# Patient Record
Sex: FEMALE | Race: ASIAN | NOT HISPANIC OR LATINO | URBAN - METROPOLITAN AREA
[De-identification: names, ages, dates, MRNs, and addresses within clinical notes are randomized per-mention and may not be internally consistent; named-entity substitution may affect disease eponyms.]

---

## 2017-08-09 ENCOUNTER — OUTPATIENT (OUTPATIENT)
Dept: OUTPATIENT SERVICES | Facility: HOSPITAL | Age: 72
LOS: 1 days | Discharge: HOME | End: 2017-08-09
Payer: MEDICAID

## 2017-08-09 DIAGNOSIS — D64.9 ANEMIA, UNSPECIFIED: ICD-10-CM

## 2017-08-10 ENCOUNTER — RESULT REVIEW (OUTPATIENT)
Age: 72
End: 2017-08-10

## 2017-08-10 ENCOUNTER — APPOINTMENT (OUTPATIENT)
Dept: HEMATOLOGY ONCOLOGY | Facility: CLINIC | Age: 72
End: 2017-08-10

## 2017-08-10 VITALS
HEART RATE: 62 BPM | HEIGHT: 60.71 IN | TEMPERATURE: 98.1 F | DIASTOLIC BLOOD PRESSURE: 66 MMHG | OXYGEN SATURATION: 100 % | WEIGHT: 98.55 LBS | BODY MASS INDEX: 18.85 KG/M2 | RESPIRATION RATE: 16 BRPM | SYSTOLIC BLOOD PRESSURE: 151 MMHG

## 2017-08-10 DIAGNOSIS — C90.00 MULTIPLE MYELOMA NOT HAVING ACHIEVED REMISSION: ICD-10-CM

## 2017-08-10 LAB
ANISOCYTOSIS BLD QL: SIGNIFICANT CHANGE UP
BASOPHILS # BLD AUTO: 0 K/UL — SIGNIFICANT CHANGE UP (ref 0–0.2)
DACRYOCYTES BLD QL SMEAR: SLIGHT — SIGNIFICANT CHANGE UP
ELLIPTOCYTES BLD QL SMEAR: SLIGHT — SIGNIFICANT CHANGE UP
EOSINOPHIL # BLD AUTO: 0.2 K/UL — SIGNIFICANT CHANGE UP (ref 0–0.5)
EOSINOPHIL NFR BLD AUTO: 5 % — SIGNIFICANT CHANGE UP (ref 0–6)
HCT VFR BLD CALC: 27.1 % — LOW (ref 34.5–45)
HGB BLD-MCNC: 9 G/DL — LOW (ref 11.5–15.5)
HYPOCHROMIA BLD QL: SLIGHT — SIGNIFICANT CHANGE UP
LYMPHOCYTES # BLD AUTO: 2 K/UL — SIGNIFICANT CHANGE UP (ref 1–3.3)
LYMPHOCYTES # BLD AUTO: 33 % — SIGNIFICANT CHANGE UP (ref 13–44)
MACROCYTES BLD QL: SLIGHT — SIGNIFICANT CHANGE UP
MCHC RBC-ENTMCNC: 25.6 PG — LOW (ref 27–34)
MCHC RBC-ENTMCNC: 33.4 G/DL — SIGNIFICANT CHANGE UP (ref 32–36)
MCV RBC AUTO: 76.7 FL — LOW (ref 80–100)
MICROCYTES BLD QL: SLIGHT — SIGNIFICANT CHANGE UP
MONOCYTES # BLD AUTO: 0.6 K/UL — SIGNIFICANT CHANGE UP (ref 0–0.9)
MONOCYTES NFR BLD AUTO: 10 % — SIGNIFICANT CHANGE UP (ref 2–14)
NEUTROPHILS # BLD AUTO: 3.4 K/UL — SIGNIFICANT CHANGE UP (ref 1.8–7.4)
NEUTROPHILS NFR BLD AUTO: 49 % — SIGNIFICANT CHANGE UP (ref 43–77)
NEUTS BAND # BLD: 3 % — SIGNIFICANT CHANGE UP (ref 0–8)
NRBC # BLD: 5 /100 — HIGH (ref 0–0)
PLAT MORPH BLD: NORMAL — SIGNIFICANT CHANGE UP
PLATELET # BLD AUTO: 247 K/UL — SIGNIFICANT CHANGE UP (ref 150–400)
POIKILOCYTOSIS BLD QL AUTO: SIGNIFICANT CHANGE UP
POLYCHROMASIA BLD QL SMEAR: SLIGHT — SIGNIFICANT CHANGE UP
RBC # BLD: 3.53 M/UL — LOW (ref 3.8–5.2)
RBC # FLD: 17.8 % — HIGH (ref 10.3–14.5)
RBC BLD AUTO: ABNORMAL
SCHISTOCYTES BLD QL AUTO: SLIGHT — SIGNIFICANT CHANGE UP
WBC # BLD: 6.3 K/UL — SIGNIFICANT CHANGE UP (ref 3.8–10.5)
WBC # FLD AUTO: 6.3 K/UL — SIGNIFICANT CHANGE UP (ref 3.8–10.5)

## 2017-08-10 PROCEDURE — 84166 PROTEIN E-PHORESIS/URINE/CSF: CPT | Mod: 26

## 2017-08-21 ENCOUNTER — APPOINTMENT (OUTPATIENT)
Dept: RADIOLOGY | Facility: CLINIC | Age: 72
End: 2017-08-21
Payer: MEDICAID

## 2017-08-21 ENCOUNTER — RESULT REVIEW (OUTPATIENT)
Age: 72
End: 2017-08-21

## 2017-08-21 ENCOUNTER — OUTPATIENT (OUTPATIENT)
Dept: OUTPATIENT SERVICES | Facility: HOSPITAL | Age: 72
LOS: 1 days | End: 2017-08-21
Payer: MEDICAID

## 2017-08-21 DIAGNOSIS — Z00.8 ENCOUNTER FOR OTHER GENERAL EXAMINATION: ICD-10-CM

## 2017-08-21 PROCEDURE — 77075 RADEX OSSEOUS SURVEY COMPL: CPT | Mod: 26

## 2017-08-21 PROCEDURE — 77075 RADEX OSSEOUS SURVEY COMPL: CPT

## 2017-09-11 ENCOUNTER — APPOINTMENT (OUTPATIENT)
Dept: HEMATOLOGY ONCOLOGY | Facility: CLINIC | Age: 72
End: 2017-09-11

## 2017-09-13 ENCOUNTER — OUTPATIENT (OUTPATIENT)
Dept: OUTPATIENT SERVICES | Facility: HOSPITAL | Age: 72
LOS: 1 days | Discharge: HOME | End: 2017-09-13

## 2017-09-13 DIAGNOSIS — D64.9 ANEMIA, UNSPECIFIED: ICD-10-CM

## 2017-09-15 ENCOUNTER — OUTPATIENT (OUTPATIENT)
Dept: OUTPATIENT SERVICES | Facility: HOSPITAL | Age: 72
LOS: 1 days | End: 2017-09-15
Payer: MEDICAID

## 2017-09-15 DIAGNOSIS — D64.9 ANEMIA, UNSPECIFIED: ICD-10-CM

## 2017-09-17 ENCOUNTER — RESULT REVIEW (OUTPATIENT)
Age: 72
End: 2017-09-17

## 2017-09-18 ENCOUNTER — RESULT REVIEW (OUTPATIENT)
Age: 72
End: 2017-09-18

## 2017-09-18 ENCOUNTER — APPOINTMENT (OUTPATIENT)
Dept: HEMATOLOGY ONCOLOGY | Facility: CLINIC | Age: 72
End: 2017-09-18

## 2017-09-18 ENCOUNTER — APPOINTMENT (OUTPATIENT)
Dept: ORTHOPEDIC SURGERY | Facility: CLINIC | Age: 72
End: 2017-09-18
Payer: MEDICAID

## 2017-09-18 VITALS
SYSTOLIC BLOOD PRESSURE: 161 MMHG | HEIGHT: 62 IN | HEART RATE: 67 BPM | BODY MASS INDEX: 18.4 KG/M2 | WEIGHT: 100 LBS | DIASTOLIC BLOOD PRESSURE: 71 MMHG

## 2017-09-18 VITALS
TEMPERATURE: 98.1 F | HEART RATE: 68 BPM | BODY MASS INDEX: 19.3 KG/M2 | SYSTOLIC BLOOD PRESSURE: 136 MMHG | RESPIRATION RATE: 16 BRPM | DIASTOLIC BLOOD PRESSURE: 80 MMHG | OXYGEN SATURATION: 100 % | WEIGHT: 101.19 LBS

## 2017-09-18 DIAGNOSIS — Z87.39 PERSONAL HISTORY OF OTHER DISEASES OF THE MUSCULOSKELETAL SYSTEM AND CONNECTIVE TISSUE: ICD-10-CM

## 2017-09-18 DIAGNOSIS — Z78.9 OTHER SPECIFIED HEALTH STATUS: ICD-10-CM

## 2017-09-18 DIAGNOSIS — Z82.61 FAMILY HISTORY OF ARTHRITIS: ICD-10-CM

## 2017-09-18 DIAGNOSIS — M19.041 PRIMARY OSTEOARTHRITIS, RIGHT HAND: ICD-10-CM

## 2017-09-18 PROCEDURE — 88189 FLOWCYTOMETRY/READ 16 & >: CPT

## 2017-09-18 PROCEDURE — 81261 IGH GENE REARRANGE AMP METH: CPT

## 2017-09-18 PROCEDURE — 88291 CYTO/MOLECULAR REPORT: CPT

## 2017-09-18 PROCEDURE — 81264 IGK REARRANGEABN CLONAL POP: CPT

## 2017-09-18 PROCEDURE — 88271 CYTOGENETICS DNA PROBE: CPT

## 2017-09-18 PROCEDURE — 88280 CHROMOSOME KARYOTYPE STUDY: CPT

## 2017-09-18 PROCEDURE — 99204 OFFICE O/P NEW MOD 45 MIN: CPT

## 2017-09-18 PROCEDURE — 88237 TISSUE CULTURE BONE MARROW: CPT

## 2017-09-18 PROCEDURE — 88184 FLOWCYTOMETRY/ TC 1 MARKER: CPT

## 2017-09-18 PROCEDURE — 88264 CHROMOSOME ANALYSIS 20-25: CPT

## 2017-09-18 PROCEDURE — 88185 FLOWCYTOMETRY/TC ADD-ON: CPT

## 2017-09-18 PROCEDURE — 88275 CYTOGENETICS 100-300: CPT

## 2017-09-19 DIAGNOSIS — C90.00 MULTIPLE MYELOMA NOT HAVING ACHIEVED REMISSION: ICD-10-CM

## 2017-09-19 PROBLEM — Z78.9 DENIES ALCOHOL CONSUMPTION: Status: ACTIVE | Noted: 2017-09-18

## 2017-09-19 PROBLEM — Z78.9 EXERCISES OCCASIONALLY: Status: ACTIVE | Noted: 2017-09-18

## 2017-09-19 PROBLEM — Z87.39 HISTORY OF ARTHRITIS: Status: RESOLVED | Noted: 2017-09-18 | Resolved: 2017-09-19

## 2017-09-19 PROBLEM — Z78.9 DOES NOT USE ILLICIT DRUGS: Status: ACTIVE | Noted: 2017-09-18

## 2017-09-19 PROBLEM — Z87.39 HISTORY OF RHEUMATOID ARTHRITIS: Status: RESOLVED | Noted: 2017-09-18 | Resolved: 2017-09-19

## 2017-09-19 PROBLEM — Z87.39 HISTORY OF OSTEOPOROSIS: Status: RESOLVED | Noted: 2017-09-18 | Resolved: 2017-09-19

## 2017-09-19 PROBLEM — Z82.61 FAMILY HISTORY OF ARTHRITIS: Status: ACTIVE | Noted: 2017-09-18

## 2017-09-19 PROBLEM — Z78.9 CURRENT NON-SMOKER: Status: ACTIVE | Noted: 2017-09-18

## 2017-09-21 LAB — DNA PLOIDY SPEC FC-IMP: SIGNIFICANT CHANGE UP

## 2017-09-22 LAB — HEMATOPATHOLOGY REPORT: SIGNIFICANT CHANGE UP

## 2017-09-25 LAB — TM INTERPRETATION: SIGNIFICANT CHANGE UP

## 2017-09-27 LAB
CHROM ANALY INTERPHASE BLD FISH-IMP: SIGNIFICANT CHANGE UP
CHROM ANALY OVERALL INTERP SPEC-IMP: SIGNIFICANT CHANGE UP

## 2017-09-29 ENCOUNTER — FORM ENCOUNTER (OUTPATIENT)
Age: 72
End: 2017-09-29

## 2017-09-30 ENCOUNTER — OUTPATIENT (OUTPATIENT)
Dept: OUTPATIENT SERVICES | Facility: HOSPITAL | Age: 72
LOS: 1 days | End: 2017-09-30
Payer: MEDICAID

## 2017-09-30 ENCOUNTER — APPOINTMENT (OUTPATIENT)
Dept: RADIOLOGY | Facility: IMAGING CENTER | Age: 72
End: 2017-09-30
Payer: MEDICAID

## 2017-09-30 DIAGNOSIS — C90.00 MULTIPLE MYELOMA NOT HAVING ACHIEVED REMISSION: ICD-10-CM

## 2017-09-30 PROCEDURE — 77074 RADEX OSSEOUS SURVEY LMTD: CPT

## 2017-09-30 PROCEDURE — 77074 RADEX OSSEOUS SURVEY LMTD: CPT | Mod: 26

## 2017-10-09 ENCOUNTER — APPOINTMENT (OUTPATIENT)
Dept: HEMATOLOGY ONCOLOGY | Facility: CLINIC | Age: 72
End: 2017-10-09

## 2017-10-22 ENCOUNTER — FORM ENCOUNTER (OUTPATIENT)
Age: 72
End: 2017-10-22

## 2017-10-23 ENCOUNTER — OUTPATIENT (OUTPATIENT)
Dept: OUTPATIENT SERVICES | Facility: HOSPITAL | Age: 72
LOS: 1 days | End: 2017-10-23
Payer: MEDICAID

## 2017-10-23 ENCOUNTER — APPOINTMENT (OUTPATIENT)
Dept: NUCLEAR MEDICINE | Facility: IMAGING CENTER | Age: 72
End: 2017-10-23
Payer: MEDICAID

## 2017-10-23 DIAGNOSIS — C90.00 MULTIPLE MYELOMA NOT HAVING ACHIEVED REMISSION: ICD-10-CM

## 2017-10-23 PROCEDURE — A9552: CPT

## 2017-10-23 PROCEDURE — 78816 PET IMAGE W/CT FULL BODY: CPT | Mod: 26,PI

## 2017-10-23 PROCEDURE — 78816 PET IMAGE W/CT FULL BODY: CPT

## 2019-11-18 ENCOUNTER — APPOINTMENT (OUTPATIENT)
Dept: VASCULAR SURGERY | Facility: CLINIC | Age: 74
End: 2019-11-18
Payer: MEDICAID

## 2019-11-18 PROCEDURE — 93971 EXTREMITY STUDY: CPT

## 2019-11-18 PROCEDURE — 99203 OFFICE O/P NEW LOW 30 MIN: CPT

## 2019-11-25 NOTE — PHYSICAL EXAM
[2+] : left 2+ [No Rash or Lesion] : No rash or lesion [Alert] : alert [Calm] : calm [JVD] : no jugular venous distention  [Normal Breath Sounds] : Normal breath sounds [Normal Heart Sounds] : normal heart sounds [Ankle Swelling (On Exam)] : not present [Abdomen Masses] : No abdominal masses [Skin Ulcer] : no ulcer [Oriented to Place] : oriented to place [Oriented to Person] : oriented to person [de-identified] : appears well  [de-identified] : right ac fossa notable for dialated vein that is compressible, no pulsatility, no erythema no palpable cord \par  strength 5/5 b/l upper extremities\par  [de-identified] : intact

## 2019-11-25 NOTE — ASSESSMENT
[FreeTextEntry1] : 75 yo female with history of htn, ckd, pre-dm, gout, thalasemia, anemia presents for evaluation of right upper extremity mass\par venous duplex shows chronic webbing with cephalic vein dilation \par no intervention at this time\par pt advised to avoid the area for venupuncture \par pt to follow up as needed

## 2020-07-01 ENCOUNTER — RECORD ABSTRACTING (OUTPATIENT)
Age: 75
End: 2020-07-01

## 2020-07-02 ENCOUNTER — OUTPATIENT (OUTPATIENT)
Dept: OUTPATIENT SERVICES | Facility: HOSPITAL | Age: 75
LOS: 1 days | Discharge: HOME | End: 2020-07-02

## 2020-07-02 DIAGNOSIS — D64.9 ANEMIA, UNSPECIFIED: ICD-10-CM

## 2020-07-09 ENCOUNTER — APPOINTMENT (OUTPATIENT)
Dept: HEMATOLOGY ONCOLOGY | Facility: CLINIC | Age: 75
End: 2020-07-09
Payer: MEDICARE

## 2020-07-09 PROCEDURE — 99203 OFFICE O/P NEW LOW 30 MIN: CPT | Mod: 95

## 2020-07-09 NOTE — ASSESSMENT
[FreeTextEntry1] : This is a 74 year old woman with a history of kappa light cahin MGUS, bone marrow biopsy demonstrated 9% plasma cells, with CCND1 mutation in 4% of cells, IgH and IgK positive, 46XX karyotype.  Chapis lost to follow up for nearly 3 years while in the [facility], monika had not been following up with a physician there.  Will re-evaluate the MGUS with CBC, CMP, LDH, SPEP, ELIECER, Free light chains.  If there is an indication of progressive anemia or renal insufficiency, will need to repeat bone marrow biopsy and consider treatment. Otherwise will monitor as an MGUS.

## 2020-07-09 NOTE — HISTORY OF PRESENT ILLNESS
[de-identified] : THis is a 74 year old woman with a history of MGUS 9% plasma cells on bone marrow biopsy in September 2017.  Hx of renal insufficeincy and chronic anemia, hypertension, hyperlipidemia.  Bone marrow biopsy performed due to kappa light chain MGUS.  Patient  was lost to follow up for 3 years, had been living in the [facility] until recently ocming back to [place] in June. Has not had any labwork done in the [facility] and has not yet re-established care since then.  Chapis feels well on the most part, denies any weakness or fatigue, denies any bony aches or body pains.  Patient \par \par Patient does not have any body aches or bone pains.  \par \par Does have a history of an anemia hg 8-9 range with a Creatinine of 1.6-1.9 in 2017 no more recentlab.s  \par \par PET scan negative no lytic lesions October 24 2017,  Do not have any more recent records.\par \par She sees a nephrologist Dr. Breen.  however has not seen them either since coming back to the [place]. [de-identified] : \par Comprehensive Hematopathology Report\par Final Diagnosis:\par 1, 2. Bone marrow biopsy and bone marrow aspirate\par - Trilineage hematopoiesis with maturation and present iron\par stores\par - Mild monotypic plasmacytosis (approximately 8-10% plasma\par cells)\par Diagnostic Note:\par Plasma cells are increased according to bone marrow aspirate (9%\par of cells). Flow cytometry showed 2% monotypic plasma cells with\par aberrant phenotype. Biopsy was minimal (quantification of plasma\par cells may not be representative); however  showed increased\par plasma cells (8-10% marrow cellularity; minimal sample). Suggest\par follow-up, re-evaluation of bone marrow if clinically indicated,\par and correlation with relevant clinical, laboratory (monoclonal\par protein type and amount) and radiologic imaging. Please note\par findings of a normal female karyotype and a positive multiple\par myeloma panel FISH study for gains of CCND1 in 4% of cells. Based\par on the additional findings, the diagnosis has not changed.\par \par Karyotype 46 XX\par IgH, IgK positive\par FISH positive for gain in CCND in 4%

## 2020-07-17 ENCOUNTER — LABORATORY RESULT (OUTPATIENT)
Age: 75
End: 2020-07-17

## 2020-07-22 LAB
ALBUMIN MFR SERPL ELPH: 61.4 %
ALBUMIN SERPL ELPH-MCNC: 5.3 G/DL
ALBUMIN SERPL-MCNC: 5 G/DL
ALBUMIN/GLOB SERPL: 1.6 RATIO
ALP BLD-CCNC: 51 U/L
ALPHA1 GLOB MFR SERPL ELPH: 2.9 %
ALPHA1 GLOB SERPL ELPH-MCNC: 0.2 G/DL
ALPHA2 GLOB MFR SERPL ELPH: 8.6 %
ALPHA2 GLOB SERPL ELPH-MCNC: 0.7 G/DL
ALT SERPL-CCNC: 24 U/L
ANION GAP SERPL CALC-SCNC: 16 MMOL/L
AST SERPL-CCNC: 31 U/L
B-GLOBULIN MFR SERPL ELPH: 8.2 %
B-GLOBULIN SERPL ELPH-MCNC: 0.7 G/DL
B2 MICROGLOB SERPL-MCNC: 7.3 MG/L
BASOPHILS # BLD AUTO: 0.06 K/UL
BASOPHILS NFR BLD AUTO: 0.9 %
BILIRUB SERPL-MCNC: 0.2 MG/DL
BUN SERPL-MCNC: 29 MG/DL
CALCIUM SERPL-MCNC: 9.6 MG/DL
CHLORIDE SERPL-SCNC: 106 MMOL/L
CO2 SERPL-SCNC: 19 MMOL/L
CREAT SERPL-MCNC: 1.93 MG/DL
DEPRECATED KAPPA LC FREE/LAMBDA SER: 7.75 RATIO
DEPRECATED KAPPA LC FREE/LAMBDA SER: 7.75 RATIO
EOSINOPHIL # BLD AUTO: 0.32 K/UL
EOSINOPHIL NFR BLD AUTO: 5.2 %
FERRITIN SERPL-MCNC: 2211 NG/ML
FOLATE SERPL-MCNC: >20 NG/ML
GAMMA GLOB FLD ELPH-MCNC: 1.5 G/DL
GAMMA GLOB MFR SERPL ELPH: 18.9 %
GLUCOSE SERPL-MCNC: 101 MG/DL
HCT VFR BLD CALC: 31.7 %
HGB BLD-MCNC: 9.2 G/DL
IGA SER QL IEP: 130 MG/DL
IGG SER QL IEP: 1658 MG/DL
IGM SER QL IEP: 57 MG/DL
INTERPRETATION SERPL IEP-IMP: NORMAL
IRON SATN MFR SERPL: 81 %
IRON SERPL-MCNC: 165 UG/DL
KAPPA LC CSF-MCNC: 3.34 MG/DL
KAPPA LC CSF-MCNC: 3.34 MG/DL
KAPPA LC SERPL-MCNC: 25.87 MG/DL
KAPPA LC SERPL-MCNC: 25.87 MG/DL
LDH SERPL-CCNC: 245 U/L
LYMPHOCYTES # BLD AUTO: 1.54 K/UL
LYMPHOCYTES NFR BLD AUTO: 25.2 %
M PROTEIN MFR SERPL ELPH: 13.6 %
M PROTEIN SPEC IFE-MCNC: NORMAL
MAN DIFF?: NORMAL
MCHC RBC-ENTMCNC: 25.7 PG
MCHC RBC-ENTMCNC: 29 GM/DL
MCV RBC AUTO: 88.5 FL
MONOCLON BAND OBS SERPL: 1.1 G/DL
MONOCYTES # BLD AUTO: 0.32 K/UL
MONOCYTES NFR BLD AUTO: 5.2 %
NEUTROPHILS # BLD AUTO: 3.89 K/UL
NEUTROPHILS NFR BLD AUTO: 63.5 %
PLATELET # BLD AUTO: 189 K/UL
POTASSIUM SERPL-SCNC: 5.3 MMOL/L
PROT SERPL-MCNC: 8.1 G/DL
RBC # BLD: 3.58 M/UL
RBC # BLD: 3.58 M/UL
RBC # FLD: 16.6 %
RETICS # AUTO: 1.1 %
RETICS AGGREG/RBC NFR: 39.8 K/UL
SODIUM SERPL-SCNC: 142 MMOL/L
TIBC SERPL-MCNC: 204 UG/DL
UIBC SERPL-MCNC: 39 UG/DL
VIT B12 SERPL-MCNC: 1188 PG/ML
WBC # FLD AUTO: 6.13 K/UL

## 2021-01-06 DIAGNOSIS — D47.2 MONOCLONAL GAMMOPATHY: ICD-10-CM

## 2021-01-12 ENCOUNTER — OUTPATIENT (OUTPATIENT)
Dept: OUTPATIENT SERVICES | Facility: HOSPITAL | Age: 76
LOS: 1 days | Discharge: HOME | End: 2021-01-12

## 2021-01-12 DIAGNOSIS — D64.9 ANEMIA, UNSPECIFIED: ICD-10-CM

## 2021-01-13 ENCOUNTER — APPOINTMENT (OUTPATIENT)
Dept: HEMATOLOGY ONCOLOGY | Facility: CLINIC | Age: 76
End: 2021-01-13

## 2022-02-06 ENCOUNTER — INPATIENT (INPATIENT)
Facility: HOSPITAL | Age: 77
LOS: 23 days | Discharge: NURSING, REHAB OR LONG-TERM CARE | DRG: 312 | End: 2022-03-02
Attending: INTERNAL MEDICINE | Admitting: INTERNAL MEDICINE
Payer: MEDICARE

## 2022-02-06 ENCOUNTER — EMERGENCY (EMERGENCY)
Facility: HOSPITAL | Age: 77
LOS: 0 days | Discharge: ANOTHER HOSPITAL | End: 2022-02-06
Attending: STUDENT IN AN ORGANIZED HEALTH CARE EDUCATION/TRAINING PROGRAM
Payer: MEDICARE

## 2022-02-06 VITALS
OXYGEN SATURATION: 98 % | TEMPERATURE: 98 F | RESPIRATION RATE: 18 BRPM | HEART RATE: 93 BPM | SYSTOLIC BLOOD PRESSURE: 160 MMHG | DIASTOLIC BLOOD PRESSURE: 74 MMHG

## 2022-02-06 VITALS
DIASTOLIC BLOOD PRESSURE: 81 MMHG | SYSTOLIC BLOOD PRESSURE: 168 MMHG | HEIGHT: 60 IN | RESPIRATION RATE: 16 BRPM | HEART RATE: 88 BPM | OXYGEN SATURATION: 100 % | TEMPERATURE: 98 F | WEIGHT: 85.1 LBS

## 2022-02-06 VITALS
OXYGEN SATURATION: 98 % | RESPIRATION RATE: 16 BRPM | HEIGHT: 60 IN | WEIGHT: 87.96 LBS | TEMPERATURE: 98 F | HEART RATE: 93 BPM | SYSTOLIC BLOOD PRESSURE: 167 MMHG | DIASTOLIC BLOOD PRESSURE: 76 MMHG

## 2022-02-06 DIAGNOSIS — I10 ESSENTIAL (PRIMARY) HYPERTENSION: ICD-10-CM

## 2022-02-06 DIAGNOSIS — S12.9XXA FRACTURE OF NECK, UNSPECIFIED, INITIAL ENCOUNTER: ICD-10-CM

## 2022-02-06 DIAGNOSIS — M54.2 CERVICALGIA: ICD-10-CM
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
## 2022-02-06 DIAGNOSIS — S12.290A OTHER DISPLACED FRACTURE OF THIRD CERVICAL VERTEBRA, INITIAL ENCOUNTER FOR CLOSED FRACTURE: ICD-10-CM

## 2022-02-06 DIAGNOSIS — E78.5 HYPERLIPIDEMIA, UNSPECIFIED: ICD-10-CM

## 2022-02-06 DIAGNOSIS — Z20.822 CONTACT WITH AND (SUSPECTED) EXPOSURE TO COVID-19: ICD-10-CM

## 2022-02-06 DIAGNOSIS — M79.645 PAIN IN LEFT FINGER(S): ICD-10-CM
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
## 2022-02-06 LAB
ALBUMIN SERPL ELPH-MCNC: 3.8 G/DL — SIGNIFICANT CHANGE UP (ref 3.3–5)
ALBUMIN SERPL ELPH-MCNC: 4.5 G/DL — SIGNIFICANT CHANGE UP (ref 3.3–5)
ALP SERPL-CCNC: 67 U/L — SIGNIFICANT CHANGE UP (ref 40–120)
ALP SERPL-CCNC: 70 U/L — SIGNIFICANT CHANGE UP (ref 40–120)
ALT FLD-CCNC: 26 U/L — SIGNIFICANT CHANGE UP (ref 10–45)
ALT FLD-CCNC: 39 U/L — SIGNIFICANT CHANGE UP (ref 12–78)
ANION GAP SERPL CALC-SCNC: 11 MMOL/L — SIGNIFICANT CHANGE UP (ref 5–17)
ANION GAP SERPL CALC-SCNC: 4 MMOL/L — LOW (ref 5–17)
APPEARANCE UR: CLEAR — SIGNIFICANT CHANGE UP
APTT BLD: 30 SEC — SIGNIFICANT CHANGE UP (ref 27.5–35.5)
AST SERPL-CCNC: 29 U/L — SIGNIFICANT CHANGE UP (ref 10–40)
AST SERPL-CCNC: 31 U/L — SIGNIFICANT CHANGE UP (ref 15–37)
BACTERIA # UR AUTO: NEGATIVE — SIGNIFICANT CHANGE UP
BASOPHILS # BLD AUTO: 0.01 K/UL — SIGNIFICANT CHANGE UP (ref 0–0.2)
BASOPHILS # BLD AUTO: 0.02 K/UL — SIGNIFICANT CHANGE UP (ref 0–0.2)
BASOPHILS NFR BLD AUTO: 0.1 % — SIGNIFICANT CHANGE UP (ref 0–2)
BASOPHILS NFR BLD AUTO: 0.3 % — SIGNIFICANT CHANGE UP (ref 0–2)
BILIRUB SERPL-MCNC: 0.2 MG/DL — SIGNIFICANT CHANGE UP (ref 0.2–1.2)
BILIRUB SERPL-MCNC: 0.2 MG/DL — SIGNIFICANT CHANGE UP (ref 0.2–1.2)
BILIRUB UR-MCNC: NEGATIVE — SIGNIFICANT CHANGE UP
BUN SERPL-MCNC: 26 MG/DL — HIGH (ref 7–23)
BUN SERPL-MCNC: 31 MG/DL — HIGH (ref 7–23)
CALCIUM SERPL-MCNC: 8.4 MG/DL — LOW (ref 8.5–10.1)
CALCIUM SERPL-MCNC: 9.1 MG/DL — SIGNIFICANT CHANGE UP (ref 8.4–10.5)
CHLORIDE SERPL-SCNC: 109 MMOL/L — HIGH (ref 96–108)
CHLORIDE SERPL-SCNC: 113 MMOL/L — HIGH (ref 96–108)
CO2 SERPL-SCNC: 17 MMOL/L — LOW (ref 22–31)
CO2 SERPL-SCNC: 22 MMOL/L — SIGNIFICANT CHANGE UP (ref 22–31)
COLOR SPEC: COLORLESS — SIGNIFICANT CHANGE UP
CREAT SERPL-MCNC: 1.99 MG/DL — HIGH (ref 0.5–1.3)
CREAT SERPL-MCNC: 2.29 MG/DL — HIGH (ref 0.5–1.3)
DIFF PNL FLD: NEGATIVE — SIGNIFICANT CHANGE UP
EOSINOPHIL # BLD AUTO: 0.03 K/UL — SIGNIFICANT CHANGE UP (ref 0–0.5)
EOSINOPHIL # BLD AUTO: 0.05 K/UL — SIGNIFICANT CHANGE UP (ref 0–0.5)
EOSINOPHIL NFR BLD AUTO: 0.4 % — SIGNIFICANT CHANGE UP (ref 0–6)
EOSINOPHIL NFR BLD AUTO: 0.7 % — SIGNIFICANT CHANGE UP (ref 0–6)
EPI CELLS # UR: 1 /HPF — SIGNIFICANT CHANGE UP
FLUAV AG NPH QL: SIGNIFICANT CHANGE UP
FLUBV AG NPH QL: SIGNIFICANT CHANGE UP
GLUCOSE SERPL-MCNC: 128 MG/DL — HIGH (ref 70–99)
GLUCOSE SERPL-MCNC: 145 MG/DL — HIGH (ref 70–99)
GLUCOSE UR QL: NEGATIVE — SIGNIFICANT CHANGE UP
HCT VFR BLD CALC: 29 % — LOW (ref 34.5–45)
HCT VFR BLD CALC: 30.9 % — LOW (ref 34.5–45)
HGB BLD-MCNC: 8.6 G/DL — LOW (ref 11.5–15.5)
HGB BLD-MCNC: 9.4 G/DL — LOW (ref 11.5–15.5)
HYALINE CASTS # UR AUTO: 1 /LPF — SIGNIFICANT CHANGE UP (ref 0–2)
IMM GRANULOCYTES NFR BLD AUTO: 1.1 % — SIGNIFICANT CHANGE UP (ref 0–1.5)
IMM GRANULOCYTES NFR BLD AUTO: 1.2 % — SIGNIFICANT CHANGE UP (ref 0–1.5)
INR BLD: 0.86 RATIO — LOW (ref 0.88–1.16)
KETONES UR-MCNC: NEGATIVE — SIGNIFICANT CHANGE UP
LEUKOCYTE ESTERASE UR-ACNC: NEGATIVE — SIGNIFICANT CHANGE UP
LYMPHOCYTES # BLD AUTO: 0.74 K/UL — LOW (ref 1–3.3)
LYMPHOCYTES # BLD AUTO: 0.75 K/UL — LOW (ref 1–3.3)
LYMPHOCYTES # BLD AUTO: 8.9 % — LOW (ref 13–44)
LYMPHOCYTES # BLD AUTO: 9.9 % — LOW (ref 13–44)
MCHC RBC-ENTMCNC: 25.6 PG — LOW (ref 27–34)
MCHC RBC-ENTMCNC: 25.8 PG — LOW (ref 27–34)
MCHC RBC-ENTMCNC: 29.7 GM/DL — LOW (ref 32–36)
MCHC RBC-ENTMCNC: 30.4 G/DL — LOW (ref 32–36)
MCV RBC AUTO: 84.9 FL — SIGNIFICANT CHANGE UP (ref 80–100)
MCV RBC AUTO: 86.3 FL — SIGNIFICANT CHANGE UP (ref 80–100)
MONOCYTES # BLD AUTO: 0.39 K/UL — SIGNIFICANT CHANGE UP (ref 0–0.9)
MONOCYTES # BLD AUTO: 0.39 K/UL — SIGNIFICANT CHANGE UP (ref 0–0.9)
MONOCYTES NFR BLD AUTO: 4.6 % — SIGNIFICANT CHANGE UP (ref 2–14)
MONOCYTES NFR BLD AUTO: 5.2 % — SIGNIFICANT CHANGE UP (ref 2–14)
NEUTROPHILS # BLD AUTO: 6.21 K/UL — SIGNIFICANT CHANGE UP (ref 1.8–7.4)
NEUTROPHILS # BLD AUTO: 7.11 K/UL — SIGNIFICANT CHANGE UP (ref 1.8–7.4)
NEUTROPHILS NFR BLD AUTO: 82.8 % — HIGH (ref 43–77)
NEUTROPHILS NFR BLD AUTO: 84.8 % — HIGH (ref 43–77)
NITRITE UR-MCNC: NEGATIVE — SIGNIFICANT CHANGE UP
NRBC # BLD: 0 /100 WBCS — SIGNIFICANT CHANGE UP (ref 0–0)
NRBC # BLD: 0 /100 WBCS — SIGNIFICANT CHANGE UP (ref 0–0)
NT-PROBNP SERPL-SCNC: 326 PG/ML — HIGH (ref 0–300)
PH UR: 8 — SIGNIFICANT CHANGE UP (ref 5–8)
PLATELET # BLD AUTO: 204 K/UL — SIGNIFICANT CHANGE UP (ref 150–400)
PLATELET # BLD AUTO: 244 K/UL — SIGNIFICANT CHANGE UP (ref 150–400)
POTASSIUM SERPL-MCNC: 5.6 MMOL/L — HIGH (ref 3.5–5.3)
POTASSIUM SERPL-MCNC: 5.7 MMOL/L — HIGH (ref 3.5–5.3)
POTASSIUM SERPL-SCNC: 5.6 MMOL/L — HIGH (ref 3.5–5.3)
POTASSIUM SERPL-SCNC: 5.7 MMOL/L — HIGH (ref 3.5–5.3)
PROT SERPL-MCNC: 8.2 G/DL — SIGNIFICANT CHANGE UP (ref 6–8.3)
PROT SERPL-MCNC: 8.7 GM/DL — HIGH (ref 6–8.3)
PROT UR-MCNC: ABNORMAL
PROTHROM AB SERPL-ACNC: 10.4 SEC — LOW (ref 10.6–13.6)
RBC # BLD: 3.36 M/UL — LOW (ref 3.8–5.2)
RBC # BLD: 3.64 M/UL — LOW (ref 3.8–5.2)
RBC # FLD: 18.6 % — HIGH (ref 10.3–14.5)
RBC # FLD: 18.6 % — HIGH (ref 10.3–14.5)
RBC CASTS # UR COMP ASSIST: 1 /HPF — SIGNIFICANT CHANGE UP (ref 0–4)
SARS-COV-2 RNA SPEC QL NAA+PROBE: SIGNIFICANT CHANGE UP
SODIUM SERPL-SCNC: 137 MMOL/L — SIGNIFICANT CHANGE UP (ref 135–145)
SODIUM SERPL-SCNC: 139 MMOL/L — SIGNIFICANT CHANGE UP (ref 135–145)
SP GR SPEC: 1.01 — SIGNIFICANT CHANGE UP (ref 1.01–1.02)
TROPONIN I, HIGH SENSITIVITY RESULT: 6.9 NG/L — SIGNIFICANT CHANGE UP
TROPONIN T, HIGH SENSITIVITY RESULT: 16 NG/L — SIGNIFICANT CHANGE UP (ref 0–51)
TROPONIN T, HIGH SENSITIVITY RESULT: 16 NG/L — SIGNIFICANT CHANGE UP (ref 0–51)
UROBILINOGEN FLD QL: NEGATIVE — SIGNIFICANT CHANGE UP
WBC # BLD: 7.49 K/UL — SIGNIFICANT CHANGE UP (ref 3.8–10.5)
WBC # BLD: 8.39 K/UL — SIGNIFICANT CHANGE UP (ref 3.8–10.5)
WBC # FLD AUTO: 7.49 K/UL — SIGNIFICANT CHANGE UP (ref 3.8–10.5)
WBC # FLD AUTO: 8.39 K/UL — SIGNIFICANT CHANGE UP (ref 3.8–10.5)
WBC UR QL: 0 /HPF — SIGNIFICANT CHANGE UP (ref 0–5)

## 2022-02-06 PROCEDURE — 70450 CT HEAD/BRAIN W/O DYE: CPT | Mod: 26,MA

## 2022-02-06 PROCEDURE — 99285 EMERGENCY DEPT VISIT HI MDM: CPT

## 2022-02-06 PROCEDURE — 93010 ELECTROCARDIOGRAM REPORT: CPT

## 2022-02-06 PROCEDURE — 73130 X-RAY EXAM OF HAND: CPT | Mod: 26,LT

## 2022-02-06 PROCEDURE — 72125 CT NECK SPINE W/O DYE: CPT | Mod: 26,MA

## 2022-02-06 PROCEDURE — 71045 X-RAY EXAM CHEST 1 VIEW: CPT | Mod: 26

## 2022-02-06 RX ORDER — SIMVASTATIN 20 MG/1
40 TABLET, FILM COATED ORAL AT BEDTIME
Refills: 0 | Status: DISCONTINUED | OUTPATIENT
Start: 2022-02-06 | End: 2022-03-02

## 2022-02-06 RX ORDER — ALBUTEROL 90 UG/1
2.5 AEROSOL, METERED ORAL ONCE
Refills: 0 | Status: COMPLETED | OUTPATIENT
Start: 2022-02-06 | End: 2022-02-06

## 2022-02-06 RX ORDER — SODIUM CHLORIDE 9 MG/ML
1000 INJECTION INTRAMUSCULAR; INTRAVENOUS; SUBCUTANEOUS ONCE
Refills: 0 | Status: COMPLETED | OUTPATIENT
Start: 2022-02-06 | End: 2022-02-06

## 2022-02-06 RX ORDER — AMLODIPINE BESYLATE 2.5 MG/1
0 TABLET ORAL
Qty: 0 | Refills: 0 | DISCHARGE

## 2022-02-06 RX ORDER — OXYCODONE AND ACETAMINOPHEN 5; 325 MG/1; MG/1
1 TABLET ORAL ONCE
Refills: 0 | Status: DISCONTINUED | OUTPATIENT
Start: 2022-02-06 | End: 2022-02-06

## 2022-02-06 RX ORDER — MORPHINE SULFATE 50 MG/1
2 CAPSULE, EXTENDED RELEASE ORAL ONCE
Refills: 0 | Status: DISCONTINUED | OUTPATIENT
Start: 2022-02-06 | End: 2022-02-06

## 2022-02-06 RX ORDER — METOPROLOL TARTRATE 50 MG
12.5 TABLET ORAL EVERY 12 HOURS
Refills: 0 | Status: DISCONTINUED | OUTPATIENT
Start: 2022-02-06 | End: 2022-02-08

## 2022-02-06 RX ORDER — CALCIUM ACETATE 667 MG
667 TABLET ORAL
Refills: 0 | Status: DISCONTINUED | OUTPATIENT
Start: 2022-02-06 | End: 2022-02-08

## 2022-02-06 RX ORDER — ACETAMINOPHEN 500 MG
750 TABLET ORAL ONCE
Refills: 0 | Status: COMPLETED | OUTPATIENT
Start: 2022-02-06 | End: 2022-02-06

## 2022-02-06 RX ORDER — SODIUM BICARBONATE 1 MEQ/ML
0 SYRINGE (ML) INTRAVENOUS
Qty: 0 | Refills: 0 | DISCHARGE

## 2022-02-06 RX ORDER — MORPHINE SULFATE 50 MG/1
4 CAPSULE, EXTENDED RELEASE ORAL ONCE
Refills: 0 | Status: DISCONTINUED | OUTPATIENT
Start: 2022-02-06 | End: 2022-02-06

## 2022-02-06 RX ORDER — HYDRALAZINE HCL 50 MG
10 TABLET ORAL
Refills: 0 | Status: DISCONTINUED | OUTPATIENT
Start: 2022-02-06 | End: 2022-02-07

## 2022-02-06 RX ORDER — HEPARIN SODIUM 5000 [USP'U]/ML
5000 INJECTION INTRAVENOUS; SUBCUTANEOUS EVERY 8 HOURS
Refills: 0 | Status: DISCONTINUED | OUTPATIENT
Start: 2022-02-06 | End: 2022-03-02

## 2022-02-06 RX ORDER — LATANOPROST 0.05 MG/ML
1 SOLUTION/ DROPS OPHTHALMIC; TOPICAL AT BEDTIME
Refills: 0 | Status: DISCONTINUED | OUTPATIENT
Start: 2022-02-06 | End: 2022-03-02

## 2022-02-06 RX ORDER — SIMVASTATIN 20 MG/1
0 TABLET, FILM COATED ORAL
Qty: 0 | Refills: 0 | DISCHARGE

## 2022-02-06 RX ORDER — LEVOTHYROXINE SODIUM 125 MCG
25 TABLET ORAL DAILY
Refills: 0 | Status: DISCONTINUED | OUTPATIENT
Start: 2022-02-06 | End: 2022-03-02

## 2022-02-06 RX ORDER — AMLODIPINE BESYLATE 2.5 MG/1
10 TABLET ORAL DAILY
Refills: 0 | Status: DISCONTINUED | OUTPATIENT
Start: 2022-02-06 | End: 2022-02-08

## 2022-02-06 RX ORDER — SODIUM BICARBONATE 1 MEQ/ML
650 SYRINGE (ML) INTRAVENOUS DAILY
Refills: 0 | Status: DISCONTINUED | OUTPATIENT
Start: 2022-02-06 | End: 2022-02-07

## 2022-02-06 RX ORDER — ONDANSETRON 8 MG/1
4 TABLET, FILM COATED ORAL ONCE
Refills: 0 | Status: COMPLETED | OUTPATIENT
Start: 2022-02-06 | End: 2022-02-06

## 2022-02-06 RX ORDER — ALLOPURINOL 300 MG
100 TABLET ORAL DAILY
Refills: 0 | Status: DISCONTINUED | OUTPATIENT
Start: 2022-02-06 | End: 2022-03-02

## 2022-02-06 RX ORDER — SODIUM CHLORIDE 9 MG/ML
1000 INJECTION, SOLUTION INTRAVENOUS
Refills: 0 | Status: DISCONTINUED | OUTPATIENT
Start: 2022-02-06 | End: 2022-02-07

## 2022-02-06 RX ADMIN — ONDANSETRON 4 MILLIGRAM(S): 8 TABLET, FILM COATED ORAL at 19:48

## 2022-02-06 RX ADMIN — OXYCODONE AND ACETAMINOPHEN 1 TABLET(S): 5; 325 TABLET ORAL at 23:29

## 2022-02-06 RX ADMIN — LATANOPROST 1 DROP(S): 0.05 SOLUTION/ DROPS OPHTHALMIC; TOPICAL at 22:06

## 2022-02-06 RX ADMIN — Medication 10 MILLIGRAM(S): at 22:06

## 2022-02-06 RX ADMIN — SODIUM CHLORIDE 60 MILLILITER(S): 9 INJECTION, SOLUTION INTRAVENOUS at 19:52

## 2022-02-06 RX ADMIN — Medication 12.5 MILLIGRAM(S): at 20:51

## 2022-02-06 RX ADMIN — MORPHINE SULFATE 4 MILLIGRAM(S): 50 CAPSULE, EXTENDED RELEASE ORAL at 14:28

## 2022-02-06 RX ADMIN — MORPHINE SULFATE 4 MILLIGRAM(S): 50 CAPSULE, EXTENDED RELEASE ORAL at 14:48

## 2022-02-06 RX ADMIN — HEPARIN SODIUM 5000 UNIT(S): 5000 INJECTION INTRAVENOUS; SUBCUTANEOUS at 22:07

## 2022-02-06 RX ADMIN — MORPHINE SULFATE 2 MILLIGRAM(S): 50 CAPSULE, EXTENDED RELEASE ORAL at 17:27

## 2022-02-06 RX ADMIN — SIMVASTATIN 40 MILLIGRAM(S): 20 TABLET, FILM COATED ORAL at 22:06

## 2022-02-06 RX ADMIN — OXYCODONE AND ACETAMINOPHEN 1 TABLET(S): 5; 325 TABLET ORAL at 23:59

## 2022-02-06 RX ADMIN — SODIUM CHLORIDE 1000 MILLILITER(S): 9 INJECTION INTRAMUSCULAR; INTRAVENOUS; SUBCUTANEOUS at 13:03

## 2022-02-06 RX ADMIN — ALBUTEROL 2.5 MILLIGRAM(S): 90 AEROSOL, METERED ORAL at 17:27

## 2022-02-06 RX ADMIN — SODIUM CHLORIDE 1000 MILLILITER(S): 9 INJECTION INTRAMUSCULAR; INTRAVENOUS; SUBCUTANEOUS at 14:29

## 2022-02-06 RX ADMIN — Medication 667 MILLIGRAM(S): at 22:06

## 2022-02-06 NOTE — CONSULT NOTE ADULT - ASSESSMENT
RADHA POLLACK  76F mult med hx p/w neck pain s/p fall this AM. In the bathroom, urinating, stood up too quickly, fell down, and hit back of head. CT C: acute C2 fx of body of the dens extending to left lateral mass (type 3), R C2 facet fx, mild comp fx sup endplate C3. Exam: AOx3, FC, PERRL, EOMI, no facial, 5/5 throughout, no drift, no bryan, no clonus, SILT.  -No acute nsgy intervention   -C collar at all times  -Outpatient f/u w/ Dr. Barton in 4-6w RADHA POLLACK  76F mult med hx p/w neck pain s/p fall this AM. In the bathroom, urinating, stood up too quickly, fell down, and hit back of head. CT C: acute C2 fx of body of the dens extending to left lateral mass (type 3), R C2 facet fx, mild comp fx sup endplate C3. Exam: AOx3, FC, PERRL, EOMI, no facial, 5/5 throughout, no drift, no bryan, no clonus, SILT.  -No acute nsgy intervention   -C collar at all times. Please contact hector hernandez for fitted collar:  [phone]  -Outpatient f/u w/ Dr. Barton in 4-6w

## 2022-02-06 NOTE — ED PROVIDER NOTE - OBJECTIVE STATEMENT
75 yo M w/ PMH of CKD, Anemia, Gout, HTN, HLD, presents to the ED for neck pain s/p fall this morning. Pt was in the bathroom, urinating, stood up too quickly, fell down, and hit the back of her head. Pt also c/o left ring finger pain. Denies CP, abdominal pain, N/V/D, hip pain, back pain, HA, chills, or any other extremity pain.

## 2022-02-06 NOTE — ED ADULT NURSE NOTE - OBJECTIVE STATEMENT
77 yo F presents to ED A+OX3 via EMS, transfer from [place] s/p fall this morning. As per EMS, patient was transferred for neurosurgery evaluation after being diagnosed with a C2 fracture. Patient arrives with C-collar in place. Patient reports Hx of dizziness. States this morning she stood up from the toilet, felt dizzy upon standing and fell. States her daughter heard her fall and called EMS. Patient reports neck pain, as per EMS patient was given pain medication prior to being transferred from [place]. Denies chest pain, SOB, abdominal pain, headache. Denies LOC.  Patient placed on cardiac monitor. Bed in lowest position, side rails up. Call bell within reach.

## 2022-02-06 NOTE — ED ADULT NURSE NOTE - INTERVENTIONS DEFINITIONS
[place] to call system/Call bell, personal items and telephone within reach/Instruct patient to call for assistance/Physically safe environment: no spills, clutter or unnecessary equipment/Monitor gait and stability/Monitor for mental status changes and reorient to person, place, and time/Reinforce activity limits and safety measures with patient and family

## 2022-02-06 NOTE — ED ADULT TRIAGE NOTE - CHIEF COMPLAINT QUOTE
p/w p/w neck pain s/p fall today at home, pt stated felt dizziness. denies blood thinners, pt states "my neck started to hurt"

## 2022-02-06 NOTE — ED ADULT NURSE REASSESSMENT NOTE - NS ED NURSE REASSESS COMMENT FT1
tx to [facility] pending for trauma/neurosurgery eval vss report given to tx center and lm [facility] rn

## 2022-02-06 NOTE — H&P ADULT - ASSESSMENT
75yo female pt with PMHx of CKD (Last K-5.3, Bun/Cr.-29/1.93 on 7/18/2020), Anemia, Gout, HTN, HLD, was transferred, on cervical collar, from [facility] c/o head/neck pain, C2 fracture s/p fall this morning with a syncopal episode.

## 2022-02-06 NOTE — H&P ADULT - NSHPPHYSICALEXAM_GEN_ALL_CORE
T(F): 98 (02-06-22 @ 15:35), Max: 98.3 (02-06-22 @ 14:34)  HR: 94 (02-06-22 @ 16:00) (88 - 94)  BP: 156/77 (02-06-22 @ 16:00) (156/77 - 168/81)  RR: 18 (02-06-22 @ 16:00) (16 - 18)  SpO2: 99% (02-06-22 @ 16:00) (98% - 100%)    PHYSICAL EXAM:  GENERAL: NAD, well-developed  HEAD:  Atraumatic, Normocephalic  EYES: EOMI, PERRLA, conjunctiva and sclera clear  NECK: Supple, No JVD  CHEST/LUNG: Clear to auscultation bilaterally; No wheeze  HEART: Regular rate and rhythm; No murmurs, rubs, or gallops  ABDOMEN: Soft, Nontender, Nondistended; Bowel sounds present  EXTREMITIES:  2+ Peripheral Pulses, No clubbing, cyanosis, or edema  PSYCH: AAOx3  NEUROLOGY: non-focal  SKIN: No rashes or lesions

## 2022-02-06 NOTE — ED ADULT NURSE NOTE - OBJECTIVE STATEMENT
76 year old female hx HTN, HLD, anemia, and kidney failure c/o left sided neck pain 10/10 after becoming dizzy in the bathroom and falling in the bathroom. Patient denies any head injury or LOC. No nausea or vomiting.

## 2022-02-06 NOTE — ED PROVIDER NOTE - PROGRESS NOTE DETAILS
Dr. Schuler consulting, Dr. Dalila Lopez from ED accepting physician ED to ED transfer to [facility] Dr. Barton consulting, Dr. Dalila Lopez from ED accepting physician ED to ED transfer to [facility]

## 2022-02-06 NOTE — ED PROVIDER NOTE - CARE PLAN
1 Principal Discharge DX:	Syncope   Principal Discharge DX:	Cervical spine fracture  Secondary Diagnosis:	Syncope  Secondary Diagnosis:	Hyperkalemia  Secondary Diagnosis:	Chronic kidney disease (CKD)

## 2022-02-06 NOTE — ED PROVIDER NOTE - PROGRESS NOTE DETAILS
Pt's evaluated by neurosurgery and recommended; -No acute nsgy intervention, -C collar at all times, and   Outpatient f/u w/ Dr. Barton in 4-6w. c-collar switched to Judy DUFFY c-pascual.

## 2022-02-06 NOTE — ED ADULT NURSE NOTE - NSICDXPASTMEDICALHX_GEN_ALL_CORE_FT
PAST MEDICAL HISTORY:  Anemia     Cataract     H/O kidney injury     HLD (hyperlipidemia)     HTN (hypertension)

## 2022-02-06 NOTE — ED ADULT NURSE NOTE - CHIEF COMPLAINT QUOTE
p/w neck pain s/p fall today at home, pt stated felt dizziness. denies blood thinners, pt states "my neck started to hurt"

## 2022-02-06 NOTE — ED PROVIDER NOTE - PHYSICAL EXAMINATION
NAD, VSS, Afebrile. +PERRL, EOMI. No facial tender. + Generalized cervical tender and on c-collar. No T/L tender. Lungs clear. No chest wall, rib or CVA tender. No pelvic or hip tender. No peripheral edema. Neuro- intact.

## 2022-02-06 NOTE — ED PROVIDER NOTE - OBJECTIVE STATEMENT
77yo female pt with PMHx of CKD (Last K-5.3, Bun/Cr.-29/1.93 on 7/18/2020), Anemia, Gout, HTN, HLD, was transferred, on cervical collar, from [facility] c/o head/neck pain, C2 fracture s/p fall this morning. Pt stated she felt dizzy after urination in her bathroom at 8:30am and fell. +LOC and noticed severe headache/neck pain. She's evaluated in [facility] and sent to ED for spine consult for C2 fracture. Denies visual changes or N/V. Denies sensory changes or weakness to extremities. Denies CP/SOB/Palpitation/abd pain. Denies lower back pain. Denies urinary problems. Denies fever, chills, cough or congestion.  PMD- Dr. Maykel He in [place]

## 2022-02-06 NOTE — ED PROVIDER NOTE - CLINICAL SUMMARY MEDICAL DECISION MAKING FREE TEXT BOX
Татьяна: 76 year old female with ckd, anemia, gout, transferred from [facility] for head/neck pain, C2 fracture s/p fall this morning.  felt dizzy after urination in her bathroom and fell.  transferred to ER for eval for ? c2 fracture. patient evaluated by nsx, and can have outpatient mri, will get labs, ekg, admit for workup of syncope.

## 2022-02-06 NOTE — CONSULT NOTE ADULT - SUBJECTIVE AND OBJECTIVE BOX
p (9930)     76F mult med hx p/w neck pain s/p fall this AM. In the bathroom, urinating, stood up too quickly, fell down, and hit back of head. CT C: acute C2 fx of body of the dens extending to left lateral mass (type 3), R C2 facet fx, mild comp fx sup endplate C3. Exam: AOx3, FC, PERRL, EOMI, no facial, 5/5 throughout, no drift, no bryan, no clonus, SILT.      --Anticoagulation:    =====================  PAST MEDICAL HISTORY   Anemia    HTN (hypertension)    HLD (hyperlipidemia)    H/O kidney injury    Cataract      PAST SURGICAL HISTORY         MEDICATIONS:  Antibiotics:    Neuro:    Other:      SOCIAL HISTORY:   Occupation:   Marital Status:     FAMILY HISTORY:      ROS: Negative except per HPI    LABS:                          9.4    7.49  )-----------( 244      ( 06 Feb 2022 13:09 )             30.9     02-06    139  |  113<H>  |  31<H>  ----------------------------<  145<H>  5.6<H>   |  22  |  2.29<H>    Ca    8.4<L>      06 Feb 2022 13:09    TPro  8.7<H>  /  Alb  3.8  /  TBili  0.2  /  DBili  x   /  AST  31  /  ALT  39  /  AlkPhos  70  02-06      v

## 2022-02-06 NOTE — ED PROVIDER NOTE - CLINICAL SUMMARY MEDICAL DECISION MAKING FREE TEXT BOX
C2 fracture   pain well controlled  no focal deficits on exam   Maintain C-collar  transfer to [facility] for trauma and NSGY evaluation   Dr. Barton NSGY evaluation  Dr. Lopez accepting physician from ED

## 2022-02-06 NOTE — ED PROVIDER NOTE - NS ED SCRIBE STATEMENT
Attending Partial Purse String (Intermediate) Text: Given the location of the defect and the characteristics of the surrounding skin an intermediate purse string closure was deemed most appropriate.  Undermining was performed circumferentially around the surgical defect.  A purse string suture was then placed and tightened. Wound tension of the circular defect prevented complete closure of the wound.

## 2022-02-06 NOTE — ED ADULT NURSE NOTE - CHPI ED NUR SYMPTOMS NEG
no abrasion/no bleeding/no confusion/no deformity/no fever/no loss of consciousness/no numbness/no tingling

## 2022-02-06 NOTE — ED PROVIDER NOTE - PHYSICAL EXAMINATION
GENERAL:  well appearing, non-toxic patient in no acute distress  SKIN: skin warm, pink and dry. MMM.   HEAD: NC, AT  EYE: Normal lids, conjunctiva and sclera, PERRL, EOMI  ENT:  Airway intact. Patent oropharynx without erythema or exudate. Uvula midline. TMs clear b/l.   NECK: Neck supple. No midline cervical tenderness, meningismus, or JVD. Trachea midline. + Pt wearing a c-colar.  PULM: CTAB. Normal respiratory effort. No respiratory distress. No wheezes, stridor, rales or rhonchi. No retractions  CV: RRR, no M/R/G.   ABD: Soft, non-tender, non-distended, no hepatosplenomegaly. No rebound or guarding. No CVA tenderness.  MSK: FROM of all extremities.  No MSK tenderness. No edema, erythema, cyanosis. Distal pulses intact.  NEURO: A+Ox3, no sensory/motor deficits, CN II-XII intact. No speech slurring, pronator drift, facial asymmetry. Normal finger-to-nose  b/l. 5/5 strength throughout. Normal gait. Negative Romberg.  PSYCH: appropriate behavior, cooperative.

## 2022-02-06 NOTE — ED ADULT NURSE NOTE - NSIMPLEMENTINTERV_GEN_ALL_ED
Implemented All Fall Risk Interventions:  [place] to call system. Call bell, personal items and telephone within reach. Instruct patient to call for assistance. Room bathroom lighting operational. Non-slip footwear when patient is off stretcher. Physically safe environment: no spills, clutter or unnecessary equipment. Stretcher in lowest position, wheels locked, appropriate side rails in place. Provide visual cue, wrist band, yellow gown, etc. Monitor gait and stability. Monitor for mental status changes and reorient to person, place, and time. Review medications for side effects contributing to fall risk. Reinforce activity limits and safety measures with patient and family.

## 2022-02-06 NOTE — ED ADULT NURSE REASSESSMENT NOTE - NS ED NURSE REASSESS COMMENT FT1
Report received from Ilene, RN. Upon assessment, pt is AO x 4, speaking in full and complete sentences, breathing spontaneous and unlabored. 20g R AC patent. Blood cultured and troponin drawn and sent, Pt given crackers, pt vomited. NP Joey at bedside, zofran to be ordered and given. Pt requesting additional crackers. Pt educated to wait until nausea subsides to eat. Pt NSR on tele, fall and safety precautions in place, pt in c-collar, pt on primafit, call bell within reach.

## 2022-02-07 DIAGNOSIS — N18.9 CHRONIC KIDNEY DISEASE, UNSPECIFIED: ICD-10-CM

## 2022-02-07 DIAGNOSIS — R55 SYNCOPE AND COLLAPSE: ICD-10-CM
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
## 2022-02-07 DIAGNOSIS — S12.9XXA FRACTURE OF NECK, UNSPECIFIED, INITIAL ENCOUNTER: ICD-10-CM

## 2022-02-07 DIAGNOSIS — I10 ESSENTIAL (PRIMARY) HYPERTENSION: ICD-10-CM

## 2022-02-07 LAB
A1C WITH ESTIMATED AVERAGE GLUCOSE RESULT: 5.8 % — HIGH (ref 4–5.6)
ANION GAP SERPL CALC-SCNC: 14 MMOL/L — SIGNIFICANT CHANGE UP (ref 5–17)
BUN SERPL-MCNC: 30 MG/DL — HIGH (ref 7–23)
CALCIUM SERPL-MCNC: 9.4 MG/DL — SIGNIFICANT CHANGE UP (ref 8.4–10.5)
CHLORIDE SERPL-SCNC: 101 MMOL/L — SIGNIFICANT CHANGE UP (ref 96–108)
CO2 SERPL-SCNC: 17 MMOL/L — LOW (ref 22–31)
CREAT SERPL-MCNC: 2.04 MG/DL — HIGH (ref 0.5–1.3)
ESTIMATED AVERAGE GLUCOSE: 120 MG/DL — HIGH (ref 68–114)
GLUCOSE SERPL-MCNC: 113 MG/DL — HIGH (ref 70–99)
HCT VFR BLD CALC: 29.9 % — LOW (ref 34.5–45)
HCV AB S/CO SERPL IA: 0.12 S/CO — SIGNIFICANT CHANGE UP (ref 0–0.99)
HCV AB SERPL-IMP: SIGNIFICANT CHANGE UP
HGB BLD-MCNC: 9.2 G/DL — LOW (ref 11.5–15.5)
MCHC RBC-ENTMCNC: 25.9 PG — LOW (ref 27–34)
MCHC RBC-ENTMCNC: 30.8 GM/DL — LOW (ref 32–36)
MCV RBC AUTO: 84.2 FL — SIGNIFICANT CHANGE UP (ref 80–100)
NRBC # BLD: 2 /100 WBCS — HIGH (ref 0–0)
PLATELET # BLD AUTO: 227 K/UL — SIGNIFICANT CHANGE UP (ref 150–400)
POTASSIUM SERPL-MCNC: 5.3 MMOL/L — SIGNIFICANT CHANGE UP (ref 3.5–5.3)
POTASSIUM SERPL-SCNC: 5.3 MMOL/L — SIGNIFICANT CHANGE UP (ref 3.5–5.3)
RBC # BLD: 3.55 M/UL — LOW (ref 3.8–5.2)
RBC # FLD: 19 % — HIGH (ref 10.3–14.5)
SODIUM SERPL-SCNC: 132 MMOL/L — LOW (ref 135–145)
TSH SERPL-MCNC: 4.24 UIU/ML — HIGH (ref 0.27–4.2)
WBC # BLD: 8.51 K/UL — SIGNIFICANT CHANGE UP (ref 3.8–10.5)
WBC # FLD AUTO: 8.51 K/UL — SIGNIFICANT CHANGE UP (ref 3.8–10.5)

## 2022-02-07 PROCEDURE — 93306 TTE W/DOPPLER COMPLETE: CPT | Mod: 26

## 2022-02-07 RX ORDER — SODIUM ZIRCONIUM CYCLOSILICATE 10 G/10G
5 POWDER, FOR SUSPENSION ORAL DAILY
Refills: 0 | Status: DISCONTINUED | OUTPATIENT
Start: 2022-02-07 | End: 2022-02-11

## 2022-02-07 RX ORDER — HYDRALAZINE HCL 50 MG
25 TABLET ORAL EVERY 8 HOURS
Refills: 0 | Status: DISCONTINUED | OUTPATIENT
Start: 2022-02-07 | End: 2022-02-08

## 2022-02-07 RX ORDER — SODIUM BICARBONATE 1 MEQ/ML
650 SYRINGE (ML) INTRAVENOUS
Refills: 0 | Status: DISCONTINUED | OUTPATIENT
Start: 2022-02-07 | End: 2022-02-26

## 2022-02-07 RX ORDER — ONDANSETRON 8 MG/1
4 TABLET, FILM COATED ORAL ONCE
Refills: 0 | Status: COMPLETED | OUTPATIENT
Start: 2022-02-07 | End: 2022-02-07

## 2022-02-07 RX ORDER — ACETAMINOPHEN 500 MG
750 TABLET ORAL ONCE
Refills: 0 | Status: COMPLETED | OUTPATIENT
Start: 2022-02-07 | End: 2022-02-07

## 2022-02-07 RX ADMIN — LATANOPROST 1 DROP(S): 0.05 SOLUTION/ DROPS OPHTHALMIC; TOPICAL at 21:41

## 2022-02-07 RX ADMIN — Medication 100 MILLIGRAM(S): at 12:39

## 2022-02-07 RX ADMIN — Medication 300 MILLIGRAM(S): at 20:45

## 2022-02-07 RX ADMIN — Medication 25 MILLIGRAM(S): at 21:40

## 2022-02-07 RX ADMIN — ONDANSETRON 4 MILLIGRAM(S): 8 TABLET, FILM COATED ORAL at 02:45

## 2022-02-07 RX ADMIN — Medication 667 MILLIGRAM(S): at 12:38

## 2022-02-07 RX ADMIN — Medication 1 DROP(S): at 12:37

## 2022-02-07 RX ADMIN — Medication 1 DROP(S): at 05:50

## 2022-02-07 RX ADMIN — Medication 667 MILLIGRAM(S): at 21:50

## 2022-02-07 RX ADMIN — Medication 750 MILLIGRAM(S): at 21:15

## 2022-02-07 RX ADMIN — Medication 667 MILLIGRAM(S): at 17:34

## 2022-02-07 RX ADMIN — AMLODIPINE BESYLATE 10 MILLIGRAM(S): 2.5 TABLET ORAL at 05:24

## 2022-02-07 RX ADMIN — Medication 12.5 MILLIGRAM(S): at 05:23

## 2022-02-07 RX ADMIN — HEPARIN SODIUM 5000 UNIT(S): 5000 INJECTION INTRAVENOUS; SUBCUTANEOUS at 21:41

## 2022-02-07 RX ADMIN — HEPARIN SODIUM 5000 UNIT(S): 5000 INJECTION INTRAVENOUS; SUBCUTANEOUS at 13:38

## 2022-02-07 RX ADMIN — Medication 10 MILLIGRAM(S): at 05:24

## 2022-02-07 RX ADMIN — Medication 650 MILLIGRAM(S): at 17:34

## 2022-02-07 RX ADMIN — SIMVASTATIN 40 MILLIGRAM(S): 20 TABLET, FILM COATED ORAL at 21:41

## 2022-02-07 RX ADMIN — Medication 25 MILLIGRAM(S): at 13:38

## 2022-02-07 RX ADMIN — Medication 12.5 MILLIGRAM(S): at 17:34

## 2022-02-07 RX ADMIN — SODIUM ZIRCONIUM CYCLOSILICATE 5 GRAM(S): 10 POWDER, FOR SUSPENSION ORAL at 21:42

## 2022-02-07 RX ADMIN — Medication 1 DROP(S): at 17:34

## 2022-02-07 RX ADMIN — HEPARIN SODIUM 5000 UNIT(S): 5000 INJECTION INTRAVENOUS; SUBCUTANEOUS at 05:24

## 2022-02-07 RX ADMIN — Medication 25 MICROGRAM(S): at 05:23

## 2022-02-07 NOTE — PHYSICAL THERAPY INITIAL EVALUATION ADULT - BALANCE TRAINING, PT EVAL
2/13/2019      Adolfo Dennis      This is to certify that the above named patient had an appointment at this office for professional attention on 2-      Please excuse him/her:    (X)    FROM:   (X)    DUE TO:    X    Work      Injury       School      Illness       Gym  X    Other       Other           Comments  May return to work on 2- with no restrictions.      Thank you,       SIGNATURE:____________________________________________________                                                               Enoc Mistry M.D.        Enoc Mistry M.D.  [address] [place] [address]  Telephone:  [phone]  Fax:  [phone]         GOAL: Patient will improve standing balance to Good in 2 weeks

## 2022-02-07 NOTE — PHYSICAL THERAPY INITIAL EVALUATION ADULT - PLANNED THERAPY INTERVENTIONS, PT EVAL
stair negotiation GOAL: patient will negotiate up/down 4 steps independently in 2 weeks/balance training/bed mobility training/gait training/transfer training

## 2022-02-07 NOTE — CONSULT NOTE ADULT - SUBJECTIVE AND OBJECTIVE BOX
[facility]), [facility]        Patient is a 76y old  Female who presents with a chief complaint of syncope , cervical spine fracture (2022 21:11)    Excerpt from H&P,"     75yo female pt with PMHx of CKD (Last K-5.3, Bun/Cr.-29/1.93 on 2020), Anemia, Gout, HTN, HLD, was transferred, on cervical collar, from [facility] c/o head/neck pain, C2 fracture s/p fall this morning with a syncopal episode.   Pt stated she felt dizzy after urination in her bathroom at 8:30am and fell. +LOC and noticed severe headache/neck pain.   She was evaluated in [facility] and sent to ED for spine consult 2/2 C2 fracture.   Denies visual changes or N/V. Denies sensory changes or weakness to extremities. Denies CP/SOB/Palpitation/abd pain. Denies lower back pain. Denies urinary problems. Denies fever, chills, cough or congestion.             *****PAST MEDICAL / Surgical  HISTORY:  PAST MEDICAL & SURGICAL HISTORY:  Anemia    HTN (hypertension)    HLD (hyperlipidemia)    H/O kidney injury    Cataract             *****FAMILY HISTORY:  FAMILY HISTORY:           *****SOCIAL HISTORY:  Alcohol: None  Smoking: None         *****ALLERGIES:   Allergies    No Known Allergies    Intolerances             *****MEDICATIONS: current medication reviewed and documented.   MEDICATIONS  (STANDING):  allopurinol 100 milliGRAM(s) Oral daily  amLODIPine   Tablet 10 milliGRAM(s) Oral daily  artificial  tears Solution 1 Drop(s) Both EYES four times a day  calcium acetate 667 milliGRAM(s) Oral four times a day with meals  gabapentin Solution 50 milliGRAM(s) Oral two times a day  heparin   Injectable 5000 Unit(s) SubCutaneous every 8 hours  hydrALAZINE 25 milliGRAM(s) Oral every 8 hours  latanoprost 0.005% Ophthalmic Solution 1 Drop(s) Both EYES at bedtime  levothyroxine 25 MICROGram(s) Oral daily  metoprolol tartrate 12.5 milliGRAM(s) Oral every 12 hours  simvastatin 40 milliGRAM(s) Oral at bedtime  sodium bicarbonate 650 milliGRAM(s) Oral two times a day  sodium zirconium cyclosilicate 5 Gram(s) Oral daily    MEDICATIONS  (PRN):           *****REVIEW OF SYSTEM:  GEN: no fever, no chills, no pain  RESP: no SOB, no cough, no sputum  CVS: no chest pain, no palpitations, no edema  GI: no abdominal pain, no nausea, no vomiting, no constipation, no diarrhea  : no dysurea, no frequency, no hematurea  Neuro: no headache, no dizziness  PSYCH: no anxiety, no depression  Derm : no itching, no rash         *****VITAL SIGNS:  T(C): 36.9 (22 @ 04:40), Max: 37.1 (22 @ 12:14)  HR: 63 (22 @ 04:40) (61 - 77)  BP: 136/69 (22 @ 04:40) (133/58 - 164/83)  RR: 16 (22 @ 04:40) (16 - 18)  SpO2: 98% (22 @ 04:40) (98% - 99%)  Wt(kg): --     @ 07:01  -   @ 07:00  --------------------------------------------------------  IN: 0 mL / OUT: 700 mL / NET: -700 mL     @ 07:01  -   @ 06:03  --------------------------------------------------------  IN: 0 mL / OUT: 400 mL / NET: -400 mL             *****PHYSICAL EXAM:   Alert oriented x 3   Attention comprehension are fair. Able to name, repeat, read without any difficulty.   Able to follow 3 step commands.     EOMI fundi not visualized,  VFF to confrontration  No facial asymmetry   Tongue is midline    Moving all 4 ext symmetrically no pronator drift   Reflexes are symmetric throughout   sensation is grossly symmetric  no hoffmans    Gait : not assessed.  B/L down going toes               *****LAB AND IMAGIN.2    8.51  )-----------( 227      ( 2022 17:20 )             29.9               02-07    132<L>  |  101  |  30<H>  ----------------------------<  113<H>  5.3   |  17<L>  |  2.04<H>    Ca    9.4      2022 17:20    TPro  8.2  /  Alb  4.5  /  TBili  0.2  /  DBili  x   /  AST  29  /  ALT  26  /  AlkPhos  67  02-06    PT/INR - ( 2022 16:39 )   PT: 10.4 sec;   INR: 0.86 ratio         PTT - ( 2022 16:39 )  PTT:30.0 sec                        Urinalysis Basic - ( 2022 17:40 )    Color: Colorless / Appearance: Clear / S.011 / pH: x  Gluc: x / Ketone: Negative  / Bili: Negative / Urobili: Negative   Blood: x / Protein: 30 mg/dL / Nitrite: Negative   Leuk Esterase: Negative / RBC: 1 /hpf / WBC 0 /HPF   Sq Epi: x / Non Sq Epi: 1 /hpf / Bacteria: Negative        [All pertinent recent Imaging reports reviewed]         *****A S S E S S M E N T   A N D   P L A N :  Excerpt from H&P,"     75yo female pt with PMHx of CKD (Last K-5.3, Bun/Cr.-29/1.93 on 2020), Anemia, Gout, HTN, HLD, was transferred, on cervical collar, from [facility] c/o head/neck pain, C2 fracture s/p fall this morning with a syncopal episode.   Pt stated she felt dizzy after urination in her bathroom at 8:30am and fell. +LOC and noticed severe headache/neck pain.   She was evaluated in [facility] and sent to ED for spine consult 2/2 C2 fracture.   Denies visual changes or N/V. Denies sensory changes or weakness to extremities. Denies CP/SOB/Palpitation/abd pain. Denies lower back pain. Denies urinary problems. Denies fever, chills, cough or congestion.            Problem/Recommendations 1: C2 fracture    CT C: acute C2 fx of body of the dens extending to left lateral mass (type 3), R C2 facet fx, mild comp fx sup endplate C3  nsx no surgical intervention , c collar at all times follow up with dr COMER   pt eval /ot eval   neuro checks q 4   gabapentin 50 bid for pain   will consider adding muscle relaxant   lidocaine patch for comfort     Problem/Recommendations 2:   hyponatremia   pain related siadh   will correct  ___________________________  Will follow with you.  Thank you,  Li Isaac MD  Diplomate of the American Board of Neurology and Psychiatry.  Diplomate of the American Board of Vascular Neurology.   [facility] ([facility]), [facility]   Ph: [phone]    Differential diagnosis and plan of care discussed with patient after the evaluation.   Advanced care planning options discussed.   Pain assessed and judicious use of narcotics when appropriate was discussed.  Importance of Fall prevention discussed.  Counseling on Smoking and Alcohol cessation was offered when appropriate.  Counseling on Diet, exercise, and medication compliance was done.   83 minutes spent on the total encounter;  more than 50 % of the visit was spent on counseling  and or coordinating care by the attending physician.    Thank you for allowing me to participate in the care of this alberto patient. Please do not hesitate to call me if you have any questions.     This and subsequent notes  will  inherently be subject to errors including those of syntax and substitutions which may escape proofreading. In such instances original meaning may be extrapolated by contextual derivation.

## 2022-02-07 NOTE — PROGRESS NOTE ADULT - SUBJECTIVE AND OBJECTIVE BOX
Roxy was awake and alert in bed as I fit her with an Aspen Multipost cervical brace. She was instructed on donning and adjustment of the brace and given additional liners for washing .  Velcro straps were marked to help keep brace positioned under her chin.   [facility]

## 2022-02-07 NOTE — CONSULT NOTE ADULT - SUBJECTIVE AND OBJECTIVE BOX
CHIEF COMPLAINT: FALL     HISTORY OF PRESENT ILLNESS:  77yo female pt with PMHx of CKD (Last K-5.3, Bun/Cr.-29/1.93 on 7/18/2020), Anemia, Gout, HTN, HLD, was transferred, on cervical collar, from [facility] c/o head/neck pain, C2 fracture s/p fall this morning with a syncopal episode. Pt stated she felt dizzy after urination in her bathroom at 8:30am and fell. +LOC and noticed severe headache/neck pain.  She was evaluated in [facility] and sent to ED for spine consult 2/2 C2 fracture.  Denies visual changes or N/V. Denies sensory changes or weakness to extremities. Denies CP/SOB/Palpitation/abd pain. Denies lower back pain. Denies urinary problems. Denies fever, chills, cough or congestion.    PAST MEDICAL & SURGICAL HISTORY:  Anemia    HTN (hypertension)    HLD (hyperlipidemia)    H/O kidney injury    Cataract    MEDICATIONS:  amLODIPine   Tablet 10 milliGRAM(s) Oral daily  heparin   Injectable 5000 Unit(s) SubCutaneous every 8 hours  hydrALAZINE 25 milliGRAM(s) Oral every 8 hours  metoprolol tartrate 12.5 milliGRAM(s) Oral every 12 hours    allopurinol 100 milliGRAM(s) Oral daily  levothyroxine 25 MICROGram(s) Oral daily  simvastatin 40 milliGRAM(s) Oral at bedtime    artificial  tears Solution 1 Drop(s) Both EYES four times a day  calcium acetate 667 milliGRAM(s) Oral four times a day with meals  latanoprost 0.005% Ophthalmic Solution 1 Drop(s) Both EYES at bedtime  sodium bicarbonate 650 milliGRAM(s) Oral two times a day    FAMILY HISTORY:    SOCIAL HISTORY:    [ ] Non-smoker  [ ] Smoker  [ ] Alcohol    Allergies    No Known Allergies    Intolerances    REVIEW OF SYSTEMS:  CONSTITUTIONAL: No fever, weight loss, + fatigue  EYES: No eye pain, visual disturbances, or discharge  ENMT:  No difficulty hearing, tinnitus, vertigo; No sinus or throat pain  NECK: No pain or stiffness  RESPIRATORY: No cough, wheezing, chills or hemoptysis; No Shortness of Breath  CARDIOVASCULAR: No chest pain, palpitations, passing out, + dizziness, or leg swelling  GASTROINTESTINAL: No abdominal or epigastric pain. No nausea, vomiting, or hematemesis; No diarrhea or constipation. No melena or hematochezia.  GENITOURINARY: No dysuria, frequency, hematuria, or incontinence  NEUROLOGICAL: No headaches, memory loss, loss of strength, numbness, or tremors  SKIN: No itching, burning, rashes, or lesions   LYMPH Nodes: No enlarged glands  ENDOCRINE: No heat or cold intolerance; No hair loss  MUSCULOSKELETAL: No joint pain or swelling; No muscle, back, or extremity pain  PSYCHIATRIC: No depression, anxiety, mood swings, or difficulty sleeping  HEME/LYMPH: No easy bruising, or bleeding gums  ALLERY AND IMMUNOLOGIC: No hives or eczema	    [ ] All others negative	  [ ] Unable to obtain    PHYSICAL EXAM:  T(C): 37.1 (02-07-22 @ 16:48), Max: 37.1 (02-07-22 @ 12:14)  HR: 73 (02-07-22 @ 16:48) (61 - 85)  BP: 150/65 (02-07-22 @ 16:48) (144/56 - 168/68)  RR: 18 (02-07-22 @ 16:48) (18 - 20)  SpO2: 98% (02-07-22 @ 16:48) (98% - 100%)  Wt(kg): --  I&O's Summary    06 Feb 2022 07:01  -  07 Feb 2022 07:00  --------------------------------------------------------  IN: 0 mL / OUT: 700 mL / NET: -700 mL    Appearance: NAD, c collar	  HEENT:  Normal oral mucosa, PERRL, EOMI	  Lymphatic: No lymphadenopathy  Cardiovascular: Normal S1 S2, No JVD, No murmurs, No edema  Respiratory: Lungs clear to auscultation	  Psychiatry: A & O x 3, Mood & affect appropriate  Gastrointestinal:  Soft, Non-tender, + BS	  Skin: No rashes, No ecchymoses, No cyanosis	  Neuro: AOx3, FC, PERRL, EOMI, no facial, 5/5 throughout, no drift, no bryan, no clonus, SILT.  Vascular: Peripheral pulses palpable 2+ bilaterally    TELEMETRY: 	    ECG:  NSR  RADIOLOGY:  < from: CT Cervical Spine No Cont (02.06.22 @ 14:00) >  ACC: [id number] EXAM:  CT CERVICAL SPINE                        ACC: [id number] EXAM:  CT BRAIN                          PROCEDURE DATE:  02/06/2022      INTERPRETATION:  CT HEAD WITHOUT CONTRAST  CT CERVICAL SPINE WITHOUT CONTRAST    INDICATION: 76 years old. Female. fall.    COMPARISON: None available.    TECHNIQUE:  1. Noncontrast axial CT head was obtained from the skull base to vertex.   Multiplanar reformations were made.  2. CT scan of the cervical spine was performed without intravenous  contrast. Multiplanar and 3-D reformations were made.    FINDINGS:    CT HEAD:  No acute intracranial hemorrhage, mass effect or midline shift.  No CT evidence of acute large territory vascular infarct.  The ventricles and cortical sulci are prominent reflecting parenchymal   volume loss.  Scattered hypodensities in the periventricular white matter are   nonspecific, but likely sequela of small vessel ischemic disease.  Intracranial atherosclerotic calcifications are present.    Small mucus retention cyst or polyp in the right maxillary sinus. The   paranasal sinuses and mastoid air cells are otherwise well aerated. The   native ocular lenses are surgically absent.  No displaced calvarial fracture.    CT CERVICAL SPINE:  There is an acute fracture the body of the dens extending obliquely to   the left lateral mass. There is also a fracture of the right C2 facet.   There is also a mild acute compression fracture of the superior endplate   of C3. Mild surrounding soft tissue swelling.    No additional cervical spine fracture.    Alignment is maintained. No spondylolisthesis.    Multilevel degenerative changes resulting in varying degrees of bony   encroachment on the cervical spinal canal and neural foramina.    There is a subacute toold fracture deformity of the right lateral 2nd   rib.    IMPRESSION:  CT head:  No acute intracranial hemorrhage or mass effect.    CT cervical spine:  Acute C2 fracture of the body of the dens extending to the left lateral   mass. Right C2 facet fracture.  Mild acute compression fracture of the superior endplate of C3.    Findings discussed with Dr. HARMON on 2/6/2022 2:06 PM with readback.    --- End of Report ---    < end of copied text >  < from: CT Head No Cont (02.06.22 @ 13:59) >  ACC: [id number] EXAM:  CT CERVICAL SPINE                        ACC: [id number] EXAM:  CT BRAIN                          PROCEDURE DATE:  02/06/2022      INTERPRETATION:  CT HEAD WITHOUT CONTRAST  CT CERVICAL SPINE WITHOUT CONTRAST    INDICATION: 76 years old. Female. fall.    COMPARISON: None available.    TECHNIQUE:  1. Noncontrast axial CT head was obtained from the skull base to vertex.   Multiplanar reformations were made.  2. CT scan of the cervical spine was performed without intravenous  contrast. Multiplanar and 3-D reformations were made.    FINDINGS:    CT HEAD:  No acute intracranial hemorrhage, mass effect or midline shift.  No CT evidence of acute large territory vascular infarct.  The ventricles and cortical sulci are prominent reflecting parenchymal   volume loss.  Scattered hypodensities in the periventricular white matter are   nonspecific, but likely sequela of small vessel ischemic disease.  Intracranial atherosclerotic calcifications are present.    Small mucus retention cyst or polyp in the right maxillary sinus. The   paranasal sinuses and mastoid air cells are otherwise well aerated. The   native ocular lenses are surgically absent.  No displaced calvarial fracture.    CT CERVICAL SPINE:  There is an acute fracture the body of the dens extending obliquely to   the left lateral mass. There is also a fracture of the right C2 facet.   There is also a mild acute compression fracture of the superior endplate   of C3. Mild surrounding soft tissue swelling.    No additional cervical spine fracture.    Alignment is maintained. No spondylolisthesis.    Multilevel degenerative changes resulting in varying degrees of bony   encroachment on the cervical spinal canal and neural foramina.    There is a subacute toold fracture deformity of the right lateral 2nd   rib.    IMPRESSION:  CT head:  No acute intracranial hemorrhage or mass effect.    CT cervical spine:  Acute C2 fracture of the body of the dens extending to the left lateral   mass. Right C2 facet fracture.  Mild acute compression fracture of the superior endplate of C3.    Findings discussed with Dr. HARMON on 2/6/2022 2:06 PM with readback.    --- End of Report ---    < end of copied text >  < from: Xray Hand 3 Views, Left (02.06.22 @ 13:50) >  ACC: [id number] EXAM:  XR HAND MIN 3 VIEWS LT                          PROCEDURE DATE:  02/06/2022      INTERPRETATION:  Hand pain.    3 views left hand.    There is no fracture, malalignment, or other significant bony or joint   abnormality.  Soft tissues grossly unremarkable.    IMPRESSION: Negative left hand.    --- End of Report ---    < end of copied text >  < from: Xray Chest 1 View AP/PA. (02.06.22 @ 13:50) >  ACC: [id number] EXAM:  XR CHEST AP OR PA 1V                          PROCEDURE DATE:  02/06/2022      INTERPRETATION:  Syncope.    Single AP view    The heart is magnified by AP film. There is no parenchymal consolidation,   congestion, or pleural effusion bilaterally.  The trachea is midline.   Osseous structures are normal for age.    IMPRESSION: NO ACTIVE INTRATHORACIC PATHOLOGY.    --- End of Report ---    < end of copied text >    OTHER: 	  	  LABS:	 	    CARDIAC MARKERS:                        8.6    8.39  )-----------( 204      ( 06 Feb 2022 16:39 )             29.0     02-06    137  |  109<H>  |  26<H>  ----------------------------<  128<H>  5.7<H>   |  17<L>  |  1.99<H>    Ca    9.1      06 Feb 2022 16:39    TPro  8.2  /  Alb  4.5  /  TBili  0.2  /  DBili  x   /  AST  29  /  ALT  26  /  AlkPhos  67  02-06    proBNP:   Lipid Profile:   HgA1c:   TSH: Thyroid Stimulating Hormone, Serum: 4.24 uIU/mL (02-07 @ 10:19)

## 2022-02-07 NOTE — CONSULT NOTE ADULT - PROBLEM SELECTOR RECOMMENDATION 2
c/w hydralazine 25mg PO q8hrs  c/w amlodipine 10mg PO daily  c/w metoprolol 12.5 PO q12hrs  continue to monitor

## 2022-02-07 NOTE — PATIENT PROFILE ADULT - TOBACCO USE
[Asthma] : asthma [Allergic Rhinitis] : allergic rhinitis [Venom Reactions] : venom reactions [Food Allergies] : food allergies [de-identified] : David is a 9 months old baby with atopic dermatitis who is here for initial evaluation. Mom is present during H&P and states that eczema started at 4 months of age, mom showers David once a week, uses Eucerin soap, moisturizes with Aquaphor only on the face 5-6 times a day. Eczema affects cheeks, and she also has dryness of legs. Uses arms and hammer detergent\par Diet: no breastfeeding, baby eats cows milk based formula, bread, peanuts butter, avocado, apple sauce, oat, sweet potato, eggs sometimes, no tree nuts, no fish. Mom states that eczema started when David was only eating formula. Never smoker

## 2022-02-07 NOTE — CONSULT NOTE ADULT - SUBJECTIVE AND OBJECTIVE BOX
HPI: Ms. Avery is a 76 year-old woman with history of multiple medical issues including hypertension, hyperlipidemia, and chronic kidney disease. She was transferred to [facility] yesterday from the [facility] ER after being noted on imaging to have a C2 fracture, s/p syncopal episode/fall earlier in the a.m. She alluded to dizziness after urination in her bathroom at 8:30am yesterday prior to fall. She attested to loss of consciousness.    She was noted on admission to have a BUN/creatinine of 31/2.29 and a serum potassium of 5.6meq/L. In light of these findings, a renal consultation was requested.      PAST MEDICAL & SURGICAL HISTORY:  Anemia  HTN (hypertension)  HLD (hyperlipidemia)  CKD  Gout  Cataract    Allergies  No Known Allergies    SOCIAL HISTORY:  Denies ETOh,Smoking,     FAMILY HISTORY:  No CKD    REVIEW OF SYSTEMS:  CONSTITUTIONAL: No weakness, fevers or chills  EYES/ENT: No visual changes;  No vertigo or throat pain   NECK: (+)neck pain  RESPIRATORY: No cough, wheezing, hemoptysis; No shortness of breath  CARDIOVASCULAR: (+)syncope, (+)dizziness; no chest pain, no palpitations  GASTROINTESTINAL: No abdominal or epigastric pain. No nausea, vomiting, or hematemesis; No diarrhea or constipation. No melena or hematochezia.  GENITOURINARY: No dysuria, frequency or hematuria  NEUROLOGICAL: (+)headache  SKIN: No itching, burning, rashes, or lesions   All other review of systems is negative unless indicated above.    VITAL:  T(C): , Max: 36.8 (22 @ 14:34)  T(F): , Max: 98.3 (22 @ 14:34)  HR: 67 (22 @ 04:55)  BP: 153/67 (22 @ 04:55)  RR: 18 (22 @ 04:55)  SpO2: 99% (22 @ 04:55)    PHYSICAL EXAM:  Constitutional: NAD, Alert  HEENT: NCAT, MMM  Neck: Supple, No JVD  Respiratory: CTA-b/l  Cardiovascular: RRR s1s2, no m/r/g  Gastrointestinal: BS+, soft, NT/ND  Extremities: No peripheral edema b/l  Neurological: no focal deficits; strength grossly intact  Back: no CVAT b/l  Skin: No rashes, no nevi    LABS:                        8.6    8.39  )-----------( 204      ( 2022 16:39 )             29.0     Na(137)/K(5.7)/Cl(109)/HCO3(17)/BUN(26)/Cr(1.99)Glu(128)/Ca(9.1)/Mg(--)/PO4(--)     @ 16:39  Na(139)/K(5.6)/Cl(113)/HCO3(22)/BUN(31)/Cr(2.29)Glu(145)/Ca(8.4)/Mg(--)/PO4(--)     @ 13:09    Urinalysis Basic - ( 2022 17:40 )  Color: Colorless / Appearance: Clear / S.011 / pH: x  Gluc: x / Ketone: Negative  / Bili: Negative / Urobili: Negative   Blood: x / Protein: 30 mg/dL / Nitrite: Negative   Leuk Esterase: Negative / RBC: 1 /hpf / WBC 0 /HPF   Sq Epi: x / Non Sq Epi: 1 /hpf / Bacteria: Negative    2020 - BUN/Cr 29/1.93, K 5.3    IMAGING:  < from: CT Cervical Spine No Cont (22 @ 14:00) >  CT head: No acute intracranial hemorrhage or mass effect.  CT cervical spine: Acute C2 fracture of the body of the dens extending to the left lateral   mass. Right C2 facet fracture. Mild acute compression fracture of the superior endplate of C3.      ASSESSMENT:  (1)Renal - nonproteinuric CKD4 with GFR ~20-30ml/min. Likely due to microvascular disease. Stable function relative to .   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  (5)HTN - high BP - should we be avoiding diuretics here? Unclear if she was orthostatic when she fell  (6)Neurosurg - C2 fracture - Neurosurgery on board; recommending C-Collar at all times.    RECOMMEND:  (1)Agree with renal diet for now  (2)Add Lokelma 5gm po qd for now  (3)Increase NaHCO3 from 650qd to 650bid  (4)Increase Hydralazine from 10bid to 25tid  (5)Check PO4 level in a.m; can continue Phoslo with meals  (6)Trend orthostatics  (7)Dose new meds for GFR 20-30ml/min    Thank you for involving [facility] in this patient's care.    With warm regards,    Arjun Rider MD   [facility]  Office: [phone]  Cell: [phone]             HPI: Ms. Avery is a 76 year-old woman with history of multiple medical issues including hypertension, hyperlipidemia, and chronic kidney disease. She was transferred to [facility] yesterday from the [facility] ER after being noted on imaging to have a C2 fracture, s/p syncopal episode/fall earlier in the a.m. She alluded to dizziness after urination in her bathroom at 8:30am yesterday prior to fall. She attested to loss of consciousness.    She was noted on admission to have a BUN/creatinine of 31/2.29 and a serum potassium of 5.6meq/L. In light of these findings, a renal consultation was requested.    History provided by patient and by daughter (phone - [phone]). Daughter shares that patient is known to nephrologist Dr. Hartmann. Shares that potassium had at one point been high, but on most recent bloodwork the levels were better.     PAST MEDICAL & SURGICAL HISTORY:  Anemia  HTN (hypertension)  HLD (hyperlipidemia)  CKD  Gout  Cataract    Allergies  No Known Allergies    SOCIAL HISTORY:  Denies ETOh,Smoking,     FAMILY HISTORY:  No CKD    REVIEW OF SYSTEMS:  CONSTITUTIONAL: No weakness, fevers or chills  EYES/ENT: No visual changes;  No vertigo or throat pain   NECK: (+)neck pain  RESPIRATORY: No cough, wheezing, hemoptysis; No shortness of breath  CARDIOVASCULAR: (+)syncope, (+)dizziness; no chest pain, no palpitations  GASTROINTESTINAL: No abdominal or epigastric pain. No nausea, vomiting, or hematemesis; No diarrhea or constipation. No melena or hematochezia.  GENITOURINARY: No dysuria, frequency or hematuria  NEUROLOGICAL: (+)headache  SKIN: No itching, burning, rashes, or lesions   All other review of systems is negative unless indicated above.    VITAL:  T(C): , Max: 36.8 (22 @ 14:34)  T(F): , Max: 98.3 (22 @ 14:34)  HR: 67 (22 @ 04:55)  BP: 153/67 (22 @ 04:55)  RR: 18 (22 @ 04:55)  SpO2: 99% (22 @ 04:55)    PHYSICAL EXAM:  Constitutional: thin/frail, alert, NAD  HEENT: (+)C-Collar  Neck: Supple, No JVD  Respiratory: CTA-b/l  Cardiovascular: RRR s1s2, no m/r/g  Gastrointestinal: BS+, soft, NT/ND  Extremities: No peripheral edema b/l  Neurological: no focal deficits; strength grossly intact  Back: no CVAT b/l  Skin: No rashes, no nevi    LABS:                        8.6    8.39  )-----------( 204      ( 2022 16:39 )             29.0     Na(137)/K(5.7)/Cl(109)/HCO3(17)/BUN(26)/Cr(1.99)Glu(128)/Ca(9.1)/Mg(--)/PO4(--)     @ 16:39  Na(139)/K(5.6)/Cl(113)/HCO3(22)/BUN(31)/Cr(2.29)Glu(145)/Ca(8.4)/Mg(--)/PO4(--)     @ 13:09    Urinalysis Basic - ( 2022 17:40 )  Color: Colorless / Appearance: Clear / S.011 / pH: x  Gluc: x / Ketone: Negative  / Bili: Negative / Urobili: Negative   Blood: x / Protein: 30 mg/dL / Nitrite: Negative   Leuk Esterase: Negative / RBC: 1 /hpf / WBC 0 /HPF   Sq Epi: x / Non Sq Epi: 1 /hpf / Bacteria: Negative    2020 - BUN/Cr 29/1.93, K 5.3    IMAGING:  < from: CT Cervical Spine No Cont (22 @ 14:00) >  CT head: No acute intracranial hemorrhage or mass effect.  CT cervical spine: Acute C2 fracture of the body of the dens extending to the left lateral   mass. Right C2 facet fracture. Mild acute compression fracture of the superior endplate of C3.      ASSESSMENT:  (1)Renal - nonproteinuric CKD4 with GFR ~20-30ml/min. Likely due to microvascular disease. Stable function relative to .   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  (5)HTN - high BP - should we be avoiding diuretics here? Unclear if she was orthostatic when she fell  (6)Neurosurg - C2 fracture - Neurosurgery on board; recommending C-Collar at all times.    RECOMMEND:  (1)Agree with renal diet for now  (2)Add Lokelma 5gm po qd for now  (3)Increase NaHCO3 from 650qd to 650bid  (4)Increase Hydralazine from 10bid to 25tid  (5)Check PO4 level in a.m; can continue Phoslo with meals  (6)Trend orthostatics  (7)Dose new meds for GFR 20-30ml/min    Thank you for involving [facility] in this patient's care.    With warm regards,    Arjun Rider MD   [facility]  Office: [phone]  Cell: [phone]             HPI: Ms. Avery is a 76 year-old woman with history of multiple medical issues including hypertension, hyperlipidemia, and chronic kidney disease. She was transferred to [facility] yesterday from the [facility] ER after being noted on imaging to have a C2 fracture, s/p syncopal episode/fall earlier in the a.m. She alluded to dizziness after urination in her bathroom at 8:30am yesterday prior to fall. She attested to loss of consciousness.    She was noted on admission to have a BUN/creatinine of 31/2.29 and a serum potassium of 5.6meq/L. In light of these findings, a renal consultation was requested.    History provided by patient and by daughter (phone - [phone]). Daughter shares that patient is known to nephrologist Dr. Hartmann. Shares that potassium had at one point been high, but on most recent bloodwork the levels were better.     PAST MEDICAL & SURGICAL HISTORY:  Anemia  HTN (hypertension)  HLD (hyperlipidemia)  CKD  Gout  Cataract    Allergies  No Known Allergies    SOCIAL HISTORY:  Denies ETOh,Smoking,     FAMILY HISTORY:  No CKD    REVIEW OF SYSTEMS:  CONSTITUTIONAL: No weakness, fevers or chills  EYES/ENT: No visual changes;  No vertigo or throat pain   NECK: (+)neck pain  RESPIRATORY: No cough, wheezing, hemoptysis; No shortness of breath  CARDIOVASCULAR: (+)syncope, (+)dizziness; no chest pain, no palpitations  GASTROINTESTINAL: No abdominal or epigastric pain. No nausea, vomiting, or hematemesis; No diarrhea or constipation. No melena or hematochezia.  GENITOURINARY: No dysuria, frequency or hematuria  NEUROLOGICAL: (+)headache  SKIN: No itching, burning, rashes, or lesions   All other review of systems is negative unless indicated above.    VITAL:  T(C): , Max: 36.8 (22 @ 14:34)  T(F): , Max: 98.3 (22 @ 14:34)  HR: 67 (22 @ 04:55)  BP: 153/67 (22 @ 04:55)  RR: 18 (22 @ 04:55)  SpO2: 99% (22 @ 04:55)    PHYSICAL EXAM:  Constitutional: thin/frail, alert, NAD  HEENT: (+)C-Collar  Neck: Supple, No JVD  Respiratory: CTA-b/l  Cardiovascular: RRR s1s2, no m/r/g  Gastrointestinal: BS+, soft, NT/ND  Extremities: No peripheral edema b/l  Neurological: no focal deficits; strength grossly intact  Back: no CVAT b/l  Skin: No rashes, no nevi    LABS:                        8.6    8.39  )-----------( 204      ( 2022 16:39 )             29.0     Na(137)/K(5.7)/Cl(109)/HCO3(17)/BUN(26)/Cr(1.99)Glu(128)/Ca(9.1)/Mg(--)/PO4(--)     @ 16:39  Na(139)/K(5.6)/Cl(113)/HCO3(22)/BUN(31)/Cr(2.29)Glu(145)/Ca(8.4)/Mg(--)/PO4(--)     @ 13:09    Urinalysis Basic - ( 2022 17:40 )  Color: Colorless / Appearance: Clear / S.011 / pH: x  Gluc: x / Ketone: Negative  / Bili: Negative / Urobili: Negative   Blood: x / Protein: 30 mg/dL / Nitrite: Negative   Leuk Esterase: Negative / RBC: 1 /hpf / WBC 0 /HPF   Sq Epi: x / Non Sq Epi: 1 /hpf / Bacteria: Negative    2020 - BUN/Cr 29/1.93, K 5.3    IMAGING:  < from: CT Cervical Spine No Cont (22 @ 14:00) >  CT head: No acute intracranial hemorrhage or mass effect.  CT cervical spine: Acute C2 fracture of the body of the dens extending to the left lateral   mass. Right C2 facet fracture. Mild acute compression fracture of the superior endplate of C3.      ASSESSMENT:  (1)Renal - nonproteinuric CKD4 with GFR ~20-30ml/min. Likely due to microvascular disease. Stable function relative to .   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  (5)HTN - high BP - should we be avoiding diuretics here? Unclear if she was orthostatic when she fell  (6)Neurosurg - C2 fracture - Neurosurgery on board; recommending C-Collar at all times.    RECOMMEND:  (1)Agree with renal diet for now  (2)Add Lokelma 5gm po qd for now  (3)Increase NaHCO3 from 650qd to 650bid  (4)Increase Hydralazine from 10bid to 25tid  (5)Check PO4 level in a.m; can continue Phoslo with meals  (6)Trend orthostatics  (7)Dose new meds for GFR 20-30ml/min    Thank you for involving [facility] in this patient's care. Discussed with Dr. Shameka Brown, whose team works with Dr. Hartmann. Dr. Brown et al will assume care from a renal standpoint starting tomorrow.    With warm regards,    Arjun Rider MD   [facility]  Office: [phone]  Cell: [phone]             HPI: Ms. Avery is a 76 year-old woman with history of multiple medical issues including hypertension, hyperlipidemia, and chronic kidney disease. She was transferred to [facility] yesterday from the [facility] ER after being noted on imaging to have a C2 fracture, s/p syncopal episode/fall earlier in the a.m. She alluded to dizziness after urination in her bathroom at 8:30am yesterday prior to fall. She attested to loss of consciousness.    She was noted on admission to have a BUN/creatinine of 31/2.29 and a serum potassium of 5.6meq/L. In light of these findings, a renal consultation was requested.    History provided by patient and by daughter (phone - [phone]). Daughter shares that patient is known to nephrologist Dr. Hartmann. Shares that potassium had at one point been high, but on most recent bloodwork the levels were better.     PAST MEDICAL & SURGICAL HISTORY:  Anemia  HTN (hypertension)  HLD (hyperlipidemia)  CKD  Gout  Cataract    Allergies  No Known Allergies    SOCIAL HISTORY:  Denies ETOh,Smoking,     FAMILY HISTORY:  No CKD    REVIEW OF SYSTEMS:  CONSTITUTIONAL: No weakness, fevers or chills  EYES/ENT: No visual changes;  No vertigo or throat pain   NECK: (+)neck pain  RESPIRATORY: No cough, wheezing, hemoptysis; No shortness of breath  CARDIOVASCULAR: (+)syncope, (+)dizziness; no chest pain, no palpitations  GASTROINTESTINAL: No abdominal or epigastric pain. No nausea, vomiting, or hematemesis; No diarrhea or constipation. No melena or hematochezia.  GENITOURINARY: No dysuria, frequency or hematuria  NEUROLOGICAL: (+)headache  SKIN: No itching, burning, rashes, or lesions   All other review of systems is negative unless indicated above.    VITAL:  T(C): , Max: 36.8 (22 @ 14:34)  T(F): , Max: 98.3 (22 @ 14:34)  HR: 67 (22 @ 04:55)  BP: 153/67 (22 @ 04:55)  RR: 18 (22 @ 04:55)  SpO2: 99% (22 @ 04:55)    PHYSICAL EXAM:  Constitutional: thin/frail, alert, NAD  HEENT: (+)C-Collar  Neck: Supple, No JVD  Respiratory: CTA-b/l  Cardiovascular: RRR s1s2, no m/r/g  Gastrointestinal: BS+, soft, NT/ND  Extremities: No peripheral edema b/l  Neurological: no focal deficits; strength grossly intact  Back: no CVAT b/l  Skin: No rashes, no nevi    LABS:                        8.6    8.39  )-----------( 204      ( 2022 16:39 )             29.0     Na(137)/K(5.7)/Cl(109)/HCO3(17)/BUN(26)/Cr(1.99)Glu(128)/Ca(9.1)/Mg(--)/PO4(--)     @ 16:39  Na(139)/K(5.6)/Cl(113)/HCO3(22)/BUN(31)/Cr(2.29)Glu(145)/Ca(8.4)/Mg(--)/PO4(--)     @ 13:09    Urinalysis Basic - ( 2022 17:40 )  Color: Colorless / Appearance: Clear / S.011 / pH: x  Gluc: x / Ketone: Negative  / Bili: Negative / Urobili: Negative   Blood: x / Protein: 30 mg/dL / Nitrite: Negative   Leuk Esterase: Negative / RBC: 1 /hpf / WBC 0 /HPF   Sq Epi: x / Non Sq Epi: 1 /hpf / Bacteria: Negative    2020 - BUN/Cr 29/1.93, K 5.3    IMAGING:  < from: CT Cervical Spine No Cont (22 @ 14:00) >  CT head: No acute intracranial hemorrhage or mass effect.  CT cervical spine: Acute C2 fracture of the body of the dens extending to the left lateral   mass. Right C2 facet fracture. Mild acute compression fracture of the superior endplate of C3.      ASSESSMENT:  (1)Renal - nonproteinuric CKD4 with GFR ~20-30ml/min. Likely due to microvascular disease. Stable function relative to .   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  (5)HTN - high BP - should we be avoiding diuretics here? Unclear if she was orthostatic when she fell  (6)Neurosurg - C2 fracture - Neurosurgery on board; recommending C-Collar at all times.    RECOMMEND:  (1)Agree with renal diet for now  (2)Add Lokelma 5gm po qd for now  (3)Increase NaHCO3 from 650qd to 650bid  (4)Increase Hydralazine from 10bid to 25tid  (5)Check PO4 level in a.m; can continue Phoslo with meals  (6)Trend orthostatics  (7)Dose new meds for GFR 20-30ml/min    Thank you for involving [facility] in this patient's care. Discussed with Dr. Shameka Brown, whose works with Dr. Hartmann. Dr. Brown et al will assume care from a renal standpoint starting tomorrow.    With warm regards,    Arjun Rider MD   [facility]  Office: [phone]  Cell: [phone]

## 2022-02-07 NOTE — PROGRESS NOTE ADULT - SUBJECTIVE AND OBJECTIVE BOX
Patient is a 76y old  Female who presents with a chief complaint of syncope , cervical spine fracture (2022 17:25)      SUBJECTIVE / OVERNIGHT EVENTS: ptn states she has neck pain w radiation to fingers, was fitted for an Aspen collar by orthotics specialist. seen by neuro, cards, renal    MEDICATIONS  (STANDING):  allopurinol 100 milliGRAM(s) Oral daily  amLODIPine   Tablet 10 milliGRAM(s) Oral daily  artificial  tears Solution 1 Drop(s) Both EYES four times a day  calcium acetate 667 milliGRAM(s) Oral four times a day with meals  heparin   Injectable 5000 Unit(s) SubCutaneous every 8 hours  hydrALAZINE 25 milliGRAM(s) Oral every 8 hours  latanoprost 0.005% Ophthalmic Solution 1 Drop(s) Both EYES at bedtime  levothyroxine 25 MICROGram(s) Oral daily  metoprolol tartrate 12.5 milliGRAM(s) Oral every 12 hours  simvastatin 40 milliGRAM(s) Oral at bedtime  sodium bicarbonate 650 milliGRAM(s) Oral two times a day  sodium zirconium cyclosilicate 5 Gram(s) Oral daily    MEDICATIONS  (PRN):      Vital Signs Last 24 Hrs  T(F): 98.1 (22 @ 20:30), Max: 98.8 (22 @ 16:48)  HR: 77 (22 @ 20:30) (61 - 85)  BP: 146/69 (22 @ 20:30) (144/56 - 168/68)  RR: 18 (22 @ 20:30) (18 - 20)  SpO2: 98% (22 @ 20:30) (98% - 100%)  Telemetry:   CAPILLARY BLOOD GLUCOSE        I&O's Summary    2022 07:01  -  2022 07:00  --------------------------------------------------------  IN: 0 mL / OUT: 700 mL / NET: -700 mL        PHYSICAL EXAM:  GENERAL: NAD, well-developed  HEAD:  Atraumatic, Normocephalic  EYES: EOMI, PERRLA, conjunctiva and sclera clear  NECK: Supple, No JVD  CHEST/LUNG: Clear to auscultation bilaterally; No wheeze  HEART: Regular rate and rhythm; No murmurs, rubs, or gallops  ABDOMEN: Soft, Nontender, Nondistended; Bowel sounds present  EXTREMITIES:  2+ Peripheral Pulses, No clubbing, cyanosis, or edema  PSYCH: AAOx3  NEUROLOGY: non-focal  SKIN: No rashes or lesions    LABS:                        9.2    8.51  )-----------( 227      ( 2022 17:20 )             29.9     02-07    132<L>  |  101  |  30<H>  ----------------------------<  113<H>  5.3   |  17<L>  |  2.04<H>    Ca    9.4      2022 17:20    TPro  8.2  /  Alb  4.5  /  TBili  0.2  /  DBili  x   /  AST  29  /  ALT  26  /  AlkPhos  67  02-06    PT/INR - ( 2022 16:39 )   PT: 10.4 sec;   INR: 0.86 ratio         PTT - ( 2022 16:39 )  PTT:30.0 sec      Urinalysis Basic - ( 2022 17:40 )    Color: Colorless / Appearance: Clear / S.011 / pH: x  Gluc: x / Ketone: Negative  / Bili: Negative / Urobili: Negative   Blood: x / Protein: 30 mg/dL / Nitrite: Negative   Leuk Esterase: Negative / RBC: 1 /hpf / WBC 0 /HPF   Sq Epi: x / Non Sq Epi: 1 /hpf / Bacteria: Negative        RADIOLOGY & ADDITIONAL TESTS:    Imaging Personally Reviewed:    Consultant(s) Notes Reviewed:      Care Discussed with Consultants/Other Providers:

## 2022-02-07 NOTE — CONSULT NOTE ADULT - ASSESSMENT
77yo female pt with PMHx of CKD (Last K-5.3, Bun/Cr.-29/1.93 on 7/18/2020), Anemia, Gout, HTN, HLD, was transferred, on cervical collar, from [facility] c/o head/neck pain, C2 fracture s/p fall this morning with a syncopal episode.

## 2022-02-07 NOTE — CONSULT NOTE ADULT - PROBLEM SELECTOR RECOMMENDATION 9
CTH neg   CT Cervical Spine: Acute C2 fracture of the body of the dens extending to the left lateral mass.  Right C2 facet fracture.  Mild acute compression fracture of the superior endplate C3.  no acute surgery: C-Collar on  orders per NSx

## 2022-02-07 NOTE — PROGRESS NOTE ADULT - ASSESSMENT
75yo female pt with PMHx of CKD (Last K-5.3, Bun/Cr.-29/1.93 on 7/18/2020), Anemia, Gout, HTN, HLD, was transferred, on cervical collar, from [facility] c/o head/neck pain, C2 fracture s/p fall this morning with a syncopal episode.

## 2022-02-07 NOTE — CONSULT NOTE ADULT - PROBLEM SELECTOR RECOMMENDATION 4
likely 2/2 microvascular disease  hyperkalemia, metabolic acidosis  monitor Phos  orders per Nephrology

## 2022-02-07 NOTE — CONSULT NOTE ADULT - PROBLEM SELECTOR RECOMMENDATION 3
+LOC  check thyroid panel  TTE reviewed  check orthostatics  monitor on tele to rule out gennaro-arrythmia

## 2022-02-07 NOTE — PHYSICAL THERAPY INITIAL EVALUATION ADULT - PERTINENT HX OF CURRENT PROBLEM, REHAB EVAL
76F mult med hx p/w neck pain s/p fall this AM. In the bathroom, urinating, stood up too quickly, fell down, and hit back of head. *2/6 Head CT: (-) *C-Spine CT: Acute C2 fracture of the body of the dens extending to the left lateral mass. Right C2 facet fracture. Mild acute compression fracture of the superior endplate of C3. *Left hand xray: (-) *Neurosurgery consult: no neurosurgical intervention, wear C-Collar at all times

## 2022-02-08 LAB
ANION GAP SERPL CALC-SCNC: 11 MMOL/L — SIGNIFICANT CHANGE UP (ref 5–17)
ANION GAP SERPL CALC-SCNC: 12 MMOL/L — SIGNIFICANT CHANGE UP (ref 5–17)
BUN SERPL-MCNC: 30 MG/DL — HIGH (ref 7–23)
BUN SERPL-MCNC: 33 MG/DL — HIGH (ref 7–23)
CALCIUM SERPL-MCNC: 8.4 MG/DL — SIGNIFICANT CHANGE UP (ref 8.4–10.5)
CALCIUM SERPL-MCNC: 8.8 MG/DL — SIGNIFICANT CHANGE UP (ref 8.4–10.5)
CHLORIDE SERPL-SCNC: 108 MMOL/L — SIGNIFICANT CHANGE UP (ref 96–108)
CHLORIDE SERPL-SCNC: 109 MMOL/L — HIGH (ref 96–108)
CO2 SERPL-SCNC: 15 MMOL/L — LOW (ref 22–31)
CO2 SERPL-SCNC: 19 MMOL/L — LOW (ref 22–31)
CREAT SERPL-MCNC: 2.02 MG/DL — HIGH (ref 0.5–1.3)
CREAT SERPL-MCNC: 2.26 MG/DL — HIGH (ref 0.5–1.3)
GLUCOSE SERPL-MCNC: 115 MG/DL — HIGH (ref 70–99)
GLUCOSE SERPL-MCNC: 85 MG/DL — SIGNIFICANT CHANGE UP (ref 70–99)
MAGNESIUM SERPL-MCNC: 2.3 MG/DL — SIGNIFICANT CHANGE UP (ref 1.6–2.6)
PHOSPHATE SERPL-MCNC: 5.2 MG/DL — HIGH (ref 2.5–4.5)
POTASSIUM SERPL-MCNC: 5.1 MMOL/L — SIGNIFICANT CHANGE UP (ref 3.5–5.3)
POTASSIUM SERPL-MCNC: 5.7 MMOL/L — HIGH (ref 3.5–5.3)
POTASSIUM SERPL-SCNC: 5.1 MMOL/L — SIGNIFICANT CHANGE UP (ref 3.5–5.3)
POTASSIUM SERPL-SCNC: 5.7 MMOL/L — HIGH (ref 3.5–5.3)
SODIUM SERPL-SCNC: 136 MMOL/L — SIGNIFICANT CHANGE UP (ref 135–145)
SODIUM SERPL-SCNC: 138 MMOL/L — SIGNIFICANT CHANGE UP (ref 135–145)

## 2022-02-08 PROCEDURE — 76775 US EXAM ABDO BACK WALL LIM: CPT | Mod: 26

## 2022-02-08 RX ORDER — SODIUM CHLORIDE 9 MG/ML
1000 INJECTION INTRAMUSCULAR; INTRAVENOUS; SUBCUTANEOUS ONCE
Refills: 0 | Status: DISCONTINUED | OUTPATIENT
Start: 2022-02-08 | End: 2022-02-16

## 2022-02-08 RX ORDER — GABAPENTIN 400 MG/1
50 CAPSULE ORAL
Refills: 0 | Status: DISCONTINUED | OUTPATIENT
Start: 2022-02-08 | End: 2022-03-02

## 2022-02-08 RX ORDER — SODIUM ZIRCONIUM CYCLOSILICATE 10 G/10G
5 POWDER, FOR SUSPENSION ORAL ONCE
Refills: 0 | Status: COMPLETED | OUTPATIENT
Start: 2022-02-08 | End: 2022-02-08

## 2022-02-08 RX ORDER — ACETAMINOPHEN 500 MG
650 TABLET ORAL EVERY 6 HOURS
Refills: 0 | Status: DISCONTINUED | OUTPATIENT
Start: 2022-02-08 | End: 2022-02-08

## 2022-02-08 RX ORDER — MIDODRINE HYDROCHLORIDE 2.5 MG/1
5 TABLET ORAL
Refills: 0 | Status: DISCONTINUED | OUTPATIENT
Start: 2022-02-08 | End: 2022-02-14

## 2022-02-08 RX ORDER — SEVELAMER CARBONATE 2400 MG/1
800 POWDER, FOR SUSPENSION ORAL
Refills: 0 | Status: DISCONTINUED | OUTPATIENT
Start: 2022-02-08 | End: 2022-03-02

## 2022-02-08 RX ADMIN — Medication 650 MILLIGRAM(S): at 17:46

## 2022-02-08 RX ADMIN — Medication 1 DROP(S): at 06:09

## 2022-02-08 RX ADMIN — SEVELAMER CARBONATE 800 MILLIGRAM(S): 2400 POWDER, FOR SUSPENSION ORAL at 18:35

## 2022-02-08 RX ADMIN — HEPARIN SODIUM 5000 UNIT(S): 5000 INJECTION INTRAVENOUS; SUBCUTANEOUS at 13:21

## 2022-02-08 RX ADMIN — Medication 1 DROP(S): at 17:46

## 2022-02-08 RX ADMIN — Medication 25 MILLIGRAM(S): at 06:09

## 2022-02-08 RX ADMIN — AMLODIPINE BESYLATE 10 MILLIGRAM(S): 2.5 TABLET ORAL at 06:09

## 2022-02-08 RX ADMIN — LATANOPROST 1 DROP(S): 0.05 SOLUTION/ DROPS OPHTHALMIC; TOPICAL at 22:48

## 2022-02-08 RX ADMIN — MIDODRINE HYDROCHLORIDE 5 MILLIGRAM(S): 2.5 TABLET ORAL at 17:50

## 2022-02-08 RX ADMIN — Medication 25 MILLIGRAM(S): at 13:21

## 2022-02-08 RX ADMIN — Medication 25 MICROGRAM(S): at 06:10

## 2022-02-08 RX ADMIN — SIMVASTATIN 40 MILLIGRAM(S): 20 TABLET, FILM COATED ORAL at 22:48

## 2022-02-08 RX ADMIN — HEPARIN SODIUM 5000 UNIT(S): 5000 INJECTION INTRAVENOUS; SUBCUTANEOUS at 22:47

## 2022-02-08 RX ADMIN — Medication 100 MILLIGRAM(S): at 13:21

## 2022-02-08 RX ADMIN — Medication 1 DROP(S): at 00:00

## 2022-02-08 RX ADMIN — Medication 650 MILLIGRAM(S): at 22:30

## 2022-02-08 RX ADMIN — GABAPENTIN 50 MILLIGRAM(S): 400 CAPSULE ORAL at 17:46

## 2022-02-08 RX ADMIN — Medication 667 MILLIGRAM(S): at 13:21

## 2022-02-08 RX ADMIN — Medication 12.5 MILLIGRAM(S): at 06:10

## 2022-02-08 RX ADMIN — Medication 1 DROP(S): at 13:21

## 2022-02-08 RX ADMIN — SODIUM ZIRCONIUM CYCLOSILICATE 5 GRAM(S): 10 POWDER, FOR SUSPENSION ORAL at 22:48

## 2022-02-08 RX ADMIN — SODIUM ZIRCONIUM CYCLOSILICATE 5 GRAM(S): 10 POWDER, FOR SUSPENSION ORAL at 11:09

## 2022-02-08 RX ADMIN — Medication 650 MILLIGRAM(S): at 06:10

## 2022-02-08 RX ADMIN — HEPARIN SODIUM 5000 UNIT(S): 5000 INJECTION INTRAVENOUS; SUBCUTANEOUS at 06:10

## 2022-02-08 NOTE — PROGRESS NOTE ADULT - SUBJECTIVE AND OBJECTIVE BOX
[facility]      Seen earlier today, and examined.  - Today, patient is without complaints.           *****MEDICATIONS: Current medication reviewed and documented.    MEDICATIONS  (STANDING):  allopurinol 100 milliGRAM(s) Oral daily  amLODIPine   Tablet 10 milliGRAM(s) Oral daily  artificial  tears Solution 1 Drop(s) Both EYES four times a day  calcium acetate 667 milliGRAM(s) Oral four times a day with meals  gabapentin Solution 50 milliGRAM(s) Oral two times a day  heparin   Injectable 5000 Unit(s) SubCutaneous every 8 hours  hydrALAZINE 25 milliGRAM(s) Oral every 8 hours  latanoprost 0.005% Ophthalmic Solution 1 Drop(s) Both EYES at bedtime  levothyroxine 25 MICROGram(s) Oral daily  metoprolol tartrate 12.5 milliGRAM(s) Oral every 12 hours  simvastatin 40 milliGRAM(s) Oral at bedtime  sodium bicarbonate 650 milliGRAM(s) Oral two times a day  sodium zirconium cyclosilicate 5 Gram(s) Oral daily    MEDICATIONS  (PRN):          ***** VITAL SIGNS:  T(F): 98.6 (22 @ 12:02), Max: 98.8 (22 @ 16:48)  HR: 82 (22 @ 12:02) (61 - 82)  BP: 116/62 (22 @ 12:02) (116/62 - 164/83)  RR: 18 (22 @ 12:02) (16 - 18)  SpO2: 98% (22 @ 12:02) (98% - 99%)  Wt(kg): --  ,   I&O's Summary    2022 07:01  -  2022 07:00  --------------------------------------------------------  IN: 0 mL / OUT: 600 mL / NET: -600 mL    2022 07:01  -  2022 12:25  --------------------------------------------------------  IN: 630 mL / OUT: 400 mL / NET: 230 mL             *****PHYSICAL EXAM:   alert oriented x 3 attention comprehension are fair.  Able to name, repeat.   EOmi fundi not visualized   no nystagmus VFF to confrontation  Tongue is midline  Palate elevates symmetrically   Moving all 4 ext spontaneously no drift appreciated    Gait not assessed.            *****LAB AND IMAGIN.2    8.51  )-----------( 227      ( 2022 17:20 )             29.9               02-08    138  |  108  |  30<H>  ----------------------------<  85  5.7<H>   |  19<L>  |  2.02<H>    Ca    8.8      2022 06:08  Phos  5.2     02-08  Mg     2.3     02-08    TPro  8.2  /  Alb  4.5  /  TBili  0.2  /  DBili  x   /  AST  29  /  ALT  26  /  AlkPhos  67  02-06    PT/INR - ( 2022 16:39 )   PT: 10.4 sec;   INR: 0.86 ratio         PTT - ( 2022 16:39 )  PTT:30.0 sec                   Urinalysis Basic - ( 2022 17:40 )    Color: Colorless / Appearance: Clear / S.011 / pH: x  Gluc: x / Ketone: Negative  / Bili: Negative / Urobili: Negative   Blood: x / Protein: 30 mg/dL / Nitrite: Negative   Leuk Esterase: Negative / RBC: 1 /hpf / WBC 0 /HPF   Sq Epi: x / Non Sq Epi: 1 /hpf / Bacteria: Negative      [All pertinent recent Imaging/Reports reviewed]           *****A S S E S S M E N T   A N D   P L A N :  Excerpt from H&P,"     75yo female pt with PMHx of CKD (Last K-5.3, Bun/Cr.-29/1.93 on 2020), Anemia, Gout, HTN, HLD, was transferred, on cervical collar, from [facility] c/o head/neck pain, C2 fracture s/p fall this morning with a syncopal episode.   Pt stated she felt dizzy after urination in her bathroom at 8:30am and fell. +LOC and noticed severe headache/neck pain.   She was evaluated in [facility] and sent to ED for spine consult 2/2 C2 fracture.   Denies visual changes or N/V. Denies sensory changes or weakness to extremities. Denies CP/SOB/Palpitation/abd pain. Denies lower back pain. Denies urinary problems. Denies fever, chills, cough or congestion.            Problem/Recommendations 1: C2 fracture    CT C: acute C2 fx of body of the dens extending to left lateral mass (type 3), R C2 facet fx, mild comp fx sup endplate C3  nsx no surgical intervention , c collar at all times follow up with dr COMER   pt eval /ot eval   neuro checks q 4   gabapentin 50 bid for pain   will consider adding muscle relaxant   lidocaine patch for comfort   pt is refusing tylenol         Problem/Recommendations 2:   hyponatremia   pain related siadh   now improved         Thank you for allowing me to participate in the care of this patient. Will continue to follow patient periodically. Please do not hesitate to call me if you have any  questions or if there has been a change in patients neurological status     ________________  Li Isaac MD  [facility] ([facility]  [phone]      33 minutes spent on total encounter; more than 50 % of the visit was  spent counseling about plan of care, compliance to diet/exercise and medication regimen and or  coordinating care by the attending physician.      It is advised that stroke patients follow up with TALIA Krishna @ [phone] in 1- 2 weeks.   Others please follow up with Dr. Michael Nissenbaum [phone]

## 2022-02-08 NOTE — DIETITIAN INITIAL EVALUATION ADULT. - PERTINENT MEDS FT
MEDICATIONS  (STANDING):  allopurinol 100 milliGRAM(s) Oral daily  amLODIPine   Tablet 10 milliGRAM(s) Oral daily  artificial  tears Solution 1 Drop(s) Both EYES four times a day  calcium acetate 667 milliGRAM(s) Oral four times a day with meals  gabapentin Solution 50 milliGRAM(s) Oral two times a day  heparin   Injectable 5000 Unit(s) SubCutaneous every 8 hours  hydrALAZINE 25 milliGRAM(s) Oral every 8 hours  latanoprost 0.005% Ophthalmic Solution 1 Drop(s) Both EYES at bedtime  levothyroxine 25 MICROGram(s) Oral daily  metoprolol tartrate 12.5 milliGRAM(s) Oral every 12 hours  simvastatin 40 milliGRAM(s) Oral at bedtime  sodium bicarbonate 650 milliGRAM(s) Oral two times a day  sodium zirconium cyclosilicate 5 Gram(s) Oral daily  sodium zirconium cyclosilicate 5 Gram(s) Oral once    MEDICATIONS  (PRN):  acetaminophen     Tablet .. 650 milliGRAM(s) Oral every 6 hours PRN Moderate Pain (4 - 6)

## 2022-02-08 NOTE — DIETITIAN INITIAL EVALUATION ADULT. - ORAL INTAKE PTA/DIET HISTORY
visited pt at bedside this morning. confirms NKFA. eating well. takes centrum PTA, exclaims to take another vitamin, but cannot recall which one. no oral supplements noted in outpatient medications. was with nausea/vomiting on Sunday, no further episodes.

## 2022-02-08 NOTE — PROGRESS NOTE ADULT - ASSESSMENT
77yo female pt with PMHx of CKD (Last K-5.3, Bun/Cr.-29/1.93 on 7/18/2020), Anemia, Gout, HTN, HLD, was transferred, on cervical collar, from [facility] c/o head/neck pain, C2 fracture s/p fall this morning with a syncopal episode.

## 2022-02-08 NOTE — DIETITIAN INITIAL EVALUATION ADULT. - PERTINENT LABORATORY DATA
02-08 Na 138 mmol/L Glu 85 mg/dL K+ 5.7 mmol/L<H> Cr  2.02 mg/dL<H> BUN 30 mg/dL<H> Phos 5.2 mg/dL<H>   A1C with Estimated Average Glucose Result: 5.8 % (02-07-22 @ 09:16)

## 2022-02-08 NOTE — PROGRESS NOTE ADULT - PROBLEM SELECTOR PLAN 2
c/w hydralazine 25mg PO q8hrs  c/w amlodipine 10mg PO daily  c/w metoprolol 12.5 PO q12hrs  continue to monitor + orthostatics  start midodrine 5mg PO BID  hold all antihypertensives  continue to monitor

## 2022-02-08 NOTE — DIETITIAN INITIAL EVALUATION ADULT. - ADD RECOMMEND
1) Will continue to monitor PO intake, weight, labs, skin, GI status and diet 2) Change diet as above - Low Sodium, no concentrated phosphorus or potassium. 3) Update preferences as able 4) Nutrition Risk notification placed in chart 5) Consider addition of nephrovite if no contraindications

## 2022-02-08 NOTE — DIETITIAN INITIAL EVALUATION ADULT. - CHIEF COMPLAINT
Per H&P: 77yo female pt with PMHx of CKD (Last K-5.3, Bun/Cr.-29/1.93 on 7/18/2020), Anemia, Gout, HTN, HLD, was transferred, on cervical collar, from [facility] c/o head/neck pain, C2 fracture s/p fall this morning with a syncopal episode.

## 2022-02-08 NOTE — DIETITIAN INITIAL EVALUATION ADULT. - REASON
Please return here or go to the Emergency Department for any concerns or worsening of condition.        Zyrtec, Claritin, or Allegra OTC as directed for the next 7 days  Flonase OTC as directed for the next 7 days  Salt Water Nasal Spray OTC 3x/day for the next 7 days      Follow up with Primary Care or ENT if not improved in 7-10 Days:  [phone]          Acute Bacterial Rhinosinusitis (ABRS)  Acute bacterial rhinosinusitis (ABRS) is an infection of your nasal cavity and sinuses. Its caused by bacteria. Acute means that youve had symptoms for less than 12 weeks.  Understanding your sinuses  The nasal cavity is the large air-filled space behind your nose. The sinuses are a group of spaces formed by the bones of your face. They connect with your nasal cavity. ABRS causes the tissue lining these spaces to become inflamed. Mucus may not drain normally. This leads to facial pain and other symptoms.  What causes ABRS?  ABRS most often follows an upper respiratory infection caused by a virus. Bacteria then infect the lining of your nasal cavity and sinuses. But you can also get ABRS if you have:  · Nasal allergies  · Long-term nasal swelling and congestion not caused by allergies  · Blockage in the nose  Symptoms of ABRS  The symptoms of ABRS may be different for each person, and can include:  · Nasal congestion  · Runny nose  · Fluid draining from the nose down the throat (postnasal drip)  · Headache  · Cough  · Pain in the sinuses  · Thick, colored fluid from the nose (mucus)  · Fever  Diagnosing ABRS  ABRS may be diagnosed if youve had an upper respiratory infection like a cold and cough for longer than 10 to 14 days. Your health care provider will ask about your symptoms and your medical history. The provider will check your vital signs, including your temperature. Youll have a physical exam. The health care provider will check your ears, nose, and throat. You likely wont need any tests. If ABRS comes back, you  may have a culture or other tests.  Treatment for ABRS  Treatment may include:  · Antibiotic medicine. This is for symptoms that last for at least 10 to 14 days.  · Nasal corticosteroid medicine. Drops or spray used in the nose can lessen swelling and congestion.  · Over-the-counter pain medicine. This is to lessen sinus pain and pressure.  · Nasal decongestant medicine. Spray or drops may help to lessen congestion. Do not use them for more than a few days.  · Salt wash (saline irrigation). This can help to loosen mucus.  Possible complications of ABRS  ABRS may come back or become long-term (chronic).  In rare cases, ABRS may cause complications such as:   · Inflamed tissue around the brain and spinal cord (meningitis)  · Inflamed tissue around the eyes (orbital cellulitis)  · Inflamed bones around the sinuses (osteitis)  These problems may need to be treated in a hospital with intravenous (IV) antibiotic medicine or surgery.  When to call the health care provider  Call your health care provider if you have any of the following:  · Symptoms that dont get better, or get worse  · Symptoms that dont get better after 3 to 5 days on antibiotics  · Trouble seeing  · Swelling around your eyes  · Confusion or trouble staying awake   Date Last Reviewed: 3/3/2015  © 8795-8160 [facility]. [address] [place] [address], [place] [address]. All rights reserved. This information is not intended as a substitute for professional medical care. Always follow your healthcare professional's instructions.      Understanding Nasal Anatomy: Inside View  A lot happens under the surface of the nose. The bone and cartilage under the skin give the nose most of its size and shape. Other structures inside and behind the nose help you breathe. Learning the anatomy of the nose can help you better understand how the nose works.       Bone supports the upper third (bridge) of the nose. The upper cartilage supports the side of the nose.  "The lower cartilage adds support, width, and height. It helps shape the nostrils and the tip of the nose. Skin also helps shape the nose.          The nasal cavity is a hollow space behind the nose that air flows through. The septum is a thin "wall" made of cartilage and bone. It divides the inside of the nose into two chambers. The mucous membrane is thin tissue that lines the nose, sinuses, and throat. It warms and moistens the air you breathe in. It also makes the sticky mucus that helps clean the air of dust and other small particles. The turbinates on each side of the nose are curved, bony ridges lined with mucous membrane. They warm and moisten the air you breathe in. The sinuses are hollow, air-filled chambers in the bone around your nose. Mucus from the sinuses drains into the nasal cavity.  Date Last Reviewed: 11/1/2016  © 2414-1502 [employer] [address] [place] [address]. All rights reserved. This information is not intended as a substitute for professional medical care. Always follow your healthcare professional's instructions.            How to Check Your Blood Sugar    Keeping track of how much sugar (glucose) is in your blood is an important part of self-care when you have diabetes. This is also called self-monitoring of blood glucose (SMBG). To make sure your glucose and insulin are in balance, check your blood sugar as instructed by your healthcare provider. You may need to check your blood glucose levels at certain times every day. Or you may need to check them only a few times a week.  What you need  To check your blood sugar, make sure you have the following:   · A small pricking needle (lancing device)  · Test strips  · A glucose meter  · A notebook, chart, or log book and pen or pencil   Using a blood glucose meter  You can check your blood sugar at home, at work, and anywhere else. Your diabetes team will help you choose a blood glucose meter. A meter measures the " amount of glucose in a tiny drop of blood. Youll use a device called a lancet to draw a drop of blood. Put the strip in the meter first. Then touch the test strip to the drop of blood. The meter then gives you a number (reading) that tells you the level of your blood sugar.  Aim for your target range  Your blood sugar should be in your target range--not too high and not too low. A target range is where your blood sugar level is healthiest. Staying in this range as much as possible will help lower your risk for health problems (complications). Your diabetes team will help you figure out the best target range for you. That range depends on many things. They include your age, other health problems, how well your diabetes is controlled, and how long you have had diabetes. In general, target ranges are:  · Control of blood glucose (hemoglobin A1c or A1c): generally, 7.0% or less. You will usually have this test at the lab.  · Before a meal (preprandial glucose): Between 80 and 130 mg/dL.  · One to 2 hours after a meal (postprandial glucose): Less than 180 mg/dL.  Track your readings  Use a notebook, chart, or log book to keep track of your readings. Write down the date, time, and your blood sugar level numbers. This helps you see patterns, such as high blood sugar after eating certain foods. Take your log along when you see your healthcare provider. Your blood glucose levels will help your provider decide if he or she needs to make any changes to your management plan. To check your blood sugar, follow the steps below.  Step 1. Get ready  · Wash your hands with soap and warm (not hot) water.  · Follow all of the instructions that came with your glucometer. Be sure that your test strips were designed to be used with your meter and are not .   Step 2. Draw a drop of blood  · Prick the side of your finger with the lancet. Squeeze gently until you get a drop of blood. Squeezing too hard can cause an inaccurate  reading.  · Put the lancet in a special sharps container. Ask your healthcare team where you can buy one or what you can use to throw away any sharps.  · If you are unable to get enough blood, hold your hand at your side and gently shake it.  Step 3. Place the drop on a strip  · Wait for the meter to show a message or symbol that it is time to test.  · Touch the test strip to the drop of blood.  · Be sure to follow the instructions included with meter.  Step 4. Read and record your results  · Wait for your meter to show the result.  · If you see an error message, recheck using a fresh strip and a fresh drop of blood. Also recheck if the glucose numbers aren't what you expect--too low without symptoms, or too high for no reason.  · Write the results in your log book.  Date Last Reviewed: 6/1/2016  © 5677-9903 [employer]. [address] [place] [address], [place] [address]. All rights reserved. This information is not intended as a substitute for professional medical care. Always follow your healthcare professional's instructions.         patient states to be in pain. also with c-collar. deferred at this time.

## 2022-02-08 NOTE — PROGRESS NOTE ADULT - SUBJECTIVE AND OBJECTIVE BOX
Patient is a 76y old  Female who presents with a chief complaint of syncope , cervical spine fracture (2022 13:51)      SUBJECTIVE / OVERNIGHT EVENTS: collar on for dense fx, on neurontin for pain, neuro following will need to cont collar for 6 weeks, will need outptn NSx follow up. TTE stable, awaiting carotid dopplers DC planning    MEDICATIONS  (STANDING):  allopurinol 100 milliGRAM(s) Oral daily  amLODIPine   Tablet 10 milliGRAM(s) Oral daily  artificial  tears Solution 1 Drop(s) Both EYES four times a day  gabapentin Solution 50 milliGRAM(s) Oral two times a day  heparin   Injectable 5000 Unit(s) SubCutaneous every 8 hours  hydrALAZINE 25 milliGRAM(s) Oral every 8 hours  latanoprost 0.005% Ophthalmic Solution 1 Drop(s) Both EYES at bedtime  levothyroxine 25 MICROGram(s) Oral daily  metoprolol tartrate 12.5 milliGRAM(s) Oral every 12 hours  sevelamer carbonate 800 milliGRAM(s) Oral three times a day with meals  simvastatin 40 milliGRAM(s) Oral at bedtime  sodium bicarbonate 650 milliGRAM(s) Oral two times a day  sodium zirconium cyclosilicate 5 Gram(s) Oral daily    MEDICATIONS  (PRN):      Vital Signs Last 24 Hrs  T(F): 98.6 (22 @ 12:02), Max: 98.8 (22 @ 16:48)  HR: 82 (22 @ 12:02) (61 - 82)  BP: 116/62 (22 @ 12:02) (116/62 - 150/65)  RR: 18 (22 @ 12:02) (16 - 18)  SpO2: 98% (22 @ 12:02) (98% - 98%)  Telemetry:   CAPILLARY BLOOD GLUCOSE        I&O's Summary    2022 07:01  -  2022 07:00  --------------------------------------------------------  IN: 0 mL / OUT: 600 mL / NET: -600 mL    2022 07:01  -  2022 15:09  --------------------------------------------------------  IN: 630 mL / OUT: 400 mL / NET: 230 mL        PHYSICAL EXAM:  GENERAL: NAD, well-developed  HEAD:  Atraumatic, Normocephalic  EYES: EOMI, PERRLA, conjunctiva and sclera clear  NECK: Supple, No JVD  CHEST/LUNG: Clear to auscultation bilaterally; No wheeze  HEART: Regular rate and rhythm; No murmurs, rubs, or gallops  ABDOMEN: Soft, Nontender, Nondistended; Bowel sounds present  EXTREMITIES:  2+ Peripheral Pulses, No clubbing, cyanosis, or edema  PSYCH: AAOx3  NEUROLOGY: non-focal  SKIN: No rashes or lesions    LABS:                        9.2    8.51  )-----------( 227      ( 2022 17:20 )             29.9     02-08    138  |  108  |  30<H>  ----------------------------<  85  5.7<H>   |  19<L>  |  2.02<H>    Ca    8.8      2022 06:08  Phos  5.2     02-08  Mg     2.3     02-08    TPro  8.2  /  Alb  4.5  /  TBili  0.2  /  DBili  x   /  AST  29  /  ALT  26  /  AlkPhos  67  02-06    PT/INR - ( 2022 16:39 )   PT: 10.4 sec;   INR: 0.86 ratio         PTT - ( 2022 16:39 )  PTT:30.0 sec      Urinalysis Basic - ( 2022 17:40 )    Color: Colorless / Appearance: Clear / S.011 / pH: x  Gluc: x / Ketone: Negative  / Bili: Negative / Urobili: Negative   Blood: x / Protein: 30 mg/dL / Nitrite: Negative   Leuk Esterase: Negative / RBC: 1 /hpf / WBC 0 /HPF   Sq Epi: x / Non Sq Epi: 1 /hpf / Bacteria: Negative        RADIOLOGY & ADDITIONAL TESTS:    Imaging Personally Reviewed:    Consultant(s) Notes Reviewed:      Care Discussed with Consultants/Other Providers:

## 2022-02-08 NOTE — PROGRESS NOTE ADULT - ASSESSMENT
77yo female pt with PMHx of CKD (Last K-5.3, Bun/Cr.-29/1.93 on 7/18/2020), Anemia, Gout, HTN, HLD, was transferred, on cervical collar, from [facility] c/o head/neck pain, C2 fracture s/p fall this morning with a syncopal episode.

## 2022-02-08 NOTE — PROGRESS NOTE ADULT - SUBJECTIVE AND OBJECTIVE BOX
[facility] - MERCEDES Howe / MERCEDES Ballesteros / YAQUELIN Crouch/ MERCEDES Brown/ MERCEDES Car/ LUZ Hartmann / MAGDALENO Alonzo / TERRY Holman  ---------------------------------------------------------------------------------------------------------------  seen and examined today for CKD  Interval : NAD  VITALS:  T(F): 98.6 (02-08-22 @ 12:02), Max: 98.8 (02-07-22 @ 16:48)  HR: 82 (02-08-22 @ 12:02)  BP: 116/62 (02-08-22 @ 12:02)  RR: 18 (02-08-22 @ 12:02)  SpO2: 98% (02-08-22 @ 12:02)  Wt(kg): --    02-07 @ 07:01  -  02-08 @ 07:00  --------------------------------------------------------  IN: 0 mL / OUT: 600 mL / NET: -600 mL    02-08 @ 07:01  -  02-08 @ 13:51  --------------------------------------------------------  IN: 630 mL / OUT: 400 mL / NET: 230 mL      Physical Exam :-  Constitutional: NAD  Neck: Supple.  Respiratory: Bilateral equal breath sounds,  Cardiovascular: S1, S2 normal,  Gastrointestinal: Bowel Sounds present, soft, non tender.  Extremities: No edema  Neurological: Alert and Oriented x 3, no focal deficits  Psychiatric: Normal mood, normal affect  Data:-  Allergies :   No Known Allergies    Hospital Medications:   MEDICATIONS  (STANDING):  allopurinol 100 milliGRAM(s) Oral daily  amLODIPine   Tablet 10 milliGRAM(s) Oral daily  artificial  tears Solution 1 Drop(s) Both EYES four times a day  gabapentin Solution 50 milliGRAM(s) Oral two times a day  heparin   Injectable 5000 Unit(s) SubCutaneous every 8 hours  hydrALAZINE 25 milliGRAM(s) Oral every 8 hours  latanoprost 0.005% Ophthalmic Solution 1 Drop(s) Both EYES at bedtime  levothyroxine 25 MICROGram(s) Oral daily  metoprolol tartrate 12.5 milliGRAM(s) Oral every 12 hours  sevelamer carbonate 800 milliGRAM(s) Oral three times a day with meals  simvastatin 40 milliGRAM(s) Oral at bedtime  sodium bicarbonate 650 milliGRAM(s) Oral two times a day  sodium zirconium cyclosilicate 5 Gram(s) Oral daily    02-08    138  |  108  |  30<H>  ----------------------------<  85  5.7<H>   |  19<L>  |  2.02<H>    Ca    8.8      08 Feb 2022 06:08  Phos  5.2     02-08  Mg     2.3     02-08    TPro  8.2  /  Alb  4.5  /  TBili  0.2  /  DBili      /  AST  29  /  ALT  26  /  AlkPhos  67  02-06    Creatinine Trend: 2.02 <--, 2.04 <--, 1.99 <--, 2.29 <--                        9.2    8.51  )-----------( 227      ( 07 Feb 2022 17:20 )             29.9

## 2022-02-08 NOTE — PROGRESS NOTE ADULT - ASSESSMENT
76 year-old woman with history of multiple medical issues including hypertension, hyperlipidemia, and chronic kidney disease. She was transferred to [facility] yesterday from the [facility] ER after being noted on imaging to have a C2 fracture, s/p syncopal episode. Following for CKD    CKD 4  Serum creatinine at baseline  Renal diet  daily BMP    Hyperkalemia  Continue Lokelma 5mg PO QD  May receive additional dosis if persistently high  Low K diet    Metabolic acidosis  Continue Nabicarb      For any question, call:  Cell # [phone]  Pager # [phone]  Callback # [phone]

## 2022-02-08 NOTE — DIETITIAN INITIAL EVALUATION ADULT. - CALCULATED FROM (G/KG)
LVM that orders have been placed- ortho appt tomorrow.       ----- Message from Ivy Gaxiola sent at 2/4/2020  2:18 PM CST -----  Contact: self  Type: Needs Medical Advice    Who Called:  self  Symptoms (please be specific):  Broken leg  How long has patient had these symptoms:    Pharmacy name and phone #:    Best Call Back Number: [phone] (home)   Additional Information: patient sent a message yesterday regarding a wheelchair. Patient states she would like a call back to discuss the type of wheelchair she needs. Thanks!       38.6

## 2022-02-08 NOTE — DIETITIAN INITIAL EVALUATION ADULT. - OTHER INFO
Pt endorses some pain. Pt states to have good PO intake. Denies nausea/vomiting/constipation/diarrhea. Denies difficulty chewing / swallowing. No bowel movement documented so far this admission.     Dosing weight: 84.9 pounds  Patient endorses weight loss over the past year. States that one year ago, weighed 110 pounds. This indicates a 25.1 pound / 22.8% weight loss x 1 year, severe.    Hgb A1c 5.8% - noted to be in pre-DM range. no noted history of DM. will follow up with education as pt pain subsides, as able.

## 2022-02-08 NOTE — DIETITIAN INITIAL EVALUATION ADULT. - PERSON TAUGHT/METHOD
provided patient with phosphorus and potassium containing foods. discussed elevated levels and to monitor intake. patient states to agree and nodded head./verbal instruction/written material/patient instructed

## 2022-02-08 NOTE — PROGRESS NOTE ADULT - SUBJECTIVE AND OBJECTIVE BOX
Subjective: Patient seen and examined. No new events except as noted.   no chest pain, no sob     REVIEW OF SYSTEMS:    CONSTITUTIONAL: + weakness, fevers or chills  EYES/ENT: No visual changes;  No vertigo or throat pain   NECK: No pain or stiffness  RESPIRATORY: No cough, wheezing, hemoptysis; No shortness of breath  CARDIOVASCULAR: No chest pain or palpitations  GASTROINTESTINAL: No abdominal or epigastric pain. No nausea, vomiting, or hematemesis; No diarrhea or constipation. No melena or hematochezia.  GENITOURINARY: No dysuria, frequency or hematuria  NEUROLOGICAL: No numbness or weakness  SKIN: No itching, burning, rashes, or lesions   All other review of systems is negative unless indicated above.    MEDICATIONS:  MEDICATIONS  (STANDING):  allopurinol 100 milliGRAM(s) Oral daily  amLODIPine   Tablet 10 milliGRAM(s) Oral daily  artificial  tears Solution 1 Drop(s) Both EYES four times a day  gabapentin Solution 50 milliGRAM(s) Oral two times a day  heparin   Injectable 5000 Unit(s) SubCutaneous every 8 hours  hydrALAZINE 25 milliGRAM(s) Oral every 8 hours  latanoprost 0.005% Ophthalmic Solution 1 Drop(s) Both EYES at bedtime  levothyroxine 25 MICROGram(s) Oral daily  metoprolol tartrate 12.5 milliGRAM(s) Oral every 12 hours  sevelamer carbonate 800 milliGRAM(s) Oral three times a day with meals  simvastatin 40 milliGRAM(s) Oral at bedtime  sodium bicarbonate 650 milliGRAM(s) Oral two times a day  sodium chloride 0.9% Bolus 1000 milliLiter(s) IV Bolus once  sodium zirconium cyclosilicate 5 Gram(s) Oral daily      PHYSICAL EXAM:  T(C): 37 (02-08-22 @ 12:02), Max: 37.1 (02-07-22 @ 16:48)  HR: 82 (02-08-22 @ 12:02) (61 - 82)  BP: 116/62 (02-08-22 @ 12:02) (116/62 - 150/65)  RR: 18 (02-08-22 @ 12:02) (16 - 18)  SpO2: 98% (02-08-22 @ 12:02) (98% - 98%)  Wt(kg): --  I&O's Summary    07 Feb 2022 07:01  -  08 Feb 2022 07:00  --------------------------------------------------------  IN: 0 mL / OUT: 600 mL / NET: -600 mL    08 Feb 2022 07:01  -  08 Feb 2022 15:28  --------------------------------------------------------  IN: 630 mL / OUT: 400 mL / NET: 230 mL      Appearance: Normal	  HEENT:   Normal oral mucosa, PERRL, EOMI	  Lymphatic: No lymphadenopathy , no edema  Cardiovascular: Normal S1 S2, No JVD, No murmurs , Peripheral pulses palpable 2+ bilaterally  Respiratory: Lungs clear to auscultation, normal effort 	  Gastrointestinal:  Soft, Non-tender, + BS	  Skin: No rashes, No ecchymoses, No cyanosis, warm to touch  Neuro: alert oriented x 3 attention comprehension are fair.  Able to name, repeat.   EOmi fundi not visualized   no nystagmus VFF to confrontation  Tongue is midline  Palate elevates symmetrically   Moving all 4 ext spontaneously no drift appreciated  Gait not assessed.   Ext: No edema      LABS:    CARDIAC MARKERS:                        9.2    8.51  )-----------( 227      ( 07 Feb 2022 17:20 )             29.9     02-08    138  |  108  |  30<H>  ----------------------------<  85  5.7<H>   |  19<L>  |  2.02<H>    Ca    8.8      08 Feb 2022 06:08  Phos  5.2     02-08  Mg     2.3     02-08    TPro  8.2  /  Alb  4.5  /  TBili  0.2  /  DBili  x   /  AST  29  /  ALT  26  /  AlkPhos  67  02-06    proBNP:   Lipid Profile:   HgA1c:   TSH:     TELEMETRY: 	    ECG:  	  RADIOLOGY:   DIAGNOSTIC TESTING:  [ ] Echocardiogram:  [ ]  Catheterization:  [ ] Stress Test:    OTHER:

## 2022-02-09 LAB
ANION GAP SERPL CALC-SCNC: 11 MMOL/L — SIGNIFICANT CHANGE UP (ref 5–17)
ANION GAP SERPL CALC-SCNC: 15 MMOL/L — SIGNIFICANT CHANGE UP (ref 5–17)
BUN SERPL-MCNC: 27 MG/DL — HIGH (ref 7–23)
BUN SERPL-MCNC: 30 MG/DL — HIGH (ref 7–23)
CALCIUM SERPL-MCNC: 8.4 MG/DL — SIGNIFICANT CHANGE UP (ref 8.4–10.5)
CALCIUM SERPL-MCNC: 8.6 MG/DL — SIGNIFICANT CHANGE UP (ref 8.4–10.5)
CHLORIDE SERPL-SCNC: 110 MMOL/L — HIGH (ref 96–108)
CHLORIDE SERPL-SCNC: 110 MMOL/L — HIGH (ref 96–108)
CO2 SERPL-SCNC: 17 MMOL/L — LOW (ref 22–31)
CO2 SERPL-SCNC: 18 MMOL/L — LOW (ref 22–31)
CREAT SERPL-MCNC: 1.78 MG/DL — HIGH (ref 0.5–1.3)
CREAT SERPL-MCNC: 1.92 MG/DL — HIGH (ref 0.5–1.3)
GLUCOSE SERPL-MCNC: 97 MG/DL — SIGNIFICANT CHANGE UP (ref 70–99)
GLUCOSE SERPL-MCNC: 98 MG/DL — SIGNIFICANT CHANGE UP (ref 70–99)
POTASSIUM SERPL-MCNC: 4.1 MMOL/L — SIGNIFICANT CHANGE UP (ref 3.5–5.3)
POTASSIUM SERPL-MCNC: 4.2 MMOL/L — SIGNIFICANT CHANGE UP (ref 3.5–5.3)
POTASSIUM SERPL-SCNC: 4.1 MMOL/L — SIGNIFICANT CHANGE UP (ref 3.5–5.3)
POTASSIUM SERPL-SCNC: 4.2 MMOL/L — SIGNIFICANT CHANGE UP (ref 3.5–5.3)
SODIUM SERPL-SCNC: 139 MMOL/L — SIGNIFICANT CHANGE UP (ref 135–145)
SODIUM SERPL-SCNC: 142 MMOL/L — SIGNIFICANT CHANGE UP (ref 135–145)

## 2022-02-09 RX ORDER — ACETAMINOPHEN 500 MG
650 TABLET ORAL EVERY 6 HOURS
Refills: 0 | Status: DISCONTINUED | OUTPATIENT
Start: 2022-02-09 | End: 2022-03-02

## 2022-02-09 RX ORDER — ACETAMINOPHEN 500 MG
750 TABLET ORAL ONCE
Refills: 0 | Status: COMPLETED | OUTPATIENT
Start: 2022-02-09 | End: 2022-02-09

## 2022-02-09 RX ADMIN — Medication 25 MICROGRAM(S): at 05:29

## 2022-02-09 RX ADMIN — Medication 650 MILLIGRAM(S): at 21:16

## 2022-02-09 RX ADMIN — Medication 100 MILLIGRAM(S): at 12:02

## 2022-02-09 RX ADMIN — SEVELAMER CARBONATE 800 MILLIGRAM(S): 2400 POWDER, FOR SUSPENSION ORAL at 08:02

## 2022-02-09 RX ADMIN — HEPARIN SODIUM 5000 UNIT(S): 5000 INJECTION INTRAVENOUS; SUBCUTANEOUS at 18:41

## 2022-02-09 RX ADMIN — SIMVASTATIN 40 MILLIGRAM(S): 20 TABLET, FILM COATED ORAL at 21:16

## 2022-02-09 RX ADMIN — Medication 650 MILLIGRAM(S): at 12:02

## 2022-02-09 RX ADMIN — Medication 300 MILLIGRAM(S): at 04:08

## 2022-02-09 RX ADMIN — Medication 1 DROP(S): at 05:29

## 2022-02-09 RX ADMIN — Medication 650 MILLIGRAM(S): at 05:29

## 2022-02-09 RX ADMIN — GABAPENTIN 50 MILLIGRAM(S): 400 CAPSULE ORAL at 18:42

## 2022-02-09 RX ADMIN — LATANOPROST 1 DROP(S): 0.05 SOLUTION/ DROPS OPHTHALMIC; TOPICAL at 21:16

## 2022-02-09 RX ADMIN — Medication 1 DROP(S): at 00:01

## 2022-02-09 RX ADMIN — HEPARIN SODIUM 5000 UNIT(S): 5000 INJECTION INTRAVENOUS; SUBCUTANEOUS at 21:16

## 2022-02-09 RX ADMIN — GABAPENTIN 50 MILLIGRAM(S): 400 CAPSULE ORAL at 05:29

## 2022-02-09 RX ADMIN — Medication 650 MILLIGRAM(S): at 18:41

## 2022-02-09 RX ADMIN — SODIUM ZIRCONIUM CYCLOSILICATE 5 GRAM(S): 10 POWDER, FOR SUSPENSION ORAL at 21:16

## 2022-02-09 RX ADMIN — Medication 1 DROP(S): at 23:56

## 2022-02-09 RX ADMIN — Medication 750 MILLIGRAM(S): at 04:38

## 2022-02-09 RX ADMIN — MIDODRINE HYDROCHLORIDE 5 MILLIGRAM(S): 2.5 TABLET ORAL at 05:29

## 2022-02-09 RX ADMIN — MIDODRINE HYDROCHLORIDE 5 MILLIGRAM(S): 2.5 TABLET ORAL at 18:41

## 2022-02-09 RX ADMIN — Medication 1 DROP(S): at 12:02

## 2022-02-09 RX ADMIN — HEPARIN SODIUM 5000 UNIT(S): 5000 INJECTION INTRAVENOUS; SUBCUTANEOUS at 05:29

## 2022-02-09 RX ADMIN — Medication 1 DROP(S): at 18:42

## 2022-02-09 RX ADMIN — SEVELAMER CARBONATE 800 MILLIGRAM(S): 2400 POWDER, FOR SUSPENSION ORAL at 18:42

## 2022-02-09 RX ADMIN — SEVELAMER CARBONATE 800 MILLIGRAM(S): 2400 POWDER, FOR SUSPENSION ORAL at 12:01

## 2022-02-09 NOTE — PROGRESS NOTE ADULT - ASSESSMENT
77yo female pt with PMHx of CKD (Last K-5.3, Bun/Cr.-29/1.93 on 7/18/2020), Anemia, Gout, HTN, HLD, was transferred, on cervical collar, from [facility] c/o head/neck pain, C2 fracture s/p fall this morning with a syncopal episode.

## 2022-02-09 NOTE — PROGRESS NOTE ADULT - SUBJECTIVE AND OBJECTIVE BOX
Patient is a 76y old  Female who presents with a chief complaint of syncope , cervical spine fracture (09 Feb 2022 16:39)      SUBJECTIVE / OVERNIGHT EVENTS: ptn feels better, orthostasis resolved, FLORIDALMA improving post IVF    MEDICATIONS  (STANDING):  allopurinol 100 milliGRAM(s) Oral daily  artificial  tears Solution 1 Drop(s) Both EYES four times a day  gabapentin Solution 50 milliGRAM(s) Oral two times a day  heparin   Injectable 5000 Unit(s) SubCutaneous every 8 hours  latanoprost 0.005% Ophthalmic Solution 1 Drop(s) Both EYES at bedtime  levothyroxine 25 MICROGram(s) Oral daily  midodrine. 5 milliGRAM(s) Oral <User Schedule>  sevelamer carbonate 800 milliGRAM(s) Oral three times a day with meals  simvastatin 40 milliGRAM(s) Oral at bedtime  sodium bicarbonate 650 milliGRAM(s) Oral two times a day  sodium chloride 0.9% Bolus 1000 milliLiter(s) IV Bolus once  sodium zirconium cyclosilicate 5 Gram(s) Oral daily    MEDICATIONS  (PRN):  acetaminophen     Tablet .. 650 milliGRAM(s) Oral every 6 hours PRN Mild Pain (1 - 3)      Vital Signs Last 24 Hrs  T(F): 98.5 (02-09-22 @ 20:13), Max: 99.8 (02-09-22 @ 04:25)  HR: 88 (02-09-22 @ 20:13) (68 - 88)  BP: 151/68 (02-09-22 @ 20:13) (117/54 - 159/63)  RR: 18 (02-09-22 @ 20:13) (18 - 18)  SpO2: 97% (02-09-22 @ 20:13) (97% - 99%)  Telemetry:   CAPILLARY BLOOD GLUCOSE        I&O's Summary    08 Feb 2022 07:01  -  09 Feb 2022 07:00  --------------------------------------------------------  IN: 1110 mL / OUT: 1200 mL / NET: -90 mL    09 Feb 2022 07:01  -  09 Feb 2022 21:08  --------------------------------------------------------  IN: 1230 mL / OUT: 0 mL / NET: 1230 mL        PHYSICAL EXAM:  GENERAL: NAD, well-developed  HEAD:  Atraumatic, Normocephalic  EYES: EOMI, PERRLA, conjunctiva and sclera clear  NECK: Supple, No JVD  CHEST/LUNG: Clear to auscultation bilaterally; No wheeze  HEART: Regular rate and rhythm; No murmurs, rubs, or gallops  ABDOMEN: Soft, Nontender, Nondistended; Bowel sounds present  EXTREMITIES:  2+ Peripheral Pulses, No clubbing, cyanosis, or edema  PSYCH: AAOx3  NEUROLOGY: non-focal  SKIN: No rashes or lesions    LABS:    02-09    142  |  110<H>  |  30<H>  ----------------------------<  97  4.1   |  17<L>  |  1.78<H>    Ca    8.4      09 Feb 2022 12:30  Phos  5.2     02-08  Mg     2.3     02-08                RADIOLOGY & ADDITIONAL TESTS:    Imaging Personally Reviewed:    Consultant(s) Notes Reviewed:      Care Discussed with Consultants/Other Providers:

## 2022-02-09 NOTE — PROGRESS NOTE ADULT - SUBJECTIVE AND OBJECTIVE BOX
Patient seen and examined  no complaints    No Known Allergies    Hospital Medications:   MEDICATIONS  (STANDING):  allopurinol 100 milliGRAM(s) Oral daily  artificial  tears Solution 1 Drop(s) Both EYES four times a day  gabapentin Solution 50 milliGRAM(s) Oral two times a day  heparin   Injectable 5000 Unit(s) SubCutaneous every 8 hours  latanoprost 0.005% Ophthalmic Solution 1 Drop(s) Both EYES at bedtime  levothyroxine 25 MICROGram(s) Oral daily  midodrine. 5 milliGRAM(s) Oral <User Schedule>  sevelamer carbonate 800 milliGRAM(s) Oral three times a day with meals  simvastatin 40 milliGRAM(s) Oral at bedtime  sodium bicarbonate 650 milliGRAM(s) Oral two times a day  sodium chloride 0.9% Bolus 1000 milliLiter(s) IV Bolus once  sodium zirconium cyclosilicate 5 Gram(s) Oral daily        VITALS:  T(F): 98 (22 @ 11:30), Max: 99.8 (22 @ 04:25)  HR: 71 (22 @ 11:30)  BP: 117/54 (22 @ 11:30)  RR: 18 (22 @ 11:30)  SpO2: 98% (22 @ 11:30)  Wt(kg): --     @ 07:01  -   @ 07:00  --------------------------------------------------------  IN: 1110 mL / OUT: 1200 mL / NET: -90 mL     @ 07:01  -   @ 13:30  --------------------------------------------------------  IN: 630 mL / OUT: 0 mL / NET: 630 mL    Physical Exam :-  Constitutional: NAD  Neck: + neck collar  Respiratory: Bilateral equal breath sounds,  Cardiovascular: S1, S2 normal,  Gastrointestinal: Bowel Sounds present, soft, non tender.  Extremities: No edema  Neurological: Alert and Oriented x 3, no focal deficits  Psychiatric: Normal mood, normal affect    LABS:      142  |  110<H>  |  30<H>  ----------------------------<  97  4.1   |  17<L>  |  1.78<H>    Ca    8.4      2022 12:30  Phos  5.2     02-08  Mg     2.3     02-08      Creatinine Trend: 1.78 <--, 1.92 <--, 2.26 <--, 2.02 <--, 2.04 <--, 1.99 <--, 2.29 <--                        9.2    8.51  )-----------( 227      ( 2022 17:20 )             29.9     Urine Studies:  Urinalysis Basic - ( 2022 17:40 )    Color: Colorless / Appearance: Clear / S.011 / pH:   Gluc:  / Ketone: Negative  / Bili: Negative / Urobili: Negative   Blood:  / Protein: 30 mg/dL / Nitrite: Negative   Leuk Esterase: Negative / RBC: 1 /hpf / WBC 0 /HPF   Sq Epi:  / Non Sq Epi: 1 /hpf / Bacteria: Negative        RADIOLOGY & ADDITIONAL STUDIES:

## 2022-02-09 NOTE — PROGRESS NOTE ADULT - ASSESSMENT
76 year-old woman with history of multiple medical issues including hypertension, hyperlipidemia, and chronic kidney disease. She was transferred to [facility] yesterday from the [facility] ER after being noted on imaging to have a C2 fracture, s/p syncopal episode. Following for CKD    CKD 4 - likely Hypertensive Nephropathy  Serum stable  appears to be ranging around 1.8 to 2.0mg/dl  Renal diet  daily BMP    Hyperkalemia  Continue Lokelma 5mg PO QD  Low K diet  k improving    Metabolic acidosis  on  Nabicarb 650mg po bid  trend bicarb      For any question, call:  Cell # [phone]  Dr Brown

## 2022-02-09 NOTE — PROGRESS NOTE ADULT - ASSESSMENT
77yo female pt with PMHx of CKD (Last K-5.3, Bun/Cr.-29/1.93 on 7/18/2020), Anemia, Gout, HTN, HLD, was transferred, on cervical collar, from [facility] c/o head/neck pain, C2 fracture s/p fall this morning with a syncopal episode.

## 2022-02-09 NOTE — PROGRESS NOTE ADULT - PROBLEM SELECTOR PLAN 2
improving orthostatics  c/w midodrine 5mg PO BID  continue to hold all antihypertensives  continue to monitor

## 2022-02-10 DIAGNOSIS — M10.9 GOUT, UNSPECIFIED: ICD-10-CM

## 2022-02-10 LAB
ANION GAP SERPL CALC-SCNC: 11 MMOL/L — SIGNIFICANT CHANGE UP (ref 5–17)
BUN SERPL-MCNC: 31 MG/DL — HIGH (ref 7–23)
CALCIUM SERPL-MCNC: 8.6 MG/DL — SIGNIFICANT CHANGE UP (ref 8.4–10.5)
CHLORIDE SERPL-SCNC: 112 MMOL/L — HIGH (ref 96–108)
CO2 SERPL-SCNC: 19 MMOL/L — LOW (ref 22–31)
CREAT SERPL-MCNC: 2.01 MG/DL — HIGH (ref 0.5–1.3)
GLUCOSE SERPL-MCNC: 90 MG/DL — SIGNIFICANT CHANGE UP (ref 70–99)
POTASSIUM SERPL-MCNC: 4.6 MMOL/L — SIGNIFICANT CHANGE UP (ref 3.5–5.3)
POTASSIUM SERPL-SCNC: 4.6 MMOL/L — SIGNIFICANT CHANGE UP (ref 3.5–5.3)
SODIUM SERPL-SCNC: 142 MMOL/L — SIGNIFICANT CHANGE UP (ref 135–145)

## 2022-02-10 RX ORDER — ONDANSETRON 8 MG/1
4 TABLET, FILM COATED ORAL ONCE
Refills: 0 | Status: COMPLETED | OUTPATIENT
Start: 2022-02-10 | End: 2022-02-10

## 2022-02-10 RX ORDER — SODIUM CHLORIDE 9 MG/ML
250 INJECTION, SOLUTION INTRAVENOUS
Refills: 0 | Status: DISCONTINUED | OUTPATIENT
Start: 2022-02-10 | End: 2022-02-16

## 2022-02-10 RX ORDER — HYDROMORPHONE HYDROCHLORIDE 2 MG/ML
0.25 INJECTION INTRAMUSCULAR; INTRAVENOUS; SUBCUTANEOUS ONCE
Refills: 0 | Status: DISCONTINUED | OUTPATIENT
Start: 2022-02-10 | End: 2022-02-10

## 2022-02-10 RX ORDER — HYDRALAZINE HCL 50 MG
10 TABLET ORAL ONCE
Refills: 0 | Status: COMPLETED | OUTPATIENT
Start: 2022-02-10 | End: 2022-02-10

## 2022-02-10 RX ADMIN — SEVELAMER CARBONATE 800 MILLIGRAM(S): 2400 POWDER, FOR SUSPENSION ORAL at 11:54

## 2022-02-10 RX ADMIN — Medication 25 MICROGRAM(S): at 05:32

## 2022-02-10 RX ADMIN — MIDODRINE HYDROCHLORIDE 5 MILLIGRAM(S): 2.5 TABLET ORAL at 05:32

## 2022-02-10 RX ADMIN — HYDROMORPHONE HYDROCHLORIDE 0.25 MILLIGRAM(S): 2 INJECTION INTRAMUSCULAR; INTRAVENOUS; SUBCUTANEOUS at 17:30

## 2022-02-10 RX ADMIN — Medication 650 MILLIGRAM(S): at 06:40

## 2022-02-10 RX ADMIN — Medication 1 DROP(S): at 17:40

## 2022-02-10 RX ADMIN — MIDODRINE HYDROCHLORIDE 5 MILLIGRAM(S): 2.5 TABLET ORAL at 17:38

## 2022-02-10 RX ADMIN — SEVELAMER CARBONATE 800 MILLIGRAM(S): 2400 POWDER, FOR SUSPENSION ORAL at 08:38

## 2022-02-10 RX ADMIN — Medication 650 MILLIGRAM(S): at 05:32

## 2022-02-10 RX ADMIN — Medication 1 DROP(S): at 05:33

## 2022-02-10 RX ADMIN — LATANOPROST 1 DROP(S): 0.05 SOLUTION/ DROPS OPHTHALMIC; TOPICAL at 22:02

## 2022-02-10 RX ADMIN — Medication 650 MILLIGRAM(S): at 17:40

## 2022-02-10 RX ADMIN — HEPARIN SODIUM 5000 UNIT(S): 5000 INJECTION INTRAVENOUS; SUBCUTANEOUS at 22:02

## 2022-02-10 RX ADMIN — ONDANSETRON 4 MILLIGRAM(S): 8 TABLET, FILM COATED ORAL at 19:49

## 2022-02-10 RX ADMIN — HEPARIN SODIUM 5000 UNIT(S): 5000 INJECTION INTRAVENOUS; SUBCUTANEOUS at 17:37

## 2022-02-10 RX ADMIN — Medication 100 MILLIGRAM(S): at 11:54

## 2022-02-10 RX ADMIN — SODIUM ZIRCONIUM CYCLOSILICATE 5 GRAM(S): 10 POWDER, FOR SUSPENSION ORAL at 22:02

## 2022-02-10 RX ADMIN — HYDROMORPHONE HYDROCHLORIDE 0.25 MILLIGRAM(S): 2 INJECTION INTRAMUSCULAR; INTRAVENOUS; SUBCUTANEOUS at 18:00

## 2022-02-10 RX ADMIN — Medication 40 MILLIGRAM(S): at 17:38

## 2022-02-10 RX ADMIN — GABAPENTIN 50 MILLIGRAM(S): 400 CAPSULE ORAL at 05:32

## 2022-02-10 RX ADMIN — Medication 1 DROP(S): at 11:54

## 2022-02-10 RX ADMIN — Medication 10 MILLIGRAM(S): at 22:18

## 2022-02-10 RX ADMIN — SEVELAMER CARBONATE 800 MILLIGRAM(S): 2400 POWDER, FOR SUSPENSION ORAL at 17:39

## 2022-02-10 RX ADMIN — Medication 650 MILLIGRAM(S): at 15:06

## 2022-02-10 RX ADMIN — Medication 650 MILLIGRAM(S): at 06:06

## 2022-02-10 RX ADMIN — Medication 650 MILLIGRAM(S): at 14:36

## 2022-02-10 RX ADMIN — SIMVASTATIN 40 MILLIGRAM(S): 20 TABLET, FILM COATED ORAL at 22:03

## 2022-02-10 RX ADMIN — HEPARIN SODIUM 5000 UNIT(S): 5000 INJECTION INTRAVENOUS; SUBCUTANEOUS at 05:33

## 2022-02-10 RX ADMIN — GABAPENTIN 50 MILLIGRAM(S): 400 CAPSULE ORAL at 17:38

## 2022-02-10 RX ADMIN — SODIUM CHLORIDE 250 MILLILITER(S): 9 INJECTION, SOLUTION INTRAVENOUS at 22:30

## 2022-02-10 NOTE — PROGRESS NOTE ADULT - ASSESSMENT
77yo female pt with PMHx of CKD (Last K-5.3, Bun/Cr.-29/1.93 on 7/18/2020), Anemia, Gout, HTN, HLD, was transferred, on cervical collar, from [facility] c/o head/neck pain, C2 fracture s/p fall this morning with a syncopal episode.

## 2022-02-10 NOTE — PROGRESS NOTE ADULT - SUBJECTIVE AND OBJECTIVE BOX
Patient is a 76y old  Female who presents with a chief complaint of syncope , cervical spine fracture (10 Feb 2022 13:57)      SUBJECTIVE / OVERNIGHT EVENTS: ptn c/o right foot pain, no fever, no N/V, no chills, exam c/w acute gout    MEDICATIONS  (STANDING):  allopurinol 100 milliGRAM(s) Oral daily  artificial  tears Solution 1 Drop(s) Both EYES four times a day  gabapentin Solution 50 milliGRAM(s) Oral two times a day  heparin   Injectable 5000 Unit(s) SubCutaneous every 8 hours  latanoprost 0.005% Ophthalmic Solution 1 Drop(s) Both EYES at bedtime  levothyroxine 25 MICROGram(s) Oral daily  methylPREDNISolone sodium succinate Injectable 40 milliGRAM(s) IV Push every 12 hours  midodrine. 5 milliGRAM(s) Oral <User Schedule>  sevelamer carbonate 800 milliGRAM(s) Oral three times a day with meals  simvastatin 40 milliGRAM(s) Oral at bedtime  sodium bicarbonate 650 milliGRAM(s) Oral two times a day  sodium chloride 0.9% Bolus 1000 milliLiter(s) IV Bolus once  sodium zirconium cyclosilicate 5 Gram(s) Oral daily    MEDICATIONS  (PRN):  acetaminophen     Tablet .. 650 milliGRAM(s) Oral every 6 hours PRN Mild Pain (1 - 3)      Vital Signs Last 24 Hrs  T(F): 98.5 (02-10-22 @ 19:58), Max: 98.9 (02-09-22 @ 23:21)  HR: 85 (02-10-22 @ 19:58) (71 - 85)  BP: 174/67 (02-10-22 @ 19:58) (123/63 - 174/67)  RR: 18 (02-10-22 @ 19:58) (18 - 18)  SpO2: 95% (02-10-22 @ 19:58) (95% - 99%)  Telemetry:   CAPILLARY BLOOD GLUCOSE        I&O's Summary    09 Feb 2022 07:01  -  10 Feb 2022 07:00  --------------------------------------------------------  IN: 1230 mL / OUT: 500 mL / NET: 730 mL        PHYSICAL EXAM:  GENERAL: NAD, well-developed  HEAD:  Atraumatic, Normocephalic  EYES: EOMI, PERRLA, conjunctiva and sclera clear  NECK: Supple, No JVD  CHEST/LUNG: Clear to auscultation bilaterally; No wheeze  HEART: Regular rate and rhythm; No murmurs, rubs, or gallops  ABDOMEN: Soft, Nontender, Nondistended; Bowel sounds present  EXTREMITIES:  2+ Peripheral Pulses, No clubbing, cyanosis, or edema  PSYCH: AAOx3  NEUROLOGY: non-focal  SKIN: No rashes or lesions    LABS:    02-10    142  |  112<H>  |  31<H>  ----------------------------<  90  4.6   |  19<L>  |  2.01<H>    Ca    8.6      10 Feb 2022 06:04                RADIOLOGY & ADDITIONAL TESTS:    Imaging Personally Reviewed:    Consultant(s) Notes Reviewed:      Care Discussed with Consultants/Other Providers:

## 2022-02-10 NOTE — PROGRESS NOTE ADULT - ASSESSMENT
76 year-old woman with history of multiple medical issues including hypertension, hyperlipidemia, and chronic kidney disease. She was transferred to [facility] yesterday from the [facility] ER after being noted on imaging to have a C2 fracture, s/p syncopal episode. Following for CKD    CKD 4 - likely Hypertensive Nephropathy  Serum stable  appears to be ranging around 1.8 to 2.0mg/dl  Renal diet  daily BMP    Hyperkalemia  Continue Lokelma 5mg PO QD  Low K diet  k improving    Metabolic acidosis  on  Nabicarb 650mg po bid  trend bicarb    Cervical fracture  pain management  Neurosx recs appreciated      For any question, call:  Cell # [phone]  Pager # [phone]  Callback # [phone]

## 2022-02-10 NOTE — PROGRESS NOTE ADULT - ASSESSMENT
75yo female pt with PMHx of CKD (Last K-5.3, Bun/Cr.-29/1.93 on 7/18/2020), Anemia, Gout, HTN, HLD, was transferred, on cervical collar, from [facility] c/o head/neck pain, C2 fracture s/p fall this morning with a syncopal episode.

## 2022-02-10 NOTE — PROGRESS NOTE ADULT - SUBJECTIVE AND OBJECTIVE BOX
Subjective: Patient seen and examined. No new events except as noted.   complains on neck pain   no chest pain, no sob   Cr up - renal ultrasound yesterday    REVIEW OF SYSTEMS:    CONSTITUTIONAL: + weakness, fevers or chills  EYES/ENT: No visual changes;  No vertigo or throat pain   NECK: No pain or stiffness  RESPIRATORY: No cough, wheezing, hemoptysis; No shortness of breath  CARDIOVASCULAR: No chest pain or palpitations  GASTROINTESTINAL: No abdominal or epigastric pain. No nausea, vomiting, or hematemesis; No diarrhea or constipation. No melena or hematochezia.  GENITOURINARY: No dysuria, frequency or hematuria  NEUROLOGICAL: No numbness or weakness  SKIN: No itching, burning, rashes, or lesions   All other review of systems is negative unless indicated above.    MEDICATIONS:  MEDICATIONS  (STANDING):  allopurinol 100 milliGRAM(s) Oral daily  artificial  tears Solution 1 Drop(s) Both EYES four times a day  gabapentin Solution 50 milliGRAM(s) Oral two times a day  heparin   Injectable 5000 Unit(s) SubCutaneous every 8 hours  latanoprost 0.005% Ophthalmic Solution 1 Drop(s) Both EYES at bedtime  levothyroxine 25 MICROGram(s) Oral daily  midodrine. 5 milliGRAM(s) Oral <User Schedule>  sevelamer carbonate 800 milliGRAM(s) Oral three times a day with meals  simvastatin 40 milliGRAM(s) Oral at bedtime  sodium bicarbonate 650 milliGRAM(s) Oral two times a day  sodium chloride 0.9% Bolus 1000 milliLiter(s) IV Bolus once  sodium zirconium cyclosilicate 5 Gram(s) Oral daily      PHYSICAL EXAM:  T(C): 36.7 (02-10-22 @ 07:45), Max: 37.2 (02-09-22 @ 23:21)  HR: 71 (02-10-22 @ 07:45) (68 - 88)  BP: 138/65 (02-10-22 @ 07:45) (123/63 - 151/68)  RR: 18 (02-10-22 @ 07:45) (18 - 18)  SpO2: 98% (02-10-22 @ 07:45) (97% - 99%)  Wt(kg): --  I&O's Summary    09 Feb 2022 07:01  -  10 Feb 2022 07:00  --------------------------------------------------------  IN: 1230 mL / OUT: 500 mL / NET: 730 mL      Appearance: NAD	  HEENT: Normal oral mucosa, PERRL, EOMI	  Lymphatic: No lymphadenopathy , no edema  Cardiovascular: Normal S1 S2, No JVD, No murmurs , Peripheral pulses palpable 2+ bilaterally  Respiratory: Lungs clear to auscultation, normal effort 	  Gastrointestinal:  Soft, Non-tender, + BS	  Skin: No rashes, No ecchymoses, No cyanosis, warm to touch  Neuro: alert oriented x 3 attention comprehension are fair.  Able to name, repeat.   EOmi fundi not visualized   no nystagmus VFF to confrontation  Tongue is midline  Palate elevates symmetrically   Moving all 4 ext spontaneously no drift appreciated  Gait not assessed.   Ext: No edema      LABS:    CARDIAC MARKERS:    02-10    142  |  112<H>  |  31<H>  ----------------------------<  90  4.6   |  19<L>  |  2.01<H>    Ca    8.6      10 Feb 2022 06:04      proBNP:   Lipid Profile:   HgA1c:   TSH:     TELEMETRY: 	    ECG:  	  RADIOLOGY:   DIAGNOSTIC TESTING:  [ ] Echocardiogram:  [ ]  Catheterization:  [ ] Stress Test:    OTHER:

## 2022-02-10 NOTE — PROGRESS NOTE ADULT - SUBJECTIVE AND OBJECTIVE BOX
[facility]      Seen earlier today, and examined.  - Today, patient is without complaints.           *****MEDICATIONS: Current medication reviewed and documented.    MEDICATIONS  (STANDING):  allopurinol 100 milliGRAM(s) Oral daily  artificial  tears Solution 1 Drop(s) Both EYES four times a day  gabapentin Solution 50 milliGRAM(s) Oral two times a day  heparin   Injectable 5000 Unit(s) SubCutaneous every 8 hours  latanoprost 0.005% Ophthalmic Solution 1 Drop(s) Both EYES at bedtime  levothyroxine 25 MICROGram(s) Oral daily  midodrine. 5 milliGRAM(s) Oral <User Schedule>  sevelamer carbonate 800 milliGRAM(s) Oral three times a day with meals  simvastatin 40 milliGRAM(s) Oral at bedtime  sodium bicarbonate 650 milliGRAM(s) Oral two times a day  sodium chloride 0.9% Bolus 1000 milliLiter(s) IV Bolus once  sodium zirconium cyclosilicate 5 Gram(s) Oral daily    MEDICATIONS  (PRN):  acetaminophen     Tablet .. 650 milliGRAM(s) Oral every 6 hours PRN Mild Pain (1 - 3)          ***** VITAL SIGNS:  T(F): 98.1 (02-10-22 @ 07:45), Max: 98.9 (22 @ 23:21)  HR: 71 (02-10-22 @ 07:45) (68 - 88)  BP: 138/65 (02-10-22 @ 07:45) (123/63 - 151/68)  RR: 18 (02-10-22 @ 07:45) (18 - 18)  SpO2: 98% (02-10-22 @ 07:45) (97% - 99%)  Wt(kg): --  ,   I&O's Summary    2022 07:01  -  10 Feb 2022 07:00  --------------------------------------------------------  IN: 1230 mL / OUT: 500 mL / NET: 730 mL             *****PHYSICAL EXAM:   alert oriented x 3 attention comprehension are fair.  Able to name, repeat.   EOmi fundi not visualized   no nystagmus VFF to confrontation  Tongue is midline  Palate elevates symmetrically   Moving all 4 ext spontaneously no drift appreciated    Gait not assessed.            *****LAB AND IMAGIN-10    142  |  112<H>  |  31<H>  ----------------------------<  90  4.6   |  19<L>  |  2.01<H>    Ca    8.6      10 2022 06:04                           [All pertinent recent Imaging/Reports reviewed]           *****A S S E S S M E N T   A N D   P L A N :    Excerpt from H&P,"     77yo female pt with PMHx of CKD (Last K-5.3, Bun/Cr.-29/1.93 on 2020), Anemia, Gout, HTN, HLD, was transferred, on cervical collar, from [facility] c/o head/neck pain, C2 fracture s/p fall this morning with a syncopal episode.   Pt stated she felt dizzy after urination in her bathroom at 8:30am and fell. +LOC and noticed severe headache/neck pain.   She was evaluated in [facility] and sent to ED for spine consult 2/2 C2 fracture.   Denies visual changes or N/V. Denies sensory changes or weakness to extremities. Denies CP/SOB/Palpitation/abd pain. Denies lower back pain. Denies urinary problems. Denies fever, chills, cough or congestion.            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 Will continue to follow patient periodically. Please do not hesitate to call me if you have any  questions or if there has been a change in patients neurological status     ________________  Li Isaac MD  [facility] ([facility]  [phone]      33 minutes spent on total encounter; more than 50 % of the visit was  spent counseling about plan of care, compliance to diet/exercise and medication regimen and or  coordinating care by the attending physician.      It is advised that stroke patients follow up with TALIA Krishna @ [phone] in 1- 2 weeks.   Others please follow up with Dr. Michael Nissenbaum [phone]

## 2022-02-10 NOTE — PROGRESS NOTE ADULT - SUBJECTIVE AND OBJECTIVE BOX
[facility] - MERCEDES Howe / MERCEDES Ballesteros / YAQUELIN Crouch/ MERCEDES Brown/ MERCEDES Car/ LUZ Hartmann / MAGDALENO Alonzo / TERRY Holman  ---------------------------------------------------------------------------------------------------------------  seen and examined today for CKD 4  Interval : persistent neck pain  VITALS:  T(F): 98.1 (02-10-22 @ 04:34), Max: 98.9 (02-09-22 @ 23:21)  HR: 74 (02-10-22 @ 04:34)  BP: 149/63 (02-10-22 @ 04:34)  RR: 18 (02-10-22 @ 04:34)  SpO2: 98% (02-10-22 @ 04:34)  Wt(kg): --    02-09 @ 07:01  -  02-10 @ 07:00  --------------------------------------------------------  IN: 1230 mL / OUT: 500 mL / NET: 730 mL      Physical Exam :-  Constitutional: NAD  Neck: Supple.  Respiratory: Bilateral equal breath sounds,  Cardiovascular: S1, S2 normal,  Gastrointestinal: Bowel Sounds present, soft, non tender.  Extremities: No edema  Neurological: Alert and Oriented x 3, no focal deficits  Psychiatric: Normal mood, normal affect  Data:-  Allergies :   No Known Allergies    Hospital Medications:   MEDICATIONS  (STANDING):  allopurinol 100 milliGRAM(s) Oral daily  artificial  tears Solution 1 Drop(s) Both EYES four times a day  gabapentin Solution 50 milliGRAM(s) Oral two times a day  heparin   Injectable 5000 Unit(s) SubCutaneous every 8 hours  latanoprost 0.005% Ophthalmic Solution 1 Drop(s) Both EYES at bedtime  levothyroxine 25 MICROGram(s) Oral daily  midodrine. 5 milliGRAM(s) Oral <User Schedule>  sevelamer carbonate 800 milliGRAM(s) Oral three times a day with meals  simvastatin 40 milliGRAM(s) Oral at bedtime  sodium bicarbonate 650 milliGRAM(s) Oral two times a day  sodium chloride 0.9% Bolus 1000 milliLiter(s) IV Bolus once  sodium zirconium cyclosilicate 5 Gram(s) Oral daily    02-10    142  |  112<H>  |  31<H>  ----------------------------<  90  4.6   |  19<L>  |  2.01<H>    Ca    8.6      10 Feb 2022 06:04      Creatinine Trend: 2.01 <--, 1.78 <--, 1.92 <--, 2.26 <--, 2.02 <--, 2.04 <--, 1.99 <--, 2.29 <--

## 2022-02-11 ENCOUNTER — TRANSCRIPTION ENCOUNTER (OUTPATIENT)
Age: 77
End: 2022-02-11

## 2022-02-11 LAB
ANION GAP SERPL CALC-SCNC: 14 MMOL/L — SIGNIFICANT CHANGE UP (ref 5–17)
BUN SERPL-MCNC: 32 MG/DL — HIGH (ref 7–23)
CALCIUM SERPL-MCNC: 9.1 MG/DL — SIGNIFICANT CHANGE UP (ref 8.4–10.5)
CHLORIDE SERPL-SCNC: 105 MMOL/L — SIGNIFICANT CHANGE UP (ref 96–108)
CO2 SERPL-SCNC: 21 MMOL/L — LOW (ref 22–31)
CREAT SERPL-MCNC: 1.68 MG/DL — HIGH (ref 0.5–1.3)
GLUCOSE SERPL-MCNC: 120 MG/DL — HIGH (ref 70–99)
HCT VFR BLD CALC: 24.9 % — LOW (ref 34.5–45)
HCT VFR BLD CALC: 28.2 % — LOW (ref 34.5–45)
HGB BLD-MCNC: 7.5 G/DL — LOW (ref 11.5–15.5)
HGB BLD-MCNC: 8.2 G/DL — LOW (ref 11.5–15.5)
MCHC RBC-ENTMCNC: 25.3 PG — LOW (ref 27–34)
MCHC RBC-ENTMCNC: 25.3 PG — LOW (ref 27–34)
MCHC RBC-ENTMCNC: 29.1 GM/DL — LOW (ref 32–36)
MCHC RBC-ENTMCNC: 30.1 GM/DL — LOW (ref 32–36)
MCV RBC AUTO: 84.1 FL — SIGNIFICANT CHANGE UP (ref 80–100)
MCV RBC AUTO: 87 FL — SIGNIFICANT CHANGE UP (ref 80–100)
NRBC # BLD: 0 /100 WBCS — SIGNIFICANT CHANGE UP (ref 0–0)
NRBC # BLD: 0 /100 WBCS — SIGNIFICANT CHANGE UP (ref 0–0)
PLATELET # BLD AUTO: 211 K/UL — SIGNIFICANT CHANGE UP (ref 150–400)
PLATELET # BLD AUTO: 221 K/UL — SIGNIFICANT CHANGE UP (ref 150–400)
POTASSIUM SERPL-MCNC: 4.4 MMOL/L — SIGNIFICANT CHANGE UP (ref 3.5–5.3)
POTASSIUM SERPL-SCNC: 4.4 MMOL/L — SIGNIFICANT CHANGE UP (ref 3.5–5.3)
RBC # BLD: 2.96 M/UL — LOW (ref 3.8–5.2)
RBC # BLD: 3.24 M/UL — LOW (ref 3.8–5.2)
RBC # FLD: 18.6 % — HIGH (ref 10.3–14.5)
RBC # FLD: 18.7 % — HIGH (ref 10.3–14.5)
SODIUM SERPL-SCNC: 140 MMOL/L — SIGNIFICANT CHANGE UP (ref 135–145)
URATE SERPL-MCNC: 6.1 MG/DL — SIGNIFICANT CHANGE UP (ref 2.5–7)
WBC # BLD: 11.51 K/UL — HIGH (ref 3.8–10.5)
WBC # BLD: 9.46 K/UL — SIGNIFICANT CHANGE UP (ref 3.8–10.5)
WBC # FLD AUTO: 11.51 K/UL — HIGH (ref 3.8–10.5)
WBC # FLD AUTO: 9.46 K/UL — SIGNIFICANT CHANGE UP (ref 3.8–10.5)

## 2022-02-11 PROCEDURE — 73610 X-RAY EXAM OF ANKLE: CPT | Mod: 26,RT

## 2022-02-11 PROCEDURE — 73630 X-RAY EXAM OF FOOT: CPT | Mod: 26,RT

## 2022-02-11 PROCEDURE — 99221 1ST HOSP IP/OBS SF/LOW 40: CPT

## 2022-02-11 RX ORDER — POLYETHYLENE GLYCOL 3350 17 G/17G
17 POWDER, FOR SOLUTION ORAL ONCE
Refills: 0 | Status: COMPLETED | OUTPATIENT
Start: 2022-02-11 | End: 2022-02-11

## 2022-02-11 RX ORDER — HYDRALAZINE HCL 50 MG
10 TABLET ORAL ONCE
Refills: 0 | Status: COMPLETED | OUTPATIENT
Start: 2022-02-11 | End: 2022-02-11

## 2022-02-11 RX ADMIN — HEPARIN SODIUM 5000 UNIT(S): 5000 INJECTION INTRAVENOUS; SUBCUTANEOUS at 21:33

## 2022-02-11 RX ADMIN — Medication 650 MILLIGRAM(S): at 18:20

## 2022-02-11 RX ADMIN — Medication 1 DROP(S): at 23:53

## 2022-02-11 RX ADMIN — Medication 10 MILLIGRAM(S): at 08:36

## 2022-02-11 RX ADMIN — SEVELAMER CARBONATE 800 MILLIGRAM(S): 2400 POWDER, FOR SUSPENSION ORAL at 14:02

## 2022-02-11 RX ADMIN — Medication 40 MILLIGRAM(S): at 05:22

## 2022-02-11 RX ADMIN — Medication 650 MILLIGRAM(S): at 05:22

## 2022-02-11 RX ADMIN — SIMVASTATIN 40 MILLIGRAM(S): 20 TABLET, FILM COATED ORAL at 21:33

## 2022-02-11 RX ADMIN — MIDODRINE HYDROCHLORIDE 5 MILLIGRAM(S): 2.5 TABLET ORAL at 05:22

## 2022-02-11 RX ADMIN — SEVELAMER CARBONATE 800 MILLIGRAM(S): 2400 POWDER, FOR SUSPENSION ORAL at 18:20

## 2022-02-11 RX ADMIN — HEPARIN SODIUM 5000 UNIT(S): 5000 INJECTION INTRAVENOUS; SUBCUTANEOUS at 05:22

## 2022-02-11 RX ADMIN — Medication 25 MICROGRAM(S): at 05:22

## 2022-02-11 RX ADMIN — Medication 1 DROP(S): at 14:02

## 2022-02-11 RX ADMIN — LATANOPROST 1 DROP(S): 0.05 SOLUTION/ DROPS OPHTHALMIC; TOPICAL at 21:33

## 2022-02-11 RX ADMIN — POLYETHYLENE GLYCOL 3350 17 GRAM(S): 17 POWDER, FOR SOLUTION ORAL at 06:51

## 2022-02-11 RX ADMIN — Medication 1 DROP(S): at 05:23

## 2022-02-11 RX ADMIN — Medication 1 DROP(S): at 00:46

## 2022-02-11 RX ADMIN — GABAPENTIN 50 MILLIGRAM(S): 400 CAPSULE ORAL at 05:23

## 2022-02-11 RX ADMIN — Medication 650 MILLIGRAM(S): at 21:33

## 2022-02-11 RX ADMIN — SEVELAMER CARBONATE 800 MILLIGRAM(S): 2400 POWDER, FOR SUSPENSION ORAL at 08:33

## 2022-02-11 RX ADMIN — POLYETHYLENE GLYCOL 3350 17 GRAM(S): 17 POWDER, FOR SOLUTION ORAL at 23:52

## 2022-02-11 RX ADMIN — HEPARIN SODIUM 5000 UNIT(S): 5000 INJECTION INTRAVENOUS; SUBCUTANEOUS at 14:02

## 2022-02-11 RX ADMIN — Medication 650 MILLIGRAM(S): at 23:00

## 2022-02-11 RX ADMIN — Medication 1 DROP(S): at 18:21

## 2022-02-11 RX ADMIN — Medication 100 MILLIGRAM(S): at 14:01

## 2022-02-11 NOTE — PROGRESS NOTE ADULT - ASSESSMENT
75yo female pt with PMHx of CKD (Last K-5.3, Bun/Cr.-29/1.93 on 7/18/2020), Anemia, Gout, HTN, HLD, was transferred, on cervical collar, from [facility] c/o head/neck pain, C2 fracture s/p fall this morning with a syncopal episode.

## 2022-02-11 NOTE — PROGRESS NOTE ADULT - SUBJECTIVE AND OBJECTIVE BOX
Patient is a 76y old  Female who presents with a chief complaint of syncope , cervical spine fracture (11 Feb 2022 12:41)      SUBJECTIVE / OVERNIGHT EVENTS:    MEDICATIONS  (STANDING):  allopurinol 100 milliGRAM(s) Oral daily  artificial  tears Solution 1 Drop(s) Both EYES four times a day  gabapentin Solution 50 milliGRAM(s) Oral two times a day  heparin   Injectable 5000 Unit(s) SubCutaneous every 8 hours  latanoprost 0.005% Ophthalmic Solution 1 Drop(s) Both EYES at bedtime  levothyroxine 25 MICROGram(s) Oral daily  methylPREDNISolone sodium succinate Injectable 40 milliGRAM(s) IV Push every 12 hours  midodrine. 5 milliGRAM(s) Oral <User Schedule>  sevelamer carbonate 800 milliGRAM(s) Oral three times a day with meals  simvastatin 40 milliGRAM(s) Oral at bedtime  sodium bicarbonate 650 milliGRAM(s) Oral two times a day  sodium chloride 0.45%. 250 milliLiter(s) (250 mL/Hr) IV Continuous <Continuous>  sodium chloride 0.9% Bolus 1000 milliLiter(s) IV Bolus once    MEDICATIONS  (PRN):  acetaminophen     Tablet .. 650 milliGRAM(s) Oral every 6 hours PRN Mild Pain (1 - 3)      Vital Signs Last 24 Hrs  T(F): 98.3 (02-11-22 @ 15:16), Max: 98.6 (02-11-22 @ 08:23)  HR: 75 (02-11-22 @ 15:16) (66 - 91)  BP: 124/56 (02-11-22 @ 15:16) (124/56 - 181/75)  RR: 18 (02-11-22 @ 15:16) (16 - 18)  SpO2: 98% (02-11-22 @ 15:16) (95% - 99%)  Telemetry:   CAPILLARY BLOOD GLUCOSE        I&O's Summary    10 Feb 2022 07:01  -  11 Feb 2022 07:00  --------------------------------------------------------  IN: 120 mL / OUT: 1300 mL / NET: -1180 mL    11 Feb 2022 07:01  -  11 Feb 2022 17:00  --------------------------------------------------------  IN: 630 mL / OUT: 0 mL / NET: 630 mL        PHYSICAL EXAM:  GENERAL: NAD, well-developed  HEAD:  Atraumatic, Normocephalic  EYES: EOMI, PERRLA, conjunctiva and sclera clear  NECK: Supple, No JVD  CHEST/LUNG: Clear to auscultation bilaterally; No wheeze  HEART: Regular rate and rhythm; No murmurs, rubs, or gallops  ABDOMEN: Soft, Nontender, Nondistended; Bowel sounds present  EXTREMITIES:  2+ Peripheral Pulses, No clubbing, cyanosis, or edema  PSYCH: AAOx3  NEUROLOGY: non-focal  SKIN: No rashes or lesions    LABS:                        8.2    11.51 )-----------( 221      ( 11 Feb 2022 09:16 )             28.2     02-11    140  |  105  |  32<H>  ----------------------------<  120<H>  4.4   |  21<L>  |  1.68<H>    Ca    9.1      11 Feb 2022 06:48

## 2022-02-11 NOTE — PROGRESS NOTE ADULT - ASSESSMENT
76 year-old woman with history of multiple medical issues including hypertension, hyperlipidemia, and chronic kidney disease. She was transferred to [facility] yesterday from the [facility] ER after being noted on imaging to have a C2 fracture, s/p syncopal episode. Following for CKD    CKD 4 - likely Hypertensive Nephropathy  Serum stable- better today  appears to be ranging around 1.8 to 2.0mg/dl  Renal diet  daily BMP    Hyperkalemia  Improved  d/c Lokelma 5mg PO QD  Low K diet  k improving    Metabolic acidosis  on  Nabicarb 650mg po bid  trend bicarb    Cervical fracture  pain management  Neurosx recs appreciated      For any question, call:  Cell # [phone]  Dr Brown

## 2022-02-11 NOTE — CONSULT NOTE ADULT - SUBJECTIVE AND OBJECTIVE BOX
Patient is a 76y old  Female who presents with a chief complaint of syncope , cervical spine fracture (11 Feb 2022 16:58)      HPI:  75yo female pt with PMHx of CKD (Last K-5.3, Bun/Cr.-29/1.93 on 7/18/2020), Anemia, Gout, HTN, HLD, was transferred, on cervical collar, from [facility] c/o head/neck pain, C2 fracture s/p fall this morning with a syncopal episode.   Pt stated she felt dizzy after urination in her bathroom at 8:30am and fell. +LOC and noticed severe headache/neck pain.   She was evaluated in [facility] and sent to ED for spine consult 2/2 C2 fracture.   Denies visual changes or N/V. Denies sensory changes or weakness to extremities. Denies CP/SOB/Palpitation/abd pain. Denies lower back pain. Denies urinary problems. Denies fever, chills, cough or congestion. (06 Feb 2022 18:08)    Podiatry consulted for right foot pain. Patient states relates long history of gout, however, last flare up was over a year ago. Patient states that pain is decreased since yesterday.    PAST MEDICAL & SURGICAL HISTORY:  Anemia    HTN (hypertension)    HLD (hyperlipidemia)    H/O kidney injury    Cataract        MEDICATIONS  (STANDING):  allopurinol 100 milliGRAM(s) Oral daily  artificial  tears Solution 1 Drop(s) Both EYES four times a day  gabapentin Solution 50 milliGRAM(s) Oral two times a day  heparin   Injectable 5000 Unit(s) SubCutaneous every 8 hours  latanoprost 0.005% Ophthalmic Solution 1 Drop(s) Both EYES at bedtime  levothyroxine 25 MICROGram(s) Oral daily  midodrine. 5 milliGRAM(s) Oral <User Schedule>  sevelamer carbonate 800 milliGRAM(s) Oral three times a day with meals  simvastatin 40 milliGRAM(s) Oral at bedtime  sodium bicarbonate 650 milliGRAM(s) Oral two times a day  sodium chloride 0.45%. 250 milliLiter(s) (250 mL/Hr) IV Continuous <Continuous>  sodium chloride 0.9% Bolus 1000 milliLiter(s) IV Bolus once    MEDICATIONS  (PRN):  acetaminophen     Tablet .. 650 milliGRAM(s) Oral every 6 hours PRN Mild Pain (1 - 3)      Allergies    No Known Allergies    Intolerances        VITALS:    Vital Signs Last 24 Hrs  T(C): 36.8 (11 Feb 2022 15:16), Max: 37 (11 Feb 2022 08:23)  T(F): 98.3 (11 Feb 2022 15:16), Max: 98.6 (11 Feb 2022 08:23)  HR: 75 (11 Feb 2022 15:16) (66 - 91)  BP: 124/56 (11 Feb 2022 15:16) (124/56 - 181/75)  BP(mean): --  RR: 18 (11 Feb 2022 15:16) (16 - 18)  SpO2: 98% (11 Feb 2022 15:16) (95% - 99%)    LABS:                          8.2    11.51 )-----------( 221      ( 11 Feb 2022 09:16 )             28.2       02-11    140  |  105  |  32<H>  ----------------------------<  120<H>  4.4   |  21<L>  |  1.68<H>    Ca    9.1      11 Feb 2022 06:48        CAPILLARY BLOOD GLUCOSE              LOWER EXTREMITY PHYSICAL EXAM:    Vasular: DP/PT 2/4, B/L, CFT <3 seconds B/L, Temperature gradient WNL, B/L, right foot edema noted.   Neuro: Epicritic sensation wnl to the level of foot, B/L.  Musculoskeletal/Ortho: pain with palpation of right ankle and midfoot, minimal pain with micromotion R  ankle and patient able to move the ankle  Skin: mild erythema to lateral ankle and midfoot with no open lesions and no signs of infection

## 2022-02-11 NOTE — PROGRESS NOTE ADULT - SUBJECTIVE AND OBJECTIVE BOX
Patient seen and examined  complains of neck discomfort      No Known Allergies    Hospital Medications:   MEDICATIONS  (STANDING):  allopurinol 100 milliGRAM(s) Oral daily  artificial  tears Solution 1 Drop(s) Both EYES four times a day  gabapentin Solution 50 milliGRAM(s) Oral two times a day  heparin   Injectable 5000 Unit(s) SubCutaneous every 8 hours  latanoprost 0.005% Ophthalmic Solution 1 Drop(s) Both EYES at bedtime  levothyroxine 25 MICROGram(s) Oral daily  methylPREDNISolone sodium succinate Injectable 40 milliGRAM(s) IV Push every 12 hours  midodrine. 5 milliGRAM(s) Oral <User Schedule>  sevelamer carbonate 800 milliGRAM(s) Oral three times a day with meals  simvastatin 40 milliGRAM(s) Oral at bedtime  sodium bicarbonate 650 milliGRAM(s) Oral two times a day  sodium chloride 0.45%. 250 milliLiter(s) (250 mL/Hr) IV Continuous <Continuous>  sodium chloride 0.9% Bolus 1000 milliLiter(s) IV Bolus once  sodium zirconium cyclosilicate 5 Gram(s) Oral daily        VITALS:  T(F): 98.6 (22 @ 08:23), Max: 98.6 (22 @ 08:23)  HR: 72 (22 @ 08:53)  BP: 133/78 (22 @ 08:53)  RR: 16 (22 @ 08:53)  SpO2: 98% (22 @ 08:53)  Wt(kg): --    02-10 @ 07:01  -   @ 07:00  --------------------------------------------------------  IN: 120 mL / OUT: 1300 mL / NET: -1180 mL        Physical Exam :-  Constitutional: NAD  Neck: Supple.  Respiratory: Bilateral equal breath sounds,  Cardiovascular: S1, S2 normal,  Gastrointestinal: Bowel Sounds present, soft, non tender.  Extremities: No edema  Neurological: Alert and Oriented x 3, no focal deficits  Psychiatric: Normal mood, normal affect    LABS:      140  |  105  |  32<H>  ----------------------------<  120<H>  4.4   |  21<L>  |  1.68<H>    Ca    9.1      2022 06:48      Creatinine Trend: 1.68 <--, 2.01 <--, 1.78 <--, 1.92 <--, 2.26 <--, 2.02 <--, 2.04 <--, 1.99 <--, 2.29 <--                        8.2    11.51 )-----------( 221      ( 2022 09:16 )             28.2     Urine Studies:  Urinalysis Basic - ( 2022 17:40 )    Color: Colorless / Appearance: Clear / S.011 / pH:   Gluc:  / Ketone: Negative  / Bili: Negative / Urobili: Negative   Blood:  / Protein: 30 mg/dL / Nitrite: Negative   Leuk Esterase: Negative / RBC: 1 /hpf / WBC 0 /HPF   Sq Epi:  / Non Sq Epi: 1 /hpf / Bacteria: Negative        RADIOLOGY & ADDITIONAL STUDIES:

## 2022-02-11 NOTE — CONSULT NOTE ADULT - ASSESSMENT
77 y/o female pt with right foot gouty flare up   - pt seen and evaluated  - symptoms consistent with gout flare up  - rec PO prednisone course until symptoms subside follow by taper   - right foot and ankle xrays ordered to r/o any underlying fractures  - follow up as outpatient upon discharge at the office ([address], [place] [address], call  for appointment)

## 2022-02-11 NOTE — PROGRESS NOTE ADULT - ASSESSMENT
77yo female pt with PMHx of CKD (Last K-5.3, Bun/Cr.-29/1.93 on 7/18/2020), Anemia, Gout, HTN, HLD, was transferred, on cervical collar, from [facility] c/o head/neck pain, C2 fracture s/p fall this morning with a syncopal episode.

## 2022-02-11 NOTE — PROGRESS NOTE ADULT - SUBJECTIVE AND OBJECTIVE BOX
Subjective: Patient seen and examined. No new events except as noted.     REVIEW OF SYSTEMS:    CONSTITUTIONAL: No weakness, fevers or chills  EYES/ENT: No visual changes;  No vertigo or throat pain   NECK: No pain or stiffness  RESPIRATORY: No cough, wheezing, hemoptysis; No shortness of breath  CARDIOVASCULAR: No chest pain or palpitations  GASTROINTESTINAL: No abdominal or epigastric pain. No nausea, vomiting, or hematemesis; No diarrhea or constipation. No melena or hematochezia.  GENITOURINARY: No dysuria, frequency or hematuria  NEUROLOGICAL: No numbness or weakness  SKIN: No itching, burning, rashes, or lesions   All other review of systems is negative unless indicated above.    MEDICATIONS:  MEDICATIONS  (STANDING):  allopurinol 100 milliGRAM(s) Oral daily  artificial  tears Solution 1 Drop(s) Both EYES four times a day  gabapentin Solution 50 milliGRAM(s) Oral two times a day  heparin   Injectable 5000 Unit(s) SubCutaneous every 8 hours  latanoprost 0.005% Ophthalmic Solution 1 Drop(s) Both EYES at bedtime  levothyroxine 25 MICROGram(s) Oral daily  methylPREDNISolone sodium succinate Injectable 40 milliGRAM(s) IV Push every 12 hours  midodrine. 5 milliGRAM(s) Oral <User Schedule>  sevelamer carbonate 800 milliGRAM(s) Oral three times a day with meals  simvastatin 40 milliGRAM(s) Oral at bedtime  sodium bicarbonate 650 milliGRAM(s) Oral two times a day  sodium chloride 0.45%. 250 milliLiter(s) (250 mL/Hr) IV Continuous <Continuous>  sodium chloride 0.9% Bolus 1000 milliLiter(s) IV Bolus once  sodium zirconium cyclosilicate 5 Gram(s) Oral daily      PHYSICAL EXAM:  T(C): 37 (02-11-22 @ 08:23), Max: 37 (02-11-22 @ 08:23)  HR: 72 (02-11-22 @ 08:53) (66 - 91)  BP: 133/78 (02-11-22 @ 08:53) (133/78 - 181/75)  RR: 16 (02-11-22 @ 08:53) (16 - 18)  SpO2: 98% (02-11-22 @ 08:53) (95% - 99%)  Wt(kg): --  I&O's Summary    10 Feb 2022 07:01  -  11 Feb 2022 07:00  --------------------------------------------------------  IN: 120 mL / OUT: 1300 mL / NET: -1180 mL          Appearance: NAD	  HEENT: Normal oral mucosa, PERRL, EOMI	  Lymphatic: No lymphadenopathy , no edema  Cardiovascular: Normal S1 S2, No JVD, No murmurs , Peripheral pulses palpable 2+ bilaterally  Respiratory: Lungs clear to auscultation, normal effort 	  Gastrointestinal:  Soft, Non-tender, + BS	  Skin: No rashes, No ecchymoses, No cyanosis, warm to touch  Neuro: alert oriented x 3 attention comprehension are fair.  Able to name, repeat.   EOmi fundi not visualized   no nystagmus VFF to confrontation  Tongue is midline  Palate elevates symmetrically   Moving all 4 ext spontaneously no drift appreciated  Gait not assessed.   Ext: No edema        LABS:    CARDIAC MARKERS:                                8.2    11.51 )-----------( 221      ( 11 Feb 2022 09:16 )             28.2     02-11    140  |  105  |  32<H>  ----------------------------<  120<H>  4.4   |  21<L>  |  1.68<H>    Ca    9.1      11 Feb 2022 06:48      proBNP:   Lipid Profile:   HgA1c:   TSH:             TELEMETRY: 	    ECG:  	  RADIOLOGY:   DIAGNOSTIC TESTING:  [ ] Echocardiogram:  [ ]  Catheterization:  [ ] Stress Test:    OTHER:

## 2022-02-11 NOTE — DISCHARGE NOTE NURSING/CASE MANAGEMENT/SOCIAL WORK - PATIENT PORTAL LINK FT
You can access the FollowMyHealth Patient Portal offered by [facility] by registering at the following website: [email or web address]. By joining [facility]’s FollowMyHealth portal, you will also be able to view your health information using other applications (apps) compatible with our system.

## 2022-02-12 LAB
ANION GAP SERPL CALC-SCNC: 14 MMOL/L — SIGNIFICANT CHANGE UP (ref 5–17)
BUN SERPL-MCNC: 44 MG/DL — HIGH (ref 7–23)
CALCIUM SERPL-MCNC: 8.7 MG/DL — SIGNIFICANT CHANGE UP (ref 8.4–10.5)
CHLORIDE SERPL-SCNC: 106 MMOL/L — SIGNIFICANT CHANGE UP (ref 96–108)
CO2 SERPL-SCNC: 21 MMOL/L — LOW (ref 22–31)
CREAT SERPL-MCNC: 1.89 MG/DL — HIGH (ref 0.5–1.3)
CULTURE RESULTS: SIGNIFICANT CHANGE UP
CULTURE RESULTS: SIGNIFICANT CHANGE UP
GLUCOSE SERPL-MCNC: 85 MG/DL — SIGNIFICANT CHANGE UP (ref 70–99)
HCT VFR BLD CALC: 24.7 % — LOW (ref 34.5–45)
HGB BLD-MCNC: 7.2 G/DL — LOW (ref 11.5–15.5)
MCHC RBC-ENTMCNC: 25.2 PG — LOW (ref 27–34)
MCHC RBC-ENTMCNC: 29.1 GM/DL — LOW (ref 32–36)
MCV RBC AUTO: 86.4 FL — SIGNIFICANT CHANGE UP (ref 80–100)
NRBC # BLD: 0 /100 WBCS — SIGNIFICANT CHANGE UP (ref 0–0)
PLATELET # BLD AUTO: 220 K/UL — SIGNIFICANT CHANGE UP (ref 150–400)
POTASSIUM SERPL-MCNC: 4.7 MMOL/L — SIGNIFICANT CHANGE UP (ref 3.5–5.3)
POTASSIUM SERPL-SCNC: 4.7 MMOL/L — SIGNIFICANT CHANGE UP (ref 3.5–5.3)
RBC # BLD: 2.86 M/UL — LOW (ref 3.8–5.2)
RBC # FLD: 19.2 % — HIGH (ref 10.3–14.5)
SODIUM SERPL-SCNC: 141 MMOL/L — SIGNIFICANT CHANGE UP (ref 135–145)
SPECIMEN SOURCE: SIGNIFICANT CHANGE UP
SPECIMEN SOURCE: SIGNIFICANT CHANGE UP
WBC # BLD: 14.28 K/UL — HIGH (ref 3.8–10.5)
WBC # FLD AUTO: 14.28 K/UL — HIGH (ref 3.8–10.5)

## 2022-02-12 RX ORDER — GLYCERIN ADULT
1 SUPPOSITORY, RECTAL RECTAL ONCE
Refills: 0 | Status: COMPLETED | OUTPATIENT
Start: 2022-02-12 | End: 2022-02-12

## 2022-02-12 RX ADMIN — LATANOPROST 1 DROP(S): 0.05 SOLUTION/ DROPS OPHTHALMIC; TOPICAL at 21:45

## 2022-02-12 RX ADMIN — Medication 1 DROP(S): at 05:32

## 2022-02-12 RX ADMIN — HEPARIN SODIUM 5000 UNIT(S): 5000 INJECTION INTRAVENOUS; SUBCUTANEOUS at 21:45

## 2022-02-12 RX ADMIN — Medication 650 MILLIGRAM(S): at 05:32

## 2022-02-12 RX ADMIN — Medication 1 SUPPOSITORY(S): at 13:25

## 2022-02-12 RX ADMIN — GABAPENTIN 50 MILLIGRAM(S): 400 CAPSULE ORAL at 17:30

## 2022-02-12 RX ADMIN — Medication 650 MILLIGRAM(S): at 22:00

## 2022-02-12 RX ADMIN — HEPARIN SODIUM 5000 UNIT(S): 5000 INJECTION INTRAVENOUS; SUBCUTANEOUS at 05:32

## 2022-02-12 RX ADMIN — Medication 650 MILLIGRAM(S): at 17:20

## 2022-02-12 RX ADMIN — Medication 1 DROP(S): at 11:48

## 2022-02-12 RX ADMIN — SEVELAMER CARBONATE 800 MILLIGRAM(S): 2400 POWDER, FOR SUSPENSION ORAL at 17:57

## 2022-02-12 RX ADMIN — Medication 1 DROP(S): at 23:48

## 2022-02-12 RX ADMIN — Medication 650 MILLIGRAM(S): at 05:33

## 2022-02-12 RX ADMIN — SEVELAMER CARBONATE 800 MILLIGRAM(S): 2400 POWDER, FOR SUSPENSION ORAL at 12:24

## 2022-02-12 RX ADMIN — GABAPENTIN 50 MILLIGRAM(S): 400 CAPSULE ORAL at 05:32

## 2022-02-12 RX ADMIN — HEPARIN SODIUM 5000 UNIT(S): 5000 INJECTION INTRAVENOUS; SUBCUTANEOUS at 17:19

## 2022-02-12 RX ADMIN — Medication 40 MILLIGRAM(S): at 05:32

## 2022-02-12 RX ADMIN — Medication 100 MILLIGRAM(S): at 11:49

## 2022-02-12 RX ADMIN — Medication 650 MILLIGRAM(S): at 21:46

## 2022-02-12 RX ADMIN — Medication 25 MICROGRAM(S): at 05:32

## 2022-02-12 RX ADMIN — SIMVASTATIN 40 MILLIGRAM(S): 20 TABLET, FILM COATED ORAL at 21:45

## 2022-02-12 RX ADMIN — Medication 1 DROP(S): at 17:36

## 2022-02-12 RX ADMIN — SEVELAMER CARBONATE 800 MILLIGRAM(S): 2400 POWDER, FOR SUSPENSION ORAL at 08:37

## 2022-02-12 RX ADMIN — Medication 650 MILLIGRAM(S): at 06:45

## 2022-02-12 NOTE — PROGRESS NOTE ADULT - ASSESSMENT
75yo female pt with PMHx of CKD (Last K-5.3, Bun/Cr.-29/1.93 on 7/18/2020), Anemia, Gout, HTN, HLD, was transferred, on cervical collar, from [facility] c/o head/neck pain, C2 fracture s/p fall this morning with a syncopal episode.

## 2022-02-12 NOTE — PROGRESS NOTE ADULT - SUBJECTIVE AND OBJECTIVE BOX
Patient seen and examined  no complaints    No Known Allergies    Hospital Medications:   MEDICATIONS  (STANDING):  allopurinol 100 milliGRAM(s) Oral daily  artificial  tears Solution 1 Drop(s) Both EYES four times a day  gabapentin Solution 50 milliGRAM(s) Oral two times a day  glycerin Suppository - Adult 1 Suppository(s) Rectal once  heparin   Injectable 5000 Unit(s) SubCutaneous every 8 hours  latanoprost 0.005% Ophthalmic Solution 1 Drop(s) Both EYES at bedtime  levothyroxine 25 MICROGram(s) Oral daily  midodrine. 5 milliGRAM(s) Oral <User Schedule>  predniSONE   Tablet 40 milliGRAM(s) Oral daily  sevelamer carbonate 800 milliGRAM(s) Oral three times a day with meals  simvastatin 40 milliGRAM(s) Oral at bedtime  sodium bicarbonate 650 milliGRAM(s) Oral two times a day  sodium chloride 0.45%. 250 milliLiter(s) (250 mL/Hr) IV Continuous <Continuous>  sodium chloride 0.9% Bolus 1000 milliLiter(s) IV Bolus once      VITALS:  T(F): 97.8 (22 @ 12:45), Max: 98.4 (22 @ 23:50)  HR: 72 (22 @ 12:45)  BP: 155/66 (22 @ 12:45)  RR: 17 (22 @ 12:45)  SpO2: 99% (22 @ 12:45)  Wt(kg): --     @ 07:  -   @ 07:00  --------------------------------------------------------  IN: 750 mL / OUT: 600 mL / NET: 150 mL     @ 07:01  -   @ 13:56  --------------------------------------------------------  IN: 480 mL / OUT: 0 mL / NET: 480 mL      Physical Exam :-  Constitutional: NAD  Neck: Supple.  Respiratory: Bilateral equal breath sounds,  Cardiovascular: S1, S2 normal,  Gastrointestinal: Bowel Sounds present, soft, non tender.  Extremities: No edema  Neurological: Alert and Oriented x 3, no focal deficits  Psychiatric: Normal mood, normal affect    LABS:      141  |  106  |  44<H>  ----------------------------<  85  4.7   |  21<L>  |  1.89<H>    Ca    8.7      2022 06:41      Creatinine Trend: 1.89 <--, 1.68 <--, 2.01 <--, 1.78 <--, 1.92 <--, 2.26 <--, 2.02 <--, 2.04 <--, 1.99 <--, 2.29 <--                        7.2    14.28 )-----------( 220      ( 2022 06:41 )             24.7     Urine Studies:  Urinalysis Basic - ( 2022 17:40 )    Color: Colorless / Appearance: Clear / S.011 / pH:   Gluc:  / Ketone: Negative  / Bili: Negative / Urobili: Negative   Blood:  / Protein: 30 mg/dL / Nitrite: Negative   Leuk Esterase: Negative / RBC: 1 /hpf / WBC 0 /HPF   Sq Epi:  / Non Sq Epi: 1 /hpf / Bacteria: Negative        RADIOLOGY & ADDITIONAL STUDIES:

## 2022-02-12 NOTE — PROGRESS NOTE ADULT - SUBJECTIVE AND OBJECTIVE BOX
[facility]      Seen earlier today, and examined.  - Today, patient is without complaints.           *****MEDICATIONS: Current medication reviewed and documented.    MEDICATIONS  (STANDING):  allopurinol 100 milliGRAM(s) Oral daily  artificial  tears Solution 1 Drop(s) Both EYES four times a day  gabapentin Solution 50 milliGRAM(s) Oral two times a day  heparin   Injectable 5000 Unit(s) SubCutaneous every 8 hours  latanoprost 0.005% Ophthalmic Solution 1 Drop(s) Both EYES at bedtime  levothyroxine 25 MICROGram(s) Oral daily  midodrine. 5 milliGRAM(s) Oral <User Schedule>  polyethylene glycol 3350 17 Gram(s) Oral daily  predniSONE   Tablet 40 milliGRAM(s) Oral daily  sevelamer carbonate 800 milliGRAM(s) Oral three times a day with meals  simvastatin 40 milliGRAM(s) Oral at bedtime  sodium bicarbonate 650 milliGRAM(s) Oral two times a day  sodium chloride 0.45%. 250 milliLiter(s) (250 mL/Hr) IV Continuous <Continuous>  sodium chloride 0.9% Bolus 1000 milliLiter(s) IV Bolus once    MEDICATIONS  (PRN):  acetaminophen     Tablet .. 650 milliGRAM(s) Oral every 6 hours PRN Mild Pain (1 - 3)  bisacodyl Suppository 10 milliGRAM(s) Rectal daily PRN Constipation          ***** VITAL SIGNS:  T(F): 98.4 (22 @ 07:29), Max: 98.4 (22 @ 05:41)  HR: 63 (22 @ 07:29) (63 - 72)  BP: 169/70 (22 @ 07:29) (155/65 - 174/75)  RR: 18 (22 @ 07:29) (17 - 18)  SpO2: 97% (22 @ 07:29) (97% - 99%)  Wt(kg): --  ,   I&O's Summary    2022 07:01  -  2022 07:00  --------------------------------------------------------  IN: 480 mL / OUT: 800 mL / NET: -320 mL             *****PHYSICAL EXAM:   alert oriented x 3 attention comprehension are fair.  Able to name, repeat.   EOmi fundi not visualized   no nystagmus VFF to confrontation  Tongue is midline  Palate elevates symmetrically   Moving all 4 ext spontaneously no drift appreciated    Gait not assessed.            *****LAB AND IMAGIN.5    13.82 )-----------( 260      ( 2022 07:14 )             25.8               02    139  |  104  |  48<H>  ----------------------------<  88  4.7   |  23  |  1.78<H>    Ca    8.6      2022 07:16                           [All pertinent recent Imaging/Reports reviewed]           *****A S S E S S M E N T   A N D   P L A N :Excerpt from H&P,"     75yo female pt with PMHx of CKD (Last K-5.3, Bun/Cr.-29/1.93 on 2020), Anemia, Gout, HTN, HLD, was transferred, on cervical collar, from [facility] c/o head/neck pain, C2 fracture s/p fall this morning with a syncopal episode.   Pt stated she felt dizzy after urination in her bathroom at 8:30am and fell. +LOC and noticed severe headache/neck pain.   She was evaluated in [facility] and sent to ED for spine consult 2/2 C2 fracture.   Denies visual changes or N/V. Denies sensory changes or weakness to extremities. Denies CP/SOB/Palpitation/abd pain. Denies lower back pain. Denies urinary problems. Denies fever, chills, cough or congestion.            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 Will continue to follow patient periodically. Please do not hesitate to call me if you have any  questions or if there has been a change in patients neurological status     ________________  Li Isaac MD  [facility]  [phone]      33 minutes spent on total encounter; more than 50 % of the visit was  spent counseling about plan of care, compliance to diet/exercise and medication regimen and or  coordinating care by the attending physician.      It is advised that stroke patients follow up with TALIA Krishna @ [phone] in 1- 2 weeks.   Others please follow up with Dr. Michael Nissenbaum [phone]

## 2022-02-12 NOTE — PROGRESS NOTE ADULT - ASSESSMENT
76 year-old woman with history of multiple medical issues including hypertension, hyperlipidemia, and chronic kidney disease. She was transferred to [facility] yesterday from the [facility] ER after being noted on imaging to have a C2 fracture, s/p syncopal episode. Following for CKD    CKD 4 - likely Hypertensive Nephropathy  Serum stable - at baseline  appears to be ranging around 1.8 to 2.0mg/dl  Renal diet  daily BMP    Hyperkalemia  Improved  off Lokelma 5mg PO QD  Low K diet  k improving    Metabolic acidosis  on  Nabicarb 650mg po bid  trend bicarb    Cervical fracture  pain management  Neurosx recs appreciated      For any question, call:  Cell # [phone]  Dr Brown

## 2022-02-12 NOTE — PROGRESS NOTE ADULT - ASSESSMENT
77yo female pt with PMHx of CKD (Last K-5.3, Bun/Cr.-29/1.93 on 7/18/2020), Anemia, Gout, HTN, HLD, was transferred, on cervical collar, from [facility] c/o head/neck pain, C2 fracture s/p fall this morning with a syncopal episode.

## 2022-02-12 NOTE — PROGRESS NOTE ADULT - SUBJECTIVE AND OBJECTIVE BOX
Subjective: Patient seen and examined. No new events except as noted.   does not complain on pain right now  dizziness when walking to bathroom     REVIEW OF SYSTEMS:    CONSTITUTIONAL: + weakness, fevers or chills  EYES/ENT: No visual changes;  No vertigo or throat pain   NECK: No pain or stiffness  RESPIRATORY: No cough, wheezing, hemoptysis; No shortness of breath  CARDIOVASCULAR: No chest pain or palpitations  GASTROINTESTINAL: No abdominal or epigastric pain. No nausea, vomiting, or hematemesis; No diarrhea or constipation. No melena or hematochezia.  GENITOURINARY: No dysuria, frequency or hematuria  NEUROLOGICAL: No numbness or weakness  SKIN: No itching, burning, rashes, or lesions   All other review of systems is negative unless indicated above.    MEDICATIONS:  MEDICATIONS  (STANDING):  allopurinol 100 milliGRAM(s) Oral daily  artificial  tears Solution 1 Drop(s) Both EYES four times a day  gabapentin Solution 50 milliGRAM(s) Oral two times a day  glycerin Suppository - Adult 1 Suppository(s) Rectal once  heparin   Injectable 5000 Unit(s) SubCutaneous every 8 hours  latanoprost 0.005% Ophthalmic Solution 1 Drop(s) Both EYES at bedtime  levothyroxine 25 MICROGram(s) Oral daily  midodrine. 5 milliGRAM(s) Oral <User Schedule>  predniSONE   Tablet 40 milliGRAM(s) Oral daily  sevelamer carbonate 800 milliGRAM(s) Oral three times a day with meals  simvastatin 40 milliGRAM(s) Oral at bedtime  sodium bicarbonate 650 milliGRAM(s) Oral two times a day  sodium chloride 0.45%. 250 milliLiter(s) (250 mL/Hr) IV Continuous <Continuous>  sodium chloride 0.9% Bolus 1000 milliLiter(s) IV Bolus once      PHYSICAL EXAM:  T(C): 36.6 (02-12-22 @ 08:02), Max: 37 (02-11-22 @ 12:41)  HR: 65 (02-12-22 @ 08:02) (61 - 81)  BP: 170/64 (02-12-22 @ 08:02) (124/56 - 170/64)  RR: 18 (02-12-22 @ 08:02) (17 - 18)  SpO2: 98% (02-12-22 @ 08:02) (97% - 99%)  Wt(kg): --  I&O's Summary    11 Feb 2022 07:01  -  12 Feb 2022 07:00  --------------------------------------------------------  IN: 750 mL / OUT: 600 mL / NET: 150 mL    Appearance: NAD	  HEENT: Normal oral mucosa, PERRL, EOMI	  Lymphatic: No lymphadenopathy , no edema  Cardiovascular: Normal S1 S2, No JVD, No murmurs , Peripheral pulses palpable 2+ bilaterally  Respiratory: Lungs clear to auscultation, normal effort 	  Gastrointestinal:  Soft, Non-tender, + BS	  Skin: No rashes, No ecchymoses, No cyanosis, warm to touch  Neuro: alert oriented x 3 attention comprehension are fair.  Able to name, repeat.   EOmi fundi not visualized   no nystagmus VFF to confrontation  Tongue is midline  Palate elevates symmetrically   Moving all 4 ext spontaneously no drift appreciated  Gait not assessed.   Ext: No edema        LABS:    CARDIAC MARKERS:                     7.2    14.28 )-----------( 220      ( 12 Feb 2022 06:41 )             24.7     02-12    141  |  106  |  44<H>  ----------------------------<  85  4.7   |  21<L>  |  1.89<H>    Ca    8.7      12 Feb 2022 06:41      proBNP:   Lipid Profile:   HgA1c:   TSH:     TELEMETRY: 	    ECG:  	  RADIOLOGY:   DIAGNOSTIC TESTING:  [ ] Echocardiogram:  [ ]  Catheterization:  [ ] Stress Test:    OTHER:

## 2022-02-12 NOTE — PROGRESS NOTE ADULT - SUBJECTIVE AND OBJECTIVE BOX
Patient is a 76y old  Female who presents with a chief complaint of syncope , cervical spine fracture (12 Feb 2022 10:34)      SUBJECTIVE / OVERNIGHT EVENTS:    MEDICATIONS  (STANDING):  allopurinol 100 milliGRAM(s) Oral daily  artificial  tears Solution 1 Drop(s) Both EYES four times a day  gabapentin Solution 50 milliGRAM(s) Oral two times a day  glycerin Suppository - Adult 1 Suppository(s) Rectal once  heparin   Injectable 5000 Unit(s) SubCutaneous every 8 hours  latanoprost 0.005% Ophthalmic Solution 1 Drop(s) Both EYES at bedtime  levothyroxine 25 MICROGram(s) Oral daily  midodrine. 5 milliGRAM(s) Oral <User Schedule>  predniSONE   Tablet 40 milliGRAM(s) Oral daily  sevelamer carbonate 800 milliGRAM(s) Oral three times a day with meals  simvastatin 40 milliGRAM(s) Oral at bedtime  sodium bicarbonate 650 milliGRAM(s) Oral two times a day  sodium chloride 0.45%. 250 milliLiter(s) (250 mL/Hr) IV Continuous <Continuous>  sodium chloride 0.9% Bolus 1000 milliLiter(s) IV Bolus once    MEDICATIONS  (PRN):  acetaminophen     Tablet .. 650 milliGRAM(s) Oral every 6 hours PRN Mild Pain (1 - 3)      Vital Signs Last 24 Hrs  T(F): 97.8 (02-12-22 @ 08:02), Max: 98.6 (02-11-22 @ 12:41)  HR: 65 (02-12-22 @ 08:02) (61 - 81)  BP: 170/64 (02-12-22 @ 08:02) (124/56 - 170/64)  RR: 18 (02-12-22 @ 08:02) (17 - 18)  SpO2: 98% (02-12-22 @ 08:02) (97% - 99%)  Telemetry:   CAPILLARY BLOOD GLUCOSE        I&O's Summary    11 Feb 2022 07:01  -  12 Feb 2022 07:00  --------------------------------------------------------  IN: 750 mL / OUT: 600 mL / NET: 150 mL        PHYSICAL EXAM:  GENERAL: NAD, well-developed  HEAD:  Atraumatic, Normocephalic  EYES: EOMI, PERRLA, conjunctiva and sclera clear  NECK: Supple, No JVD  CHEST/LUNG: Clear to auscultation bilaterally; No wheeze  HEART: Regular rate and rhythm; No murmurs, rubs, or gallops  ABDOMEN: Soft, Nontender, Nondistended; Bowel sounds present  EXTREMITIES:  2+ Peripheral Pulses, No clubbing, cyanosis, or edema  PSYCH: AAOx3  NEUROLOGY: non-focal  SKIN: No rashes or lesions    LABS:                        7.2    14.28 )-----------( 220      ( 12 Feb 2022 06:41 )             24.7     02-12    141  |  106  |  44<H>  ----------------------------<  85  4.7   |  21<L>  |  1.89<H>    Ca    8.7      12 Feb 2022 06:41                RADIOLOGY & ADDITIONAL TESTS:    Imaging Personally Reviewed:    Consultant(s) Notes Reviewed:      Care Discussed with Consultants/Other Providers:   Patient is a 76y old  Female who presents with a chief complaint of syncope , cervical spine fracture (12 Feb 2022 10:34)      SUBJECTIVE / OVERNIGHT EVENTS: no new events    MEDICATIONS  (STANDING):  allopurinol 100 milliGRAM(s) Oral daily  artificial  tears Solution 1 Drop(s) Both EYES four times a day  gabapentin Solution 50 milliGRAM(s) Oral two times a day  glycerin Suppository - Adult 1 Suppository(s) Rectal once  heparin   Injectable 5000 Unit(s) SubCutaneous every 8 hours  latanoprost 0.005% Ophthalmic Solution 1 Drop(s) Both EYES at bedtime  levothyroxine 25 MICROGram(s) Oral daily  midodrine. 5 milliGRAM(s) Oral <User Schedule>  predniSONE   Tablet 40 milliGRAM(s) Oral daily  sevelamer carbonate 800 milliGRAM(s) Oral three times a day with meals  simvastatin 40 milliGRAM(s) Oral at bedtime  sodium bicarbonate 650 milliGRAM(s) Oral two times a day  sodium chloride 0.45%. 250 milliLiter(s) (250 mL/Hr) IV Continuous <Continuous>  sodium chloride 0.9% Bolus 1000 milliLiter(s) IV Bolus once    MEDICATIONS  (PRN):  acetaminophen     Tablet .. 650 milliGRAM(s) Oral every 6 hours PRN Mild Pain (1 - 3)      Vital Signs Last 24 Hrs  T(F): 97.8 (02-12-22 @ 08:02), Max: 98.6 (02-11-22 @ 12:41)  HR: 65 (02-12-22 @ 08:02) (61 - 81)  BP: 170/64 (02-12-22 @ 08:02) (124/56 - 170/64)  RR: 18 (02-12-22 @ 08:02) (17 - 18)  SpO2: 98% (02-12-22 @ 08:02) (97% - 99%)  Telemetry:   CAPILLARY BLOOD GLUCOSE        I&O's Summary    11 Feb 2022 07:01  -  12 Feb 2022 07:00  --------------------------------------------------------  IN: 750 mL / OUT: 600 mL / NET: 150 mL        PHYSICAL EXAM:  GENERAL: NAD, well-developed  HEAD:  Atraumatic, Normocephalic  EYES: EOMI, PERRLA, conjunctiva and sclera clear  NECK: Supple, No JVD  CHEST/LUNG: Clear to auscultation bilaterally; No wheeze  HEART: Regular rate and rhythm; No murmurs, rubs, or gallops  ABDOMEN: Soft, Nontender, Nondistended; Bowel sounds present  EXTREMITIES:  2+ Peripheral Pulses, No clubbing, cyanosis, or edema  PSYCH: AAOx3  NEUROLOGY: non-focal  SKIN: No rashes or lesions    LABS:                        7.2    14.28 )-----------( 220      ( 12 Feb 2022 06:41 )             24.7     02-12    141  |  106  |  44<H>  ----------------------------<  85  4.7   |  21<L>  |  1.89<H>    Ca    8.7      12 Feb 2022 06:41                RADIOLOGY & ADDITIONAL TESTS:    Imaging Personally Reviewed:    Consultant(s) Notes Reviewed:      Care Discussed with Consultants/Other Providers:

## 2022-02-13 LAB
ANION GAP SERPL CALC-SCNC: 12 MMOL/L — SIGNIFICANT CHANGE UP (ref 5–17)
BUN SERPL-MCNC: 48 MG/DL — HIGH (ref 7–23)
CALCIUM SERPL-MCNC: 8.6 MG/DL — SIGNIFICANT CHANGE UP (ref 8.4–10.5)
CHLORIDE SERPL-SCNC: 104 MMOL/L — SIGNIFICANT CHANGE UP (ref 96–108)
CO2 SERPL-SCNC: 23 MMOL/L — SIGNIFICANT CHANGE UP (ref 22–31)
CREAT SERPL-MCNC: 1.78 MG/DL — HIGH (ref 0.5–1.3)
GLUCOSE SERPL-MCNC: 88 MG/DL — SIGNIFICANT CHANGE UP (ref 70–99)
HCT VFR BLD CALC: 25.8 % — LOW (ref 34.5–45)
HGB BLD-MCNC: 7.5 G/DL — LOW (ref 11.5–15.5)
MCHC RBC-ENTMCNC: 25.3 PG — LOW (ref 27–34)
MCHC RBC-ENTMCNC: 29.1 GM/DL — LOW (ref 32–36)
MCV RBC AUTO: 87.2 FL — SIGNIFICANT CHANGE UP (ref 80–100)
NRBC # BLD: 0 /100 WBCS — SIGNIFICANT CHANGE UP (ref 0–0)
PLATELET # BLD AUTO: 260 K/UL — SIGNIFICANT CHANGE UP (ref 150–400)
POTASSIUM SERPL-MCNC: 4.7 MMOL/L — SIGNIFICANT CHANGE UP (ref 3.5–5.3)
POTASSIUM SERPL-SCNC: 4.7 MMOL/L — SIGNIFICANT CHANGE UP (ref 3.5–5.3)
RBC # BLD: 2.96 M/UL — LOW (ref 3.8–5.2)
RBC # FLD: 18.6 % — HIGH (ref 10.3–14.5)
SODIUM SERPL-SCNC: 139 MMOL/L — SIGNIFICANT CHANGE UP (ref 135–145)
WBC # BLD: 13.82 K/UL — HIGH (ref 3.8–10.5)
WBC # FLD AUTO: 13.82 K/UL — HIGH (ref 3.8–10.5)

## 2022-02-13 RX ORDER — GLYCERIN ADULT
1 SUPPOSITORY, RECTAL RECTAL ONCE
Refills: 0 | Status: COMPLETED | OUTPATIENT
Start: 2022-02-13 | End: 2022-02-13

## 2022-02-13 RX ORDER — POLYETHYLENE GLYCOL 3350 17 G/17G
17 POWDER, FOR SOLUTION ORAL DAILY
Refills: 0 | Status: DISCONTINUED | OUTPATIENT
Start: 2022-02-13 | End: 2022-02-14

## 2022-02-13 RX ADMIN — LATANOPROST 1 DROP(S): 0.05 SOLUTION/ DROPS OPHTHALMIC; TOPICAL at 21:16

## 2022-02-13 RX ADMIN — Medication 650 MILLIGRAM(S): at 22:16

## 2022-02-13 RX ADMIN — Medication 650 MILLIGRAM(S): at 06:26

## 2022-02-13 RX ADMIN — Medication 650 MILLIGRAM(S): at 21:16

## 2022-02-13 RX ADMIN — SIMVASTATIN 40 MILLIGRAM(S): 20 TABLET, FILM COATED ORAL at 21:17

## 2022-02-13 RX ADMIN — HEPARIN SODIUM 5000 UNIT(S): 5000 INJECTION INTRAVENOUS; SUBCUTANEOUS at 21:16

## 2022-02-13 RX ADMIN — Medication 1 DROP(S): at 05:39

## 2022-02-13 RX ADMIN — Medication 650 MILLIGRAM(S): at 05:39

## 2022-02-13 RX ADMIN — HEPARIN SODIUM 5000 UNIT(S): 5000 INJECTION INTRAVENOUS; SUBCUTANEOUS at 14:08

## 2022-02-13 RX ADMIN — Medication 1 SUPPOSITORY(S): at 05:40

## 2022-02-13 RX ADMIN — Medication 650 MILLIGRAM(S): at 05:37

## 2022-02-13 RX ADMIN — Medication 100 MILLIGRAM(S): at 12:11

## 2022-02-13 RX ADMIN — Medication 1 DROP(S): at 17:42

## 2022-02-13 RX ADMIN — Medication 5 MILLIGRAM(S): at 21:16

## 2022-02-13 RX ADMIN — GABAPENTIN 50 MILLIGRAM(S): 400 CAPSULE ORAL at 05:37

## 2022-02-13 RX ADMIN — Medication 25 MICROGRAM(S): at 05:38

## 2022-02-13 RX ADMIN — SEVELAMER CARBONATE 800 MILLIGRAM(S): 2400 POWDER, FOR SUSPENSION ORAL at 09:11

## 2022-02-13 RX ADMIN — GABAPENTIN 50 MILLIGRAM(S): 400 CAPSULE ORAL at 17:43

## 2022-02-13 RX ADMIN — SEVELAMER CARBONATE 800 MILLIGRAM(S): 2400 POWDER, FOR SUSPENSION ORAL at 17:43

## 2022-02-13 RX ADMIN — POLYETHYLENE GLYCOL 3350 17 GRAM(S): 17 POWDER, FOR SOLUTION ORAL at 12:11

## 2022-02-13 RX ADMIN — Medication 1 DROP(S): at 12:11

## 2022-02-13 RX ADMIN — HEPARIN SODIUM 5000 UNIT(S): 5000 INJECTION INTRAVENOUS; SUBCUTANEOUS at 05:38

## 2022-02-13 RX ADMIN — Medication 1 DROP(S): at 23:38

## 2022-02-13 RX ADMIN — Medication 40 MILLIGRAM(S): at 05:38

## 2022-02-13 RX ADMIN — Medication 650 MILLIGRAM(S): at 17:43

## 2022-02-13 RX ADMIN — SEVELAMER CARBONATE 800 MILLIGRAM(S): 2400 POWDER, FOR SUSPENSION ORAL at 12:11

## 2022-02-13 NOTE — PROGRESS NOTE ADULT - SUBJECTIVE AND OBJECTIVE BOX
Patient seen and examined  no complaints    No Known Allergies    Hospital Medications:   MEDICATIONS  (STANDING):  allopurinol 100 milliGRAM(s) Oral daily  artificial  tears Solution 1 Drop(s) Both EYES four times a day  gabapentin Solution 50 milliGRAM(s) Oral two times a day  heparin   Injectable 5000 Unit(s) SubCutaneous every 8 hours  latanoprost 0.005% Ophthalmic Solution 1 Drop(s) Both EYES at bedtime  levothyroxine 25 MICROGram(s) Oral daily  midodrine. 5 milliGRAM(s) Oral <User Schedule>  polyethylene glycol 3350 17 Gram(s) Oral daily  predniSONE   Tablet 40 milliGRAM(s) Oral daily  sevelamer carbonate 800 milliGRAM(s) Oral three times a day with meals  simvastatin 40 milliGRAM(s) Oral at bedtime  sodium bicarbonate 650 milliGRAM(s) Oral two times a day  sodium chloride 0.45%. 250 milliLiter(s) (250 mL/Hr) IV Continuous <Continuous>  sodium chloride 0.9% Bolus 1000 milliLiter(s) IV Bolus once      VITALS:  T(F): 99.1 (22 @ 12:04), Max: 99.1 (22 @ 12:04)  HR: 75 (22 @ 12:04)  BP: 169/68 (22 @ 12:04)  RR: 18 (22 @ 12:04)  SpO2: 100% (22 @ 12:56)  Wt(kg): --     @ 07:01  -   @ 07:00  --------------------------------------------------------  IN: 480 mL / OUT: 800 mL / NET: -320 mL        Physical Exam :-  Constitutional: NAD  Neck: Supple.  Respiratory: Bilateral equal breath sounds,  Cardiovascular: S1, S2 normal,  Gastrointestinal: Bowel Sounds present, soft, non tender.  Extremities: No edema  Neurological: Alert and Oriented x 3, no focal deficits  Psychiatric: Normal mood, normal affect    LABS:      139  |  104  |  48<H>  ----------------------------<  88  4.7   |  23  |  1.78<H>    Ca    8.6      2022 07:16      Creatinine Trend: 1.78 <--, 1.89 <--, 1.68 <--, 2.01 <--, 1.78 <--, 1.92 <--, 2.26 <--, 2.02 <--, 2.04 <--, 1.99 <--                        7.5    13.82 )-----------( 260      ( 2022 07:14 )             25.8     Urine Studies:  Urinalysis Basic - ( 2022 17:40 )    Color: Colorless / Appearance: Clear / S.011 / pH:   Gluc:  / Ketone: Negative  / Bili: Negative / Urobili: Negative   Blood:  / Protein: 30 mg/dL / Nitrite: Negative   Leuk Esterase: Negative / RBC: 1 /hpf / WBC 0 /HPF   Sq Epi:  / Non Sq Epi: 1 /hpf / Bacteria: Negative        RADIOLOGY & ADDITIONAL STUDIES:

## 2022-02-13 NOTE — PROGRESS NOTE ADULT - ASSESSMENT
75yo female pt with PMHx of CKD (Last K-5.3, Bun/Cr.-29/1.93 on 7/18/2020), Anemia, Gout, HTN, HLD, was transferred, on cervical collar, from [facility] c/o head/neck pain, C2 fracture s/p fall this morning with a syncopal episode.

## 2022-02-13 NOTE — PROGRESS NOTE ADULT - ASSESSMENT
76 year-old woman with history of multiple medical issues including hypertension, hyperlipidemia, and chronic kidney disease. She was transferred to [facility] yesterday from the [facility] ER after being noted on imaging to have a C2 fracture, s/p syncopal episode. Following for CKD    CKD 4 - likely Hypertensive Nephropathy  Serum stable - at baseline  appears to be ranging around 1.8 to 2.0mg/dl  Renal diet  daily BMP    Hyperkalemia  Improved  off Lokelma 5mg PO QD  Low K diet  k improving    Metabolic acidosis  on  Nabicarb 650mg po bid  trend bicarb    Cervical fracture  pain management  Neurosx recs appreciated      For any question, call:  Cell # [phone]  Dr Brown

## 2022-02-13 NOTE — PROGRESS NOTE ADULT - SUBJECTIVE AND OBJECTIVE BOX
Subjective: Patient seen and examined. No new events except as noted.     REVIEW OF SYSTEMS:    CONSTITUTIONAL: + weakness, fevers or chills  EYES/ENT: No visual changes;  No vertigo or throat pain   NECK: No pain or stiffness  RESPIRATORY: No cough, wheezing, hemoptysis; No shortness of breath  CARDIOVASCULAR: No chest pain or palpitations  GASTROINTESTINAL: No abdominal or epigastric pain. No nausea, vomiting, or hematemesis; No diarrhea or constipation. No melena or hematochezia.  GENITOURINARY: No dysuria, frequency or hematuria  NEUROLOGICAL: No numbness or weakness  SKIN: No itching, burning, rashes, or lesions   All other review of systems is negative unless indicated above.    MEDICATIONS:  MEDICATIONS  (STANDING):  allopurinol 100 milliGRAM(s) Oral daily  artificial  tears Solution 1 Drop(s) Both EYES four times a day  gabapentin Solution 50 milliGRAM(s) Oral two times a day  heparin   Injectable 5000 Unit(s) SubCutaneous every 8 hours  latanoprost 0.005% Ophthalmic Solution 1 Drop(s) Both EYES at bedtime  levothyroxine 25 MICROGram(s) Oral daily  midodrine. 5 milliGRAM(s) Oral <User Schedule>  polyethylene glycol 3350 17 Gram(s) Oral daily  predniSONE   Tablet 40 milliGRAM(s) Oral daily  sevelamer carbonate 800 milliGRAM(s) Oral three times a day with meals  simvastatin 40 milliGRAM(s) Oral at bedtime  sodium bicarbonate 650 milliGRAM(s) Oral two times a day  sodium chloride 0.45%. 250 milliLiter(s) (250 mL/Hr) IV Continuous <Continuous>  sodium chloride 0.9% Bolus 1000 milliLiter(s) IV Bolus once      PHYSICAL EXAM:  T(C): 36.9 (02-13-22 @ 07:29), Max: 36.9 (02-13-22 @ 05:41)  HR: 63 (02-13-22 @ 07:29) (63 - 72)  BP: 169/70 (02-13-22 @ 07:29) (155/65 - 174/75)  RR: 18 (02-13-22 @ 07:29) (17 - 18)  SpO2: 97% (02-13-22 @ 07:29) (97% - 99%)  Wt(kg): --  I&O's Summary    12 Feb 2022 07:01  -  13 Feb 2022 07:00  --------------------------------------------------------  IN: 480 mL / OUT: 800 mL / NET: -320 mL          Appearance: NAD	  HEENT: Normal oral mucosa, PERRL, EOMI	  Lymphatic: No lymphadenopathy , no edema  Cardiovascular: Normal S1 S2, No JVD, No murmurs , Peripheral pulses palpable 2+ bilaterally  Respiratory: Lungs clear to auscultation, normal effort 	  Gastrointestinal:  Soft, Non-tender, + BS	  Skin: No rashes, No ecchymoses, No cyanosis, warm to touch  Neuro: alert oriented x 3 attention comprehension are fair.  Able to name, repeat.   EOmi fundi not visualized   no nystagmus VFF to confrontation  Tongue is midline  Palate elevates symmetrically   Moving all 4 ext spontaneously no drift appreciated  Gait not assessed.   Ext: No edema        LABS:    CARDIAC MARKERS:                                7.5    13.82 )-----------( 260      ( 13 Feb 2022 07:14 )             25.8     02-13    139  |  104  |  48<H>  ----------------------------<  88  4.7   |  23  |  1.78<H>    Ca    8.6      13 Feb 2022 07:16      proBNP:   Lipid Profile:   HgA1c:   TSH:             TELEMETRY: 	    ECG:  	  RADIOLOGY:   DIAGNOSTIC TESTING:  [ ] Echocardiogram:  [ ]  Catheterization:  [ ] Stress Test:    OTHER:

## 2022-02-13 NOTE — PROGRESS NOTE ADULT - ASSESSMENT
77yo female pt with PMHx of CKD (Last K-5.3, Bun/Cr.-29/1.93 on 7/18/2020), Anemia, Gout, HTN, HLD, was transferred, on cervical collar, from [facility] c/o head/neck pain, C2 fracture s/p fall this morning with a syncopal episode.

## 2022-02-13 NOTE — PROGRESS NOTE ADULT - SUBJECTIVE AND OBJECTIVE BOX
Patient is a 76y old  Female who presents with a chief complaint of syncope , cervical spine fracture (13 Feb 2022 13:58)      SUBJECTIVE / OVERNIGHT EVENTS: no new events    MEDICATIONS  (STANDING):  allopurinol 100 milliGRAM(s) Oral daily  artificial  tears Solution 1 Drop(s) Both EYES four times a day  gabapentin Solution 50 milliGRAM(s) Oral two times a day  heparin   Injectable 5000 Unit(s) SubCutaneous every 8 hours  latanoprost 0.005% Ophthalmic Solution 1 Drop(s) Both EYES at bedtime  levothyroxine 25 MICROGram(s) Oral daily  midodrine. 5 milliGRAM(s) Oral <User Schedule>  polyethylene glycol 3350 17 Gram(s) Oral daily  predniSONE   Tablet 40 milliGRAM(s) Oral daily  sevelamer carbonate 800 milliGRAM(s) Oral three times a day with meals  simvastatin 40 milliGRAM(s) Oral at bedtime  sodium bicarbonate 650 milliGRAM(s) Oral two times a day  sodium chloride 0.45%. 250 milliLiter(s) (250 mL/Hr) IV Continuous <Continuous>  sodium chloride 0.9% Bolus 1000 milliLiter(s) IV Bolus once    MEDICATIONS  (PRN):  acetaminophen     Tablet .. 650 milliGRAM(s) Oral every 6 hours PRN Mild Pain (1 - 3)  bisacodyl Suppository 10 milliGRAM(s) Rectal daily PRN Constipation      Vital Signs Last 24 Hrs  T(F): 98.1 (02-13-22 @ 17:20), Max: 99.1 (02-13-22 @ 12:04)  HR: 70 (02-13-22 @ 17:20) (63 - 75)  BP: 181/76 (02-13-22 @ 17:20) (160/69 - 181/76)  RR: 18 (02-13-22 @ 17:20) (18 - 18)  SpO2: 100% (02-13-22 @ 17:20) (97% - 100%)  Telemetry:   CAPILLARY BLOOD GLUCOSE        I&O's Summary    12 Feb 2022 07:01  -  13 Feb 2022 07:00  --------------------------------------------------------  IN: 480 mL / OUT: 800 mL / NET: -320 mL        PHYSICAL EXAM:  GENERAL: NAD, well-developed  HEAD:  Atraumatic, Normocephalic  EYES: EOMI, PERRLA, conjunctiva and sclera clear  NECK: Supple, No JVD  CHEST/LUNG: Clear to auscultation bilaterally; No wheeze  HEART: Regular rate and rhythm; No murmurs, rubs, or gallops  ABDOMEN: Soft, Nontender, Nondistended; Bowel sounds present  EXTREMITIES:  2+ Peripheral Pulses, No clubbing, cyanosis, or edema  PSYCH: AAOx3  NEUROLOGY: non-focal  SKIN: No rashes or lesions    LABS:                        7.5    13.82 )-----------( 260      ( 13 Feb 2022 07:14 )             25.8     02-13    139  |  104  |  48<H>  ----------------------------<  88  4.7   |  23  |  1.78<H>    Ca    8.6      13 Feb 2022 07:16                RADIOLOGY & ADDITIONAL TESTS:    Imaging Personally Reviewed:    Consultant(s) Notes Reviewed:      Care Discussed with Consultants/Other Providers:

## 2022-02-14 DIAGNOSIS — K64.9 UNSPECIFIED HEMORRHOIDS: ICD-10-CM

## 2022-02-14 LAB
HCT VFR BLD CALC: 27.6 % — LOW (ref 34.5–45)
HGB BLD-MCNC: 8.1 G/DL — LOW (ref 11.5–15.5)
MCHC RBC-ENTMCNC: 25.3 PG — LOW (ref 27–34)
MCHC RBC-ENTMCNC: 29.3 GM/DL — LOW (ref 32–36)
MCV RBC AUTO: 86.3 FL — SIGNIFICANT CHANGE UP (ref 80–100)
NRBC # BLD: 0 /100 WBCS — SIGNIFICANT CHANGE UP (ref 0–0)
PLATELET # BLD AUTO: 269 K/UL — SIGNIFICANT CHANGE UP (ref 150–400)
RBC # BLD: 3.2 M/UL — LOW (ref 3.8–5.2)
RBC # FLD: 18.8 % — HIGH (ref 10.3–14.5)
WBC # BLD: 12.51 K/UL — HIGH (ref 3.8–10.5)
WBC # FLD AUTO: 12.51 K/UL — HIGH (ref 3.8–10.5)

## 2022-02-14 PROCEDURE — 99231 SBSQ HOSP IP/OBS SF/LOW 25: CPT

## 2022-02-14 RX ORDER — HYDROCORTISONE 1 %
1 OINTMENT (GRAM) TOPICAL ONCE
Refills: 0 | Status: COMPLETED | OUTPATIENT
Start: 2022-02-14 | End: 2022-02-14

## 2022-02-14 RX ORDER — POLYETHYLENE GLYCOL 3350 17 G/17G
17 POWDER, FOR SOLUTION ORAL
Refills: 0 | Status: DISCONTINUED | OUTPATIENT
Start: 2022-02-14 | End: 2022-03-02

## 2022-02-14 RX ADMIN — Medication 1 DROP(S): at 17:16

## 2022-02-14 RX ADMIN — SIMVASTATIN 40 MILLIGRAM(S): 20 TABLET, FILM COATED ORAL at 21:08

## 2022-02-14 RX ADMIN — POLYETHYLENE GLYCOL 3350 17 GRAM(S): 17 POWDER, FOR SOLUTION ORAL at 17:19

## 2022-02-14 RX ADMIN — SEVELAMER CARBONATE 800 MILLIGRAM(S): 2400 POWDER, FOR SUSPENSION ORAL at 17:16

## 2022-02-14 RX ADMIN — Medication 25 MICROGRAM(S): at 05:26

## 2022-02-14 RX ADMIN — LATANOPROST 1 DROP(S): 0.05 SOLUTION/ DROPS OPHTHALMIC; TOPICAL at 21:09

## 2022-02-14 RX ADMIN — HEPARIN SODIUM 5000 UNIT(S): 5000 INJECTION INTRAVENOUS; SUBCUTANEOUS at 14:45

## 2022-02-14 RX ADMIN — Medication 1 SUPPOSITORY(S): at 17:21

## 2022-02-14 RX ADMIN — Medication 1 DROP(S): at 23:27

## 2022-02-14 RX ADMIN — SEVELAMER CARBONATE 800 MILLIGRAM(S): 2400 POWDER, FOR SUSPENSION ORAL at 11:35

## 2022-02-14 RX ADMIN — HEPARIN SODIUM 5000 UNIT(S): 5000 INJECTION INTRAVENOUS; SUBCUTANEOUS at 05:27

## 2022-02-14 RX ADMIN — GABAPENTIN 50 MILLIGRAM(S): 400 CAPSULE ORAL at 05:27

## 2022-02-14 RX ADMIN — Medication 650 MILLIGRAM(S): at 22:00

## 2022-02-14 RX ADMIN — SEVELAMER CARBONATE 800 MILLIGRAM(S): 2400 POWDER, FOR SUSPENSION ORAL at 08:40

## 2022-02-14 RX ADMIN — GABAPENTIN 50 MILLIGRAM(S): 400 CAPSULE ORAL at 17:19

## 2022-02-14 RX ADMIN — Medication 650 MILLIGRAM(S): at 17:16

## 2022-02-14 RX ADMIN — Medication 1 DROP(S): at 11:36

## 2022-02-14 RX ADMIN — Medication 100 MILLIGRAM(S): at 11:36

## 2022-02-14 RX ADMIN — Medication 1 DROP(S): at 05:28

## 2022-02-14 RX ADMIN — Medication 650 MILLIGRAM(S): at 15:10

## 2022-02-14 RX ADMIN — POLYETHYLENE GLYCOL 3350 17 GRAM(S): 17 POWDER, FOR SOLUTION ORAL at 11:38

## 2022-02-14 RX ADMIN — Medication 5 MILLIGRAM(S): at 21:08

## 2022-02-14 RX ADMIN — Medication 40 MILLIGRAM(S): at 05:26

## 2022-02-14 RX ADMIN — Medication 650 MILLIGRAM(S): at 14:34

## 2022-02-14 RX ADMIN — HEPARIN SODIUM 5000 UNIT(S): 5000 INJECTION INTRAVENOUS; SUBCUTANEOUS at 21:08

## 2022-02-14 RX ADMIN — Medication 650 MILLIGRAM(S): at 05:26

## 2022-02-14 RX ADMIN — Medication 650 MILLIGRAM(S): at 21:08

## 2022-02-14 NOTE — PROGRESS NOTE ADULT - SUBJECTIVE AND OBJECTIVE BOX
Patient seen and examined  no complaints    No Known Allergies    Hospital Medications:   MEDICATIONS  (STANDING):  allopurinol 100 milliGRAM(s) Oral daily  artificial  tears Solution 1 Drop(s) Both EYES four times a day  gabapentin Solution 50 milliGRAM(s) Oral two times a day  heparin   Injectable 5000 Unit(s) SubCutaneous every 8 hours  latanoprost 0.005% Ophthalmic Solution 1 Drop(s) Both EYES at bedtime  levothyroxine 25 MICROGram(s) Oral daily  midodrine. 5 milliGRAM(s) Oral <User Schedule>  polyethylene glycol 3350 17 Gram(s) Oral daily  sevelamer carbonate 800 milliGRAM(s) Oral three times a day with meals  simvastatin 40 milliGRAM(s) Oral at bedtime  sodium bicarbonate 650 milliGRAM(s) Oral two times a day  sodium chloride 0.45%. 250 milliLiter(s) (250 mL/Hr) IV Continuous <Continuous>  sodium chloride 0.9% Bolus 1000 milliLiter(s) IV Bolus once        VITALS:  T(F): 97.8 (02-14-22 @ 08:16), Max: 98.7 (02-14-22 @ 04:42)  HR: 63 (02-14-22 @ 04:42)  BP: 159/68 (02-14-22 @ 04:42)  RR: 18 (02-14-22 @ 08:16)  SpO2: 97% (02-14-22 @ 08:16)  Wt(kg): --    02-13 @ 07:01  -  02-14 @ 07:00  --------------------------------------------------------  IN: 0 mL / OUT: 600 mL / NET: -600 mL      Physical Exam :-  Constitutional: NAD  Neck: Supple.  Respiratory: Bilateral equal breath sounds,  Cardiovascular: S1, S2 normal,  Gastrointestinal: Bowel Sounds present, soft, non tender.  Extremities: No edema  Neurological: Alert and Oriented x 3, no focal deficits  Psychiatric: Normal mood, normal affect    LABS:  02-13    139  |  104  |  48<H>  ----------------------------<  88  4.7   |  23  |  1.78<H>    Ca    8.6      13 Feb 2022 07:16      Creatinine Trend: 1.78 <--, 1.89 <--, 1.68 <--, 2.01 <--, 1.78 <--, 1.92 <--, 2.26 <--, 2.02 <--, 2.04 <--                        8.1    12.51 )-----------( 269      ( 14 Feb 2022 10:09 )             27.6     Urine Studies:      RADIOLOGY & ADDITIONAL STUDIES:

## 2022-02-14 NOTE — PROGRESS NOTE ADULT - ASSESSMENT
76 year-old woman with history of multiple medical issues including hypertension, hyperlipidemia, and chronic kidney disease. She was transferred to [facility] yesterday from the [facility] ER after being noted on imaging to have a C2 fracture, s/p syncopal episode. Following for CKD    CKD 4 - likely Hypertensive Nephropathy  Serum stable - at baseline  appears to be ranging around 1.8 to 2.0mg/dl  Renal diet  daily BMP    Hyperkalemia  Improved  off Lokelma 5mg PO QD  Low K diet  k improving    Metabolic acidosis  on  Nabicarb 650mg po bid  trend bicarb    Cervical fracture  pain management  Neurosx recs appreciated      For any question, call:  Cell # [phone]  Dr Brown

## 2022-02-14 NOTE — PROGRESS NOTE ADULT - ASSESSMENT
77yo female pt with PMHx of CKD (Last K-5.3, Bun/Cr.-29/1.93 on 7/18/2020), Anemia, Gout, HTN, HLD, was transferred, on cervical collar, from [facility] c/o head/neck pain, C2 fracture s/p fall this morning with a syncopal episode.

## 2022-02-14 NOTE — PROGRESS NOTE ADULT - SUBJECTIVE AND OBJECTIVE BOX
Subjective: Patient seen and examined. No new events except as noted.     REVIEW OF SYSTEMS:    CONSTITUTIONAL: + weakness, fevers or chills  EYES/ENT: No visual changes;  No vertigo or throat pain   NECK: No pain or stiffness  RESPIRATORY: No cough, wheezing, hemoptysis; No shortness of breath  CARDIOVASCULAR: No chest pain or palpitations  GASTROINTESTINAL: No abdominal or epigastric pain. No nausea, vomiting, or hematemesis; No diarrhea or constipation. No melena or hematochezia.  GENITOURINARY: No dysuria, frequency or hematuria  NEUROLOGICAL: No numbness or weakness  SKIN: No itching, burning, rashes, or lesions   All other review of systems is negative unless indicated above.    MEDICATIONS:  MEDICATIONS  (STANDING):  allopurinol 100 milliGRAM(s) Oral daily  artificial  tears Solution 1 Drop(s) Both EYES four times a day  gabapentin Solution 50 milliGRAM(s) Oral two times a day  heparin   Injectable 5000 Unit(s) SubCutaneous every 8 hours  latanoprost 0.005% Ophthalmic Solution 1 Drop(s) Both EYES at bedtime  levothyroxine 25 MICROGram(s) Oral daily  midodrine. 5 milliGRAM(s) Oral <User Schedule>  polyethylene glycol 3350 17 Gram(s) Oral daily  sevelamer carbonate 800 milliGRAM(s) Oral three times a day with meals  simvastatin 40 milliGRAM(s) Oral at bedtime  sodium bicarbonate 650 milliGRAM(s) Oral two times a day  sodium chloride 0.45%. 250 milliLiter(s) (250 mL/Hr) IV Continuous <Continuous>  sodium chloride 0.9% Bolus 1000 milliLiter(s) IV Bolus once      PHYSICAL EXAM:  T(C): 36.6 (02-14-22 @ 08:16), Max: 37.1 (02-14-22 @ 04:42)  HR: 63 (02-14-22 @ 04:42) (63 - 72)  BP: 159/68 (02-14-22 @ 04:42) (146/66 - 181/76)  RR: 18 (02-14-22 @ 08:16) (18 - 18)  SpO2: 97% (02-14-22 @ 08:16) (97% - 100%)  Wt(kg): --  I&O's Summary    13 Feb 2022 07:01  -  14 Feb 2022 07:00  --------------------------------------------------------  IN: 0 mL / OUT: 600 mL / NET: -600 mL      Appearance: NAD	  HEENT: Normal oral mucosa, PERRL, EOMI	  Lymphatic: No lymphadenopathy , no edema  Cardiovascular: Normal S1 S2, No JVD, No murmurs , Peripheral pulses palpable 2+ bilaterally  Respiratory: Lungs clear to auscultation, normal effort 	  Gastrointestinal:  Soft, Non-tender, + BS	  Skin: No rashes, No ecchymoses, No cyanosis, warm to touch  Neuro: alert oriented x 3 attention comprehension are fair.  Able to name, repeat.   EOmi fundi not visualized   no nystagmus VFF to confrontation  Tongue is midline  Palate elevates symmetrically   Moving all 4 ext spontaneously no drift appreciated  Gait not assessed.   Ext: No edema      LABS:    CARDIAC MARKERS:                          8.1    12.51 )-----------( 269      ( 14 Feb 2022 10:09 )             27.6     02-13    139  |  104  |  48<H>  ----------------------------<  88  4.7   |  23  |  1.78<H>    Ca    8.6      13 Feb 2022 07:16      proBNP:   Lipid Profile:   HgA1c:   TSH:     TELEMETRY: 	    ECG:  	  RADIOLOGY:   DIAGNOSTIC TESTING:  [ ] Echocardiogram:  [ ]  Catheterization:  [ ] Stress Test:    OTHER:

## 2022-02-14 NOTE — CONSULT NOTE ADULT - ASSESSMENT
constipation  ckd  C2 fracture     likely 2/2 pain meds   +BM today  Miralax BID  dulcolax supp  monitor stool count   diet as tolerated   will follow   dw pt and daughter at bedside

## 2022-02-14 NOTE — PROGRESS NOTE ADULT - ASSESSMENT
75 y/o female pt with right foot gouty flare up   - pt seen and evaluated  - symptoms consistent with gout flare up  - pain resolved at this time  - okay to stop prednisone at this time  - right foot and ankle xrays negative for fractures   - follow up as outpatient upon discharge at the office ([address], [place] [address], call  for appointment)

## 2022-02-14 NOTE — PROGRESS NOTE ADULT - ASSESSMENT
77yo female pt with PMHx of CKD (Last K-5.3, Bun/Cr.-29/1.93 on 7/18/2020), Anemia, Gout, HTN, HLD, was transferred, on cervical collar, from [facility] c/o head/neck pain, C2 fracture s/p fall this morning with a syncopal episode.

## 2022-02-14 NOTE — PROGRESS NOTE ADULT - SUBJECTIVE AND OBJECTIVE BOX
Patient is a 76y old  Female who presents with a chief complaint of syncope , cervical spine fracture (14 Feb 2022 14:02)      SUBJECTIVE / OVERNIGHT EVENTS: ptn c/o painful BMs and constipation    MEDICATIONS  (STANDING):  allopurinol 100 milliGRAM(s) Oral daily  artificial  tears Solution 1 Drop(s) Both EYES four times a day  gabapentin Solution 50 milliGRAM(s) Oral two times a day  heparin   Injectable 5000 Unit(s) SubCutaneous every 8 hours  hydrocortisone hemorrhoidal Suppository 1 Suppository(s) Rectal once  latanoprost 0.005% Ophthalmic Solution 1 Drop(s) Both EYES at bedtime  levothyroxine 25 MICROGram(s) Oral daily  midodrine. 5 milliGRAM(s) Oral <User Schedule>  polyethylene glycol 3350 17 Gram(s) Oral two times a day  sevelamer carbonate 800 milliGRAM(s) Oral three times a day with meals  simvastatin 40 milliGRAM(s) Oral at bedtime  sodium bicarbonate 650 milliGRAM(s) Oral two times a day  sodium chloride 0.45%. 250 milliLiter(s) (250 mL/Hr) IV Continuous <Continuous>  sodium chloride 0.9% Bolus 1000 milliLiter(s) IV Bolus once    MEDICATIONS  (PRN):  acetaminophen     Tablet .. 650 milliGRAM(s) Oral every 6 hours PRN Mild Pain (1 - 3)  bisacodyl Suppository 10 milliGRAM(s) Rectal daily PRN Constipation      Vital Signs Last 24 Hrs  T(F): 98.8 (02-14-22 @ 13:25), Max: 98.8 (02-14-22 @ 13:25)  HR: 85 (02-14-22 @ 13:25) (63 - 85)  BP: 163/68 (02-14-22 @ 13:25) (146/66 - 181/76)  RR: 18 (02-14-22 @ 13:25) (18 - 18)  SpO2: 97% (02-14-22 @ 13:25) (97% - 100%)  Telemetry:   CAPILLARY BLOOD GLUCOSE        I&O's Summary    13 Feb 2022 07:01  -  14 Feb 2022 07:00  --------------------------------------------------------  IN: 0 mL / OUT: 600 mL / NET: -600 mL    14 Feb 2022 07:01  -  14 Feb 2022 14:51  --------------------------------------------------------  IN: 0 mL / OUT: 300 mL / NET: -300 mL        PHYSICAL EXAM:  GENERAL: NAD, well-developed  HEAD:  Atraumatic, Normocephalic  EYES: EOMI, PERRLA, conjunctiva and sclera clear  NECK: Supple, No JVD  CHEST/LUNG: Clear to auscultation bilaterally; No wheeze  HEART: Regular rate and rhythm; No murmurs, rubs, or gallops  ABDOMEN: Soft, Nontender, Nondistended; Bowel sounds present  EXTREMITIES:  2+ Peripheral Pulses, No clubbing, cyanosis, or edema  PSYCH: AAOx3  NEUROLOGY: non-focal  SKIN: No rashes or lesions    LABS:                        8.1    12.51 )-----------( 269      ( 14 Feb 2022 10:09 )             27.6     02-13    139  |  104  |  48<H>  ----------------------------<  88  4.7   |  23  |  1.78<H>    Ca    8.6      13 Feb 2022 07:16                RADIOLOGY & ADDITIONAL TESTS:    Imaging Personally Reviewed:    Consultant(s) Notes Reviewed:      Care Discussed with Consultants/Other Providers:

## 2022-02-14 NOTE — PROGRESS NOTE ADULT - SUBJECTIVE AND OBJECTIVE BOX
Patient is a 76y old  Female who presents with a chief complaint of syncope , cervical spine fracture (14 Feb 2022 14:51)       INTERVAL HPI/OVERNIGHT EVENTS:  Patient seen and evaluated at bedside.  Pt is resting comfortable in NAD    Allergies    No Known Allergies    Intolerances        Vital Signs Last 24 Hrs  T(C): 36.8 (14 Feb 2022 15:11), Max: 37.1 (14 Feb 2022 04:42)  T(F): 98.2 (14 Feb 2022 15:11), Max: 98.8 (14 Feb 2022 13:25)  HR: 71 (14 Feb 2022 15:11) (63 - 85)  BP: 154/71 (14 Feb 2022 15:11) (146/66 - 163/68)  BP(mean): --  RR: 18 (14 Feb 2022 15:11) (18 - 18)  SpO2: 96% (14 Feb 2022 15:11) (96% - 99%)    LABS:                        8.1    12.51 )-----------( 269      ( 14 Feb 2022 10:09 )             27.6     02-13    139  |  104  |  48<H>  ----------------------------<  88  4.7   |  23  |  1.78<H>    Ca    8.6      13 Feb 2022 07:16          CAPILLARY BLOOD GLUCOSE          Lower Extremity Physical Exam:  Vasular: DP/PT 2/4, B/L, CFT <3 seconds B/L, Temperature gradient WNL, B/L, right foot edema noted.   Neuro: Epicritic sensation wnl to the level of foot, B/L.  Musculoskeletal/Ortho: no pain with palpation of right ankle and midfoot, no pain with micromotion R  ankle and patient able to move the ankle  Skin: erythema to lateral ankle and midfoot resolved with no open lesions and no signs of infection      RADIOLOGY  < from: Xray Ankle Complete 3 Views, Right (02.11.22 @ 19:39) >    ACC: [id number] EXAM:  XR FOOT COMP MIN 3 VIEWS RT                        ACC: [id number] EXAM:  XR ANKLE COMP MIN 3 VIEWS RT                          PROCEDURE DATE:  02/11/2022          INTERPRETATION:  History: Foot and ankle pain. Swelling. Prior fall on   February 6.    3 views of the right ankle and 3 views of the right foot were performed.    IMPRESSION:    There is no evidence of acute fracture or dislocation. Joint spaces are   preserved.    --- End of Report ---            JUDD MOYA MD; Attending Radiologist  This document has been electronically signed. Feb 12 2022 10:00AM    < end of copied text >

## 2022-02-15 LAB
ANION GAP SERPL CALC-SCNC: 14 MMOL/L — SIGNIFICANT CHANGE UP (ref 5–17)
BUN SERPL-MCNC: 50 MG/DL — HIGH (ref 7–23)
CALCIUM SERPL-MCNC: 8.8 MG/DL — SIGNIFICANT CHANGE UP (ref 8.4–10.5)
CHLORIDE SERPL-SCNC: 105 MMOL/L — SIGNIFICANT CHANGE UP (ref 96–108)
CO2 SERPL-SCNC: 21 MMOL/L — LOW (ref 22–31)
CREAT SERPL-MCNC: 2.09 MG/DL — HIGH (ref 0.5–1.3)
GLUCOSE SERPL-MCNC: 92 MG/DL — SIGNIFICANT CHANGE UP (ref 70–99)
HCT VFR BLD CALC: 26.8 % — LOW (ref 34.5–45)
HGB BLD-MCNC: 8.1 G/DL — LOW (ref 11.5–15.5)
MCHC RBC-ENTMCNC: 25.6 PG — LOW (ref 27–34)
MCHC RBC-ENTMCNC: 30.2 GM/DL — LOW (ref 32–36)
MCV RBC AUTO: 84.5 FL — SIGNIFICANT CHANGE UP (ref 80–100)
NRBC # BLD: 0 /100 WBCS — SIGNIFICANT CHANGE UP (ref 0–0)
PLATELET # BLD AUTO: 297 K/UL — SIGNIFICANT CHANGE UP (ref 150–400)
POTASSIUM SERPL-MCNC: 5.4 MMOL/L — HIGH (ref 3.5–5.3)
POTASSIUM SERPL-SCNC: 5.4 MMOL/L — HIGH (ref 3.5–5.3)
RBC # BLD: 3.17 M/UL — LOW (ref 3.8–5.2)
RBC # FLD: 18.1 % — HIGH (ref 10.3–14.5)
SODIUM SERPL-SCNC: 140 MMOL/L — SIGNIFICANT CHANGE UP (ref 135–145)
WBC # BLD: 10.47 K/UL — SIGNIFICANT CHANGE UP (ref 3.8–10.5)
WBC # FLD AUTO: 10.47 K/UL — SIGNIFICANT CHANGE UP (ref 3.8–10.5)

## 2022-02-15 RX ORDER — SODIUM ZIRCONIUM CYCLOSILICATE 10 G/10G
5 POWDER, FOR SUSPENSION ORAL ONCE
Refills: 0 | Status: COMPLETED | OUTPATIENT
Start: 2022-02-15 | End: 2022-02-15

## 2022-02-15 RX ORDER — MESALAMINE 400 MG
1000 TABLET, DELAYED RELEASE (ENTERIC COATED) ORAL AT BEDTIME
Refills: 0 | Status: DISCONTINUED | OUTPATIENT
Start: 2022-02-15 | End: 2022-03-02

## 2022-02-15 RX ORDER — PHENYLEPHRINE-SHARK LIVER OIL-MINERAL OIL-PETROLATUM RECTAL OINTMENT
1 OINTMENT (GRAM) RECTAL ONCE
Refills: 0 | Status: COMPLETED | OUTPATIENT
Start: 2022-02-15 | End: 2022-02-15

## 2022-02-15 RX ORDER — HYDROCORTISONE 1 %
1 OINTMENT (GRAM) TOPICAL DAILY
Refills: 0 | Status: DISCONTINUED | OUTPATIENT
Start: 2022-02-15 | End: 2022-03-02

## 2022-02-15 RX ADMIN — Medication 1 DROP(S): at 17:11

## 2022-02-15 RX ADMIN — Medication 650 MILLIGRAM(S): at 03:17

## 2022-02-15 RX ADMIN — HEPARIN SODIUM 5000 UNIT(S): 5000 INJECTION INTRAVENOUS; SUBCUTANEOUS at 05:02

## 2022-02-15 RX ADMIN — GABAPENTIN 50 MILLIGRAM(S): 400 CAPSULE ORAL at 05:05

## 2022-02-15 RX ADMIN — HEPARIN SODIUM 5000 UNIT(S): 5000 INJECTION INTRAVENOUS; SUBCUTANEOUS at 13:09

## 2022-02-15 RX ADMIN — SEVELAMER CARBONATE 800 MILLIGRAM(S): 2400 POWDER, FOR SUSPENSION ORAL at 08:29

## 2022-02-15 RX ADMIN — Medication 650 MILLIGRAM(S): at 10:17

## 2022-02-15 RX ADMIN — SIMVASTATIN 40 MILLIGRAM(S): 20 TABLET, FILM COATED ORAL at 21:43

## 2022-02-15 RX ADMIN — Medication 650 MILLIGRAM(S): at 09:36

## 2022-02-15 RX ADMIN — Medication 1 DROP(S): at 05:02

## 2022-02-15 RX ADMIN — SODIUM ZIRCONIUM CYCLOSILICATE 5 GRAM(S): 10 POWDER, FOR SUSPENSION ORAL at 13:08

## 2022-02-15 RX ADMIN — Medication 650 MILLIGRAM(S): at 17:08

## 2022-02-15 RX ADMIN — HEPARIN SODIUM 5000 UNIT(S): 5000 INJECTION INTRAVENOUS; SUBCUTANEOUS at 21:43

## 2022-02-15 RX ADMIN — Medication 25 MICROGRAM(S): at 05:02

## 2022-02-15 RX ADMIN — Medication 1 DROP(S): at 12:13

## 2022-02-15 RX ADMIN — SEVELAMER CARBONATE 800 MILLIGRAM(S): 2400 POWDER, FOR SUSPENSION ORAL at 12:09

## 2022-02-15 RX ADMIN — POLYETHYLENE GLYCOL 3350 17 GRAM(S): 17 POWDER, FOR SOLUTION ORAL at 05:05

## 2022-02-15 RX ADMIN — Medication 100 MILLIGRAM(S): at 12:10

## 2022-02-15 RX ADMIN — SEVELAMER CARBONATE 800 MILLIGRAM(S): 2400 POWDER, FOR SUSPENSION ORAL at 17:08

## 2022-02-15 RX ADMIN — Medication 650 MILLIGRAM(S): at 04:15

## 2022-02-15 RX ADMIN — LATANOPROST 1 DROP(S): 0.05 SOLUTION/ DROPS OPHTHALMIC; TOPICAL at 21:43

## 2022-02-15 RX ADMIN — Medication 650 MILLIGRAM(S): at 05:02

## 2022-02-15 RX ADMIN — Medication 1000 MILLIGRAM(S): at 22:20

## 2022-02-15 RX ADMIN — PHENYLEPHRINE-SHARK LIVER OIL-MINERAL OIL-PETROLATUM RECTAL OINTMENT 1 APPLICATION(S): at 10:35

## 2022-02-15 RX ADMIN — GABAPENTIN 50 MILLIGRAM(S): 400 CAPSULE ORAL at 17:08

## 2022-02-15 NOTE — PROGRESS NOTE ADULT - SUBJECTIVE AND OBJECTIVE BOX
[facility] [place] [place] [facility] - MERCEDES Howe / MERCEDES Ballesteros / YAQUELIN Crouch/ MERCEDES Brown/ MERCEDES Car/ LUZ Hartmann / MAGDALENO Alonzo / TERRY Holman  ---------------------------------------------------------------------------------------------------------------  seen and examined today for CKD 4  Interval : NAD  VITALS:  T(F): 98 (02-15-22 @ 12:09), Max: 98.4 (02-14-22 @ 23:33)  HR: 86 (02-15-22 @ 12:09)  BP: 179/77 (02-15-22 @ 12:09)  RR: 18 (02-15-22 @ 12:09)  SpO2: 98% (02-15-22 @ 12:09)  Wt(kg): --    02-14 @ 07:01  -  02-15 @ 07:00  --------------------------------------------------------  IN: 0 mL / OUT: 950 mL / NET: -950 mL    02-15 @ 07:01  -  02-15 @ 13:33  --------------------------------------------------------  IN: 420 mL / OUT: 0 mL / NET: 420 mL      Physical Exam :-  Constitutional: NAD  Neck: Supple.  Respiratory: Bilateral equal breath sounds,  Cardiovascular: S1, S2 normal,  Gastrointestinal: Bowel Sounds present, soft, non tender.  Extremities: No edema  Neurological: Alert and Oriented x 3, no focal deficits  Psychiatric: Normal mood, normal affect  Data:-  Allergies :   No Known Allergies    Hospital Medications:   MEDICATIONS  (STANDING):  allopurinol 100 milliGRAM(s) Oral daily  artificial  tears Solution 1 Drop(s) Both EYES four times a day  bisacodyl 5 milliGRAM(s) Oral at bedtime  gabapentin Solution 50 milliGRAM(s) Oral two times a day  heparin   Injectable 5000 Unit(s) SubCutaneous every 8 hours  hydrocortisone hemorrhoidal Suppository 1 Suppository(s) Rectal daily  latanoprost 0.005% Ophthalmic Solution 1 Drop(s) Both EYES at bedtime  levothyroxine 25 MICROGram(s) Oral daily  mesalamine Suppository 1000 milliGRAM(s) Rectal at bedtime  polyethylene glycol 3350 17 Gram(s) Oral two times a day  sevelamer carbonate 800 milliGRAM(s) Oral three times a day with meals  simvastatin 40 milliGRAM(s) Oral at bedtime  sodium bicarbonate 650 milliGRAM(s) Oral two times a day  sodium chloride 0.45%. 250 milliLiter(s) (250 mL/Hr) IV Continuous <Continuous>  sodium chloride 0.9% Bolus 1000 milliLiter(s) IV Bolus once    02-15    140  |  105  |  50<H>  ----------------------------<  92  5.4<H>   |  21<L>  |  2.09<H>    Ca    8.8      15 Feb 2022 05:59      Creatinine Trend: 2.09 <--, 1.78 <--, 1.89 <--, 1.68 <--, 2.01 <--, 1.78 <--, 1.92 <--, 2.26 <--                        8.1    10.47 )-----------( 297      ( 15 Feb 2022 05:59 )             26.8

## 2022-02-15 NOTE — PROGRESS NOTE ADULT - PROBLEM SELECTOR PLAN 2
orthostatics resolved  midodrine d/c'd   continue to hold all antihypertensives  continue to monitor

## 2022-02-15 NOTE — PROGRESS NOTE ADULT - SUBJECTIVE AND OBJECTIVE BOX
Patient is a 76y old  Female who presents with a chief complaint of syncope , cervical spine fracture (15 Feb 2022 13:33)      SUBJECTIVE / OVERNIGHT EVENTS: ptn finished prednisone treatment for acute gout flare, BMs cont being painful but much improved, had 2 large BMs yesterday and 2 large BMs today. hyperkalemia recurring, has CKD4.  LOKELMA resumed as per renal. she is on a LOW Potassium diet.     MEDICATIONS  (STANDING):  allopurinol 100 milliGRAM(s) Oral daily  artificial  tears Solution 1 Drop(s) Both EYES four times a day  bisacodyl 5 milliGRAM(s) Oral at bedtime  gabapentin Solution 50 milliGRAM(s) Oral two times a day  heparin   Injectable 5000 Unit(s) SubCutaneous every 8 hours  hydrocortisone hemorrhoidal Suppository 1 Suppository(s) Rectal daily  latanoprost 0.005% Ophthalmic Solution 1 Drop(s) Both EYES at bedtime  levothyroxine 25 MICROGram(s) Oral daily  mesalamine Suppository 1000 milliGRAM(s) Rectal at bedtime  polyethylene glycol 3350 17 Gram(s) Oral two times a day  sevelamer carbonate 800 milliGRAM(s) Oral three times a day with meals  simvastatin 40 milliGRAM(s) Oral at bedtime  sodium bicarbonate 650 milliGRAM(s) Oral two times a day  sodium chloride 0.45%. 250 milliLiter(s) (250 mL/Hr) IV Continuous <Continuous>  sodium chloride 0.9% Bolus 1000 milliLiter(s) IV Bolus once    MEDICATIONS  (PRN):  acetaminophen     Tablet .. 650 milliGRAM(s) Oral every 6 hours PRN Mild Pain (1 - 3)      Vital Signs Last 24 Hrs  T(F): 98.9 (02-15-22 @ 19:45), Max: 98.9 (02-15-22 @ 12:09)  HR: 74 (02-15-22 @ 16:25) (60 - 74)  BP: 144/70 (02-15-22 @ 16:25) (144/70 - 181/77)  RR: 18 (02-15-22 @ 19:45) (18 - 18)  SpO2: 98% (02-15-22 @ 19:45) (97% - 98%)  Telemetry:   CAPILLARY BLOOD GLUCOSE        I&O's Summary    14 Feb 2022 07:01  -  15 Feb 2022 07:00  --------------------------------------------------------  IN: 0 mL / OUT: 950 mL / NET: -950 mL    15 Feb 2022 07:01  -  15 Feb 2022 20:34  --------------------------------------------------------  IN: 420 mL / OUT: 0 mL / NET: 420 mL        PHYSICAL EXAM:  GENERAL: NAD, well-developed  HEAD:  Atraumatic, Normocephalic  EYES: EOMI, PERRLA, conjunctiva and sclera clear  NECK: Supple, No JVD  CHEST/LUNG: Clear to auscultation bilaterally; No wheeze  HEART: Regular rate and rhythm; No murmurs, rubs, or gallops  ABDOMEN: Soft, Nontender, Nondistended; Bowel sounds present  EXTREMITIES:  2+ Peripheral Pulses, No clubbing, cyanosis, or edema  PSYCH: AAOx3  NEUROLOGY: non-focal  SKIN: No rashes or lesions    LABS:                        8.1    10.47 )-----------( 297      ( 15 Feb 2022 05:59 )             26.8     02-15    140  |  105  |  50<H>  ----------------------------<  92  5.4<H>   |  21<L>  |  2.09<H>    Ca    8.8      15 Feb 2022 05:59                RADIOLOGY & ADDITIONAL TESTS:    Imaging Personally Reviewed:    Consultant(s) Notes Reviewed:      Care Discussed with Consultants/Other Providers:

## 2022-02-15 NOTE — PROGRESS NOTE ADULT - ASSESSMENT
76 year-old woman with history of multiple medical issues including hypertension, hyperlipidemia, and chronic kidney disease. She was transferred to [facility] yesterday from the [facility] ER after being noted on imaging to have a C2 fracture, s/p syncopal episode. Following for CKD    CKD 4 - likely Hypertensive Nephropathy  Serum stable - at baseline  appears to be ranging around 1.8 to 2.0mg/dl  Renal diet  daily BMP    Hyperkalemia  high again  restart Lokelma 5mg PO QD  Low K diet    Metabolic acidosis  on  Nabicarb 650mg po bid  trend bicarb    Cervical fracture  pain management  Neurosx recs appreciated      For any question, call:  Cell # [phone]  Pager # [phone]  Callback # [phone]

## 2022-02-15 NOTE — PROGRESS NOTE ADULT - SUBJECTIVE AND OBJECTIVE BOX
[facility]  Bogdan Johnson PA-C  [facility] [place] [address]  [phone]      INTERVAL HPI/OVERNIGHT EVENTS:  pt seen and examined, events noted  2 Bms yestrday, 2 large BMs this am  pt continues to c/o pain with BM     MEDICATIONS  (STANDING):  allopurinol 100 milliGRAM(s) Oral daily  artificial  tears Solution 1 Drop(s) Both EYES four times a day  bisacodyl 5 milliGRAM(s) Oral at bedtime  gabapentin Solution 50 milliGRAM(s) Oral two times a day  heparin   Injectable 5000 Unit(s) SubCutaneous every 8 hours  hydrocortisone hemorrhoidal Suppository 1 Suppository(s) Rectal daily  latanoprost 0.005% Ophthalmic Solution 1 Drop(s) Both EYES at bedtime  levothyroxine 25 MICROGram(s) Oral daily  mesalamine Suppository 1000 milliGRAM(s) Rectal at bedtime  polyethylene glycol 3350 17 Gram(s) Oral two times a day  sevelamer carbonate 800 milliGRAM(s) Oral three times a day with meals  simvastatin 40 milliGRAM(s) Oral at bedtime  sodium bicarbonate 650 milliGRAM(s) Oral two times a day  sodium chloride 0.45%. 250 milliLiter(s) (250 mL/Hr) IV Continuous <Continuous>  sodium chloride 0.9% Bolus 1000 milliLiter(s) IV Bolus once  sodium zirconium cyclosilicate 5 Gram(s) Oral once    MEDICATIONS  (PRN):  acetaminophen     Tablet .. 650 milliGRAM(s) Oral every 6 hours PRN Mild Pain (1 - 3)      Allergies    No Known Allergies    Intolerances        ROS:   General:  No wt loss, fevers, chills, night sweats, fatigue,   Eyes:  Good vision, no reported pain  ENT:  No sore throat, pain, runny nose, dysphagia  CV:  No pain, palpitations, hypo/hypertension  Resp:  No dyspnea, cough, tachypnea, wheezing  GI:  + pain, No nausea, No vomiting, No diarrhea, + constipation, No weight loss, No fever, No pruritis, No rectal bleeding, No tarry stools, No dysphagia,  :  No pain, bleeding, incontinence, nocturia  Muscle:  No pain, weakness  Neuro:  No weakness, tingling, memory problems  Psych:  No fatigue, insomnia, mood problems, depression  Endocrine:  No polyuria, polydipsia, cold/heat intolerance  Heme:  No petechiae, ecchymosis, easy bruisability  Skin:  No rash, tattoos, scars, edema      PHYSICAL EXAM:   Vital Signs Last 24 Hrs  T(C): 36.7 (15 Feb 2022 12:09), Max: 37.1 (14 Feb 2022 13:25)  T(F): 98 (15 Feb 2022 12:09), Max: 98.8 (14 Feb 2022 13:25)  HR: 86 (15 Feb 2022 12:09) (60 - 87)  BP: 179/77 (15 Feb 2022 12:09) (154/71 - 181/77)  BP(mean): --  RR: 18 (15 Feb 2022 12:09) (18 - 18)  SpO2: 98% (15 Feb 2022 12:09) (96% - 98%)  Daily     Daily     GENERAL:  Appears stated age,   HEENT:  NC/AT,    CHEST:  Full & symmetric excursion,   HEART:  Regular rhythm,  ABDOMEN:  Soft, non-tender, non-distended,  EXTEREMITIES:  no cyanosis  SKIN:  No rash  NEURO:  Alert,       LABS:                        8.1    10.47 )-----------( 297      ( 15 Feb 2022 05:59 )             26.8   02-15    140  |  105  |  50<H>  ----------------------------<  92  5.4<H>   |  21<L>  |  2.09<H>    Ca    8.8      15 Feb 2022 05:59            RADIOLOGY & ADDITIONAL TESTS:

## 2022-02-15 NOTE — PROGRESS NOTE ADULT - ASSESSMENT
77yo female pt with PMHx of CKD (Last K-5.3, Bun/Cr.-29/1.93 on 7/18/2020), Anemia, Gout, HTN, HLD, was transferred, on cervical collar, from [facility] c/o head/neck pain, C2 fracture s/p fall this morning with a syncopal episode.

## 2022-02-15 NOTE — PROGRESS NOTE ADULT - ASSESSMENT
75yo female pt with PMHx of CKD (Last K-5.3, Bun/Cr.-29/1.93 on 7/18/2020), Anemia, Gout, HTN, HLD, was transferred, on cervical collar, from [facility] c/o head/neck pain, C2 fracture s/p fall this morning with a syncopal episode.

## 2022-02-15 NOTE — PROGRESS NOTE ADULT - ASSESSMENT
constipation  hemorrhoids   ckd  C2 fracture       +BM today  bowel regimen   Miralax BID  Anusol suppository QD  mesalamine suppository QD  monitor stool count   diet as tolerated   will follow   dw pt and daughter at bedside

## 2022-02-16 ENCOUNTER — TRANSCRIPTION ENCOUNTER (OUTPATIENT)
Age: 77
End: 2022-02-16

## 2022-02-16 LAB
ANION GAP SERPL CALC-SCNC: 13 MMOL/L — SIGNIFICANT CHANGE UP (ref 5–17)
ANION GAP SERPL CALC-SCNC: 9 MMOL/L — SIGNIFICANT CHANGE UP (ref 5–17)
BUN SERPL-MCNC: 46 MG/DL — HIGH (ref 7–23)
BUN SERPL-MCNC: 47 MG/DL — HIGH (ref 7–23)
CALCIUM SERPL-MCNC: 8.3 MG/DL — LOW (ref 8.4–10.5)
CALCIUM SERPL-MCNC: 8.7 MG/DL — SIGNIFICANT CHANGE UP (ref 8.4–10.5)
CHLORIDE SERPL-SCNC: 105 MMOL/L — SIGNIFICANT CHANGE UP (ref 96–108)
CHLORIDE SERPL-SCNC: 107 MMOL/L — SIGNIFICANT CHANGE UP (ref 96–108)
CO2 SERPL-SCNC: 21 MMOL/L — LOW (ref 22–31)
CO2 SERPL-SCNC: 22 MMOL/L — SIGNIFICANT CHANGE UP (ref 22–31)
CREAT SERPL-MCNC: 2.18 MG/DL — HIGH (ref 0.5–1.3)
CREAT SERPL-MCNC: 2.59 MG/DL — HIGH (ref 0.5–1.3)
GLUCOSE SERPL-MCNC: 190 MG/DL — HIGH (ref 70–99)
GLUCOSE SERPL-MCNC: 88 MG/DL — SIGNIFICANT CHANGE UP (ref 70–99)
HCT VFR BLD CALC: 24.1 % — LOW (ref 34.5–45)
HCT VFR BLD CALC: 25.2 % — LOW (ref 34.5–45)
HGB BLD-MCNC: 7.1 G/DL — LOW (ref 11.5–15.5)
HGB BLD-MCNC: 7.5 G/DL — LOW (ref 11.5–15.5)
MCHC RBC-ENTMCNC: 25.2 PG — LOW (ref 27–34)
MCHC RBC-ENTMCNC: 25.4 PG — LOW (ref 27–34)
MCHC RBC-ENTMCNC: 29.5 GM/DL — LOW (ref 32–36)
MCHC RBC-ENTMCNC: 29.8 GM/DL — LOW (ref 32–36)
MCV RBC AUTO: 84.6 FL — SIGNIFICANT CHANGE UP (ref 80–100)
MCV RBC AUTO: 86.1 FL — SIGNIFICANT CHANGE UP (ref 80–100)
NRBC # BLD: 0 /100 WBCS — SIGNIFICANT CHANGE UP (ref 0–0)
NRBC # BLD: 0 /100 WBCS — SIGNIFICANT CHANGE UP (ref 0–0)
PLATELET # BLD AUTO: 252 K/UL — SIGNIFICANT CHANGE UP (ref 150–400)
PLATELET # BLD AUTO: 295 K/UL — SIGNIFICANT CHANGE UP (ref 150–400)
POTASSIUM SERPL-MCNC: 4.8 MMOL/L — SIGNIFICANT CHANGE UP (ref 3.5–5.3)
POTASSIUM SERPL-MCNC: 5.4 MMOL/L — HIGH (ref 3.5–5.3)
POTASSIUM SERPL-SCNC: 4.8 MMOL/L — SIGNIFICANT CHANGE UP (ref 3.5–5.3)
POTASSIUM SERPL-SCNC: 5.4 MMOL/L — HIGH (ref 3.5–5.3)
RBC # BLD: 2.8 M/UL — LOW (ref 3.8–5.2)
RBC # BLD: 2.98 M/UL — LOW (ref 3.8–5.2)
RBC # FLD: 18.3 % — HIGH (ref 10.3–14.5)
RBC # FLD: 18.4 % — HIGH (ref 10.3–14.5)
SODIUM SERPL-SCNC: 138 MMOL/L — SIGNIFICANT CHANGE UP (ref 135–145)
SODIUM SERPL-SCNC: 139 MMOL/L — SIGNIFICANT CHANGE UP (ref 135–145)
WBC # BLD: 10.4 K/UL — SIGNIFICANT CHANGE UP (ref 3.8–10.5)
WBC # BLD: 9.46 K/UL — SIGNIFICANT CHANGE UP (ref 3.8–10.5)
WBC # FLD AUTO: 10.4 K/UL — SIGNIFICANT CHANGE UP (ref 3.8–10.5)
WBC # FLD AUTO: 9.46 K/UL — SIGNIFICANT CHANGE UP (ref 3.8–10.5)

## 2022-02-16 RX ORDER — SODIUM ZIRCONIUM CYCLOSILICATE 10 G/10G
5 POWDER, FOR SUSPENSION ORAL
Qty: 150 | Refills: 0
Start: 2022-02-16 | End: 2022-03-17

## 2022-02-16 RX ORDER — COLCHICINE 0.6 MG
1 TABLET ORAL
Qty: 0 | Refills: 0 | DISCHARGE

## 2022-02-16 RX ORDER — PANTOPRAZOLE SODIUM 20 MG/1
40 TABLET, DELAYED RELEASE ORAL
Refills: 0 | Status: DISCONTINUED | OUTPATIENT
Start: 2022-02-16 | End: 2022-03-02

## 2022-02-16 RX ORDER — HYDRALAZINE HCL 50 MG
1 TABLET ORAL
Qty: 0 | Refills: 0 | DISCHARGE

## 2022-02-16 RX ORDER — MESALAMINE 400 MG
1 TABLET, DELAYED RELEASE (ENTERIC COATED) ORAL
Qty: 30 | Refills: 0
Start: 2022-02-16 | End: 2022-03-17

## 2022-02-16 RX ORDER — MECLIZINE HCL 12.5 MG
1 TABLET ORAL
Qty: 0 | Refills: 0 | DISCHARGE

## 2022-02-16 RX ORDER — MIDODRINE HYDROCHLORIDE 2.5 MG/1
1 TABLET ORAL
Qty: 60 | Refills: 0
Start: 2022-02-16 | End: 2022-03-17

## 2022-02-16 RX ORDER — SODIUM CHLORIDE 9 MG/ML
1000 INJECTION INTRAMUSCULAR; INTRAVENOUS; SUBCUTANEOUS
Refills: 0 | Status: DISCONTINUED | OUTPATIENT
Start: 2022-02-16 | End: 2022-02-17

## 2022-02-16 RX ORDER — SODIUM BICARBONATE 1 MEQ/ML
1 SYRINGE (ML) INTRAVENOUS
Qty: 0 | Refills: 0 | DISCHARGE

## 2022-02-16 RX ORDER — HYDROCORTISONE 1 %
1 OINTMENT (GRAM) TOPICAL
Qty: 30 | Refills: 0
Start: 2022-02-16 | End: 2022-03-17

## 2022-02-16 RX ORDER — POLYETHYLENE GLYCOL 3350 17 G/17G
17 POWDER, FOR SOLUTION ORAL
Qty: 0 | Refills: 0 | DISCHARGE
Start: 2022-02-16

## 2022-02-16 RX ORDER — SODIUM ZIRCONIUM CYCLOSILICATE 10 G/10G
5 POWDER, FOR SUSPENSION ORAL ONCE
Refills: 0 | Status: COMPLETED | OUTPATIENT
Start: 2022-02-16 | End: 2022-02-16

## 2022-02-16 RX ORDER — AMLODIPINE BESYLATE 2.5 MG/1
1 TABLET ORAL
Qty: 0 | Refills: 0 | DISCHARGE

## 2022-02-16 RX ORDER — SEVELAMER CARBONATE 2400 MG/1
1 POWDER, FOR SUSPENSION ORAL
Qty: 90 | Refills: 0
Start: 2022-02-16 | End: 2022-03-17

## 2022-02-16 RX ORDER — MIDODRINE HYDROCHLORIDE 2.5 MG/1
5 TABLET ORAL
Refills: 0 | Status: DISCONTINUED | OUTPATIENT
Start: 2022-02-16 | End: 2022-02-27

## 2022-02-16 RX ORDER — SODIUM ZIRCONIUM CYCLOSILICATE 10 G/10G
5 POWDER, FOR SUSPENSION ORAL DAILY
Refills: 0 | Status: DISCONTINUED | OUTPATIENT
Start: 2022-02-17 | End: 2022-02-26

## 2022-02-16 RX ORDER — GABAPENTIN 400 MG/1
1 CAPSULE ORAL
Qty: 60 | Refills: 0
Start: 2022-02-16 | End: 2022-03-17

## 2022-02-16 RX ORDER — SODIUM BICARBONATE 1 MEQ/ML
1 SYRINGE (ML) INTRAVENOUS
Qty: 60 | Refills: 0
Start: 2022-02-16 | End: 2022-03-17

## 2022-02-16 RX ORDER — CALCIUM ACETATE 667 MG
1 TABLET ORAL
Qty: 0 | Refills: 0 | DISCHARGE

## 2022-02-16 RX ADMIN — SEVELAMER CARBONATE 800 MILLIGRAM(S): 2400 POWDER, FOR SUSPENSION ORAL at 13:05

## 2022-02-16 RX ADMIN — Medication 1 DROP(S): at 23:13

## 2022-02-16 RX ADMIN — SEVELAMER CARBONATE 800 MILLIGRAM(S): 2400 POWDER, FOR SUSPENSION ORAL at 08:41

## 2022-02-16 RX ADMIN — GABAPENTIN 50 MILLIGRAM(S): 400 CAPSULE ORAL at 18:17

## 2022-02-16 RX ADMIN — Medication 1 SUPPOSITORY(S): at 13:05

## 2022-02-16 RX ADMIN — Medication 650 MILLIGRAM(S): at 11:02

## 2022-02-16 RX ADMIN — Medication 1 DROP(S): at 11:03

## 2022-02-16 RX ADMIN — LATANOPROST 1 DROP(S): 0.05 SOLUTION/ DROPS OPHTHALMIC; TOPICAL at 21:51

## 2022-02-16 RX ADMIN — SEVELAMER CARBONATE 800 MILLIGRAM(S): 2400 POWDER, FOR SUSPENSION ORAL at 18:13

## 2022-02-16 RX ADMIN — Medication 1 DROP(S): at 18:14

## 2022-02-16 RX ADMIN — SIMVASTATIN 40 MILLIGRAM(S): 20 TABLET, FILM COATED ORAL at 21:52

## 2022-02-16 RX ADMIN — SODIUM CHLORIDE 75 MILLILITER(S): 9 INJECTION INTRAMUSCULAR; INTRAVENOUS; SUBCUTANEOUS at 14:53

## 2022-02-16 RX ADMIN — GABAPENTIN 50 MILLIGRAM(S): 400 CAPSULE ORAL at 05:20

## 2022-02-16 RX ADMIN — Medication 1 DROP(S): at 05:21

## 2022-02-16 RX ADMIN — Medication 650 MILLIGRAM(S): at 05:20

## 2022-02-16 RX ADMIN — Medication 1 DROP(S): at 00:20

## 2022-02-16 RX ADMIN — HEPARIN SODIUM 5000 UNIT(S): 5000 INJECTION INTRAVENOUS; SUBCUTANEOUS at 05:20

## 2022-02-16 RX ADMIN — HEPARIN SODIUM 5000 UNIT(S): 5000 INJECTION INTRAVENOUS; SUBCUTANEOUS at 21:51

## 2022-02-16 RX ADMIN — Medication 650 MILLIGRAM(S): at 18:13

## 2022-02-16 RX ADMIN — Medication 650 MILLIGRAM(S): at 11:30

## 2022-02-16 RX ADMIN — Medication 100 MILLIGRAM(S): at 11:02

## 2022-02-16 RX ADMIN — SODIUM ZIRCONIUM CYCLOSILICATE 5 GRAM(S): 10 POWDER, FOR SUSPENSION ORAL at 09:01

## 2022-02-16 RX ADMIN — Medication 25 MICROGRAM(S): at 05:20

## 2022-02-16 RX ADMIN — HEPARIN SODIUM 5000 UNIT(S): 5000 INJECTION INTRAVENOUS; SUBCUTANEOUS at 13:06

## 2022-02-16 NOTE — PROGRESS NOTE ADULT - SUBJECTIVE AND OBJECTIVE BOX
Patient is a 76y old  Female who presents with a chief complaint of syncope , cervical spine fracture (16 Feb 2022 12:27)      SUBJECTIVE / OVERNIGHT EVENTS: no new c/o , awaiting DC    MEDICATIONS  (STANDING):  allopurinol 100 milliGRAM(s) Oral daily  artificial  tears Solution 1 Drop(s) Both EYES four times a day  bisacodyl 5 milliGRAM(s) Oral at bedtime  gabapentin Solution 50 milliGRAM(s) Oral two times a day  heparin   Injectable 5000 Unit(s) SubCutaneous every 8 hours  hydrocortisone hemorrhoidal Suppository 1 Suppository(s) Rectal daily  latanoprost 0.005% Ophthalmic Solution 1 Drop(s) Both EYES at bedtime  levothyroxine 25 MICROGram(s) Oral daily  mesalamine Suppository 1000 milliGRAM(s) Rectal at bedtime  midodrine. 5 milliGRAM(s) Oral <User Schedule>  pantoprazole    Tablet 40 milliGRAM(s) Oral before breakfast  polyethylene glycol 3350 17 Gram(s) Oral two times a day  sevelamer carbonate 800 milliGRAM(s) Oral three times a day with meals  simvastatin 40 milliGRAM(s) Oral at bedtime  sodium bicarbonate 650 milliGRAM(s) Oral two times a day  sodium chloride 0.9%. 1000 milliLiter(s) (75 mL/Hr) IV Continuous <Continuous>    MEDICATIONS  (PRN):  acetaminophen     Tablet .. 650 milliGRAM(s) Oral every 6 hours PRN Mild Pain (1 - 3)      Vital Signs Last 24 Hrs  T(F): 98 (02-16-22 @ 15:49), Max: 98.7 (02-15-22 @ 23:13)  HR: 92 (02-16-22 @ 18:20) (64 - 102)  BP: 166/63 (02-16-22 @ 18:20) (93/56 - 166/63)  RR: 18 (02-16-22 @ 18:20) (17 - 18)  SpO2: 100% (02-16-22 @ 18:20) (97% - 100%)  Telemetry:   CAPILLARY BLOOD GLUCOSE        I&O's Summary    15 Feb 2022 07:01  -  16 Feb 2022 07:00  --------------------------------------------------------  IN: 660 mL / OUT: 300 mL / NET: 360 mL    16 Feb 2022 07:01  -  16 Feb 2022 20:22  --------------------------------------------------------  IN: 480 mL / OUT: 400 mL / NET: 80 mL        PHYSICAL EXAM:  GENERAL: NAD, well-developed  HEAD:  Atraumatic, Normocephalic  EYES: EOMI, PERRLA, conjunctiva and sclera clear  NECK: Supple, No JVD  CHEST/LUNG: Clear to auscultation bilaterally; No wheeze  HEART: Regular rate and rhythm; No murmurs, rubs, or gallops  ABDOMEN: Soft, Nontender, Nondistended; Bowel sounds present  EXTREMITIES:  2+ Peripheral Pulses, No clubbing, cyanosis, or edema  PSYCH: AAOx3  NEUROLOGY: non-focal  SKIN: No rashes or lesions    LABS:                        7.5    10.40 )-----------( 295      ( 16 Feb 2022 13:19 )             25.2     02-16    139  |  105  |  47<H>  ----------------------------<  190<H>  4.8   |  21<L>  |  2.59<H>    Ca    8.3<L>      16 Feb 2022 13:19                RADIOLOGY & ADDITIONAL TESTS:    Imaging Personally Reviewed:    Consultant(s) Notes Reviewed:      Care Discussed with Consultants/Other Providers:

## 2022-02-16 NOTE — PROGRESS NOTE ADULT - SUBJECTIVE AND OBJECTIVE BOX
[facility]  Bogdan Johnson PA-C  [facility] [place] [address] [phone]      INTERVAL HPI/OVERNIGHT EVENTS:  pt seen and examined, no new events  pt feeling better, states pain with BMs is improving   2 BMs today     MEDICATIONS  (STANDING):  allopurinol 100 milliGRAM(s) Oral daily  artificial  tears Solution 1 Drop(s) Both EYES four times a day  bisacodyl 5 milliGRAM(s) Oral at bedtime  gabapentin Solution 50 milliGRAM(s) Oral two times a day  heparin   Injectable 5000 Unit(s) SubCutaneous every 8 hours  hydrocortisone hemorrhoidal Suppository 1 Suppository(s) Rectal daily  latanoprost 0.005% Ophthalmic Solution 1 Drop(s) Both EYES at bedtime  levothyroxine 25 MICROGram(s) Oral daily  mesalamine Suppository 1000 milliGRAM(s) Rectal at bedtime  polyethylene glycol 3350 17 Gram(s) Oral two times a day  sevelamer carbonate 800 milliGRAM(s) Oral three times a day with meals  simvastatin 40 milliGRAM(s) Oral at bedtime  sodium bicarbonate 650 milliGRAM(s) Oral two times a day  sodium chloride 0.45%. 250 milliLiter(s) (250 mL/Hr) IV Continuous <Continuous>  sodium chloride 0.9% Bolus 1000 milliLiter(s) IV Bolus once  sodium zirconium cyclosilicate 5 Gram(s) Oral once    MEDICATIONS  (PRN):  acetaminophen     Tablet .. 650 milliGRAM(s) Oral every 6 hours PRN Mild Pain (1 - 3)      Allergies    No Known Allergies    Intolerances        ROS:   General:  No wt loss, fevers, chills, night sweats, fatigue,   Eyes:  Good vision, no reported pain  ENT:  No sore throat, pain, runny nose, dysphagia  CV:  No pain, palpitations, hypo/hypertension  Resp:  No dyspnea, cough, tachypnea, wheezing  GI:  + pain, No nausea, No vomiting, No diarrhea, + constipation, No weight loss, No fever, No pruritis, No rectal bleeding, No tarry stools, No dysphagia,  :  No pain, bleeding, incontinence, nocturia  Muscle:  No pain, weakness  Neuro:  No weakness, tingling, memory problems  Psych:  No fatigue, insomnia, mood problems, depression  Endocrine:  No polyuria, polydipsia, cold/heat intolerance  Heme:  No petechiae, ecchymosis, easy bruisability  Skin:  No rash, tattoos, scars, edema      PHYSICAL EXAM:   Vital Signs Last 24 Hrs  T(C): 36.9 (16 Feb 2022 09:16), Max: 37.2 (15 Feb 2022 12:09)  T(F): 98.4 (16 Feb 2022 09:16), Max: 98.9 (15 Feb 2022 12:09)  HR: 96 (16 Feb 2022 09:16) (64 - 96)  BP: 114/57 (16 Feb 2022 09:16) (114/57 - 171/77)  BP(mean): --  RR: 17 (16 Feb 2022 09:16) (17 - 18)  SpO2: 98% (16 Feb 2022 09:16) (97% - 99%)  Daily     Daily     GENERAL:  Appears stated age,   HEENT:  NC/AT,    CHEST:  Full & symmetric excursion,   HEART:  Regular rhythm,  ABDOMEN:  Soft, non-tender, non-distended,  EXTEREMITIES:  no cyanosis  SKIN:  No rash  NEURO:  Alert,       LABS:                        7.1    9.46  )-----------( 252      ( 16 Feb 2022 05:31 )             24.1   02-16    138  |  107  |  46<H>  ----------------------------<  88  5.4<H>   |  22  |  2.18<H>    Ca    8.7      16 Feb 2022 05:31            RADIOLOGY & ADDITIONAL TESTS:

## 2022-02-16 NOTE — PROGRESS NOTE ADULT - ASSESSMENT
75yo female pt with PMHx of CKD (Last K-5.3, Bun/Cr.-29/1.93 on 7/18/2020), Anemia, Gout, HTN, HLD, was transferred, on cervical collar, from [facility] c/o head/neck pain, C2 fracture s/p fall this morning with a syncopal episode.

## 2022-02-16 NOTE — PROGRESS NOTE ADULT - SUBJECTIVE AND OBJECTIVE BOX
[facility]rological [facility]      Seen earlier today, and examined.  - Today, patient is without complaints.           *****MEDICATIONS: Current medication reviewed and documented.    MEDICATIONS  (STANDING):  allopurinol 100 milliGRAM(s) Oral daily  artificial  tears Solution 1 Drop(s) Both EYES four times a day  bisacodyl 5 milliGRAM(s) Oral at bedtime  gabapentin Solution 50 milliGRAM(s) Oral two times a day  heparin   Injectable 5000 Unit(s) SubCutaneous every 8 hours  hydrocortisone hemorrhoidal Suppository 1 Suppository(s) Rectal daily  latanoprost 0.005% Ophthalmic Solution 1 Drop(s) Both EYES at bedtime  levothyroxine 25 MICROGram(s) Oral daily  mesalamine Suppository 1000 milliGRAM(s) Rectal at bedtime  midodrine. 5 milliGRAM(s) Oral <User Schedule>  polyethylene glycol 3350 17 Gram(s) Oral two times a day  sevelamer carbonate 800 milliGRAM(s) Oral three times a day with meals  simvastatin 40 milliGRAM(s) Oral at bedtime  sodium bicarbonate 650 milliGRAM(s) Oral two times a day    MEDICATIONS  (PRN):  acetaminophen     Tablet .. 650 milliGRAM(s) Oral every 6 hours PRN Mild Pain (1 - 3)          ***** VITAL SIGNS:  T(F): 98.2 (22 @ 12:18), Max: 98.9 (02-15-22 @ 16:25)  HR: 102 (22 @ 12:18) (64 - 102)  BP: 93/56 (22 @ 12:18) (93/56 - 161/68)  RR: 17 (22 @ 12:18) (17 - 18)  SpO2: 97% (22 @ 12:18) (97% - 99%)  Wt(kg): --  ,   I&O's Summary    15 Feb 2022 07:01  -  2022 07:00  --------------------------------------------------------  IN: 660 mL / OUT: 300 mL / NET: 360 mL    2022 07:01  -  2022 12:26  --------------------------------------------------------  IN: 240 mL / OUT: 0 mL / NET: 240 mL             *****PHYSICAL EXAM:   alert oriented x 3 attention comprehension are fair.  Able to name, repeat.   EOmi fundi not visualized   no nystagmus VFF to confrontation  Tongue is midline  Palate elevates symmetrically   Moving all 4 ext spontaneously no drift appreciated    Gait not assessed.            *****LAB AND IMAGIN.1    9.46  )-----------( 252      ( 2022 05:31 )             24.1               02-16    138  |  107  |  46<H>  ----------------------------<  88  5.4<H>   |  22  |  2.18<H>    Ca    8.7      2022 05:31                           [All pertinent recent Imaging/Reports reviewed]           *****A S S E S S M E N T   A N D   P L A N :    77yo female pt with PMHx of CKD (Last K-5.3, Bun/Cr.-29/1.93 on 2020), Anemia, Gout, HTN, HLD, was transferred, on cervical collar, from [facility] c/o head/neck pain, C2 fracture s/p fall this morning with a syncopal episode.   Pt stated she felt dizzy after urination in her bathroom at 8:30am and fell. +LOC and noticed severe headache/neck pain.   She was evaluated in [facility] and sent to ED for spine consult 2/2 C2 fracture.   Denies visual changes or N/V. Denies sensory changes or weakness to extremities. Denies CP/SOB/Palpitation/abd pain. Denies lower back pain. Denies urinary problems. Denies fever, chills, cough or congestion.            Problem/Recommendations 1: C2 fracture    CT C: acute C2 fx of body of the dens extending to left lateral mass (type 3), R C2 facet fx, mild comp fx sup endplate C3  nsx no surgical intervention , c collar at all times follow up with dr COMER   pt eval /ot eval   neuro checks q 4   gabapentin 50 bid for pain    adequate plain control     Problem/Recommendations 2:   hyponatremia   pain related siadh   now resolved   worsening renal function     Problem/Recommendations 3: anemia   trend h/h   gi on board   monitor stools                    Thank you for allowing me to participate in the care of this patient. Will continue to follow patient periodically. Please do not hesitate to call me if you have any  questions or if there has been a change in patients neurological status     ________________  Li Isaac MD  [facility]rological [facility] ([facility]  [phone]      33 minutes spent on total encounter; more than 50 % of the visit was  spent counseling about plan of care, compliance to diet/exercise and medication regimen and or  coordinating care by the attending physician.      It is advised that stroke patients follow up with TALIA Krishna @ [phone] in 1- 2 weeks.   Others please follow up with Dr. Michael Nissenbaum [phone]

## 2022-02-16 NOTE — DISCHARGE NOTE PROVIDER - DETAILS OF MALNUTRITION DIAGNOSIS/DIAGNOSES
This patient has been assessed with a concern for Malnutrition and was treated during this hospitalization for the following Nutrition diagnosis/diagnoses:     -  02/08/2022: Underweight (BMI < 19)   This patient has been assessed with a concern for Malnutrition and was treated during this hospitalization for the following Nutrition diagnosis/diagnoses:     -  02/22/2022: Moderate protein-calorie malnutrition   -  02/22/2022: Underweight (BMI < 19)   -  02/08/2022: Underweight (BMI < 19)

## 2022-02-16 NOTE — DISCHARGE NOTE PROVIDER - NSDCCPCAREPLAN_GEN_ALL_CORE_FT
PRINCIPAL DISCHARGE DIAGNOSIS  Diagnosis: Cervical spine fracture  Assessment and Plan of Treatment: Cheli BARRERA collar at all times for 6 weeks  continue Neurontin for pain  Follow up with Neuro surgery in  2  weeks      SECONDARY DISCHARGE DIAGNOSES  Diagnosis: Syncope  Assessment and Plan of Treatment: HOME CARE INSTRUCTIONS  Have someone stay with you until you feel stable.  Do not drive, operate machinery, or play sports until your caregiver says it is okay.  Keep all follow-up appointments as directed by your caregiver.   Lie down right away if you start feeling like you might faint. Breathe deeply and steadily. Wait until all the symptoms have passed.Drink enough fluids to keep your urine clear or pale yellow.  If you are taking blood pressure or heart medicine, get up slowly, taking several minutes to sit and then stand. This can reduce dizziness.  SEEK IMMEDIATE MEDICAL CARE IF:  You have a severe headache.  You have unusual pain in the chest, abdomen, or back.  You are bleeding from the mouth or rectum, or you have black or tarry stool.  You have an irregular or very fast heartbeat.  You have pain with breathing.  You have repeated fainting or seizure-like jerking during an episode.  You faint when sitting or lying down.  You have confusion.  You have difficulty walking.  You have severe weakness.  You have vision problems.  If you fainted, call your local emergency services (_____________________). Do not drive yourself to the hospital      Diagnosis: Acute gout  Assessment and Plan of Treatment: completed treatement  continue ALlopurinol    Diagnosis: Hyperkalemia  Assessment and Plan of Treatment: continue Lokalma  Follow up with Nephrology in 1 week  continue Low pottasium diet    Diagnosis: Hemorrhoids  Assessment and Plan of Treatment: continue laxatives  contiue mesalamine and Anusol suppository    Diagnosis: Chronic kidney disease (CKD)  Assessment and Plan of Treatment: Avoid taking (NSAIDs) - (ex: Ibuprofen, Advil, Celebrex, Naprosyn)  Avoid taking any nephrotoxic agents (can harm kidneys) - Intravenous contrast for diagnostic testing, combination cold medications.  Have all medications adjusted for your renal function by your Health Care Provider.  Blood pressure control is important.  Take all medication as prescribed.       PRINCIPAL DISCHARGE DIAGNOSIS  Diagnosis: Cervical spine fracture  Assessment and Plan of Treatment: Cheli C collar at all times for 6 weeks  continue Neurontin for pain  Follow up with Neuro surgery in  2  weeks      SECONDARY DISCHARGE DIAGNOSES  Diagnosis: Syncope  Assessment and Plan of Treatment: HOME CARE INSTRUCTIONS  Have someone stay with you until you feel stable.  Do not drive, operate machinery, or play sports until your caregiver says it is okay.  Keep all follow-up appointments as directed by your caregiver.   Lie down right away if you start feeling like you might faint. Breathe deeply and steadily. Wait until all the symptoms have passed.Drink enough fluids to keep your urine clear or pale yellow.  If you are taking blood pressure or heart medicine, get up slowly, taking several minutes to sit and then stand. This can reduce dizziness.  SEEK IMMEDIATE MEDICAL CARE IF:  You have a severe headache.  You have unusual pain in the chest, abdomen, or back.  You are bleeding from the mouth or rectum, or you have black or tarry stool.  You have an irregular or very fast heartbeat.  You have pain with breathing.  You have repeated fainting or seizure-like jerking during an episode.  You faint when sitting or lying down.  You have confusion.  You have difficulty walking.  You have severe weakness.  You have vision problems.  If you fainted, call your local emergency services 911. Do not drive yourself to the hospital      Diagnosis: Hyperkalemia  Assessment and Plan of Treatment: continue Lokalma  Follow up with Nephrology in 1 week  continue Low pottasium diet    Diagnosis: Chronic kidney disease (CKD)  Assessment and Plan of Treatment: Avoid taking (NSAIDs) - (ex: Ibuprofen, Advil, Celebrex, Naprosyn)  Avoid taking any nephrotoxic agents (can harm kidneys) - Intravenous contrast for diagnostic testing, combination cold medications.  Have all medications adjusted for your renal function by your Health Care Provider.  Blood pressure control is important.  Take all medication as prescribed.      Diagnosis: Acute gout  Assessment and Plan of Treatment: completed treatement  continue ALlopurinol    Diagnosis: Hemorrhoids  Assessment and Plan of Treatment: continue laxatives  contiue mesalamine and Anusol suppository

## 2022-02-16 NOTE — DISCHARGE NOTE PROVIDER - CARE PROVIDER_API CALL
Alfonso Holman (MD)  Internal Medicine; Nephrology  [address] [place] [address]  Phone: [phone]  Fax: [phone]  Follow Up Time: 1 week    Abbi Barton)  Neurosurgery  [address] [facility], [address]  [place] [address]  Phone: [phone]  Fax: [phone]  Follow Up Time: 1 month    Pierre Razo (DO)  Cardiology; Internal Medicine  [address] [facility], [address]  [place] [address]  Phone: [phone]  Fax: [phone]  Follow Up Time: 2 weeks    Ezra Giles (DO)  Gastroenterology; Internal Medicine  [address] [place] [id number]  Phone: [phone]  Fax: [phone]  Follow Up Time: 2 weeks

## 2022-02-16 NOTE — PROGRESS NOTE ADULT - ASSESSMENT
77yo female pt with PMHx of CKD (Last K-5.3, Bun/Cr.-29/1.93 on 7/18/2020), Anemia, Gout, HTN, HLD, was transferred, on cervical collar, from [facility] c/o head/neck pain, C2 fracture s/p fall this morning with a syncopal episode.

## 2022-02-16 NOTE — PROGRESS NOTE ADULT - SUBJECTIVE AND OBJECTIVE BOX
Subjective: Patient seen and examined. No new events except as noted.   no more abdominal pain   feels much better  no chest pain, no sob     REVIEW OF SYSTEMS:    CONSTITUTIONAL: + weakness, fevers or chills  EYES/ENT: No visual changes;  No vertigo or throat pain   NECK: No pain or stiffness  RESPIRATORY: No cough, wheezing, hemoptysis; No shortness of breath  CARDIOVASCULAR: No chest pain or palpitations  GASTROINTESTINAL: No abdominal or epigastric pain. No nausea, vomiting, or hematemesis; No diarrhea or constipation. No melena or hematochezia.  GENITOURINARY: No dysuria, frequency or hematuria  NEUROLOGICAL: No numbness or weakness  SKIN: No itching, burning, rashes, or lesions   All other review of systems is negative unless indicated above.    MEDICATIONS:  MEDICATIONS  (STANDING):  allopurinol 100 milliGRAM(s) Oral daily  artificial  tears Solution 1 Drop(s) Both EYES four times a day  bisacodyl 5 milliGRAM(s) Oral at bedtime  gabapentin Solution 50 milliGRAM(s) Oral two times a day  heparin   Injectable 5000 Unit(s) SubCutaneous every 8 hours  hydrocortisone hemorrhoidal Suppository 1 Suppository(s) Rectal daily  latanoprost 0.005% Ophthalmic Solution 1 Drop(s) Both EYES at bedtime  levothyroxine 25 MICROGram(s) Oral daily  mesalamine Suppository 1000 milliGRAM(s) Rectal at bedtime  polyethylene glycol 3350 17 Gram(s) Oral two times a day  sevelamer carbonate 800 milliGRAM(s) Oral three times a day with meals  simvastatin 40 milliGRAM(s) Oral at bedtime  sodium bicarbonate 650 milliGRAM(s) Oral two times a day      PHYSICAL EXAM:  T(C): 36.9 (02-16-22 @ 09:16), Max: 37.2 (02-15-22 @ 12:09)  HR: 96 (02-16-22 @ 09:16) (64 - 96)  BP: 114/57 (02-16-22 @ 09:16) (114/57 - 171/77)  RR: 17 (02-16-22 @ 09:16) (17 - 18)  SpO2: 98% (02-16-22 @ 09:16) (97% - 99%)  Wt(kg): --  I&O's Summary    15 Feb 2022 07:01  -  16 Feb 2022 07:00  --------------------------------------------------------  IN: 660 mL / OUT: 300 mL / NET: 360 mL    16 Feb 2022 07:01  -  16 Feb 2022 11:50  --------------------------------------------------------  IN: 240 mL / OUT: 0 mL / NET: 240 mL    Appearance: NAD	  HEENT: Normal oral mucosa, PERRL, EOMI	  Lymphatic: No lymphadenopathy , no edema  Cardiovascular: Normal S1 S2, No JVD, No murmurs , Peripheral pulses palpable 2+ bilaterally  Respiratory: Lungs clear to auscultation, normal effort 	  Gastrointestinal:  Soft, Non-tender, + BS	  Skin: No rashes, No ecchymoses, No cyanosis, warm to touch  Neuro: alert oriented x 3 attention comprehension are fair.  Able to name, repeat.   EOmi fundi not visualized   no nystagmus VFF to confrontation  Tongue is midline  Palate elevates symmetrically   Moving all 4 ext spontaneously no drift appreciated  Gait not assessed.   Ext: No edema    LABS:    CARDIAC MARKERS:                      7.1    9.46  )-----------( 252      ( 16 Feb 2022 05:31 )             24.1     02-16    138  |  107  |  46<H>  ----------------------------<  88  5.4<H>   |  22  |  2.18<H>    Ca    8.7      16 Feb 2022 05:31      proBNP:   Lipid Profile:   HgA1c:   TSH:     TELEMETRY: 	    ECG:  	  RADIOLOGY:   DIAGNOSTIC TESTING:  [ ] Echocardiogram:  [ ]  Catheterization:  [ ] Stress Test:    OTHER:

## 2022-02-16 NOTE — PROGRESS NOTE ADULT - SUBJECTIVE AND OBJECTIVE BOX
Patient seen and examined  no complaints    No Known Allergies    Hospital Medications:   MEDICATIONS  (STANDING):  allopurinol 100 milliGRAM(s) Oral daily  artificial  tears Solution 1 Drop(s) Both EYES four times a day  bisacodyl 5 milliGRAM(s) Oral at bedtime  gabapentin Solution 50 milliGRAM(s) Oral two times a day  heparin   Injectable 5000 Unit(s) SubCutaneous every 8 hours  hydrocortisone hemorrhoidal Suppository 1 Suppository(s) Rectal daily  latanoprost 0.005% Ophthalmic Solution 1 Drop(s) Both EYES at bedtime  levothyroxine 25 MICROGram(s) Oral daily  mesalamine Suppository 1000 milliGRAM(s) Rectal at bedtime  midodrine. 5 milliGRAM(s) Oral <User Schedule>  polyethylene glycol 3350 17 Gram(s) Oral two times a day  sevelamer carbonate 800 milliGRAM(s) Oral three times a day with meals  simvastatin 40 milliGRAM(s) Oral at bedtime  sodium bicarbonate 650 milliGRAM(s) Oral two times a day        VITALS:  T(F): 98.2 (02-16-22 @ 12:18), Max: 98.9 (02-15-22 @ 16:25)  HR: 102 (02-16-22 @ 12:18)  BP: 93/56 (02-16-22 @ 12:18)  RR: 17 (02-16-22 @ 12:18)  SpO2: 97% (02-16-22 @ 12:18)  Wt(kg): --    02-15 @ 07:01  -  02-16 @ 07:00  --------------------------------------------------------  IN: 660 mL / OUT: 300 mL / NET: 360 mL    02-16 @ 07:01  -  02-16 @ 12:27  --------------------------------------------------------  IN: 240 mL / OUT: 0 mL / NET: 240 mL        Physical Exam :-  Constitutional: NAD  Neck: Supple.  Respiratory: Bilateral equal breath sounds,  Cardiovascular: S1, S2 normal,  Gastrointestinal: Bowel Sounds present, soft, non tender.  Extremities: No edema  Neurological: Alert and Oriented x 3, no focal deficits  Psychiatric: Normal mood, normal affect    LABS:  02-16    138  |  107  |  46<H>  ----------------------------<  88  5.4<H>   |  22  |  2.18<H>    Ca    8.7      16 Feb 2022 05:31      Creatinine Trend: 2.18 <--, 2.09 <--, 1.78 <--, 1.89 <--, 1.68 <--, 2.01 <--, 1.78 <--                        7.1    9.46  )-----------( 252      ( 16 Feb 2022 05:31 )             24.1     Urine Studies:      RADIOLOGY & ADDITIONAL STUDIES:

## 2022-02-16 NOTE — PROGRESS NOTE ADULT - PROBLEM SELECTOR PLAN 2
+ orthostatics x2 days  continue to hold all antihypertensives  continue to monitor + orthostatics x2 days  restart midodrine 5mg PO BID   continue to hold all antihypertensives  continue to monitor

## 2022-02-16 NOTE — DISCHARGE NOTE PROVIDER - NSDCMRMEDTOKEN_GEN_ALL_CORE_FT
allopurinol 100 mg oral tablet: 1 tab(s) orally once a day  Aranesp 150 mcg/0.3 mL injectable solution: 0.3 milliliter(s) injectable every 2 weeks  Artificial Tears ophthalmic solution:   bisacodyl 5 mg oral delayed release tablet: 1 tab(s) orally once a day (at bedtime)  calcium:   gabapentin 250 mg/5 mL oral solution: 1 milliliter(s) orally 2 times a day  hydrocortisone 25 mg rectal suppository: 1 suppository(ies) rectal once a day  levothyroxine 25 mcg (0.025 mg) oral tablet: 1 tab(s) orally once a day  Lokelma 5 g oral powder for reconstitution: 5 gram(s) orally once a day   Lumigan 0.01% ophthalmic solution: 1 drop(s) in each eye 2 times a day  mesalamine 1000 mg rectal suppository: 1 suppository(ies) rectal once a day (at bedtime)  midodrine 5 mg oral tablet: 1 tab(s) orally 2 times a day   multivitamin: 1 tab(s) orally once a day  Physical therapy for evlauation and treatment: C2  fracture  polyethylene glycol 3350 oral powder for reconstitution: 17 gram(s) orally 2 times a day  rolling walker: 1   once a day   sevelamer carbonate 800 mg oral tablet: 1 tab(s) orally 3 times a day (with meals)  simvastatin 40 mg oral tablet: 1 tab(s) orally once a day (at bedtime)  sodium bicarbonate 650 mg oral tablet: 1 tab(s) orally 2 times a day  vitamin b-complex:   Zinc:    allopurinol 100 mg oral tablet: 1 tab(s) orally once a day  Aranesp 150 mcg/0.3 mL injectable solution: 0.3 milliliter(s) injectable every 2 weeks  Artificial Tears ophthalmic solution:   bisacodyl 5 mg oral delayed release tablet: 1 tab(s) orally once a day (at bedtime)  calcium:   gabapentin 250 mg/5 mL oral solution: 1 milliliter(s) orally 2 times a day  hydrocortisone 25 mg rectal suppository: 1 suppository(ies) rectal once a day  levothyroxine 25 mcg (0.025 mg) oral tablet: 1 tab(s) orally once a day  Lokelma 5 g oral powder for reconstitution: 5 gram(s) orally once a day   Lumigan 0.01% ophthalmic solution: 1 drop(s) in each eye 2 times a day  mesalamine 1000 mg rectal suppository: 1 suppository(ies) rectal once a day (at bedtime)  midodrine 5 mg oral tablet: 1 tab(s) orally 2 times a day   multivitamin: 1 tab(s) orally once a day  pantoprazole 40 mg oral delayed release tablet: 1 tab(s) orally once a day (before a meal)  polyethylene glycol 3350 oral powder for reconstitution: 17 gram(s) orally 2 times a day  sevelamer carbonate 800 mg oral tablet: 1 tab(s) orally 3 times a day (with meals)  simvastatin 40 mg oral tablet: 1 tab(s) orally once a day (at bedtime)  sodium bicarbonate 650 mg oral tablet: 1 tab(s) orally 2 times a day  vitamin b-complex:   Zinc:    allopurinol 100 mg oral tablet: 1 tab(s) orally once a day  Aranesp 150 mcg/0.3 mL injectable solution: 0.3 milliliter(s) injectable every 2 weeks  Artificial Tears ophthalmic solution:   bisacodyl 5 mg oral delayed release tablet: 1 tab(s) orally once a day (at bedtime)  calcium:   colchicine 0.6 mg oral tablet: 1 tab(s) orally once a day  gabapentin 250 mg/5 mL oral solution: 1 milliliter(s) orally 2 times a day  hydrALAZINE 50 mg oral tablet: 1 tab(s) orally 3 times a day  hydrocortisone 25 mg rectal suppository: 1 suppository(ies) rectal once a day  levothyroxine 25 mcg (0.025 mg) oral tablet: 1 tab(s) orally once a day  Lokelma 5 g oral powder for reconstitution: 5 gram(s) orally once a day   Lumigan 0.01% ophthalmic solution: 1 drop(s) in each eye 2 times a day  melatonin 3 mg oral tablet: 1 tab(s) orally once a day (at bedtime), As needed, Insomnia  mesalamine 1000 mg rectal suppository: 1 suppository(ies) rectal once a day (at bedtime)  metoprolol succinate 25 mg oral tablet, extended release: 1 tab(s) orally once a day  multivitamin: 1 tab(s) orally once a day  pantoprazole 40 mg oral delayed release tablet: 1 tab(s) orally once a day (before a meal)  polyethylene glycol 3350 oral powder for reconstitution: 17 gram(s) orally 2 times a day  predniSONE 10 mg oral tablet: 1 tab(s) orally once a day x 3 days starting 3/2/2022  predniSONE 5 mg oral tablet: 1 tab(s) orally once a day x 3 days starting 3/5/2022  sevelamer carbonate 800 mg oral tablet: 1 tab(s) orally 3 times a day (with meals)  simvastatin 40 mg oral tablet: 1 tab(s) orally once a day (at bedtime)  sodium bicarbonate 650 mg oral tablet: 1 tab(s) orally 3 times a day   vitamin b-complex:   Zinc:    allopurinol 100 mg oral tablet: 1 tab(s) orally once a day  Aranesp 150 mcg/0.3 mL injectable solution: 0.3 milliliter(s) injectable every 2 weeks  Artificial Tears ophthalmic solution:   bisacodyl 5 mg oral delayed release tablet: 1 tab(s) orally once a day (at bedtime)  calcium:   carvedilol 6.25 mg oral tablet: 1 tab(s) orally every 12 hours  colchicine 0.6 mg oral tablet: 1 tab(s) orally once a day  gabapentin 250 mg/5 mL oral solution: 1 milliliter(s) orally 2 times a day  hydrALAZINE 50 mg oral tablet: 1 tab(s) orally 3 times a day  hydrocortisone 25 mg rectal suppository: 1 suppository(ies) rectal once a day  levothyroxine 25 mcg (0.025 mg) oral tablet: 1 tab(s) orally once a day  Lokelma 5 g oral powder for reconstitution: 5 gram(s) orally once a day   Lumigan 0.01% ophthalmic solution: 1 drop(s) in each eye 2 times a day  melatonin 3 mg oral tablet: 1 tab(s) orally once a day (at bedtime), As needed, Insomnia  mesalamine 1000 mg rectal suppository: 1 suppository(ies) rectal once a day (at bedtime)  multivitamin: 1 tab(s) orally once a day  pantoprazole 40 mg oral delayed release tablet: 1 tab(s) orally once a day (before a meal)  polyethylene glycol 3350 oral powder for reconstitution: 17 gram(s) orally 2 times a day  predniSONE 10 mg oral tablet: 1 tab(s) orally once a day x 3 days starting 3/2/2022  predniSONE 5 mg oral tablet: 1 tab(s) orally once a day x 3 days starting 3/5/2022  sevelamer carbonate 800 mg oral tablet: 1 tab(s) orally 3 times a day (with meals)  simvastatin 40 mg oral tablet: 1 tab(s) orally once a day (at bedtime)  sodium bicarbonate 650 mg oral tablet: 1 tab(s) orally 3 times a day   vitamin b-complex:   Zinc:

## 2022-02-16 NOTE — PROGRESS NOTE ADULT - ASSESSMENT
76 year-old woman with history of multiple medical issues including hypertension, hyperlipidemia, and chronic kidney disease. She was transferred to [facility] yesterday from the [facility] ER after being noted on imaging to have a C2 fracture, s/p syncopal episode. Following for CKD    CKD 4 - likely Hypertensive Nephropathy  Serum stable - at baseline  appears to be ranging around 1.8 to 2.0mg/dl  fluctuations expected  Renal diet  daily BMP    Hyperkalemia  high again  restartet Lokelma 5mg PO QD  Low K diet    Metabolic acidosis  on  Nabicarb 650mg po bid  trend bicarb    Cervical fracture  pain management  Neurosx recs appreciated      For any question, call:  Cell # [phone]  Dr Brown

## 2022-02-16 NOTE — DISCHARGE NOTE PROVIDER - HOSPITAL COURSE
77yo female pt with PMHx of CKD (Last K-5.3, Bun/Cr.-29/1.93 on 7/18/2020), Anemia, Gout, HTN, HLD, was transferred, on cervical collar, from [facility] c/o head/neck pain, C2 fracture s/p fall this morning with a syncopal episode.  pt with cervical spine  fracture. in aspen collar,  collar on for dense fx, on neurontin for pain, neuro surgery following will need to cont collar for 6 weeks, will need outptn NSx follow up. worked up for syncope.   TTE stable. no arrythmia on tele. noted to be orthostatic. started on midodrine. All anti HTN meds held. Pt with portillo on CKD 4 with baseline creatinine 1.8-2. Renal following . pt with hyperkalemia and metabolic acidoses. On Lokalma and sodium bicarbonate. improving post IVF. hosp course complicated with Acute gout of R foot. s/p 3 days of steroids. Pt with severe rectal pain fro hemorrhoids and constipation. GI appreciated. On mesalamine and anusol suppository with miralax bid. Now having bowel movements and pain improved. Discharged home with home PT and neuroSx, GI, Renal, cardiology follow up.

## 2022-02-17 LAB
ANION GAP SERPL CALC-SCNC: 11 MMOL/L — SIGNIFICANT CHANGE UP (ref 5–17)
APPEARANCE UR: CLEAR — SIGNIFICANT CHANGE UP
BACTERIA # UR AUTO: ABNORMAL
BILIRUB UR-MCNC: NEGATIVE — SIGNIFICANT CHANGE UP
BUN SERPL-MCNC: 37 MG/DL — HIGH (ref 7–23)
CALCIUM SERPL-MCNC: 8.2 MG/DL — LOW (ref 8.4–10.5)
CHLORIDE SERPL-SCNC: 110 MMOL/L — HIGH (ref 96–108)
CO2 SERPL-SCNC: 20 MMOL/L — LOW (ref 22–31)
COLOR SPEC: SIGNIFICANT CHANGE UP
CREAT SERPL-MCNC: 1.93 MG/DL — HIGH (ref 0.5–1.3)
DIFF PNL FLD: ABNORMAL
EPI CELLS # UR: 0 /HPF — SIGNIFICANT CHANGE UP
GLUCOSE BLDC GLUCOMTR-MCNC: 115 MG/DL — HIGH (ref 70–99)
GLUCOSE SERPL-MCNC: 90 MG/DL — SIGNIFICANT CHANGE UP (ref 70–99)
GLUCOSE UR QL: NEGATIVE — SIGNIFICANT CHANGE UP
KETONES UR-MCNC: NEGATIVE — SIGNIFICANT CHANGE UP
LEUKOCYTE ESTERASE UR-ACNC: ABNORMAL
NITRITE UR-MCNC: NEGATIVE — SIGNIFICANT CHANGE UP
PH UR: 7.5 — SIGNIFICANT CHANGE UP (ref 5–8)
POTASSIUM SERPL-MCNC: 4.9 MMOL/L — SIGNIFICANT CHANGE UP (ref 3.5–5.3)
POTASSIUM SERPL-SCNC: 4.9 MMOL/L — SIGNIFICANT CHANGE UP (ref 3.5–5.3)
PROT UR-MCNC: ABNORMAL
RBC CASTS # UR COMP ASSIST: 2 /HPF — SIGNIFICANT CHANGE UP (ref 0–4)
SODIUM SERPL-SCNC: 141 MMOL/L — SIGNIFICANT CHANGE UP (ref 135–145)
SP GR SPEC: 1.01 — SIGNIFICANT CHANGE UP (ref 1.01–1.02)
UROBILINOGEN FLD QL: NEGATIVE — SIGNIFICANT CHANGE UP
WBC UR QL: 20 /HPF — HIGH (ref 0–5)

## 2022-02-17 RX ORDER — CEFTRIAXONE 500 MG/1
1000 INJECTION, POWDER, FOR SOLUTION INTRAMUSCULAR; INTRAVENOUS ONCE
Refills: 0 | Status: COMPLETED | OUTPATIENT
Start: 2022-02-17 | End: 2022-02-17

## 2022-02-17 RX ORDER — PANTOPRAZOLE SODIUM 20 MG/1
1 TABLET, DELAYED RELEASE ORAL
Qty: 30 | Refills: 0
Start: 2022-02-17 | End: 2022-03-18

## 2022-02-17 RX ADMIN — SEVELAMER CARBONATE 800 MILLIGRAM(S): 2400 POWDER, FOR SUSPENSION ORAL at 11:42

## 2022-02-17 RX ADMIN — Medication 5 MILLIGRAM(S): at 21:46

## 2022-02-17 RX ADMIN — SIMVASTATIN 40 MILLIGRAM(S): 20 TABLET, FILM COATED ORAL at 21:46

## 2022-02-17 RX ADMIN — PANTOPRAZOLE SODIUM 40 MILLIGRAM(S): 20 TABLET, DELAYED RELEASE ORAL at 05:26

## 2022-02-17 RX ADMIN — SEVELAMER CARBONATE 800 MILLIGRAM(S): 2400 POWDER, FOR SUSPENSION ORAL at 16:53

## 2022-02-17 RX ADMIN — SODIUM ZIRCONIUM CYCLOSILICATE 5 GRAM(S): 10 POWDER, FOR SUSPENSION ORAL at 13:32

## 2022-02-17 RX ADMIN — Medication 650 MILLIGRAM(S): at 22:30

## 2022-02-17 RX ADMIN — SEVELAMER CARBONATE 800 MILLIGRAM(S): 2400 POWDER, FOR SUSPENSION ORAL at 08:18

## 2022-02-17 RX ADMIN — HEPARIN SODIUM 5000 UNIT(S): 5000 INJECTION INTRAVENOUS; SUBCUTANEOUS at 05:26

## 2022-02-17 RX ADMIN — Medication 1 DROP(S): at 05:26

## 2022-02-17 RX ADMIN — Medication 25 MICROGRAM(S): at 05:25

## 2022-02-17 RX ADMIN — GABAPENTIN 50 MILLIGRAM(S): 400 CAPSULE ORAL at 05:26

## 2022-02-17 RX ADMIN — Medication 1 DROP(S): at 16:55

## 2022-02-17 RX ADMIN — MIDODRINE HYDROCHLORIDE 5 MILLIGRAM(S): 2.5 TABLET ORAL at 05:25

## 2022-02-17 RX ADMIN — POLYETHYLENE GLYCOL 3350 17 GRAM(S): 17 POWDER, FOR SOLUTION ORAL at 05:26

## 2022-02-17 RX ADMIN — Medication 650 MILLIGRAM(S): at 05:25

## 2022-02-17 RX ADMIN — HEPARIN SODIUM 5000 UNIT(S): 5000 INJECTION INTRAVENOUS; SUBCUTANEOUS at 21:46

## 2022-02-17 RX ADMIN — LATANOPROST 1 DROP(S): 0.05 SOLUTION/ DROPS OPHTHALMIC; TOPICAL at 21:46

## 2022-02-17 RX ADMIN — Medication 650 MILLIGRAM(S): at 16:54

## 2022-02-17 RX ADMIN — Medication 1 DROP(S): at 11:41

## 2022-02-17 RX ADMIN — GABAPENTIN 50 MILLIGRAM(S): 400 CAPSULE ORAL at 16:54

## 2022-02-17 RX ADMIN — Medication 100 MILLIGRAM(S): at 11:42

## 2022-02-17 RX ADMIN — HEPARIN SODIUM 5000 UNIT(S): 5000 INJECTION INTRAVENOUS; SUBCUTANEOUS at 13:32

## 2022-02-17 RX ADMIN — Medication 650 MILLIGRAM(S): at 21:45

## 2022-02-17 NOTE — PROGRESS NOTE ADULT - PROBLEM SELECTOR PLAN 2
continue to check orthostatics   c/w midodrine 5mg PO BID   continue to hold all antihypertensives  continue to monitor

## 2022-02-17 NOTE — PROGRESS NOTE ADULT - SUBJECTIVE AND OBJECTIVE BOX
Subjective: Patient seen and examined. No new events except as noted.   complains of hemorrhoid pain  some dizziness when ambulating to bathroom    sitting up in bed, eating     REVIEW OF SYSTEMS:    CONSTITUTIONAL: + weakness, fevers or chills  EYES/ENT: No visual changes;  No vertigo or throat pain   NECK: No pain or stiffness  RESPIRATORY: No cough, wheezing, hemoptysis; No shortness of breath  CARDIOVASCULAR: No chest pain or palpitations  GASTROINTESTINAL: No abdominal or epigastric pain. No nausea, vomiting, or hematemesis; No diarrhea or constipation. No melena or hematochezia.  GENITOURINARY: No dysuria, frequency or hematuria  NEUROLOGICAL: No numbness or weakness  SKIN: No itching, burning, rashes, or lesions   All other review of systems is negative unless indicated above.    MEDICATIONS:  MEDICATIONS  (STANDING):  allopurinol 100 milliGRAM(s) Oral daily  artificial  tears Solution 1 Drop(s) Both EYES four times a day  bisacodyl 5 milliGRAM(s) Oral at bedtime  gabapentin Solution 50 milliGRAM(s) Oral two times a day  heparin   Injectable 5000 Unit(s) SubCutaneous every 8 hours  hydrocortisone hemorrhoidal Suppository 1 Suppository(s) Rectal daily  latanoprost 0.005% Ophthalmic Solution 1 Drop(s) Both EYES at bedtime  levothyroxine 25 MICROGram(s) Oral daily  mesalamine Suppository 1000 milliGRAM(s) Rectal at bedtime  midodrine. 5 milliGRAM(s) Oral <User Schedule>  pantoprazole    Tablet 40 milliGRAM(s) Oral before breakfast  polyethylene glycol 3350 17 Gram(s) Oral two times a day  sevelamer carbonate 800 milliGRAM(s) Oral three times a day with meals  simvastatin 40 milliGRAM(s) Oral at bedtime  sodium bicarbonate 650 milliGRAM(s) Oral two times a day  sodium chloride 0.9%. 1000 milliLiter(s) (75 mL/Hr) IV Continuous <Continuous>  sodium zirconium cyclosilicate 5 Gram(s) Oral daily      PHYSICAL EXAM:  T(C): 36.5 (02-17-22 @ 07:46), Max: 37.1 (02-16-22 @ 23:08)  HR: 90 (02-17-22 @ 09:45) (68 - 92)  BP: 124/61 (02-17-22 @ 09:45) (124/61 - 178/61)  RR: 18 (02-17-22 @ 07:46) (18 - 18)  SpO2: 99% (02-17-22 @ 07:46) (98% - 100%)  Wt(kg): --  I&O's Summary    16 Feb 2022 07:01  -  17 Feb 2022 07:00  --------------------------------------------------------  IN: 480 mL / OUT: 1000 mL / NET: -520 mL      Appearance: NAD, c-collar on   HEENT: Normal oral mucosa, PERRL, EOMI	  Lymphatic: No lymphadenopathy , no edema  Cardiovascular: Normal S1 S2, No JVD, No murmurs , Peripheral pulses palpable 2+ bilaterally  Respiratory: Lungs clear to auscultation, normal effort 	  Gastrointestinal:  Soft, Non-tender, + BS	  Skin: No rashes, No ecchymoses, No cyanosis, warm to touch  Neuro: alert oriented x 3 attention comprehension are fair.  Able to name, repeat.   EOmi fundi not visualized   no nystagmus VFF to confrontation  Tongue is midline  Palate elevates symmetrically   Moving all 4 ext spontaneously no drift appreciated  Gait not assessed.   Ext: No edema      LABS:    CARDIAC MARKERS:                        7.5    10.40 )-----------( 295      ( 16 Feb 2022 13:19 )             25.2     02-17    141  |  110<H>  |  37<H>  ----------------------------<  90  4.9   |  20<L>  |  1.93<H>    Ca    8.2<L>      17 Feb 2022 05:50      proBNP:   Lipid Profile:   HgA1c:   TSH:       TELEMETRY: 	    ECG:  	  RADIOLOGY:   DIAGNOSTIC TESTING:  [ ] Echocardiogram:  [ ]  Catheterization:  [ ] Stress Test:    OTHER:

## 2022-02-17 NOTE — PROGRESS NOTE ADULT - SUBJECTIVE AND OBJECTIVE BOX
Patient is a 76y old  Female who presents with a chief complaint of syncope , cervical spine fracture (2022 14:58)      SUBJECTIVE / OVERNIGHT EVENTS: urinary retention of 300-400 cc, now has a williamson    MEDICATIONS  (STANDING):  allopurinol 100 milliGRAM(s) Oral daily  artificial  tears Solution 1 Drop(s) Both EYES four times a day  bisacodyl 5 milliGRAM(s) Oral at bedtime  gabapentin Solution 50 milliGRAM(s) Oral two times a day  heparin   Injectable 5000 Unit(s) SubCutaneous every 8 hours  hydrocortisone hemorrhoidal Suppository 1 Suppository(s) Rectal daily  latanoprost 0.005% Ophthalmic Solution 1 Drop(s) Both EYES at bedtime  levothyroxine 25 MICROGram(s) Oral daily  mesalamine Suppository 1000 milliGRAM(s) Rectal at bedtime  midodrine. 5 milliGRAM(s) Oral <User Schedule>  pantoprazole    Tablet 40 milliGRAM(s) Oral before breakfast  polyethylene glycol 3350 17 Gram(s) Oral two times a day  sevelamer carbonate 800 milliGRAM(s) Oral three times a day with meals  simvastatin 40 milliGRAM(s) Oral at bedtime  sodium bicarbonate 650 milliGRAM(s) Oral two times a day  sodium zirconium cyclosilicate 5 Gram(s) Oral daily    MEDICATIONS  (PRN):  acetaminophen     Tablet .. 650 milliGRAM(s) Oral every 6 hours PRN Mild Pain (1 - 3)      Vital Signs Last 24 Hrs  T(F): 101.3 (22 @ 23:10), Max: 101.3 (22 @ 23:10)  HR: 89 (22 @ 23:10) (59 - 94)  BP: 153/71 (22 @ 23:10) (112/65 - 178/61)  RR: 18 (22 @ 23:10) (18 - 18)  SpO2: 100% (22 @ 23:10) (98% - 100%)  Telemetry:   CAPILLARY BLOOD GLUCOSE      POCT Blood Glucose.: 115 mg/dL (2022 16:16)    I&O's Summary    2022 07:01  -  2022 07:00  --------------------------------------------------------  IN: 480 mL / OUT: 1000 mL / NET: -520 mL    2022 07:01  -  2022 23:14  --------------------------------------------------------  IN: 0 mL / OUT: 120 mL / NET: -120 mL        PHYSICAL EXAM:  GENERAL: NAD, well-developed  HEAD:  Atraumatic, Normocephalic  EYES: EOMI, PERRLA, conjunctiva and sclera clear  NECK: Supple, No JVD  CHEST/LUNG: Clear to auscultation bilaterally; No wheeze  HEART: Regular rate and rhythm; No murmurs, rubs, or gallops  ABDOMEN: Soft, Nontender, Nondistended; Bowel sounds present  EXTREMITIES:  2+ Peripheral Pulses, No clubbing, cyanosis, or edema  PSYCH: AAOx3  NEUROLOGY: non-focal  SKIN: No rashes or lesions    LABS:                        7.5    10.40 )-----------( 295      ( 2022 13:19 )             25.2     02-17    141  |  110<H>  |  37<H>  ----------------------------<  90  4.9   |  20<L>  |  1.93<H>    Ca    8.2<L>      2022 05:50            Urinalysis Basic - ( 2022 18:58 )    Color: Light Yellow / Appearance: Clear / S.010 / pH: x  Gluc: x / Ketone: Negative  / Bili: Negative / Urobili: Negative   Blood: x / Protein: 30 mg/dL / Nitrite: Negative   Leuk Esterase: Moderate / RBC: 2 /hpf / WBC 20 /HPF   Sq Epi: x / Non Sq Epi: 0 /hpf / Bacteria: Many        RADIOLOGY & ADDITIONAL TESTS:    Imaging Personally Reviewed:    Consultant(s) Notes Reviewed:      Care Discussed with Consultants/Other Providers:

## 2022-02-17 NOTE — PROGRESS NOTE ADULT - ASSESSMENT
77yo female pt with PMHx of CKD (Last K-5.3, Bun/Cr.-29/1.93 on 7/18/2020), Anemia, Gout, HTN, HLD, was transferred, on cervical collar, from [facility] c/o head/neck pain, C2 fracture s/p fall this morning with a syncopal episode.

## 2022-02-17 NOTE — PROGRESS NOTE ADULT - ASSESSMENT
77yo female pt with PMHx of CKD (Last K-5.3, Bun/Cr.-29/1.93 on 7/18/2020), Anemia, Gout, HTN, HLD, was transferred, on cervical collar, from [facility] c/o head/neck pain, C2 fracture s/p fall this morning with a syncopal episode.

## 2022-02-17 NOTE — PROGRESS NOTE ADULT - SUBJECTIVE AND OBJECTIVE BOX
[facility] [place]      Seen earlier today, and examined.  - Today, patient is without complaints.           *****MEDICATIONS: Current medication reviewed and documented.    MEDICATIONS  (STANDING):  allopurinol 100 milliGRAM(s) Oral daily  artificial  tears Solution 1 Drop(s) Both EYES four times a day  bisacodyl 5 milliGRAM(s) Oral at bedtime  gabapentin Solution 50 milliGRAM(s) Oral two times a day  heparin   Injectable 5000 Unit(s) SubCutaneous every 8 hours  hydrocortisone hemorrhoidal Suppository 1 Suppository(s) Rectal daily  latanoprost 0.005% Ophthalmic Solution 1 Drop(s) Both EYES at bedtime  levothyroxine 25 MICROGram(s) Oral daily  mesalamine Suppository 1000 milliGRAM(s) Rectal at bedtime  midodrine. 5 milliGRAM(s) Oral <User Schedule>  pantoprazole    Tablet 40 milliGRAM(s) Oral before breakfast  polyethylene glycol 3350 17 Gram(s) Oral two times a day  sevelamer carbonate 800 milliGRAM(s) Oral three times a day with meals  simvastatin 40 milliGRAM(s) Oral at bedtime  sodium bicarbonate 650 milliGRAM(s) Oral two times a day  sodium chloride 0.9%. 1000 milliLiter(s) (75 mL/Hr) IV Continuous <Continuous>  sodium zirconium cyclosilicate 5 Gram(s) Oral daily    MEDICATIONS  (PRN):  acetaminophen     Tablet .. 650 milliGRAM(s) Oral every 6 hours PRN Mild Pain (1 - 3)          ***** VITAL SIGNS:  T(F): 97.7 (22 @ 07:46), Max: 98.8 (22 @ 04:23)  HR: 90 (22 @ 09:45) (68 - 92)  BP: 124/61 (22 @ 09:45) (124/61 - 178/61)  RR: 18 (22 @ 07:46) (18 - 18)  SpO2: 99% (22 @ 07:46) (98% - 100%)  Wt(kg): --  ,   I&O's Summary    2022 07:01  -  2022 07:00  --------------------------------------------------------  IN: 480 mL / OUT: 1000 mL / NET: -520 mL             *****PHYSICAL EXAM:   alert oriented x 3 attention comprehension are fair.  Able to name, repeat.   EOmi fundi not visualized   no nystagmus VFF to confrontation  Tongue is midline   Moving all 4 ext spontaneously no drift appreciated    Gait not assessed.            *****LAB AND IMAGIN.5    10.40 )-----------( 295      ( 2022 13:19 )             25.2               02-17    141  |  110<H>  |  37<H>  ----------------------------<  90  4.9   |  20<L>  |  1.93<H>    Ca    8.2<L>      2022 05:50                           [All pertinent recent Imaging/Reports reviewed]           *****A S S E S S M E N T   A N D   P L A N :    77yo female pt with PMHx of CKD (Last K-5.3, Bun/Cr.-29/1.93 on 2020), Anemia, Gout, HTN, HLD, was transferred, on cervical collar, from [facility] c/o head/neck pain, C2 fracture s/p fall this morning with a syncopal episode.   Pt stated she felt dizzy after urination in her bathroom at 8:30am and fell. +LOC and noticed severe headache/neck pain.   She was evaluated in [facility] and sent to ED for spine consult 2/2 C2 fracture.   Denies visual changes or N/V. Denies sensory changes or weakness to extremities. Denies CP/SOB/Palpitation/abd pain. Denies lower back pain. Denies urinary problems. Denies fever, chills, cough or congestion.            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 Will continue to follow patient periodically. Please do not hesitate to call me if you have any  questions or if there has been a change in patients neurological status     ________________  Li Isaac MD  [facility] ([facility] [place]  [phone]      33 minutes spent on total encounter; more than 50 % of the visit was  spent counseling about plan of care, compliance to diet/exercise and medication regimen and or  coordinating care by the attending physician.      It is advised that stroke patients follow up with TALIA Krishna @ [phone] in 1- 2 weeks.   Others please follow up with Dr. Michael Nissenbaum [phone]

## 2022-02-17 NOTE — PROGRESS NOTE ADULT - SUBJECTIVE AND OBJECTIVE BOX
[facility]  Bogdan Johnson PA-C  [facility] [place] [address]  [phone]      INTERVAL HPI/OVERNIGHT EVENTS:  pt seen and examined, no new events  +BM, pain improving   c/o neck pain     MEDICATIONS  (STANDING):  allopurinol 100 milliGRAM(s) Oral daily  artificial  tears Solution 1 Drop(s) Both EYES four times a day  bisacodyl 5 milliGRAM(s) Oral at bedtime  gabapentin Solution 50 milliGRAM(s) Oral two times a day  heparin   Injectable 5000 Unit(s) SubCutaneous every 8 hours  hydrocortisone hemorrhoidal Suppository 1 Suppository(s) Rectal daily  latanoprost 0.005% Ophthalmic Solution 1 Drop(s) Both EYES at bedtime  levothyroxine 25 MICROGram(s) Oral daily  mesalamine Suppository 1000 milliGRAM(s) Rectal at bedtime  polyethylene glycol 3350 17 Gram(s) Oral two times a day  sevelamer carbonate 800 milliGRAM(s) Oral three times a day with meals  simvastatin 40 milliGRAM(s) Oral at bedtime  sodium bicarbonate 650 milliGRAM(s) Oral two times a day  sodium chloride 0.45%. 250 milliLiter(s) (250 mL/Hr) IV Continuous <Continuous>  sodium chloride 0.9% Bolus 1000 milliLiter(s) IV Bolus once  sodium zirconium cyclosilicate 5 Gram(s) Oral once    MEDICATIONS  (PRN):  acetaminophen     Tablet .. 650 milliGRAM(s) Oral every 6 hours PRN Mild Pain (1 - 3)      Allergies    No Known Allergies    Intolerances        ROS:   General:  No wt loss, fevers, chills, night sweats, fatigue,   Eyes:  Good vision, no reported pain  ENT:  No sore throat, pain, runny nose, dysphagia  CV:  No pain, palpitations, hypo/hypertension  Resp:  No dyspnea, cough, tachypnea, wheezing  GI:  + pain, No nausea, No vomiting, No diarrhea, + constipation, No weight loss, No fever, No pruritis, No rectal bleeding, No tarry stools, No dysphagia,  :  No pain, bleeding, incontinence, nocturia  Muscle:  No pain, weakness  Neuro:  No weakness, tingling, memory problems  Psych:  No fatigue, insomnia, mood problems, depression  Endocrine:  No polyuria, polydipsia, cold/heat intolerance  Heme:  No petechiae, ecchymosis, easy bruisability  Skin:  No rash, tattoos, scars, edema      PHYSICAL EXAM:   Vital Signs Last 24 Hrs  T(C): 36.5 (17 Feb 2022 07:46), Max: 37.1 (16 Feb 2022 23:08)  T(F): 97.7 (17 Feb 2022 07:46), Max: 98.8 (17 Feb 2022 04:23)  HR: 90 (17 Feb 2022 09:45) (68 - 102)  BP: 124/61 (17 Feb 2022 09:45) (93/56 - 178/61)  BP(mean): --  RR: 18 (17 Feb 2022 07:46) (17 - 18)  SpO2: 99% (17 Feb 2022 07:46) (97% - 100%)  Daily     Daily     GENERAL:  Appears stated age,   HEENT:  NC/AT,    CHEST:  Full & symmetric excursion,   HEART:  Regular rhythm,  ABDOMEN:  Soft, non-tender, non-distended,  EXTEREMITIES:  no cyanosis  SKIN:  No rash  NEURO:  Alert,       LABS:                                   7.5    10.40 )-----------( 295      ( 16 Feb 2022 13:19 )             25.2   02-17    141  |  110<H>  |  37<H>  ----------------------------<  90  4.9   |  20<L>  |  1.93<H>    Ca    8.2<L>      17 Feb 2022 05:50            RADIOLOGY & ADDITIONAL TESTS:

## 2022-02-17 NOTE — PROGRESS NOTE ADULT - SUBJECTIVE AND OBJECTIVE BOX
Patient seen and examined  no complaints    No Known Allergies    Hospital Medications:   MEDICATIONS  (STANDING):  allopurinol 100 milliGRAM(s) Oral daily  artificial  tears Solution 1 Drop(s) Both EYES four times a day  bisacodyl 5 milliGRAM(s) Oral at bedtime  gabapentin Solution 50 milliGRAM(s) Oral two times a day  heparin   Injectable 5000 Unit(s) SubCutaneous every 8 hours  hydrocortisone hemorrhoidal Suppository 1 Suppository(s) Rectal daily  latanoprost 0.005% Ophthalmic Solution 1 Drop(s) Both EYES at bedtime  levothyroxine 25 MICROGram(s) Oral daily  mesalamine Suppository 1000 milliGRAM(s) Rectal at bedtime  midodrine. 5 milliGRAM(s) Oral <User Schedule>  pantoprazole    Tablet 40 milliGRAM(s) Oral before breakfast  polyethylene glycol 3350 17 Gram(s) Oral two times a day  sevelamer carbonate 800 milliGRAM(s) Oral three times a day with meals  simvastatin 40 milliGRAM(s) Oral at bedtime  sodium bicarbonate 650 milliGRAM(s) Oral two times a day  sodium chloride 0.9%. 1000 milliLiter(s) (75 mL/Hr) IV Continuous <Continuous>  sodium zirconium cyclosilicate 5 Gram(s) Oral daily        VITALS:  T(F): 97.4 (02-17-22 @ 13:17), Max: 98.8 (02-17-22 @ 04:23)  HR: 59 (02-17-22 @ 13:17)  BP: 112/65 (02-17-22 @ 13:17)  RR: 18 (02-17-22 @ 13:17)  SpO2: 98% (02-17-22 @ 13:17)  Wt(kg): --    02-16 @ 07:01  -  02-17 @ 07:00  --------------------------------------------------------  IN: 480 mL / OUT: 1000 mL / NET: -520 mL        Physical Exam :-  Constitutional: NAD  Neck: Supple.  Respiratory: Bilateral equal breath sounds,  Cardiovascular: S1, S2 normal,  Gastrointestinal: Bowel Sounds present, soft, non tender.  Extremities: No edema  Neurological: Alert and Oriented x 3, no focal deficits  Psychiatric: Normal mood, normal affect    LABS:  02-17    141  |  110<H>  |  37<H>  ----------------------------<  90  4.9   |  20<L>  |  1.93<H>    Ca    8.2<L>      17 Feb 2022 05:50      Creatinine Trend: 1.93 <--, 2.59 <--, 2.18 <--, 2.09 <--, 1.78 <--, 1.89 <--, 1.68 <--                        7.5    10.40 )-----------( 295      ( 16 Feb 2022 13:19 )             25.2     Urine Studies:      RADIOLOGY & ADDITIONAL STUDIES:

## 2022-02-17 NOTE — PROGRESS NOTE ADULT - ASSESSMENT
constipation  hemorrhoids   ckd  C2 fracture       +BM today  bowel regimen   Miralax BID  Anusol suppository QD  mesalamine suppository QD  monitor stool count   diet as tolerated   will follow   dw pt and daughter at bedside   dc planning as per primary

## 2022-02-17 NOTE — PROGRESS NOTE ADULT - ASSESSMENT
76 year-old woman with history of multiple medical issues including hypertension, hyperlipidemia, and chronic kidney disease. She was transferred to [facility] yesterday from the [facility] ER after being noted on imaging to have a C2 fracture, s/p syncopal episode. Following for CKD    CKD 4 - likely Hypertensive Nephropathy  Serum stable - at baseline  appears to be ranging around 1.8 to 2.0mg/dl  d/c ivf  check bladder scan to rule outside retention  fluctuations expected  Renal diet  daily BMP    Hyperkalemia  improved  c/w Lokelma 5mg PO QD  Low K diet    Metabolic acidosis  on  Nabicarb 650mg po bid  trend bicarb    Cervical fracture  pain management  Neurosx recs appreciated      For any question, call:  Cell # [phone]  Dr Brown

## 2022-02-18 DIAGNOSIS — R33.9 RETENTION OF URINE, UNSPECIFIED: ICD-10-CM

## 2022-02-18 LAB
ANION GAP SERPL CALC-SCNC: 11 MMOL/L — SIGNIFICANT CHANGE UP (ref 5–17)
BLD GP AB SCN SERPL QL: NEGATIVE — SIGNIFICANT CHANGE UP
BUN SERPL-MCNC: 34 MG/DL — HIGH (ref 7–23)
CALCIUM SERPL-MCNC: 8.3 MG/DL — LOW (ref 8.4–10.5)
CHLORIDE SERPL-SCNC: 110 MMOL/L — HIGH (ref 96–108)
CO2 SERPL-SCNC: 20 MMOL/L — LOW (ref 22–31)
CREAT SERPL-MCNC: 2 MG/DL — HIGH (ref 0.5–1.3)
GLUCOSE SERPL-MCNC: 97 MG/DL — SIGNIFICANT CHANGE UP (ref 70–99)
HCT VFR BLD CALC: 21.6 % — LOW (ref 34.5–45)
HCT VFR BLD CALC: 22.3 % — LOW (ref 34.5–45)
HCT VFR BLD CALC: 25.8 % — LOW (ref 34.5–45)
HGB BLD-MCNC: 6.3 G/DL — CRITICAL LOW (ref 11.5–15.5)
HGB BLD-MCNC: 6.5 G/DL — CRITICAL LOW (ref 11.5–15.5)
HGB BLD-MCNC: 7.8 G/DL — LOW (ref 11.5–15.5)
MCHC RBC-ENTMCNC: 25.2 PG — LOW (ref 27–34)
MCHC RBC-ENTMCNC: 25.4 PG — LOW (ref 27–34)
MCHC RBC-ENTMCNC: 26.1 PG — LOW (ref 27–34)
MCHC RBC-ENTMCNC: 29.1 GM/DL — LOW (ref 32–36)
MCHC RBC-ENTMCNC: 29.2 GM/DL — LOW (ref 32–36)
MCHC RBC-ENTMCNC: 30.2 GM/DL — LOW (ref 32–36)
MCV RBC AUTO: 86.3 FL — SIGNIFICANT CHANGE UP (ref 80–100)
MCV RBC AUTO: 86.4 FL — SIGNIFICANT CHANGE UP (ref 80–100)
MCV RBC AUTO: 87.1 FL — SIGNIFICANT CHANGE UP (ref 80–100)
NRBC # BLD: 0 /100 WBCS — SIGNIFICANT CHANGE UP (ref 0–0)
PLATELET # BLD AUTO: 214 K/UL — SIGNIFICANT CHANGE UP (ref 150–400)
PLATELET # BLD AUTO: 252 K/UL — SIGNIFICANT CHANGE UP (ref 150–400)
PLATELET # BLD AUTO: 255 K/UL — SIGNIFICANT CHANGE UP (ref 150–400)
POTASSIUM SERPL-MCNC: 4.5 MMOL/L — SIGNIFICANT CHANGE UP (ref 3.5–5.3)
POTASSIUM SERPL-SCNC: 4.5 MMOL/L — SIGNIFICANT CHANGE UP (ref 3.5–5.3)
RBC # BLD: 2.5 M/UL — LOW (ref 3.8–5.2)
RBC # BLD: 2.56 M/UL — LOW (ref 3.8–5.2)
RBC # BLD: 2.99 M/UL — LOW (ref 3.8–5.2)
RBC # FLD: 17.1 % — HIGH (ref 10.3–14.5)
RBC # FLD: 18.4 % — HIGH (ref 10.3–14.5)
RBC # FLD: 18.8 % — HIGH (ref 10.3–14.5)
RH IG SCN BLD-IMP: POSITIVE — SIGNIFICANT CHANGE UP
RH IG SCN BLD-IMP: POSITIVE — SIGNIFICANT CHANGE UP
SODIUM SERPL-SCNC: 141 MMOL/L — SIGNIFICANT CHANGE UP (ref 135–145)
WBC # BLD: 10.32 K/UL — SIGNIFICANT CHANGE UP (ref 3.8–10.5)
WBC # BLD: 10.76 K/UL — HIGH (ref 3.8–10.5)
WBC # BLD: 11.74 K/UL — HIGH (ref 3.8–10.5)
WBC # FLD AUTO: 10.32 K/UL — SIGNIFICANT CHANGE UP (ref 3.8–10.5)
WBC # FLD AUTO: 10.76 K/UL — HIGH (ref 3.8–10.5)
WBC # FLD AUTO: 11.74 K/UL — HIGH (ref 3.8–10.5)

## 2022-02-18 PROCEDURE — 71045 X-RAY EXAM CHEST 1 VIEW: CPT | Mod: 26

## 2022-02-18 RX ORDER — ACETAMINOPHEN 500 MG
750 TABLET ORAL ONCE
Refills: 0 | Status: COMPLETED | OUTPATIENT
Start: 2022-02-18 | End: 2022-02-18

## 2022-02-18 RX ORDER — CEFTRIAXONE 500 MG/1
1000 INJECTION, POWDER, FOR SOLUTION INTRAMUSCULAR; INTRAVENOUS EVERY 24 HOURS
Refills: 0 | Status: DISCONTINUED | OUTPATIENT
Start: 2022-02-18 | End: 2022-02-19

## 2022-02-18 RX ADMIN — LATANOPROST 1 DROP(S): 0.05 SOLUTION/ DROPS OPHTHALMIC; TOPICAL at 21:13

## 2022-02-18 RX ADMIN — Medication 650 MILLIGRAM(S): at 05:31

## 2022-02-18 RX ADMIN — GABAPENTIN 50 MILLIGRAM(S): 400 CAPSULE ORAL at 17:24

## 2022-02-18 RX ADMIN — CEFTRIAXONE 100 MILLIGRAM(S): 500 INJECTION, POWDER, FOR SOLUTION INTRAMUSCULAR; INTRAVENOUS at 00:02

## 2022-02-18 RX ADMIN — SODIUM ZIRCONIUM CYCLOSILICATE 5 GRAM(S): 10 POWDER, FOR SUSPENSION ORAL at 11:56

## 2022-02-18 RX ADMIN — CEFTRIAXONE 100 MILLIGRAM(S): 500 INJECTION, POWDER, FOR SOLUTION INTRAMUSCULAR; INTRAVENOUS at 12:18

## 2022-02-18 RX ADMIN — Medication 1 DROP(S): at 17:30

## 2022-02-18 RX ADMIN — Medication 650 MILLIGRAM(S): at 16:43

## 2022-02-18 RX ADMIN — GABAPENTIN 50 MILLIGRAM(S): 400 CAPSULE ORAL at 05:30

## 2022-02-18 RX ADMIN — HEPARIN SODIUM 5000 UNIT(S): 5000 INJECTION INTRAVENOUS; SUBCUTANEOUS at 21:13

## 2022-02-18 RX ADMIN — Medication 650 MILLIGRAM(S): at 17:31

## 2022-02-18 RX ADMIN — Medication 100 MILLIGRAM(S): at 11:57

## 2022-02-18 RX ADMIN — Medication 1 DROP(S): at 11:58

## 2022-02-18 RX ADMIN — HEPARIN SODIUM 5000 UNIT(S): 5000 INJECTION INTRAVENOUS; SUBCUTANEOUS at 05:30

## 2022-02-18 RX ADMIN — PANTOPRAZOLE SODIUM 40 MILLIGRAM(S): 20 TABLET, DELAYED RELEASE ORAL at 05:31

## 2022-02-18 RX ADMIN — Medication 300 MILLIGRAM(S): at 05:30

## 2022-02-18 RX ADMIN — SIMVASTATIN 40 MILLIGRAM(S): 20 TABLET, FILM COATED ORAL at 21:13

## 2022-02-18 RX ADMIN — SEVELAMER CARBONATE 800 MILLIGRAM(S): 2400 POWDER, FOR SUSPENSION ORAL at 11:56

## 2022-02-18 RX ADMIN — Medication 750 MILLIGRAM(S): at 06:43

## 2022-02-18 RX ADMIN — HEPARIN SODIUM 5000 UNIT(S): 5000 INJECTION INTRAVENOUS; SUBCUTANEOUS at 14:16

## 2022-02-18 RX ADMIN — Medication 25 MICROGRAM(S): at 05:31

## 2022-02-18 RX ADMIN — Medication 1 DROP(S): at 05:31

## 2022-02-18 RX ADMIN — SEVELAMER CARBONATE 800 MILLIGRAM(S): 2400 POWDER, FOR SUSPENSION ORAL at 08:24

## 2022-02-18 RX ADMIN — SEVELAMER CARBONATE 800 MILLIGRAM(S): 2400 POWDER, FOR SUSPENSION ORAL at 17:20

## 2022-02-18 RX ADMIN — Medication 650 MILLIGRAM(S): at 16:13

## 2022-02-18 NOTE — PROGRESS NOTE ADULT - SUBJECTIVE AND OBJECTIVE BOX
Patient is a 76y old  Female who presents with a chief complaint of syncope , cervical spine fracture (2022 14:58)      SUBJECTIVE / OVERNIGHT EVENTS: fever overnight, now better post CTX. williamson related UTI.     MEDICATIONS  (STANDING):  allopurinol 100 milliGRAM(s) Oral daily  artificial  tears Solution 1 Drop(s) Both EYES four times a day  bisacodyl 5 milliGRAM(s) Oral at bedtime  cefTRIAXone   IVPB 1000 milliGRAM(s) IV Intermittent every 24 hours  gabapentin Solution 50 milliGRAM(s) Oral two times a day  heparin   Injectable 5000 Unit(s) SubCutaneous every 8 hours  hydrocortisone hemorrhoidal Suppository 1 Suppository(s) Rectal daily  latanoprost 0.005% Ophthalmic Solution 1 Drop(s) Both EYES at bedtime  levothyroxine 25 MICROGram(s) Oral daily  mesalamine Suppository 1000 milliGRAM(s) Rectal at bedtime  midodrine. 5 milliGRAM(s) Oral <User Schedule>  pantoprazole    Tablet 40 milliGRAM(s) Oral before breakfast  polyethylene glycol 3350 17 Gram(s) Oral two times a day  sevelamer carbonate 800 milliGRAM(s) Oral three times a day with meals  simvastatin 40 milliGRAM(s) Oral at bedtime  sodium bicarbonate 650 milliGRAM(s) Oral two times a day  sodium zirconium cyclosilicate 5 Gram(s) Oral daily    MEDICATIONS  (PRN):  acetaminophen     Tablet .. 650 milliGRAM(s) Oral every 6 hours PRN Mild Pain (1 - 3)      Vital Signs Last 24 Hrs  T(F): 100.7 (22 @ 17:28), Max: 101.7 (22 @ 04:23)  HR: 90 (22 @ 16:05) (80 - 103)  BP: 146/67 (22 @ 16:05) (103/55 - 176/72)  RR: 18 (22 @ 16:05) (18 - 18)  SpO2: 98% (22 @ 16:05) (98% - 100%)  Telemetry:   CAPILLARY BLOOD GLUCOSE        I&O's Summary    2022 07:01  -  2022 07:00  --------------------------------------------------------  IN: 0 mL / OUT: 1220 mL / NET: -1220 mL    2022 07:01  -  2022 18:22  --------------------------------------------------------  IN: 720 mL / OUT: 0 mL / NET: 720 mL        PHYSICAL EXAM:  GENERAL: NAD, well-developed  HEAD:  Atraumatic, Normocephalic  EYES: EOMI, PERRLA, conjunctiva and sclera clear  NECK: Supple, No JVD  CHEST/LUNG: Clear to auscultation bilaterally; No wheeze  HEART: Regular rate and rhythm; No murmurs, rubs, or gallops  ABDOMEN: Soft, Nontender, Nondistended; Bowel sounds present  EXTREMITIES:  2+ Peripheral Pulses, No clubbing, cyanosis, or edema  PSYCH: AAOx3  NEUROLOGY: non-focal  SKIN: No rashes or lesions    LABS:                        7.8    11.74 )-----------( 214      ( 2022 17:40 )             25.8     02-18    141  |  110<H>  |  34<H>  ----------------------------<  97  4.5   |  20<L>  |  2.00<H>    Ca    8.3<L>      2022 00:47            Urinalysis Basic - ( 2022 18:58 )    Color: Light Yellow / Appearance: Clear / S.010 / pH: x  Gluc: x / Ketone: Negative  / Bili: Negative / Urobili: Negative   Blood: x / Protein: 30 mg/dL / Nitrite: Negative   Leuk Esterase: Moderate / RBC: 2 /hpf / WBC 20 /HPF   Sq Epi: x / Non Sq Epi: 0 /hpf / Bacteria: Many        RADIOLOGY & ADDITIONAL TESTS:    Imaging Personally Reviewed:    Consultant(s) Notes Reviewed:      Care Discussed with Consultants/Other Providers:

## 2022-02-18 NOTE — PROGRESS NOTE ADULT - ASSESSMENT
75yo female pt with PMHx of CKD (Last K-5.3, Bun/Cr.-29/1.93 on 7/18/2020), Anemia, Gout, HTN, HLD, was transferred, on cervical collar, from [facility] c/o head/neck pain, C2 fracture s/p fall this morning with a syncopal episode.

## 2022-02-18 NOTE — PROGRESS NOTE ADULT - ASSESSMENT
77yo female pt with PMHx of CKD (Last K-5.3, Bun/Cr.-29/1.93 on 7/18/2020), Anemia, Gout, HTN, HLD, was transferred, on cervical collar, from [facility] c/o head/neck pain, C2 fracture s/p fall this morning with a syncopal episode.

## 2022-02-18 NOTE — CONSULT NOTE ADULT - SUBJECTIVE AND OBJECTIVE BOX
[facility], Division of Infectious Diseases  KESHAV Pena, MERCEDES Castano, LOIS Edwards  [phone]  ([phone] - weekdays after 5pm and weekends)    RADHA POLLACK  76y, Female  [id number]    HPI--  HPI:  77yo female pt with PMHx of CKD (Last K-5.3, Bun/Cr.-29/1.93 on 2020), Anemia, Gout, HTN, HLD, was transferred, on cervical collar, from [facility] c/o head/neck pain, C2 fracture s/p fall with a syncopal episode.   Pt stated she felt dizzy after urination in her bathroom at 8:30am and fell. +LOC and noticed severe headache/neck pain.   She was evaluated in [facility] and sent to ED for spine consult 2/2 C2 fracture.   s/p CT head/ C-spine- Acute C2 fracture of the body of the dens extending to the left lateral mass. Right C2 facet fracture. Mild acute compression fracture of the superior endplate of C3  evaluated by ortho and fitted for cervical brace  seen by podiatry  for gout flare and rec steroid taper, on chronic allopurinol  febrile  evening, started on ceftriaxone, blood and urine cultures drawn  endorsed chills at that time  does not endorse extensive history of antibiotic use, most recently had UTI last month treated w/ bactrim    ROS: 10 point review of systems completed, pertinent positives and negatives as per HPI.    Allergies: No Known Allergies    PMH -- Anemia    HTN (hypertension)    HLD (hyperlipidemia)    H/O kidney injury    Cataract      PSH --   FH --   Social History -- denies tobacco, alcohol or illicit drug use  Travel/Environmental/Occupational History:    Physical Exam--  Vital Signs Last 24 Hrs  T(F): 98.9 (2022 12:40), Max: 101.7 (2022 04:23)  HR: 90 (2022 12:40) (80 - 103)  BP: 134/68 (2022 12:40) (103/55 - 176/72)  RR: 18 (2022 12:40) (18 - 18)  SpO2: 99% (2022 12:40) (98% - 100%)  General: nontoxic-appearing, no acute distress  HEENT: EOMI, anicteric  Neck: wear neck collar  Lungs: Clear bilaterally without rales, wheezing or rhonchi  Heart: Regular rate and rhythm. No murmur  Abdomen: Soft. Nondistended. Nontender  : purewick  Neuro: AAOx3, no obvious focal deficits   Back: No costovertebral angle tenderness.  Extremities: No LE edema. swelling of R hallux  Skin: Warm. Dry. Good turgor. No rash  Psychiatric: Appropriate affect and mood for situation.   Lines:    Laboratory & Imaging Data--  CBC:                       6.5    10.32 )-----------( 252      ( 2022 02:45 )             22.3     WBC Count: 10.32 K/uL (22 @ 02:45)  WBC Count: 10.76 K/uL (22 @ 00:36)  WBC Count: 10.40 K/uL (22 @ 13:19)  WBC Count: 9.46 K/uL (22 @ 05:31)  WBC Count: 10.47 K/uL (02-15-22 @ 05:59)  WBC Count: 12.51 K/uL (22 @ 10:09)  WBC Count: 13.82 K/uL (22 @ 07:14)  WBC Count: 14.28 K/uL (22 @ 06:41)    CMP:     141  |  110<H>  |  34<H>  ----------------------------<  97  4.5   |  20<L>  |  2.00<H>    Ca    8.3<L>      2022 00:47        Urinalysis Basic - ( 2022 18:58 )    Color: Light Yellow / Appearance: Clear / S.010 / pH: x  Gluc: x / Ketone: Negative  / Bili: Negative / Urobili: Negative   Blood: x / Protein: 30 mg/dL / Nitrite: Negative   Leuk Esterase: Moderate / RBC: 2 /hpf / WBC 20 /HPF   Sq Epi: x / Non Sq Epi: 0 /hpf / Bacteria: Many      Microbiology:     Culture - Blood (collected 22 @ 00:52)  Source: .Blood Blood-Peripheral  Final Report (22 @ 01:00):    No Growth Final    Culture - Blood (collected 22 @ 00:52)  Source: .Blood Blood-Peripheral  Final Report (22 @ 01:00):    No Growth Final      Radiology--    Active Medications--  acetaminophen     Tablet .. 650 milliGRAM(s) Oral every 6 hours PRN  allopurinol 100 milliGRAM(s) Oral daily  artificial  tears Solution 1 Drop(s) Both EYES four times a day  bisacodyl 5 milliGRAM(s) Oral at bedtime  cefTRIAXone   IVPB 1000 milliGRAM(s) IV Intermittent every 24 hours  gabapentin Solution 50 milliGRAM(s) Oral two times a day  heparin   Injectable 5000 Unit(s) SubCutaneous every 8 hours  hydrocortisone hemorrhoidal Suppository 1 Suppository(s) Rectal daily  latanoprost 0.005% Ophthalmic Solution 1 Drop(s) Both EYES at bedtime  levothyroxine 25 MICROGram(s) Oral daily  mesalamine Suppository 1000 milliGRAM(s) Rectal at bedtime  midodrine. 5 milliGRAM(s) Oral <User Schedule>  pantoprazole    Tablet 40 milliGRAM(s) Oral before breakfast  polyethylene glycol 3350 17 Gram(s) Oral two times a day  sevelamer carbonate 800 milliGRAM(s) Oral three times a day with meals  simvastatin 40 milliGRAM(s) Oral at bedtime  sodium bicarbonate 650 milliGRAM(s) Oral two times a day  sodium zirconium cyclosilicate 5 Gram(s) Oral daily    Antimicrobials:   cefTRIAXone   IVPB 1000 milliGRAM(s) IV Intermittent every 24 hours    Immunologic:

## 2022-02-18 NOTE — PROGRESS NOTE ADULT - ASSESSMENT
76 year-old woman with history of multiple medical issues including hypertension, hyperlipidemia, and chronic kidney disease. She was transferred to [facility] yesterday from the [facility] ER after being noted on imaging to have a C2 fracture, s/p syncopal episode. Following for CKD    CKD 4 - likely Hypertensive Nephropathy  Serum stable - at baseline  appears to be ranging around 1.8 to 2.0mg/dl  off IVF  hematuria- consider urology eval  fluctuations expected  Renal diet  daily BMP    Hyperkalemia  improved  c/w Lokelma 5mg PO QD  Low K diet    Metabolic acidosis  on  Nabicarb 650mg po bid  trend bicarb    Cervical fracture  pain management  Neurosx recs appreciated      For any question, call:  Cell # [phone]  Dr Brown

## 2022-02-18 NOTE — PROGRESS NOTE ADULT - SUBJECTIVE AND OBJECTIVE BOX
Subjective: Patient seen and examined. No new events except as noted.     REVIEW OF SYSTEMS:    CONSTITUTIONAL:+weakness, fevers or chills  EYES/ENT: No visual changes;  No vertigo or throat pain   NECK: No pain or stiffness  RESPIRATORY: No cough, wheezing, hemoptysis; No shortness of breath  CARDIOVASCULAR: No chest pain or palpitations  GASTROINTESTINAL: No abdominal or epigastric pain. No nausea, vomiting, or hematemesis; No diarrhea or constipation. No melena or hematochezia.  GENITOURINARY: No dysuria, frequency or hematuria  NEUROLOGICAL: No numbness or weakness  SKIN: No itching, burning, rashes, or lesions   All other review of systems is negative unless indicated above.    MEDICATIONS:  MEDICATIONS  (STANDING):  allopurinol 100 milliGRAM(s) Oral daily  artificial  tears Solution 1 Drop(s) Both EYES four times a day  bisacodyl 5 milliGRAM(s) Oral at bedtime  cefTRIAXone   IVPB 1000 milliGRAM(s) IV Intermittent every 24 hours  gabapentin Solution 50 milliGRAM(s) Oral two times a day  heparin   Injectable 5000 Unit(s) SubCutaneous every 8 hours  hydrocortisone hemorrhoidal Suppository 1 Suppository(s) Rectal daily  latanoprost 0.005% Ophthalmic Solution 1 Drop(s) Both EYES at bedtime  levothyroxine 25 MICROGram(s) Oral daily  mesalamine Suppository 1000 milliGRAM(s) Rectal at bedtime  midodrine. 5 milliGRAM(s) Oral <User Schedule>  pantoprazole    Tablet 40 milliGRAM(s) Oral before breakfast  polyethylene glycol 3350 17 Gram(s) Oral two times a day  sevelamer carbonate 800 milliGRAM(s) Oral three times a day with meals  simvastatin 40 milliGRAM(s) Oral at bedtime  sodium bicarbonate 650 milliGRAM(s) Oral two times a day  sodium zirconium cyclosilicate 5 Gram(s) Oral daily      PHYSICAL EXAM:  T(C): 37.2 (02-18-22 @ 09:40), Max: 38.7 (02-18-22 @ 04:23)  HR: 97 (02-18-22 @ 09:40) (59 - 103)  BP: 103/55 (02-18-22 @ 09:40) (103/55 - 176/72)  RR: 18 (02-18-22 @ 09:40) (18 - 18)  SpO2: 99% (02-18-22 @ 09:40) (98% - 100%)  Wt(kg): --  I&O's Summary    17 Feb 2022 07:01  -  18 Feb 2022 07:00  --------------------------------------------------------  IN: 0 mL / OUT: 1220 mL / NET: -1220 mL    18 Feb 2022 07:01  -  18 Feb 2022 10:56  --------------------------------------------------------  IN: 480 mL / OUT: 0 mL / NET: 480 mL          Appearance: Normal	  HEENT:   Normal oral mucosa, PERRL, EOMI	  Lymphatic: No lymphadenopathy , no edema  Cardiovascular: Normal S1 S2, No JVD, No murmurs , Peripheral pulses palpable 2+ bilaterally  Respiratory: Lungs clear to auscultation, normal effort 	  Gastrointestinal:  Soft, Non-tender, + BS	  Skin: No rashes, No ecchymoses, No cyanosis, warm to touch  Musculoskeletal: Normal range of motion, normal strength  Psychiatry:  Mood & affect appropriate  Ext: No edema      LABS:    CARDIAC MARKERS:                                6.5    10.32 )-----------( 252      ( 18 Feb 2022 02:45 )             22.3     02-18    141  |  110<H>  |  34<H>  ----------------------------<  97  4.5   |  20<L>  |  2.00<H>    Ca    8.3<L>      18 Feb 2022 00:47      proBNP:   Lipid Profile:   HgA1c:   TSH:     Negative          TELEMETRY: 	    ECG:  	  RADIOLOGY:   DIAGNOSTIC TESTING:  [ ] Echocardiogram:  [ ]  Catheterization:  [ ] Stress Test:    OTHER:

## 2022-02-18 NOTE — CONSULT NOTE ADULT - ASSESSMENT
77 y/o female PMHx of CKD IV, Gout, HTN, HLD w/ presented to OSH s/p fall 2/2 syncopal episode found to have C2 fracture w/ hospital course c/b fever.   treated for gout flare w/ prednisone x 3 days    fever  pt reports feeling better today since starting antibiotics  UA positive, pt endorsed urinary urgency and frequency, suspect UTI  c/w ceftriaxone 1 g daily for now  if she becomes hemodynamically unstable can switch to zosyn while cultures pending  f/u blood cultures  f/u urine culture    cervical spine fracture  s/p CT head/ C-spine- Acute C2 fracture of the body of the dens extending to the left lateral mass. Right C2 facet fracture. Mild acute compression fracture of the superior endplate of C3  evaluated by ortho  wearing C-collar    fall/syncope  on telemetry  s/p TTE- no mention of vegetations  orthostatic positive  on midodrine    CKD  renally dose medications  nephrology following    Dr. Yee Cahvez covering 2/19-2/20    Covering for Dr. Yee Peña M.D.  [facility], Division of Infectious Diseases  [phone]  After 5pm on weekdays and all day on weekends - please call [phone]

## 2022-02-18 NOTE — PROGRESS NOTE ADULT - SUBJECTIVE AND OBJECTIVE BOX
[facility]  Bogdan Johnson PA-C  [facility] [place] [address]  [phone]      INTERVAL HPI/OVERNIGHT EVENTS:  pt seen and examined, no new events  +BMs daily, still with pain but improving     MEDICATIONS  (STANDING):  allopurinol 100 milliGRAM(s) Oral daily  artificial  tears Solution 1 Drop(s) Both EYES four times a day  bisacodyl 5 milliGRAM(s) Oral at bedtime  gabapentin Solution 50 milliGRAM(s) Oral two times a day  heparin   Injectable 5000 Unit(s) SubCutaneous every 8 hours  hydrocortisone hemorrhoidal Suppository 1 Suppository(s) Rectal daily  latanoprost 0.005% Ophthalmic Solution 1 Drop(s) Both EYES at bedtime  levothyroxine 25 MICROGram(s) Oral daily  mesalamine Suppository 1000 milliGRAM(s) Rectal at bedtime  polyethylene glycol 3350 17 Gram(s) Oral two times a day  sevelamer carbonate 800 milliGRAM(s) Oral three times a day with meals  simvastatin 40 milliGRAM(s) Oral at bedtime  sodium bicarbonate 650 milliGRAM(s) Oral two times a day  sodium chloride 0.45%. 250 milliLiter(s) (250 mL/Hr) IV Continuous <Continuous>  sodium chloride 0.9% Bolus 1000 milliLiter(s) IV Bolus once  sodium zirconium cyclosilicate 5 Gram(s) Oral once    MEDICATIONS  (PRN):  acetaminophen     Tablet .. 650 milliGRAM(s) Oral every 6 hours PRN Mild Pain (1 - 3)      Allergies    No Known Allergies    Intolerances        ROS:   General:  No wt loss, fevers, chills, night sweats, fatigue,   Eyes:  Good vision, no reported pain  ENT:  No sore throat, pain, runny nose, dysphagia  CV:  No pain, palpitations, hypo/hypertension  Resp:  No dyspnea, cough, tachypnea, wheezing  GI:  + pain, No nausea, No vomiting, No diarrhea, + constipation, No weight loss, No fever, No pruritis, No rectal bleeding, No tarry stools, No dysphagia,  :  No pain, bleeding, incontinence, nocturia  Muscle:  No pain, weakness  Neuro:  No weakness, tingling, memory problems  Psych:  No fatigue, insomnia, mood problems, depression  Endocrine:  No polyuria, polydipsia, cold/heat intolerance  Heme:  No petechiae, ecchymosis, easy bruisability  Skin:  No rash, tattoos, scars, edema      PHYSICAL EXAM:   Vital Signs Last 24 Hrs  T(C): 37.2 (18 Feb 2022 09:40), Max: 38.7 (18 Feb 2022 04:23)  T(F): 99 (18 Feb 2022 09:40), Max: 101.7 (18 Feb 2022 04:23)  HR: 97 (18 Feb 2022 09:40) (59 - 103)  BP: 103/55 (18 Feb 2022 09:40) (103/55 - 176/72)  BP(mean): --  RR: 18 (18 Feb 2022 09:40) (18 - 18)  SpO2: 99% (18 Feb 2022 09:40) (98% - 100%)  Daily     Daily     GENERAL:  Appears stated age,   HEENT:  NC/AT,    CHEST:  Full & symmetric excursion,   HEART:  Regular rhythm,  ABDOMEN:  Soft, non-tender, non-distended,  EXTEREMITIES:  no cyanosis  SKIN:  No rash  NEURO:  Alert,       LABS:                        6.5    10.32 )-----------( 252      ( 18 Feb 2022 02:45 )             22.3   02-18    141  |  110<H>  |  34<H>  ----------------------------<  97  4.5   |  20<L>  |  2.00<H>    Ca    8.3<L>      18 Feb 2022 00:47              RADIOLOGY & ADDITIONAL TESTS:

## 2022-02-19 LAB
ANION GAP SERPL CALC-SCNC: 10 MMOL/L — SIGNIFICANT CHANGE UP (ref 5–17)
BUN SERPL-MCNC: 26 MG/DL — HIGH (ref 7–23)
CALCIUM SERPL-MCNC: 9 MG/DL — SIGNIFICANT CHANGE UP (ref 8.4–10.5)
CHLORIDE SERPL-SCNC: 110 MMOL/L — HIGH (ref 96–108)
CO2 SERPL-SCNC: 20 MMOL/L — LOW (ref 22–31)
CREAT SERPL-MCNC: 1.8 MG/DL — HIGH (ref 0.5–1.3)
GLUCOSE SERPL-MCNC: 91 MG/DL — SIGNIFICANT CHANGE UP (ref 70–99)
HCT VFR BLD CALC: 27.9 % — LOW (ref 34.5–45)
HGB BLD-MCNC: 8.5 G/DL — LOW (ref 11.5–15.5)
MCHC RBC-ENTMCNC: 26.2 PG — LOW (ref 27–34)
MCHC RBC-ENTMCNC: 30.5 GM/DL — LOW (ref 32–36)
MCV RBC AUTO: 86.1 FL — SIGNIFICANT CHANGE UP (ref 80–100)
NRBC # BLD: 0 /100 WBCS — SIGNIFICANT CHANGE UP (ref 0–0)
PLATELET # BLD AUTO: 231 K/UL — SIGNIFICANT CHANGE UP (ref 150–400)
POTASSIUM SERPL-MCNC: 4.5 MMOL/L — SIGNIFICANT CHANGE UP (ref 3.5–5.3)
POTASSIUM SERPL-SCNC: 4.5 MMOL/L — SIGNIFICANT CHANGE UP (ref 3.5–5.3)
RBC # BLD: 3.24 M/UL — LOW (ref 3.8–5.2)
RBC # FLD: 17.5 % — HIGH (ref 10.3–14.5)
SODIUM SERPL-SCNC: 140 MMOL/L — SIGNIFICANT CHANGE UP (ref 135–145)
WBC # BLD: 12.14 K/UL — HIGH (ref 3.8–10.5)
WBC # FLD AUTO: 12.14 K/UL — HIGH (ref 3.8–10.5)

## 2022-02-19 RX ORDER — DOCOSANOL 100 MG/G
1 CREAM TOPICAL
Refills: 0 | Status: COMPLETED | OUTPATIENT
Start: 2022-02-19 | End: 2022-02-23

## 2022-02-19 RX ORDER — PIPERACILLIN AND TAZOBACTAM 4; .5 G/20ML; G/20ML
3.38 INJECTION, POWDER, LYOPHILIZED, FOR SOLUTION INTRAVENOUS EVERY 12 HOURS
Refills: 0 | Status: DISCONTINUED | OUTPATIENT
Start: 2022-02-20 | End: 2022-02-23

## 2022-02-19 RX ORDER — PIPERACILLIN AND TAZOBACTAM 4; .5 G/20ML; G/20ML
3.38 INJECTION, POWDER, LYOPHILIZED, FOR SOLUTION INTRAVENOUS ONCE
Refills: 0 | Status: COMPLETED | OUTPATIENT
Start: 2022-02-20 | End: 2022-02-20

## 2022-02-19 RX ORDER — HYDRALAZINE HCL 50 MG
5 TABLET ORAL ONCE
Refills: 0 | Status: COMPLETED | OUTPATIENT
Start: 2022-02-19 | End: 2022-02-19

## 2022-02-19 RX ORDER — PIPERACILLIN AND TAZOBACTAM 4; .5 G/20ML; G/20ML
INJECTION, POWDER, LYOPHILIZED, FOR SOLUTION INTRAVENOUS
Refills: 0 | Status: DISCONTINUED | OUTPATIENT
Start: 2022-02-20 | End: 2022-02-23

## 2022-02-19 RX ORDER — PIPERACILLIN AND TAZOBACTAM 4; .5 G/20ML; G/20ML
3.38 INJECTION, POWDER, LYOPHILIZED, FOR SOLUTION INTRAVENOUS ONCE
Refills: 0 | Status: COMPLETED | OUTPATIENT
Start: 2022-02-19 | End: 2022-02-19

## 2022-02-19 RX ADMIN — Medication 1 DROP(S): at 12:25

## 2022-02-19 RX ADMIN — Medication 1 SUPPOSITORY(S): at 12:26

## 2022-02-19 RX ADMIN — DOCOSANOL 1 APPLICATION(S): 100 CREAM TOPICAL at 21:43

## 2022-02-19 RX ADMIN — Medication 650 MILLIGRAM(S): at 05:39

## 2022-02-19 RX ADMIN — GABAPENTIN 50 MILLIGRAM(S): 400 CAPSULE ORAL at 18:14

## 2022-02-19 RX ADMIN — Medication 100 MILLIGRAM(S): at 12:26

## 2022-02-19 RX ADMIN — SIMVASTATIN 40 MILLIGRAM(S): 20 TABLET, FILM COATED ORAL at 21:42

## 2022-02-19 RX ADMIN — PIPERACILLIN AND TAZOBACTAM 200 GRAM(S): 4; .5 INJECTION, POWDER, LYOPHILIZED, FOR SOLUTION INTRAVENOUS at 22:41

## 2022-02-19 RX ADMIN — Medication 1 DROP(S): at 18:13

## 2022-02-19 RX ADMIN — Medication 650 MILLIGRAM(S): at 18:12

## 2022-02-19 RX ADMIN — DOCOSANOL 1 APPLICATION(S): 100 CREAM TOPICAL at 16:11

## 2022-02-19 RX ADMIN — LATANOPROST 1 DROP(S): 0.05 SOLUTION/ DROPS OPHTHALMIC; TOPICAL at 21:44

## 2022-02-19 RX ADMIN — Medication 650 MILLIGRAM(S): at 21:50

## 2022-02-19 RX ADMIN — GABAPENTIN 50 MILLIGRAM(S): 400 CAPSULE ORAL at 05:42

## 2022-02-19 RX ADMIN — SEVELAMER CARBONATE 800 MILLIGRAM(S): 2400 POWDER, FOR SUSPENSION ORAL at 08:14

## 2022-02-19 RX ADMIN — PANTOPRAZOLE SODIUM 40 MILLIGRAM(S): 20 TABLET, DELAYED RELEASE ORAL at 06:39

## 2022-02-19 RX ADMIN — Medication 650 MILLIGRAM(S): at 22:15

## 2022-02-19 RX ADMIN — Medication 1 DROP(S): at 05:42

## 2022-02-19 RX ADMIN — DOCOSANOL 1 APPLICATION(S): 100 CREAM TOPICAL at 08:22

## 2022-02-19 RX ADMIN — SEVELAMER CARBONATE 800 MILLIGRAM(S): 2400 POWDER, FOR SUSPENSION ORAL at 18:12

## 2022-02-19 RX ADMIN — DOCOSANOL 1 APPLICATION(S): 100 CREAM TOPICAL at 12:25

## 2022-02-19 RX ADMIN — CEFTRIAXONE 100 MILLIGRAM(S): 500 INJECTION, POWDER, FOR SOLUTION INTRAMUSCULAR; INTRAVENOUS at 12:24

## 2022-02-19 RX ADMIN — Medication 25 MICROGRAM(S): at 05:39

## 2022-02-19 RX ADMIN — Medication 5 MILLIGRAM(S): at 06:48

## 2022-02-19 RX ADMIN — SEVELAMER CARBONATE 800 MILLIGRAM(S): 2400 POWDER, FOR SUSPENSION ORAL at 12:26

## 2022-02-19 NOTE — PROGRESS NOTE ADULT - ASSESSMENT
77yo female pt with PMHx of CKD (Last K-5.3, Bun/Cr.-29/1.93 on 7/18/2020), Anemia, Gout, HTN, HLD, was transferred, on cervical collar, from [facility] c/o head/neck pain, C2 fracture s/p fall this morning with a syncopal episode.

## 2022-02-19 NOTE — PROGRESS NOTE ADULT - ASSESSMENT
75yo female pt with PMHx of CKD (Last K-5.3, Bun/Cr.-29/1.93 on 7/18/2020), Anemia, Gout, HTN, HLD, was transferred, on cervical collar, from [facility] c/o head/neck pain, C2 fracture s/p fall this morning with a syncopal episode.

## 2022-02-19 NOTE — PROGRESS NOTE ADULT - SUBJECTIVE AND OBJECTIVE BOX
Infectious Diseases progress note:    Subjective: NAD, afebrile.  Daughter at bedside.  c/o new canker sore on lower lip, had nosebleed earlier today.  Daughter states hematuria improved, pt still c/o some dysuria.  WBC 12.1     ROS:  CONSTITUTIONAL:  No fever, chills, rigors  CARDIOVASCULAR:  No chest pain or palpitations  RESPIRATORY:   No SOB, cough, dyspnea on exertion.  No wheezing  GASTROINTESTINAL:  No abd pain, N/V, diarrhea/constipation  EXTREMITIES:  No swelling or joint pain  GENITOURINARY:  No burning on urination, increased frequency or urgency.  No flank pain  NEUROLOGIC:  No HA, visual disturbances  SKIN: No rashes    Allergies    No Known Allergies    Intolerances        ANTIBIOTICS/RELEVANT:  antimicrobials  cefTRIAXone   IVPB 1000 milliGRAM(s) IV Intermittent every 24 hours    immunologic:    OTHER:  acetaminophen     Tablet .. 650 milliGRAM(s) Oral every 6 hours PRN  allopurinol 100 milliGRAM(s) Oral daily  artificial  tears Solution 1 Drop(s) Both EYES four times a day  bisacodyl 5 milliGRAM(s) Oral at bedtime  docosanol 10% Cream 1 Application(s) Topical five times a day  gabapentin Solution 50 milliGRAM(s) Oral two times a day  heparin   Injectable 5000 Unit(s) SubCutaneous every 8 hours  hydrocortisone hemorrhoidal Suppository 1 Suppository(s) Rectal daily  latanoprost 0.005% Ophthalmic Solution 1 Drop(s) Both EYES at bedtime  levothyroxine 25 MICROGram(s) Oral daily  mesalamine Suppository 1000 milliGRAM(s) Rectal at bedtime  midodrine. 5 milliGRAM(s) Oral <User Schedule>  pantoprazole    Tablet 40 milliGRAM(s) Oral before breakfast  polyethylene glycol 3350 17 Gram(s) Oral two times a day  sevelamer carbonate 800 milliGRAM(s) Oral three times a day with meals  simvastatin 40 milliGRAM(s) Oral at bedtime  sodium bicarbonate 650 milliGRAM(s) Oral two times a day  sodium zirconium cyclosilicate 5 Gram(s) Oral daily      Objective:  Vital Signs Last 24 Hrs  T(C): 36.9 (2022 11:00), Max: 38.4 (2022 16:05)  T(F): 98.5 (2022 11:00), Max: 101.1 (2022 16:05)  HR: 91 (2022 11:00) (75 - 91)  BP: 130/64 (2022 11:00) (124/63 - 182/74)  BP(mean): --  RR: 18 (2022 11:00) (18 - 20)  SpO2: 99% (2022 11:00) (98% - 100%)    PHYSICAL EXAM:  Constitutional:NAD, thin appearing  Eyes:BLAIR, EOMI  Ear/Nose/Throat: c-collar, lower lip lesion	  Neck:no JVD, no lymphadenopathy, supple  Respiratory: CTA kleber  Cardiovascular: S1S2 RRR, no murmurs  Gastrointestinal:soft, nontender,  nondistended (+) BS  Extremities:no e/e/c  Skin:  no rashes, open wounds or ulcerations  : David draining cloudy yellow/mild pink urine        LABS:                        8.5    12.14 )-----------( 231      ( 2022 06:20 )             27.9     02-19    140  |  110<H>  |  26<H>  ----------------------------<  91  4.5   |  20<L>  |  1.80<H>    Ca    9.0      2022 06:20        Urinalysis Basic - ( 2022 18:58 )    Color: Light Yellow / Appearance: Clear / S.010 / pH: x  Gluc: x / Ketone: Negative  / Bili: Negative / Urobili: Negative   Blood: x / Protein: 30 mg/dL / Nitrite: Negative   Leuk Esterase: Moderate / RBC: 2 /hpf / WBC 20 /HPF   Sq Epi: x / Non Sq Epi: 0 /hpf / Bacteria: Many              MICROBIOLOGY:    Culture - Blood (22 @ 02:46)   Specimen Source: .Blood Blood-Peripheral   Culture Results:   No growth to date.     Culture - Blood (22 @ 02:46)   Specimen Source: .Blood Blood-Peripheral   Culture Results:   No growth to date.       RADIOLOGY & ADDITIONAL STUDIES:    < from: Xray Chest 1 View- PORTABLE-Urgent (Xray Chest 1 View- PORTABLE-Urgent .) (22 @ 17:14) >  FINDINGS:  Heart/Vascular: Heart is top normal in size.  Pulmonary: Lungs are clear. No pleural effusion or pneumothorax  Bones: Bony structures are intact.    Impression:  Clear lungs.    < end of copied text >

## 2022-02-19 NOTE — PROGRESS NOTE ADULT - SUBJECTIVE AND OBJECTIVE BOX
[facility] : Ans Serv [phone], Office [phone]  Dr Crouch/Dr Howe  /Dr Trever ambrosio /Dr JOSIANE Brown/Dr Emeterio Car/Dr Michael Hartmann /Dr AKIRA Alonzo  _______________________________________________________________________________________________    seen and examined today for renal failure  Interval : Serum Creatinine stable  VITALS:  T(F): 98 (02-19-22 @ 08:15), Max: 101.1 (02-18-22 @ 16:05)  HR: 90 (02-19-22 @ 08:15)  BP: 135/67 (02-19-22 @ 08:15)  RR: 20 (02-19-22 @ 08:15)  SpO2: 98% (02-19-22 @ 08:15)  Wt(kg): --    02-18 @ 07:01  -  02-19 @ 07:00  --------------------------------------------------------  IN: 720 mL / OUT: 300 mL / NET: 420 mL    02-19 @ 07:01  -  02-19 @ 08:57  --------------------------------------------------------  IN: 280 mL / OUT: 0 mL / NET: 280 mL  Physical Exam :-  Constitutional: NAD  Respiratory: Bilateral equal breath sounds, no Crackles present.  Cardiovascular: S1, S2 normal, positive Murmur  Gastrointestinal: Bowel Sounds present, soft, non tender.  Extremities: no Edema Feet  Neurological: Alert and Oriented x 3  Psychiatric: Normal mood, normal affect  Data:-  Allergies :   No Known Allergies    Hospital Medications:   MEDICATIONS  (STANDING):  allopurinol 100 milliGRAM(s) Oral daily  artificial  tears Solution 1 Drop(s) Both EYES four times a day  bisacodyl 5 milliGRAM(s) Oral at bedtime  cefTRIAXone   IVPB 1000 milliGRAM(s) IV Intermittent every 24 hours  docosanol 10% Cream 1 Application(s) Topical five times a day  gabapentin Solution 50 milliGRAM(s) Oral two times a day  heparin   Injectable 5000 Unit(s) SubCutaneous every 8 hours  hydrocortisone hemorrhoidal Suppository 1 Suppository(s) Rectal daily  latanoprost 0.005% Ophthalmic Solution 1 Drop(s) Both EYES at bedtime  levothyroxine 25 MICROGram(s) Oral daily  mesalamine Suppository 1000 milliGRAM(s) Rectal at bedtime  midodrine. 5 milliGRAM(s) Oral <User Schedule>  pantoprazole    Tablet 40 milliGRAM(s) Oral before breakfast  polyethylene glycol 3350 17 Gram(s) Oral two times a day  sevelamer carbonate 800 milliGRAM(s) Oral three times a day with meals  simvastatin 40 milliGRAM(s) Oral at bedtime  sodium bicarbonate 650 milliGRAM(s) Oral two times a day  sodium zirconium cyclosilicate 5 Gram(s) Oral daily    02-19    140  |  110<H>  |  26<H>  ----------------------------<  91  4.5   |  20<L>  |  1.80<H>    Ca    9.0      19 Feb 2022 06:20      Creatinine Trend: 1.80 <--, 2.00 <--, 1.93 <--, 2.59 <--, 2.18 <--, 2.09 <--, 1.78 <--                        8.5    12.14 )-----------( 231      ( 19 Feb 2022 06:20 )             27.9

## 2022-02-19 NOTE — PROGRESS NOTE ADULT - SUBJECTIVE AND OBJECTIVE BOX
Subjective: Patient seen and examined. No new events except as noted.     REVIEW OF SYSTEMS:    CONSTITUTIONAL: No weakness, fevers or chills  EYES/ENT: No visual changes;  No vertigo or throat pain   NECK: No pain or stiffness  RESPIRATORY: No cough, wheezing, hemoptysis; No shortness of breath  CARDIOVASCULAR: No chest pain or palpitations  GASTROINTESTINAL: No abdominal or epigastric pain. No nausea, vomiting, or hematemesis; No diarrhea or constipation. No melena or hematochezia.  GENITOURINARY: No dysuria, frequency or hematuria  NEUROLOGICAL: No numbness or weakness  SKIN: No itching, burning, rashes, or lesions   All other review of systems is negative unless indicated above.    MEDICATIONS:  MEDICATIONS  (STANDING):  allopurinol 100 milliGRAM(s) Oral daily  artificial  tears Solution 1 Drop(s) Both EYES four times a day  bisacodyl 5 milliGRAM(s) Oral at bedtime  cefTRIAXone   IVPB 1000 milliGRAM(s) IV Intermittent every 24 hours  docosanol 10% Cream 1 Application(s) Topical five times a day  gabapentin Solution 50 milliGRAM(s) Oral two times a day  heparin   Injectable 5000 Unit(s) SubCutaneous every 8 hours  hydrocortisone hemorrhoidal Suppository 1 Suppository(s) Rectal daily  latanoprost 0.005% Ophthalmic Solution 1 Drop(s) Both EYES at bedtime  levothyroxine 25 MICROGram(s) Oral daily  mesalamine Suppository 1000 milliGRAM(s) Rectal at bedtime  midodrine. 5 milliGRAM(s) Oral <User Schedule>  pantoprazole    Tablet 40 milliGRAM(s) Oral before breakfast  polyethylene glycol 3350 17 Gram(s) Oral two times a day  sevelamer carbonate 800 milliGRAM(s) Oral three times a day with meals  simvastatin 40 milliGRAM(s) Oral at bedtime  sodium bicarbonate 650 milliGRAM(s) Oral two times a day  sodium zirconium cyclosilicate 5 Gram(s) Oral daily      PHYSICAL EXAM:  T(C): 37.2 (02-19-22 @ 16:47), Max: 37.2 (02-18-22 @ 20:41)  HR: 90 (02-19-22 @ 16:47) (75 - 91)  BP: 159/75 (02-19-22 @ 16:47) (124/63 - 182/74)  RR: 18 (02-19-22 @ 16:47) (18 - 20)  SpO2: 98% (02-19-22 @ 16:47) (98% - 100%)  Wt(kg): --  I&O's Summary    18 Feb 2022 07:01  -  19 Feb 2022 07:00  --------------------------------------------------------  IN: 720 mL / OUT: 300 mL / NET: 420 mL    19 Feb 2022 07:01  -  19 Feb 2022 19:47  --------------------------------------------------------  IN: 640 mL / OUT: 0 mL / NET: 640 mL            Appearance: NAD, c-collar on   HEENT: Normal oral mucosa, PERRL, EOMI	  Lymphatic: No lymphadenopathy , no edema  Cardiovascular: Normal S1 S2, No JVD, No murmurs , Peripheral pulses palpable 2+ bilaterally  Respiratory: Lungs clear to auscultation, normal effort 	  Gastrointestinal:  Soft, Non-tender, + BS	  Skin: No rashes, No ecchymoses, No cyanosis, warm to touch  Neuro: alert oriented x 3 attention comprehension are fair.  Able to name, repeat.   EOmi fundi not visualized   no nystagmus VFF to confrontation  Tongue is midline  Palate elevates symmetrically   Moving all 4 ext spontaneously no drift appreciated  Gait not assessed.   Ext: No edema      LABS:    CARDIAC MARKERS:                                8.5    12.14 )-----------( 231      ( 19 Feb 2022 06:20 )             27.9     02-19    140  |  110<H>  |  26<H>  ----------------------------<  91  4.5   |  20<L>  |  1.80<H>    Ca    9.0      19 Feb 2022 06:20      proBNP:   Lipid Profile:   HgA1c:   TSH:     Negative          TELEMETRY: 	    ECG:  	  RADIOLOGY:   DIAGNOSTIC TESTING:  [ ] Echocardiogram:  [ ]  Catheterization:  [ ] Stress Test:    OTHER:

## 2022-02-19 NOTE — PROGRESS NOTE ADULT - SUBJECTIVE AND OBJECTIVE BOX
Patient is a 76y old  Female who presents with a chief complaint of syncope , cervical spine fracture (19 Feb 2022 19:47)      SUBJECTIVE / OVERNIGHT EVENTS: tolerating Abx, urine cx not back    MEDICATIONS  (STANDING):  allopurinol 100 milliGRAM(s) Oral daily  artificial  tears Solution 1 Drop(s) Both EYES four times a day  bisacodyl 5 milliGRAM(s) Oral at bedtime  cefTRIAXone   IVPB 1000 milliGRAM(s) IV Intermittent every 24 hours  docosanol 10% Cream 1 Application(s) Topical five times a day  gabapentin Solution 50 milliGRAM(s) Oral two times a day  heparin   Injectable 5000 Unit(s) SubCutaneous every 8 hours  hydrocortisone hemorrhoidal Suppository 1 Suppository(s) Rectal daily  latanoprost 0.005% Ophthalmic Solution 1 Drop(s) Both EYES at bedtime  levothyroxine 25 MICROGram(s) Oral daily  mesalamine Suppository 1000 milliGRAM(s) Rectal at bedtime  midodrine. 5 milliGRAM(s) Oral <User Schedule>  pantoprazole    Tablet 40 milliGRAM(s) Oral before breakfast  polyethylene glycol 3350 17 Gram(s) Oral two times a day  sevelamer carbonate 800 milliGRAM(s) Oral three times a day with meals  simvastatin 40 milliGRAM(s) Oral at bedtime  sodium bicarbonate 650 milliGRAM(s) Oral two times a day  sodium zirconium cyclosilicate 5 Gram(s) Oral daily    MEDICATIONS  (PRN):  acetaminophen     Tablet .. 650 milliGRAM(s) Oral every 6 hours PRN Mild Pain (1 - 3)      Vital Signs Last 24 Hrs  T(F): 98.8 (02-19-22 @ 19:56), Max: 99 (02-19-22 @ 01:05)  HR: 85 (02-19-22 @ 19:56) (75 - 91)  BP: 167/71 (02-19-22 @ 19:56) (130/64 - 182/74)  RR: 18 (02-19-22 @ 19:56) (18 - 20)  SpO2: 99% (02-19-22 @ 19:56) (98% - 100%)  Telemetry:   CAPILLARY BLOOD GLUCOSE        I&O's Summary    18 Feb 2022 07:01  -  19 Feb 2022 07:00  --------------------------------------------------------  IN: 720 mL / OUT: 300 mL / NET: 420 mL    19 Feb 2022 07:01  -  19 Feb 2022 21:55  --------------------------------------------------------  IN: 1000 mL / OUT: 0 mL / NET: 1000 mL        PHYSICAL EXAM:  GENERAL: NAD, well-developed  HEAD:  Atraumatic, Normocephalic  EYES: EOMI, PERRLA, conjunctiva and sclera clear  NECK: Supple, No JVD  CHEST/LUNG: Clear to auscultation bilaterally; No wheeze  HEART: Regular rate and rhythm; No murmurs, rubs, or gallops  ABDOMEN: Soft, Nontender, Nondistended; Bowel sounds present  EXTREMITIES:  2+ Peripheral Pulses, No clubbing, cyanosis, or edema  PSYCH: AAOx3  NEUROLOGY: non-focal  SKIN: No rashes or lesions    LABS:                        8.5    12.14 )-----------( 231      ( 19 Feb 2022 06:20 )             27.9     02-19    140  |  110<H>  |  26<H>  ----------------------------<  91  4.5   |  20<L>  |  1.80<H>    Ca    9.0      19 Feb 2022 06:20                RADIOLOGY & ADDITIONAL TESTS:    Imaging Personally Reviewed:    Consultant(s) Notes Reviewed:      Care Discussed with Consultants/Other Providers:

## 2022-02-19 NOTE — PROGRESS NOTE ADULT - ASSESSMENT
76 year-old woman with history of multiple medical issues including hypertension, hyperlipidemia, and chronic kidney disease. She was transferred to [facility] yesterday from the [facility] ER after being noted on imaging to have a C2 fracture, s/p syncopal episode. Following for CKD    CKD 4 - likely Hypertensive Nephropathy  Serum stable - at baseline  appears to be ranging around 1.8 to 2.0mg/dl  renal us shows cortical calcifications    Plan  cont monitor Serum Creatinine level  hematuria- consider urology eval  fluctuations expected  Renal diet  daily BMP    Hyperkalemia  improved  c/w Lokelma 5mg PO QD  Low K diet    Metabolic acidosis due to renal failure likely   on  Nabicarb 650mg po bid  trend bicarb  goal bicarb 22     Cervical fracture  pain management  Neurosx recs appreciated

## 2022-02-19 NOTE — PROGRESS NOTE ADULT - ASSESSMENT
77 y/o female PMHx of CKD IV, Gout, HTN, HLD w/ presented to OSH s/p fall 2/2 syncopal episode found to have C2 fracture w/ hospital course c/b fever.   treated for gout flare w/ prednisone x 3 days    fever  pt reports feeling better today since starting antibiotics  UA positive, pt endorsed urinary urgency and frequency, suspect UTI  c/w ceftriaxone 1 g daily for now  if she becomes hemodynamically unstable can switch to zosyn while cultures pending  f/u blood cultures  f/u urine culture    cervical spine fracture  s/p CT head/ C-spine- Acute C2 fracture of the body of the dens extending to the left lateral mass. Right C2 facet fracture. Mild acute compression fracture of the superior endplate of C3  evaluated by ortho  wearing C-collar    fall/syncope  on telemetry  s/p TTE- no mention of vegetations  orthostatic positive  on midodrine    CKD  renally dose medications  nephrology following    2/19 - WBC 12.1.  Bcx ngtd.  Still with some dysuria, cont on rocephin.  Ucx testing.  Pt to start on Abreva for cold sore as per primary team.    Yee Chavez  [phone]

## 2022-02-20 LAB — OB PNL STL: NEGATIVE — SIGNIFICANT CHANGE UP

## 2022-02-20 RX ORDER — LANOLIN ALCOHOL/MO/W.PET/CERES
3 CREAM (GRAM) TOPICAL AT BEDTIME
Refills: 0 | Status: DISCONTINUED | OUTPATIENT
Start: 2022-02-20 | End: 2022-03-02

## 2022-02-20 RX ADMIN — PIPERACILLIN AND TAZOBACTAM 25 GRAM(S): 4; .5 INJECTION, POWDER, LYOPHILIZED, FOR SOLUTION INTRAVENOUS at 17:31

## 2022-02-20 RX ADMIN — SEVELAMER CARBONATE 800 MILLIGRAM(S): 2400 POWDER, FOR SUSPENSION ORAL at 09:39

## 2022-02-20 RX ADMIN — Medication 1 SUPPOSITORY(S): at 12:14

## 2022-02-20 RX ADMIN — GABAPENTIN 50 MILLIGRAM(S): 400 CAPSULE ORAL at 17:42

## 2022-02-20 RX ADMIN — Medication 25 MICROGRAM(S): at 06:43

## 2022-02-20 RX ADMIN — DOCOSANOL 1 APPLICATION(S): 100 CREAM TOPICAL at 20:23

## 2022-02-20 RX ADMIN — Medication 650 MILLIGRAM(S): at 10:09

## 2022-02-20 RX ADMIN — Medication 1 DROP(S): at 17:32

## 2022-02-20 RX ADMIN — SIMVASTATIN 40 MILLIGRAM(S): 20 TABLET, FILM COATED ORAL at 21:10

## 2022-02-20 RX ADMIN — Medication 1 DROP(S): at 06:44

## 2022-02-20 RX ADMIN — DOCOSANOL 1 APPLICATION(S): 100 CREAM TOPICAL at 09:37

## 2022-02-20 RX ADMIN — Medication 100 MILLIGRAM(S): at 11:24

## 2022-02-20 RX ADMIN — LATANOPROST 1 DROP(S): 0.05 SOLUTION/ DROPS OPHTHALMIC; TOPICAL at 21:10

## 2022-02-20 RX ADMIN — SEVELAMER CARBONATE 800 MILLIGRAM(S): 2400 POWDER, FOR SUSPENSION ORAL at 12:12

## 2022-02-20 RX ADMIN — Medication 650 MILLIGRAM(S): at 17:34

## 2022-02-20 RX ADMIN — Medication 650 MILLIGRAM(S): at 06:43

## 2022-02-20 RX ADMIN — SEVELAMER CARBONATE 800 MILLIGRAM(S): 2400 POWDER, FOR SUSPENSION ORAL at 17:32

## 2022-02-20 RX ADMIN — Medication 1 DROP(S): at 11:24

## 2022-02-20 RX ADMIN — DOCOSANOL 1 APPLICATION(S): 100 CREAM TOPICAL at 12:14

## 2022-02-20 RX ADMIN — PANTOPRAZOLE SODIUM 40 MILLIGRAM(S): 20 TABLET, DELAYED RELEASE ORAL at 06:43

## 2022-02-20 RX ADMIN — Medication 3 MILLIGRAM(S): at 21:06

## 2022-02-20 RX ADMIN — Medication 650 MILLIGRAM(S): at 09:39

## 2022-02-20 RX ADMIN — GABAPENTIN 50 MILLIGRAM(S): 400 CAPSULE ORAL at 06:43

## 2022-02-20 RX ADMIN — Medication 1 DROP(S): at 00:23

## 2022-02-20 RX ADMIN — SODIUM ZIRCONIUM CYCLOSILICATE 5 GRAM(S): 10 POWDER, FOR SUSPENSION ORAL at 13:53

## 2022-02-20 RX ADMIN — DOCOSANOL 1 APPLICATION(S): 100 CREAM TOPICAL at 00:23

## 2022-02-20 RX ADMIN — PIPERACILLIN AND TAZOBACTAM 25 GRAM(S): 4; .5 INJECTION, POWDER, LYOPHILIZED, FOR SOLUTION INTRAVENOUS at 03:54

## 2022-02-20 RX ADMIN — DOCOSANOL 1 APPLICATION(S): 100 CREAM TOPICAL at 17:32

## 2022-02-20 NOTE — PROGRESS NOTE ADULT - ASSESSMENT
75yo female pt with PMHx of CKD (Last K-5.3, Bun/Cr.-29/1.93 on 7/18/2020), Anemia, Gout, HTN, HLD, was transferred, on cervical collar, from [facility] c/o head/neck pain, C2 fracture s/p fall this morning with a syncopal episode.

## 2022-02-20 NOTE — PROGRESS NOTE ADULT - ASSESSMENT
76 year-old woman with history of multiple medical issues including hypertension, hyperlipidemia, and chronic kidney disease. She was transferred to [facility] yesterday from the [facility] ER after being noted on imaging to have a C2 fracture, s/p syncopal episode. Following for CKD    CKD 4 - likely Hypertensive Nephropathy  Serum stable - at baseline  appears to be ranging around 1.8 to 2.0mg/dl  renal us shows cortical calcifications    Plan  cont monitor Serum Creatinine level  hematuria- consider urology eval  fluctuations expected  Renal diet  daily BMP\  on sevelamer for high phos if improved plan to stop - check serum phos am     Hyperkalemia  improved  c/w Lokelma 5mg PO QD  Low K diet    Metabolic acidosis due to renal failure likely   on  Nabicarb 650mg po bid  trend bicarb  goal bicarb 22     Cervical fracture  pain management  Neurosx recs appreciated

## 2022-02-20 NOTE — PROGRESS NOTE ADULT - ASSESSMENT
75yo female pt with PMHx of CKD (Last K-5.3, Bun/Cr.-29/1.93 on 7/18/2020), Anemia, Gout, HTN, HLD, was transferred, on cervical collar, from [facility] c/o head/neck pain, C2 fracture s/p fall this morning with a syncopal episode.

## 2022-02-20 NOTE — PROGRESS NOTE ADULT - SUBJECTIVE AND OBJECTIVE BOX
[facility] : Ans Serv [phone], Office [phone]  Dr Crouch/Dr Howe  /Dr Trever ambrosio /Dr JOSIANE Brown/Dr Emeterio Car/Dr Michael Hartmann /Dr AKIRA Alonzo  _______________________________________________________________________________________________    seen and examined today for renal failure  Interval : Serum Creatinine stable  VITALS:  T(F): 98.3 (02-20-22 @ 08:50), Max: 99 (02-19-22 @ 16:47)  HR: 88 (02-20-22 @ 08:50)  BP: 147/70 (02-20-22 @ 08:50)  RR: 20 (02-20-22 @ 08:50)  SpO2: 98% (02-20-22 @ 08:50)  Wt(kg): --    02-19 @ 07:01  -  02-20 @ 07:00  --------------------------------------------------------  IN: 1100 mL / OUT: 1300 mL / NET: -200 mL    Physical Exam :-  Constitutional: NAD, cervical color in place  Respiratory: Bilateral equal breath sounds, no Crackles present.  Cardiovascular: S1, S2 normal, positive Murmur  Gastrointestinal: Bowel Sounds present, soft, non tender.  Extremities: no Edema Feet  Neurological: Alert   Psychiatric: Normal mood, normal affect  Data:-  Allergies :   No Known Allergies    Hospital Medications:   MEDICATIONS  (STANDING):  allopurinol 100 milliGRAM(s) Oral daily  artificial  tears Solution 1 Drop(s) Both EYES four times a day  bisacodyl 5 milliGRAM(s) Oral at bedtime  docosanol 10% Cream 1 Application(s) Topical five times a day  gabapentin Solution 50 milliGRAM(s) Oral two times a day  heparin   Injectable 5000 Unit(s) SubCutaneous every 8 hours  hydrocortisone hemorrhoidal Suppository 1 Suppository(s) Rectal daily  latanoprost 0.005% Ophthalmic Solution 1 Drop(s) Both EYES at bedtime  levothyroxine 25 MICROGram(s) Oral daily  mesalamine Suppository 1000 milliGRAM(s) Rectal at bedtime  midodrine. 5 milliGRAM(s) Oral <User Schedule>  pantoprazole    Tablet 40 milliGRAM(s) Oral before breakfast  piperacillin/tazobactam IVPB..      piperacillin/tazobactam IVPB.. 3.375 Gram(s) IV Intermittent every 12 hours  polyethylene glycol 3350 17 Gram(s) Oral two times a day  sevelamer carbonate 800 milliGRAM(s) Oral three times a day with meals  simvastatin 40 milliGRAM(s) Oral at bedtime  sodium bicarbonate 650 milliGRAM(s) Oral two times a day  sodium zirconium cyclosilicate 5 Gram(s) Oral daily    02-19    140  |  110<H>  |  26<H>  ----------------------------<  91  4.5   |  20<L>  |  1.80<H>    Ca    9.0      19 Feb 2022 06:20      Creatinine Trend: 1.80 <--, 2.00 <--, 1.93 <--, 2.59 <--, 2.18 <--, 2.09 <--                        8.5    12.14 )-----------( 231      ( 19 Feb 2022 06:20 )             27.9

## 2022-02-20 NOTE — PROGRESS NOTE ADULT - SUBJECTIVE AND OBJECTIVE BOX
Patient is a 76y old  Female who presents with a chief complaint of syncope , cervical spine fracture (20 Feb 2022 14:06)      SUBJECTIVE / OVERNIGHT EVENTS: dysuria resolved, urine is clearer, rpt wbc pending, will cont ZOsyn, urine cx growing GNR, ID pending    MEDICATIONS  (STANDING):  allopurinol 100 milliGRAM(s) Oral daily  artificial  tears Solution 1 Drop(s) Both EYES four times a day  bisacodyl 5 milliGRAM(s) Oral at bedtime  docosanol 10% Cream 1 Application(s) Topical five times a day  gabapentin Solution 50 milliGRAM(s) Oral two times a day  heparin   Injectable 5000 Unit(s) SubCutaneous every 8 hours  hydrocortisone hemorrhoidal Suppository 1 Suppository(s) Rectal daily  latanoprost 0.005% Ophthalmic Solution 1 Drop(s) Both EYES at bedtime  levothyroxine 25 MICROGram(s) Oral daily  mesalamine Suppository 1000 milliGRAM(s) Rectal at bedtime  midodrine. 5 milliGRAM(s) Oral <User Schedule>  pantoprazole    Tablet 40 milliGRAM(s) Oral before breakfast  piperacillin/tazobactam IVPB..      piperacillin/tazobactam IVPB.. 3.375 Gram(s) IV Intermittent every 12 hours  polyethylene glycol 3350 17 Gram(s) Oral two times a day  sevelamer carbonate 800 milliGRAM(s) Oral three times a day with meals  simvastatin 40 milliGRAM(s) Oral at bedtime  sodium bicarbonate 650 milliGRAM(s) Oral two times a day  sodium zirconium cyclosilicate 5 Gram(s) Oral daily    MEDICATIONS  (PRN):  acetaminophen     Tablet .. 650 milliGRAM(s) Oral every 6 hours PRN Mild Pain (1 - 3)      Vital Signs Last 24 Hrs  T(F): 98.4 (02-20-22 @ 15:33), Max: 99 (02-19-22 @ 16:47)  HR: 70 (02-20-22 @ 15:33) (67 - 90)  BP: 153/69 (02-20-22 @ 15:33) (147/70 - 178/64)  RR: 18 (02-20-22 @ 15:33) (18 - 20)  SpO2: 99% (02-20-22 @ 15:33) (98% - 100%)  Telemetry:   CAPILLARY BLOOD GLUCOSE        I&O's Summary    19 Feb 2022 07:01  -  20 Feb 2022 07:00  --------------------------------------------------------  IN: 1100 mL / OUT: 1300 mL / NET: -200 mL    20 Feb 2022 07:01  -  20 Feb 2022 15:39  --------------------------------------------------------  IN: 720 mL / OUT: 100 mL / NET: 620 mL        PHYSICAL EXAM:  GENERAL: NAD, well-developed  HEAD:  Atraumatic, Normocephalic  EYES: EOMI, PERRLA, conjunctiva and sclera clear  NECK: Supple, No JVD  CHEST/LUNG: Clear to auscultation bilaterally; No wheeze  HEART: Regular rate and rhythm; No murmurs, rubs, or gallops  ABDOMEN: Soft, Nontender, Nondistended; Bowel sounds present  EXTREMITIES:  2+ Peripheral Pulses, No clubbing, cyanosis, or edema  PSYCH: AAOx3  NEUROLOGY: non-focal  SKIN: No rashes or lesions    LABS:                        8.5    12.14 )-----------( 231      ( 19 Feb 2022 06:20 )             27.9     02-19    140  |  110<H>  |  26<H>  ----------------------------<  91  4.5   |  20<L>  |  1.80<H>    Ca    9.0      19 Feb 2022 06:20                RADIOLOGY & ADDITIONAL TESTS:    Imaging Personally Reviewed:    Consultant(s) Notes Reviewed:      Care Discussed with Consultants/Other Providers:

## 2022-02-20 NOTE — PROGRESS NOTE ADULT - SUBJECTIVE AND OBJECTIVE BOX
Subjective: Patient seen and examined. No new events except as noted.     REVIEW OF SYSTEMS:    CONSTITUTIONAL:+ weakness, fevers or chills  EYES/ENT: No visual changes;  No vertigo or throat pain   NECK: No pain or stiffness  RESPIRATORY: No cough, wheezing, hemoptysis; No shortness of breath  CARDIOVASCULAR: No chest pain or palpitations  GASTROINTESTINAL: No abdominal or epigastric pain. No nausea, vomiting, or hematemesis; No diarrhea or constipation. No melena or hematochezia.  GENITOURINARY: No dysuria, frequency or hematuria  NEUROLOGICAL: No numbness or weakness  SKIN: No itching, burning, rashes, or lesions   All other review of systems is negative unless indicated above.    MEDICATIONS:  MEDICATIONS  (STANDING):  allopurinol 100 milliGRAM(s) Oral daily  artificial  tears Solution 1 Drop(s) Both EYES four times a day  bisacodyl 5 milliGRAM(s) Oral at bedtime  docosanol 10% Cream 1 Application(s) Topical five times a day  gabapentin Solution 50 milliGRAM(s) Oral two times a day  heparin   Injectable 5000 Unit(s) SubCutaneous every 8 hours  hydrocortisone hemorrhoidal Suppository 1 Suppository(s) Rectal daily  latanoprost 0.005% Ophthalmic Solution 1 Drop(s) Both EYES at bedtime  levothyroxine 25 MICROGram(s) Oral daily  mesalamine Suppository 1000 milliGRAM(s) Rectal at bedtime  midodrine. 5 milliGRAM(s) Oral <User Schedule>  pantoprazole    Tablet 40 milliGRAM(s) Oral before breakfast  piperacillin/tazobactam IVPB..      piperacillin/tazobactam IVPB.. 3.375 Gram(s) IV Intermittent every 12 hours  polyethylene glycol 3350 17 Gram(s) Oral two times a day  sevelamer carbonate 800 milliGRAM(s) Oral three times a day with meals  simvastatin 40 milliGRAM(s) Oral at bedtime  sodium bicarbonate 650 milliGRAM(s) Oral two times a day  sodium zirconium cyclosilicate 5 Gram(s) Oral daily      PHYSICAL EXAM:  T(C): 36.6 (02-20-22 @ 11:09), Max: 37.2 (02-19-22 @ 16:47)  HR: 89 (02-20-22 @ 11:09) (67 - 90)  BP: 154/69 (02-20-22 @ 11:09) (147/70 - 178/64)  RR: 18 (02-20-22 @ 11:09) (18 - 20)  SpO2: 100% (02-20-22 @ 11:09) (98% - 100%)  Wt(kg): --  I&O's Summary    19 Feb 2022 07:01  -  20 Feb 2022 07:00  --------------------------------------------------------  IN: 1100 mL / OUT: 1300 mL / NET: -200 mL    20 Feb 2022 07:01  -  20 Feb 2022 14:06  --------------------------------------------------------  IN: 720 mL / OUT: 100 mL / NET: 620 mL            Appearance: NAD, c-collar on   HEENT: Normal oral mucosa, PERRL, EOMI	  Lymphatic: No lymphadenopathy , no edema  Cardiovascular: Normal S1 S2, No JVD, No murmurs , Peripheral pulses palpable 2+ bilaterally  Respiratory: Lungs clear to auscultation, normal effort 	  Gastrointestinal:  Soft, Non-tender, + BS	  Skin: No rashes, No ecchymoses, No cyanosis, warm to touch  Neuro: alert oriented x 3 attention comprehension are fair.  Able to name, repeat.   EOmi fundi not visualized   no nystagmus VFF to confrontation  Tongue is midline  Palate elevates symmetrically   Moving all 4 ext spontaneously no drift appreciated  Gait not assessed.   Ext: No edema        LABS:    CARDIAC MARKERS:                                8.5    12.14 )-----------( 231      ( 19 Feb 2022 06:20 )             27.9     02-19    140  |  110<H>  |  26<H>  ----------------------------<  91  4.5   |  20<L>  |  1.80<H>    Ca    9.0      19 Feb 2022 06:20      proBNP:   Lipid Profile:   HgA1c:   TSH:     Negative          TELEMETRY: 	    ECG:  	  RADIOLOGY:   DIAGNOSTIC TESTING:  [ ] Echocardiogram:  [ ]  Catheterization:  [ ] Stress Test:    OTHER:

## 2022-02-21 LAB
-  AMIKACIN: SIGNIFICANT CHANGE UP
-  AMOXICILLIN/CLAVULANIC ACID: SIGNIFICANT CHANGE UP
-  AMPICILLIN/SULBACTAM: SIGNIFICANT CHANGE UP
-  AMPICILLIN: SIGNIFICANT CHANGE UP
-  AZTREONAM: SIGNIFICANT CHANGE UP
-  CEFAZOLIN: SIGNIFICANT CHANGE UP
-  CEFEPIME: SIGNIFICANT CHANGE UP
-  CEFOXITIN: SIGNIFICANT CHANGE UP
-  CEFTRIAXONE: SIGNIFICANT CHANGE UP
-  CIPROFLOXACIN: SIGNIFICANT CHANGE UP
-  ERTAPENEM: SIGNIFICANT CHANGE UP
-  GENTAMICIN: SIGNIFICANT CHANGE UP
-  IMIPENEM: SIGNIFICANT CHANGE UP
-  LEVOFLOXACIN: SIGNIFICANT CHANGE UP
-  MEROPENEM: SIGNIFICANT CHANGE UP
-  NITROFURANTOIN: SIGNIFICANT CHANGE UP
-  PIPERACILLIN/TAZOBACTAM: SIGNIFICANT CHANGE UP
-  TIGECYCLINE: SIGNIFICANT CHANGE UP
-  TOBRAMYCIN: SIGNIFICANT CHANGE UP
-  TRIMETHOPRIM/SULFAMETHOXAZOLE: SIGNIFICANT CHANGE UP
ANION GAP SERPL CALC-SCNC: 12 MMOL/L — SIGNIFICANT CHANGE UP (ref 5–17)
BUN SERPL-MCNC: 29 MG/DL — HIGH (ref 7–23)
CALCIUM SERPL-MCNC: 8.9 MG/DL — SIGNIFICANT CHANGE UP (ref 8.4–10.5)
CHLORIDE SERPL-SCNC: 109 MMOL/L — HIGH (ref 96–108)
CO2 SERPL-SCNC: 19 MMOL/L — LOW (ref 22–31)
CREAT SERPL-MCNC: 1.91 MG/DL — HIGH (ref 0.5–1.3)
CULTURE RESULTS: SIGNIFICANT CHANGE UP
GLUCOSE SERPL-MCNC: 88 MG/DL — SIGNIFICANT CHANGE UP (ref 70–99)
HCT VFR BLD CALC: 23.9 % — LOW (ref 34.5–45)
HCT VFR BLD CALC: 26.8 % — LOW (ref 34.5–45)
HGB BLD-MCNC: 7.2 G/DL — LOW (ref 11.5–15.5)
HGB BLD-MCNC: 8 G/DL — LOW (ref 11.5–15.5)
MAGNESIUM SERPL-MCNC: 2.1 MG/DL — SIGNIFICANT CHANGE UP (ref 1.6–2.6)
MCHC RBC-ENTMCNC: 26 PG — LOW (ref 27–34)
MCHC RBC-ENTMCNC: 26 PG — LOW (ref 27–34)
MCHC RBC-ENTMCNC: 29.9 GM/DL — LOW (ref 32–36)
MCHC RBC-ENTMCNC: 30.1 GM/DL — LOW (ref 32–36)
MCV RBC AUTO: 86.3 FL — SIGNIFICANT CHANGE UP (ref 80–100)
MCV RBC AUTO: 87 FL — SIGNIFICANT CHANGE UP (ref 80–100)
METHOD TYPE: SIGNIFICANT CHANGE UP
NRBC # BLD: 0 /100 WBCS — SIGNIFICANT CHANGE UP (ref 0–0)
NRBC # BLD: 0 /100 WBCS — SIGNIFICANT CHANGE UP (ref 0–0)
ORGANISM # SPEC MICROSCOPIC CNT: SIGNIFICANT CHANGE UP
ORGANISM # SPEC MICROSCOPIC CNT: SIGNIFICANT CHANGE UP
PHOSPHATE SERPL-MCNC: 5.9 MG/DL — HIGH (ref 2.5–4.5)
PLATELET # BLD AUTO: 235 K/UL — SIGNIFICANT CHANGE UP (ref 150–400)
PLATELET # BLD AUTO: 254 K/UL — SIGNIFICANT CHANGE UP (ref 150–400)
POTASSIUM SERPL-MCNC: 5 MMOL/L — SIGNIFICANT CHANGE UP (ref 3.5–5.3)
POTASSIUM SERPL-SCNC: 5 MMOL/L — SIGNIFICANT CHANGE UP (ref 3.5–5.3)
RBC # BLD: 2.77 M/UL — LOW (ref 3.8–5.2)
RBC # BLD: 3.08 M/UL — LOW (ref 3.8–5.2)
RBC # FLD: 16.9 % — HIGH (ref 10.3–14.5)
RBC # FLD: 17.3 % — HIGH (ref 10.3–14.5)
SODIUM SERPL-SCNC: 140 MMOL/L — SIGNIFICANT CHANGE UP (ref 135–145)
SPECIMEN SOURCE: SIGNIFICANT CHANGE UP
WBC # BLD: 7.56 K/UL — SIGNIFICANT CHANGE UP (ref 3.8–10.5)
WBC # BLD: 8.37 K/UL — SIGNIFICANT CHANGE UP (ref 3.8–10.5)
WBC # FLD AUTO: 7.56 K/UL — SIGNIFICANT CHANGE UP (ref 3.8–10.5)
WBC # FLD AUTO: 8.37 K/UL — SIGNIFICANT CHANGE UP (ref 3.8–10.5)

## 2022-02-21 RX ADMIN — DOCOSANOL 1 APPLICATION(S): 100 CREAM TOPICAL at 08:06

## 2022-02-21 RX ADMIN — Medication 1 DROP(S): at 12:01

## 2022-02-21 RX ADMIN — SIMVASTATIN 40 MILLIGRAM(S): 20 TABLET, FILM COATED ORAL at 21:16

## 2022-02-21 RX ADMIN — GABAPENTIN 50 MILLIGRAM(S): 400 CAPSULE ORAL at 18:11

## 2022-02-21 RX ADMIN — Medication 3 MILLIGRAM(S): at 21:16

## 2022-02-21 RX ADMIN — Medication 25 MICROGRAM(S): at 05:25

## 2022-02-21 RX ADMIN — Medication 650 MILLIGRAM(S): at 16:35

## 2022-02-21 RX ADMIN — PIPERACILLIN AND TAZOBACTAM 25 GRAM(S): 4; .5 INJECTION, POWDER, LYOPHILIZED, FOR SOLUTION INTRAVENOUS at 05:26

## 2022-02-21 RX ADMIN — Medication 1 DROP(S): at 00:00

## 2022-02-21 RX ADMIN — SEVELAMER CARBONATE 800 MILLIGRAM(S): 2400 POWDER, FOR SUSPENSION ORAL at 16:37

## 2022-02-21 RX ADMIN — Medication 650 MILLIGRAM(S): at 05:25

## 2022-02-21 RX ADMIN — Medication 650 MILLIGRAM(S): at 16:03

## 2022-02-21 RX ADMIN — PIPERACILLIN AND TAZOBACTAM 25 GRAM(S): 4; .5 INJECTION, POWDER, LYOPHILIZED, FOR SOLUTION INTRAVENOUS at 18:11

## 2022-02-21 RX ADMIN — Medication 650 MILLIGRAM(S): at 18:10

## 2022-02-21 RX ADMIN — PANTOPRAZOLE SODIUM 40 MILLIGRAM(S): 20 TABLET, DELAYED RELEASE ORAL at 05:25

## 2022-02-21 RX ADMIN — SODIUM ZIRCONIUM CYCLOSILICATE 5 GRAM(S): 10 POWDER, FOR SUSPENSION ORAL at 15:11

## 2022-02-21 RX ADMIN — Medication 1 DROP(S): at 18:11

## 2022-02-21 RX ADMIN — SEVELAMER CARBONATE 800 MILLIGRAM(S): 2400 POWDER, FOR SUSPENSION ORAL at 12:01

## 2022-02-21 RX ADMIN — Medication 100 MILLIGRAM(S): at 12:01

## 2022-02-21 RX ADMIN — DOCOSANOL 1 APPLICATION(S): 100 CREAM TOPICAL at 15:14

## 2022-02-21 RX ADMIN — Medication 1 DROP(S): at 05:25

## 2022-02-21 RX ADMIN — DOCOSANOL 1 APPLICATION(S): 100 CREAM TOPICAL at 12:01

## 2022-02-21 RX ADMIN — LATANOPROST 1 DROP(S): 0.05 SOLUTION/ DROPS OPHTHALMIC; TOPICAL at 21:16

## 2022-02-21 RX ADMIN — GABAPENTIN 50 MILLIGRAM(S): 400 CAPSULE ORAL at 05:26

## 2022-02-21 RX ADMIN — SEVELAMER CARBONATE 800 MILLIGRAM(S): 2400 POWDER, FOR SUSPENSION ORAL at 08:06

## 2022-02-21 NOTE — PROGRESS NOTE ADULT - SUBJECTIVE AND OBJECTIVE BOX
Patient seen and examined  no complaints    No Known Allergies    Hospital Medications:   MEDICATIONS  (STANDING):  allopurinol 100 milliGRAM(s) Oral daily  artificial  tears Solution 1 Drop(s) Both EYES four times a day  bisacodyl 5 milliGRAM(s) Oral at bedtime  docosanol 10% Cream 1 Application(s) Topical five times a day  gabapentin Solution 50 milliGRAM(s) Oral two times a day  heparin   Injectable 5000 Unit(s) SubCutaneous every 8 hours  hydrocortisone hemorrhoidal Suppository 1 Suppository(s) Rectal daily  latanoprost 0.005% Ophthalmic Solution 1 Drop(s) Both EYES at bedtime  levothyroxine 25 MICROGram(s) Oral daily  mesalamine Suppository 1000 milliGRAM(s) Rectal at bedtime  midodrine. 5 milliGRAM(s) Oral <User Schedule>  pantoprazole    Tablet 40 milliGRAM(s) Oral before breakfast  piperacillin/tazobactam IVPB..      piperacillin/tazobactam IVPB.. 3.375 Gram(s) IV Intermittent every 12 hours  polyethylene glycol 3350 17 Gram(s) Oral two times a day  sevelamer carbonate 800 milliGRAM(s) Oral three times a day with meals  simvastatin 40 milliGRAM(s) Oral at bedtime  sodium bicarbonate 650 milliGRAM(s) Oral two times a day  sodium zirconium cyclosilicate 5 Gram(s) Oral daily        VITALS:  T(F): 97.6 (22 @ 12:26), Max: 98.7 (22 @ 08:49)  HR: 87 (22 @ 12:26)  BP: 147/60 (22 @ 12:26)  RR: 18 (22 @ 12:26)  SpO2: 97% (22 @ 12:26)  Wt(kg): --     @ 07:01  -   @ 07:00  --------------------------------------------------------  IN: 840 mL / OUT: 800 mL / NET: 40 mL     @ 07:01  -   @ 12:31  --------------------------------------------------------  IN: 420 mL / OUT: 0 mL / NET: 420 mL    Physical Exam :-  Constitutional: NAD, cervical color in place  Respiratory: Bilateral equal breath sounds, no Crackles present.  Cardiovascular: S1, S2 normal, positive Murmur  Gastrointestinal: Bowel Sounds present, soft, non tender.  Extremities: no Edema Feet  Neurological: Alert   Psychiatric: Normal mood, normal affect    LABS:      140  |  109<H>  |  29<H>  ----------------------------<  88  5.0   |  19<L>  |  1.91<H>    Ca    8.9      2022 06:18  Phos  5.9       Mg     2.1           Creatinine Trend: 1.91 <--, 1.80 <--, 2.00 <--, 1.93 <--, 2.59 <--, 2.18 <--, 2.09 <--                        8.0    8.37  )-----------( 235      ( 2022 08:54 )             26.8     Urine Studies:  Urinalysis Basic - ( 2022 18:58 )    Color: Light Yellow / Appearance: Clear / S.010 / pH:   Gluc:  / Ketone: Negative  / Bili: Negative / Urobili: Negative   Blood:  / Protein: 30 mg/dL / Nitrite: Negative   Leuk Esterase: Moderate / RBC: 2 /hpf / WBC 20 /HPF   Sq Epi:  / Non Sq Epi: 0 /hpf / Bacteria: Many        RADIOLOGY & ADDITIONAL STUDIES:

## 2022-02-21 NOTE — PROGRESS NOTE ADULT - ASSESSMENT
77 y/o female PMHx of CKD IV, Gout, HTN, HLD w/ presented to OSH s/p fall 2/2 syncopal episode found to have C2 fracture w/ hospital course c/b fever.   treated for gout flare w/ prednisone x 3 days    fever/ UTI  pt reports feeling better today since starting antibiotics  UA positive, pt endorsed urinary urgency and frequency, suspect UTI  continues to endorse dysuria though states this started to improve 2/20  switched to zosyn on evening 2/19, c/w zosyn while culture pending  f/u blood cultures- NGTD  f/u urine culture GNR identification and susceptibilities    cervical spine fracture  s/p CT head/ C-spine- Acute C2 fracture of the body of the dens extending to the left lateral mass. Right C2 facet fracture. Mild acute compression fracture of the superior endplate of C3  evaluated by ortho  wearing C-collar    fall/syncope  on telemetry  s/p TTE- no mention of vegetations  orthostatic positive  on midodrine    CKD  renally dose medications  nephrology following    Covering for Dr. Yee Peña M.D.  [facility], Division of Infectious Diseases  [phone]  After 5pm on weekdays and all day on weekends - please call [phone]

## 2022-02-21 NOTE — PROGRESS NOTE ADULT - ASSESSMENT
77yo female pt with PMHx of CKD (Last K-5.3, Bun/Cr.-29/1.93 on 7/18/2020), Anemia, Gout, HTN, HLD, was transferred, on cervical collar, from [facility] c/o head/neck pain, C2 fracture s/p fall this morning with a syncopal episode.

## 2022-02-21 NOTE — PROGRESS NOTE ADULT - SUBJECTIVE AND OBJECTIVE BOX
[facility], Division of Infectious Diseases  KESHAV Pena Y. Patel, S. Shah, G. Casimir  [phone]  ([phone] - weekdays after 5pm and weekends)    Name: RADHA POLLACK  Age/Gender: 76y Female  MRN: [id number]    Interval History:  Patient seen this morning.   Notes reviewed.   No concerning overnight events.  Afebrile.   states dysuria finally starting to improve    Allergies: No Known Allergies      Objective:  Vitals:   T(F): 98.7 (02-21-22 @ 08:49), Max: 98.7 (02-21-22 @ 08:49)  HR: 80 (02-21-22 @ 08:49) (66 - 89)  BP: 154/67 (02-21-22 @ 08:49) (147/64 - 171/70)  RR: 18 (02-21-22 @ 08:49) (18 - 18)  SpO2: 98% (02-21-22 @ 08:49) (97% - 100%)  Physical Examination:  General: no acute distress, nontoxic appearing  HEENT: anicteric  Cardio: S1, S2 present, normal rate  Resp: clear to auscultation anteriorly  Abd: soft, ND, NT  Ext: no LE edema  Skin: warm, dry, no visible rash   Lines:    Laboratory Studies:  CBC:                       8.0    8.37  )-----------( 235      ( 21 Feb 2022 08:54 )             26.8     WBC Trend:  8.37 02-21-22 @ 08:54  7.56 02-21-22 @ 06:20  12.14 02-19-22 @ 06:20  11.74 02-18-22 @ 17:40  10.32 02-18-22 @ 02:45  10.76 02-18-22 @ 00:36  10.40 02-16-22 @ 13:19  9.46 02-16-22 @ 05:31  10.47 02-15-22 @ 05:59    CMP: 02-21    140  |  109<H>  |  29<H>  ----------------------------<  88  5.0   |  19<L>  |  1.91<H>    Ca    8.9      21 Feb 2022 06:18  Phos  5.9     02-21  Mg     2.1     02-21              Microbiology: reviewed     Culture - Urine (collected 02-18-22 @ 02:59)  Source: Clean Catch Clean Catch (Midstream)  Preliminary Report (02-20-22 @ 08:39):    >100,000 CFU/ml Gram Negative Rods    Culture - Blood (collected 02-18-22 @ 02:46)  Source: .Blood Blood-Peripheral  Preliminary Report (02-19-22 @ 03:01):    No growth to date.    Culture - Blood (collected 02-18-22 @ 02:46)  Source: .Blood Blood-Peripheral  Preliminary Report (02-19-22 @ 03:01):    No growth to date.      Radiology: reviewed     Medications:  acetaminophen     Tablet .. 650 milliGRAM(s) Oral every 6 hours PRN  allopurinol 100 milliGRAM(s) Oral daily  artificial  tears Solution 1 Drop(s) Both EYES four times a day  bisacodyl 5 milliGRAM(s) Oral at bedtime  docosanol 10% Cream 1 Application(s) Topical five times a day  gabapentin Solution 50 milliGRAM(s) Oral two times a day  heparin   Injectable 5000 Unit(s) SubCutaneous every 8 hours  hydrocortisone hemorrhoidal Suppository 1 Suppository(s) Rectal daily  latanoprost 0.005% Ophthalmic Solution 1 Drop(s) Both EYES at bedtime  levothyroxine 25 MICROGram(s) Oral daily  melatonin 3 milliGRAM(s) Oral at bedtime PRN  mesalamine Suppository 1000 milliGRAM(s) Rectal at bedtime  midodrine. 5 milliGRAM(s) Oral <User Schedule>  pantoprazole    Tablet 40 milliGRAM(s) Oral before breakfast  piperacillin/tazobactam IVPB..      piperacillin/tazobactam IVPB.. 3.375 Gram(s) IV Intermittent every 12 hours  polyethylene glycol 3350 17 Gram(s) Oral two times a day  sevelamer carbonate 800 milliGRAM(s) Oral three times a day with meals  simvastatin 40 milliGRAM(s) Oral at bedtime  sodium bicarbonate 650 milliGRAM(s) Oral two times a day  sodium zirconium cyclosilicate 5 Gram(s) Oral daily    Antimicrobials:  piperacillin/tazobactam IVPB..      piperacillin/tazobactam IVPB.. 3.375 Gram(s) IV Intermittent every 12 hours

## 2022-02-21 NOTE — PROGRESS NOTE ADULT - SUBJECTIVE AND OBJECTIVE BOX
Patient is a 76y old  Female who presents with a chief complaint of syncope , cervical spine fracture (20 Feb 2022 15:39)      SUBJECTIVE / OVERNIGHT EVENTS: no new c/o    MEDICATIONS  (STANDING):  allopurinol 100 milliGRAM(s) Oral daily  artificial  tears Solution 1 Drop(s) Both EYES four times a day  bisacodyl 5 milliGRAM(s) Oral at bedtime  docosanol 10% Cream 1 Application(s) Topical five times a day  gabapentin Solution 50 milliGRAM(s) Oral two times a day  heparin   Injectable 5000 Unit(s) SubCutaneous every 8 hours  hydrocortisone hemorrhoidal Suppository 1 Suppository(s) Rectal daily  latanoprost 0.005% Ophthalmic Solution 1 Drop(s) Both EYES at bedtime  levothyroxine 25 MICROGram(s) Oral daily  mesalamine Suppository 1000 milliGRAM(s) Rectal at bedtime  midodrine. 5 milliGRAM(s) Oral <User Schedule>  pantoprazole    Tablet 40 milliGRAM(s) Oral before breakfast  piperacillin/tazobactam IVPB..      piperacillin/tazobactam IVPB.. 3.375 Gram(s) IV Intermittent every 12 hours  polyethylene glycol 3350 17 Gram(s) Oral two times a day  sevelamer carbonate 800 milliGRAM(s) Oral three times a day with meals  simvastatin 40 milliGRAM(s) Oral at bedtime  sodium bicarbonate 650 milliGRAM(s) Oral two times a day  sodium zirconium cyclosilicate 5 Gram(s) Oral daily    MEDICATIONS  (PRN):  acetaminophen     Tablet .. 650 milliGRAM(s) Oral every 6 hours PRN Mild Pain (1 - 3)  melatonin 3 milliGRAM(s) Oral at bedtime PRN Insomnia      Vital Signs Last 24 Hrs  T(F): 98.7 (02-21-22 @ 08:49), Max: 98.7 (02-21-22 @ 08:49)  HR: 80 (02-21-22 @ 08:49) (66 - 89)  BP: 154/67 (02-21-22 @ 08:49) (147/64 - 171/70)  RR: 18 (02-21-22 @ 08:49) (18 - 18)  SpO2: 98% (02-21-22 @ 08:49) (97% - 100%)  Telemetry:   CAPILLARY BLOOD GLUCOSE        I&O's Summary    20 Feb 2022 07:01  -  21 Feb 2022 07:00  --------------------------------------------------------  IN: 840 mL / OUT: 800 mL / NET: 40 mL    21 Feb 2022 07:01  -  21 Feb 2022 09:41  --------------------------------------------------------  IN: 420 mL / OUT: 0 mL / NET: 420 mL        PHYSICAL EXAM:  GENERAL: NAD, well-developed  HEAD:  Atraumatic, Normocephalic  EYES: EOMI, PERRLA, conjunctiva and sclera clear  NECK: Supple, No JVD  CHEST/LUNG: Clear to auscultation bilaterally; No wheeze  HEART: Regular rate and rhythm; No murmurs, rubs, or gallops  ABDOMEN: Soft, Nontender, Nondistended; Bowel sounds present  EXTREMITIES:  2+ Peripheral Pulses, No clubbing, cyanosis, or edema  PSYCH: AAOx3  NEUROLOGY: non-focal  SKIN: No rashes or lesions    LABS:                        8.0    8.37  )-----------( 235      ( 21 Feb 2022 08:54 )             26.8     02-21    140  |  109<H>  |  29<H>  ----------------------------<  88  5.0   |  19<L>  |  1.91<H>    Ca    8.9      21 Feb 2022 06:18  Phos  5.9     02-21  Mg     2.1     02-21                RADIOLOGY & ADDITIONAL TESTS:    Imaging Personally Reviewed:    Consultant(s) Notes Reviewed:      Care Discussed with Consultants/Other Providers:

## 2022-02-21 NOTE — PROGRESS NOTE ADULT - ASSESSMENT
76 year-old woman with history of multiple medical issues including hypertension, hyperlipidemia, and chronic kidney disease. She was transferred to [facility] yesterday from the [facility] ER after being noted on imaging to have a C2 fracture, s/p syncopal episode. Following for CKD    CKD 4 - likely Hypertensive Nephropathy  Serum stable - at baseline  appears to be ranging around 1.8 to 2.0mg/dl  renal us shows cortical calcifications  cont monitor Serum Creatinine level  hematuria- consider urology eval  fluctuations expected  Renal diet  daily BMP\  on sevelamer for high phos     Hyperkalemia  improved  c/w Lokelma 5mg PO QD  Low K diet    Metabolic acidosis due to renal failure likely   on  Nabicarb 650mg po bid  trend bicarb  goal bicarb 22     Cervical fracture  pain management  Neurosx recs appreciated    Dr Brown  [phone]

## 2022-02-21 NOTE — PROGRESS NOTE ADULT - ASSESSMENT
constipation  hemorrhoids   ckd  C2 fracture       bowel regimen   Miralax BID  Anusol suppository QD  mesalamine suppository QD  monitor stool count   diet as tolerated   H/H stable   will follow   dw pt and daughter at bedside   dc planning as per primary

## 2022-02-21 NOTE — PROGRESS NOTE ADULT - SUBJECTIVE AND OBJECTIVE BOX
[facility]  Bogdan Johnson PA-C  [facility] [place] [address]  [phone]      INTERVAL HPI/OVERNIGHT EVENTS:  pt seen and examined, events noted  pt with some blood in stool over the weekend, FOBT negative   H/H stable  pt refusing mesalamine supp     MEDICATIONS  (STANDING):  allopurinol 100 milliGRAM(s) Oral daily  artificial  tears Solution 1 Drop(s) Both EYES four times a day  bisacodyl 5 milliGRAM(s) Oral at bedtime  gabapentin Solution 50 milliGRAM(s) Oral two times a day  heparin   Injectable 5000 Unit(s) SubCutaneous every 8 hours  hydrocortisone hemorrhoidal Suppository 1 Suppository(s) Rectal daily  latanoprost 0.005% Ophthalmic Solution 1 Drop(s) Both EYES at bedtime  levothyroxine 25 MICROGram(s) Oral daily  mesalamine Suppository 1000 milliGRAM(s) Rectal at bedtime  polyethylene glycol 3350 17 Gram(s) Oral two times a day  sevelamer carbonate 800 milliGRAM(s) Oral three times a day with meals  simvastatin 40 milliGRAM(s) Oral at bedtime  sodium bicarbonate 650 milliGRAM(s) Oral two times a day  sodium chloride 0.45%. 250 milliLiter(s) (250 mL/Hr) IV Continuous <Continuous>  sodium chloride 0.9% Bolus 1000 milliLiter(s) IV Bolus once  sodium zirconium cyclosilicate 5 Gram(s) Oral once    MEDICATIONS  (PRN):  acetaminophen     Tablet .. 650 milliGRAM(s) Oral every 6 hours PRN Mild Pain (1 - 3)      Allergies    No Known Allergies    Intolerances        ROS:   General:  No wt loss, fevers, chills, night sweats, fatigue,   Eyes:  Good vision, no reported pain  ENT:  No sore throat, pain, runny nose, dysphagia  CV:  No pain, palpitations, hypo/hypertension  Resp:  No dyspnea, cough, tachypnea, wheezing  GI:  + pain, No nausea, No vomiting, No diarrhea, + constipation, No weight loss, No fever, No pruritis, No rectal bleeding, No tarry stools, No dysphagia,  :  No pain, bleeding, incontinence, nocturia  Muscle:  No pain, weakness  Neuro:  No weakness, tingling, memory problems  Psych:  No fatigue, insomnia, mood problems, depression  Endocrine:  No polyuria, polydipsia, cold/heat intolerance  Heme:  No petechiae, ecchymosis, easy bruisability  Skin:  No rash, tattoos, scars, edema      PHYSICAL EXAM:   Vital Signs Last 24 Hrs  T(C): 37.1 (21 Feb 2022 08:49), Max: 37.1 (21 Feb 2022 08:49)  T(F): 98.7 (21 Feb 2022 08:49), Max: 98.7 (21 Feb 2022 08:49)  HR: 80 (21 Feb 2022 08:49) (66 - 80)  BP: 154/67 (21 Feb 2022 08:49) (147/64 - 171/70)  BP(mean): --  RR: 18 (21 Feb 2022 08:49) (18 - 18)  SpO2: 98% (21 Feb 2022 08:49) (97% - 99%)  Daily     Daily     GENERAL:  Appears stated age,   HEENT:  NC/AT,    CHEST:  Full & symmetric excursion,   HEART:  Regular rhythm,  ABDOMEN:  Soft, non-tender, non-distended,  EXTEREMITIES:  no cyanosis  SKIN:  No rash  NEURO:  Alert,       LABS:                                   8.0    8.37  )-----------( 235      ( 21 Feb 2022 08:54 )             26.8   02-21    140  |  109<H>  |  29<H>  ----------------------------<  88  5.0   |  19<L>  |  1.91<H>    Ca    8.9      21 Feb 2022 06:18  Phos  5.9     02-21  Mg     2.1     02-21                RADIOLOGY & ADDITIONAL TESTS:

## 2022-02-21 NOTE — PROGRESS NOTE ADULT - ASSESSMENT
77yo female pt with PMHx of CKD (Last K-5.3, Bun/Cr.-29/1.93 on 7/18/2020), Anemia, Gout, HTN, HLD, was transferred, on cervical collar, from [facility] c/o head/neck pain, C2 fracture s/p fall this morning with a syncopal episode.

## 2022-02-22 LAB
ANION GAP SERPL CALC-SCNC: 12 MMOL/L — SIGNIFICANT CHANGE UP (ref 5–17)
BUN SERPL-MCNC: 28 MG/DL — HIGH (ref 7–23)
CALCIUM SERPL-MCNC: 8.8 MG/DL — SIGNIFICANT CHANGE UP (ref 8.4–10.5)
CHLORIDE SERPL-SCNC: 109 MMOL/L — HIGH (ref 96–108)
CO2 SERPL-SCNC: 19 MMOL/L — LOW (ref 22–31)
CREAT SERPL-MCNC: 2.07 MG/DL — HIGH (ref 0.5–1.3)
GLUCOSE SERPL-MCNC: 89 MG/DL — SIGNIFICANT CHANGE UP (ref 70–99)
HCT VFR BLD CALC: 24 % — LOW (ref 34.5–45)
HGB BLD-MCNC: 7.2 G/DL — LOW (ref 11.5–15.5)
MCHC RBC-ENTMCNC: 26.3 PG — LOW (ref 27–34)
MCHC RBC-ENTMCNC: 30 GM/DL — LOW (ref 32–36)
MCV RBC AUTO: 87.6 FL — SIGNIFICANT CHANGE UP (ref 80–100)
NRBC # BLD: 0 /100 WBCS — SIGNIFICANT CHANGE UP (ref 0–0)
PLATELET # BLD AUTO: 236 K/UL — SIGNIFICANT CHANGE UP (ref 150–400)
POTASSIUM SERPL-MCNC: 4.7 MMOL/L — SIGNIFICANT CHANGE UP (ref 3.5–5.3)
POTASSIUM SERPL-SCNC: 4.7 MMOL/L — SIGNIFICANT CHANGE UP (ref 3.5–5.3)
RBC # BLD: 2.74 M/UL — LOW (ref 3.8–5.2)
RBC # FLD: 17 % — HIGH (ref 10.3–14.5)
SODIUM SERPL-SCNC: 140 MMOL/L — SIGNIFICANT CHANGE UP (ref 135–145)
WBC # BLD: 9.42 K/UL — SIGNIFICANT CHANGE UP (ref 3.8–10.5)
WBC # FLD AUTO: 9.42 K/UL — SIGNIFICANT CHANGE UP (ref 3.8–10.5)

## 2022-02-22 RX ORDER — COLCHICINE 0.6 MG
0.6 TABLET ORAL
Refills: 0 | Status: DISCONTINUED | OUTPATIENT
Start: 2022-02-22 | End: 2022-03-02

## 2022-02-22 RX ADMIN — Medication 1 DROP(S): at 18:05

## 2022-02-22 RX ADMIN — PANTOPRAZOLE SODIUM 40 MILLIGRAM(S): 20 TABLET, DELAYED RELEASE ORAL at 05:08

## 2022-02-22 RX ADMIN — Medication 1 DROP(S): at 12:14

## 2022-02-22 RX ADMIN — SEVELAMER CARBONATE 800 MILLIGRAM(S): 2400 POWDER, FOR SUSPENSION ORAL at 08:01

## 2022-02-22 RX ADMIN — SEVELAMER CARBONATE 800 MILLIGRAM(S): 2400 POWDER, FOR SUSPENSION ORAL at 16:45

## 2022-02-22 RX ADMIN — DOCOSANOL 1 APPLICATION(S): 100 CREAM TOPICAL at 08:01

## 2022-02-22 RX ADMIN — GABAPENTIN 50 MILLIGRAM(S): 400 CAPSULE ORAL at 05:07

## 2022-02-22 RX ADMIN — Medication 25 MICROGRAM(S): at 05:07

## 2022-02-22 RX ADMIN — Medication 20 MILLIGRAM(S): at 21:03

## 2022-02-22 RX ADMIN — Medication 1 DROP(S): at 00:00

## 2022-02-22 RX ADMIN — Medication 650 MILLIGRAM(S): at 21:04

## 2022-02-22 RX ADMIN — Medication 100 MILLIGRAM(S): at 12:14

## 2022-02-22 RX ADMIN — PIPERACILLIN AND TAZOBACTAM 25 GRAM(S): 4; .5 INJECTION, POWDER, LYOPHILIZED, FOR SOLUTION INTRAVENOUS at 18:05

## 2022-02-22 RX ADMIN — Medication 650 MILLIGRAM(S): at 18:05

## 2022-02-22 RX ADMIN — LATANOPROST 1 DROP(S): 0.05 SOLUTION/ DROPS OPHTHALMIC; TOPICAL at 21:03

## 2022-02-22 RX ADMIN — SODIUM ZIRCONIUM CYCLOSILICATE 5 GRAM(S): 10 POWDER, FOR SUSPENSION ORAL at 14:54

## 2022-02-22 RX ADMIN — Medication 650 MILLIGRAM(S): at 12:17

## 2022-02-22 RX ADMIN — SEVELAMER CARBONATE 800 MILLIGRAM(S): 2400 POWDER, FOR SUSPENSION ORAL at 12:14

## 2022-02-22 RX ADMIN — SIMVASTATIN 40 MILLIGRAM(S): 20 TABLET, FILM COATED ORAL at 21:03

## 2022-02-22 RX ADMIN — GABAPENTIN 50 MILLIGRAM(S): 400 CAPSULE ORAL at 18:05

## 2022-02-22 RX ADMIN — Medication 650 MILLIGRAM(S): at 05:38

## 2022-02-22 RX ADMIN — Medication 650 MILLIGRAM(S): at 12:50

## 2022-02-22 RX ADMIN — Medication 650 MILLIGRAM(S): at 21:34

## 2022-02-22 RX ADMIN — Medication 3 MILLIGRAM(S): at 21:03

## 2022-02-22 RX ADMIN — Medication 1 DROP(S): at 05:07

## 2022-02-22 RX ADMIN — Medication 650 MILLIGRAM(S): at 05:08

## 2022-02-22 RX ADMIN — PIPERACILLIN AND TAZOBACTAM 25 GRAM(S): 4; .5 INJECTION, POWDER, LYOPHILIZED, FOR SOLUTION INTRAVENOUS at 05:08

## 2022-02-22 RX ADMIN — Medication 650 MILLIGRAM(S): at 05:07

## 2022-02-22 NOTE — PROGRESS NOTE ADULT - SUBJECTIVE AND OBJECTIVE BOX
Patient is a 76y old  Female who presents with a chief complaint of syncope , cervical spine fracture (22 Feb 2022 16:05)      SUBJECTIVE / OVERNIGHT EVENTS: no new c/o    MEDICATIONS  (STANDING):  allopurinol 100 milliGRAM(s) Oral daily  artificial  tears Solution 1 Drop(s) Both EYES four times a day  bisacodyl 5 milliGRAM(s) Oral at bedtime  docosanol 10% Cream 1 Application(s) Topical five times a day  gabapentin Solution 50 milliGRAM(s) Oral two times a day  heparin   Injectable 5000 Unit(s) SubCutaneous every 8 hours  hydrocortisone hemorrhoidal Suppository 1 Suppository(s) Rectal daily  latanoprost 0.005% Ophthalmic Solution 1 Drop(s) Both EYES at bedtime  levothyroxine 25 MICROGram(s) Oral daily  mesalamine Suppository 1000 milliGRAM(s) Rectal at bedtime  midodrine. 5 milliGRAM(s) Oral <User Schedule>  pantoprazole    Tablet 40 milliGRAM(s) Oral before breakfast  piperacillin/tazobactam IVPB..      piperacillin/tazobactam IVPB.. 3.375 Gram(s) IV Intermittent every 12 hours  polyethylene glycol 3350 17 Gram(s) Oral two times a day  sevelamer carbonate 800 milliGRAM(s) Oral three times a day with meals  simvastatin 40 milliGRAM(s) Oral at bedtime  sodium bicarbonate 650 milliGRAM(s) Oral two times a day  sodium zirconium cyclosilicate 5 Gram(s) Oral daily    MEDICATIONS  (PRN):  acetaminophen     Tablet .. 650 milliGRAM(s) Oral every 6 hours PRN Mild Pain (1 - 3)  melatonin 3 milliGRAM(s) Oral at bedtime PRN Insomnia      Vital Signs Last 24 Hrs  T(F): 98.1 (02-22-22 @ 15:26), Max: 98.4 (02-22-22 @ 04:32)  HR: 81 (02-22-22 @ 15:26) (62 - 82)  BP: 149/66 (02-22-22 @ 15:26) (128/67 - 170/69)  RR: 18 (02-22-22 @ 15:26) (18 - 18)  SpO2: 97% (02-22-22 @ 15:26) (97% - 100%)  Telemetry:   CAPILLARY BLOOD GLUCOSE        I&O's Summary    21 Feb 2022 07:01  -  22 Feb 2022 07:00  --------------------------------------------------------  IN: 860 mL / OUT: 1300 mL / NET: -440 mL    22 Feb 2022 07:01  -  22 Feb 2022 19:36  --------------------------------------------------------  IN: 760 mL / OUT: 1 mL / NET: 759 mL        PHYSICAL EXAM:  GENERAL: NAD, well-developed  HEAD:  Atraumatic, Normocephalic  EYES: EOMI, PERRLA, conjunctiva and sclera clear  NECK: Supple, No JVD  CHEST/LUNG: Clear to auscultation bilaterally; No wheeze  HEART: Regular rate and rhythm; No murmurs, rubs, or gallops  ABDOMEN: Soft, Nontender, Nondistended; Bowel sounds present  EXTREMITIES:  2+ Peripheral Pulses, No clubbing, cyanosis, or edema  PSYCH: AAOx3  NEUROLOGY: non-focal  SKIN: No rashes or lesions    LABS:                        7.2    9.42  )-----------( 236      ( 22 Feb 2022 06:06 )             24.0     02-22    140  |  109<H>  |  28<H>  ----------------------------<  89  4.7   |  19<L>  |  2.07<H>    Ca    8.8      22 Feb 2022 06:06  Phos  5.9     02-21  Mg     2.1     02-21                RADIOLOGY & ADDITIONAL TESTS:    Imaging Personally Reviewed:    Consultant(s) Notes Reviewed:      Care Discussed with Consultants/Other Providers:

## 2022-02-22 NOTE — DIETITIAN NUTRITION RISK NOTIFICATION - TREATMENT: THE FOLLOWING DIET HAS BEEN RECOMMENDED
Diet, Regular:   No Concentrated Potassium  No Concentrated Phosphorus  Low Sodium (02-08-22 @ 11:11) [Active]      
Diet, DASH/TLC:   Sodium & Cholesterol Restricted  For patients receiving Renal Replacement - No Protein Restr, No Conc K, No Conc Phos, Low Sodium (RENAL) (02-06-22 @ 18:46) [Active]

## 2022-02-22 NOTE — PROGRESS NOTE ADULT - SUBJECTIVE AND OBJECTIVE BOX
[facility]  Bogdan Johnson PA-C  [facility] [place] [address]  [phone]      INTERVAL HPI/OVERNIGHT EVENTS:  pt seen and examined, no new events  c/o pain in her feet  +BM, pain with BM improving     MEDICATIONS  (STANDING):  allopurinol 100 milliGRAM(s) Oral daily  artificial  tears Solution 1 Drop(s) Both EYES four times a day  bisacodyl 5 milliGRAM(s) Oral at bedtime  gabapentin Solution 50 milliGRAM(s) Oral two times a day  heparin   Injectable 5000 Unit(s) SubCutaneous every 8 hours  hydrocortisone hemorrhoidal Suppository 1 Suppository(s) Rectal daily  latanoprost 0.005% Ophthalmic Solution 1 Drop(s) Both EYES at bedtime  levothyroxine 25 MICROGram(s) Oral daily  mesalamine Suppository 1000 milliGRAM(s) Rectal at bedtime  polyethylene glycol 3350 17 Gram(s) Oral two times a day  sevelamer carbonate 800 milliGRAM(s) Oral three times a day with meals  simvastatin 40 milliGRAM(s) Oral at bedtime  sodium bicarbonate 650 milliGRAM(s) Oral two times a day  sodium chloride 0.45%. 250 milliLiter(s) (250 mL/Hr) IV Continuous <Continuous>  sodium chloride 0.9% Bolus 1000 milliLiter(s) IV Bolus once  sodium zirconium cyclosilicate 5 Gram(s) Oral once    MEDICATIONS  (PRN):  acetaminophen     Tablet .. 650 milliGRAM(s) Oral every 6 hours PRN Mild Pain (1 - 3)      Allergies    No Known Allergies    Intolerances        ROS:   General:  No wt loss, fevers, chills, night sweats, fatigue,   Eyes:  Good vision, no reported pain  ENT:  No sore throat, pain, runny nose, dysphagia  CV:  No pain, palpitations, hypo/hypertension  Resp:  No dyspnea, cough, tachypnea, wheezing  GI:  No pain, No nausea, No vomiting, No diarrhea, + constipation, No weight loss, No fever, No pruritis, No rectal bleeding, No tarry stools, No dysphagia,  :  No pain, bleeding, incontinence, nocturia  Muscle:  No pain, weakness  Neuro:  No weakness, tingling, memory problems  Psych:  No fatigue, insomnia, mood problems, depression  Endocrine:  No polyuria, polydipsia, cold/heat intolerance  Heme:  No petechiae, ecchymosis, easy bruisability  Skin:  No rash, tattoos, scars, edema      PHYSICAL EXAM:   Vital Signs Last 24 Hrs  T(C): 36.7 (22 Feb 2022 07:33), Max: 36.9 (21 Feb 2022 17:31)  T(F): 98.1 (22 Feb 2022 07:33), Max: 98.4 (21 Feb 2022 17:31)  HR: 82 (22 Feb 2022 07:33) (62 - 87)  BP: 152/63 (22 Feb 2022 07:33) (135/65 - 162/71)  BP(mean): --  RR: 18 (22 Feb 2022 07:33) (18 - 18)  SpO2: 98% (22 Feb 2022 07:33) (97% - 99%)  Daily     Daily     GENERAL:  Appears stated age,   HEENT:  NC/AT,    CHEST:  Full & symmetric excursion,   HEART:  Regular rhythm,  ABDOMEN:  Soft, non-tender, non-distended,  EXTEREMITIES:  no cyanosis  SKIN:  No rash  NEURO:  Alert,       LABS:                                    7.2    9.42  )-----------( 236      ( 22 Feb 2022 06:06 )             24.0   02-22    140  |  109<H>  |  28<H>  ----------------------------<  89  4.7   |  19<L>  |  2.07<H>    Ca    8.8      22 Feb 2022 06:06  Phos  5.9     02-21  Mg     2.1     02-21                  RADIOLOGY & ADDITIONAL TESTS:

## 2022-02-22 NOTE — PROGRESS NOTE ADULT - SUBJECTIVE AND OBJECTIVE BOX
[facility], Division of Infectious Diseases  KESHAV Pena Y. Patel, S. Shah, G. Casimir  [phone]  ([phone] - weekdays after 5pm and weekends)    Name: RADHA POLLACK  Age/Gender: 76y Female  MRN: [id number]    Interval History:  Patient seen this morning.   Notes reviewed.   No concerning overnight events.  Afebrile.   continues to endorse dysuria    Allergies: No Known Allergies      Objective:  Vitals:   T(F): 98.3 (02-22-22 @ 12:13), Max: 98.4 (02-21-22 @ 17:31)  HR: 79 (02-22-22 @ 13:20) (62 - 86)  BP: 128/67 (02-22-22 @ 13:20) (128/67 - 170/69)  RR: 18 (02-22-22 @ 12:13) (18 - 18)  SpO2: 100% (02-22-22 @ 12:13) (98% - 100%)  Physical Examination:  General: no acute distress, nontoxic appearing  HEENT: anicteric  Cardio: S1, S2 present, normal rate  Resp: clear to auscultation anteriorly  Abd: soft, ND, NT  : for fungal dermatitis in either groin fold  Ext: no LE edema  Skin: warm, dry, no visible rash   Lines:    Laboratory Studies:  CBC:                       7.2    9.42  )-----------( 236      ( 22 Feb 2022 06:06 )             24.0     WBC Trend:  9.42 02-22-22 @ 06:06  8.37 02-21-22 @ 08:54  7.56 02-21-22 @ 06:20  12.14 02-19-22 @ 06:20  11.74 02-18-22 @ 17:40  10.32 02-18-22 @ 02:45  10.76 02-18-22 @ 00:36  10.40 02-16-22 @ 13:19  9.46 02-16-22 @ 05:31    CMP: 02-22    140  |  109<H>  |  28<H>  ----------------------------<  89  4.7   |  19<L>  |  2.07<H>    Ca    8.8      22 Feb 2022 06:06  Phos  5.9     02-21  Mg     2.1     02-21              Microbiology: reviewed     Culture - Urine (collected 02-18-22 @ 02:59)  Source: Clean Catch Clean Catch (Midstream)  Final Report (02-21-22 @ 17:06):    >100,000 CFU/ml Klebsiella oxytoca/Raoutella ornithinolytica  Organism: Klebsiella oxytoca /Raoutella ornithinolytica (02-21-22 @ 17:06)  Organism: Klebsiella oxytoca /Raoutella ornithinolytica (02-21-22 @ 17:06)      -  Amikacin: S <=16      -  Amoxicillin/Clavulanic Acid: S <=8/4      -  Ampicillin: R >16 These ampicillin results predict results for amoxicillin      -  Ampicillin/Sulbactam: S <=4/2 Enterobacter, Klebsiella aerogenes, Citrobacter, and Serratia may develop resistance during prolonged therapy (3-4 days)      -  Aztreonam: S <=4      -  Cefazolin: I 4      -  Cefepime: S <=2      -  Cefoxitin: S <=8      -  Ceftriaxone: S <=1 Enterobacter, Klebsiella aerogenes, Citrobacter, and Serratia may develop resistance during prolonged therapy      -  Ciprofloxacin: S <=0.25      -  Ertapenem: S <=0.5      -  Gentamicin: S <=2      -  Imipenem: S <=1      -  Levofloxacin: S <=0.5      -  Meropenem: S <=1      -  Nitrofurantoin: S <=32 Should not be used to treat pyelonephritis      -  Piperacillin/Tazobactam: S <=8      -  Tigecycline: S <=2      -  Tobramycin: S <=2      -  Trimethoprim/Sulfamethoxazole: S <=0.5/9.5      Method Type: FANNIE    Culture - Blood (collected 02-18-22 @ 02:46)  Source: .Blood Blood-Peripheral  Preliminary Report (02-19-22 @ 03:01):    No growth to date.    Culture - Blood (collected 02-18-22 @ 02:46)  Source: .Blood Blood-Peripheral  Preliminary Report (02-19-22 @ 03:01):    No growth to date.      Radiology: reviewed     Medications:  acetaminophen     Tablet .. 650 milliGRAM(s) Oral every 6 hours PRN  allopurinol 100 milliGRAM(s) Oral daily  artificial  tears Solution 1 Drop(s) Both EYES four times a day  bisacodyl 5 milliGRAM(s) Oral at bedtime  docosanol 10% Cream 1 Application(s) Topical five times a day  gabapentin Solution 50 milliGRAM(s) Oral two times a day  heparin   Injectable 5000 Unit(s) SubCutaneous every 8 hours  hydrocortisone hemorrhoidal Suppository 1 Suppository(s) Rectal daily  latanoprost 0.005% Ophthalmic Solution 1 Drop(s) Both EYES at bedtime  levothyroxine 25 MICROGram(s) Oral daily  melatonin 3 milliGRAM(s) Oral at bedtime PRN  mesalamine Suppository 1000 milliGRAM(s) Rectal at bedtime  midodrine. 5 milliGRAM(s) Oral <User Schedule>  pantoprazole    Tablet 40 milliGRAM(s) Oral before breakfast  piperacillin/tazobactam IVPB..      piperacillin/tazobactam IVPB.. 3.375 Gram(s) IV Intermittent every 12 hours  polyethylene glycol 3350 17 Gram(s) Oral two times a day  sevelamer carbonate 800 milliGRAM(s) Oral three times a day with meals  simvastatin 40 milliGRAM(s) Oral at bedtime  sodium bicarbonate 650 milliGRAM(s) Oral two times a day  sodium zirconium cyclosilicate 5 Gram(s) Oral daily    Antimicrobials:  piperacillin/tazobactam IVPB..      piperacillin/tazobactam IVPB.. 3.375 Gram(s) IV Intermittent every 12 hours

## 2022-02-22 NOTE — PROGRESS NOTE ADULT - ASSESSMENT
77yo female pt with PMHx of CKD (Last K-5.3, Bun/Cr.-29/1.93 on 7/18/2020), Anemia, Gout, HTN, HLD, was transferred, on cervical collar, from [facility] c/o head/neck pain, C2 fracture s/p fall this morning with a syncopal episode.

## 2022-02-22 NOTE — PROGRESS NOTE ADULT - PROBLEM SELECTOR PLAN 2
continue to check orthostatics   c/w midodrine 5mg PO BID - pt/family has been refusing because SBP ranges from 130-160s when laying in bed.  Midodrine recently d/c'd (2/14) by medicine NP after improved orthostatics..24-48hrs later (2/15 & 2/16), pt with +orthostatics with SBPs down to 90s (2/16) - midodrine restarted 2/16.  Continue to monitor while pt refusing Midodrine.  continue to hold all antihypertensives - stable off meds  continue to monitor

## 2022-02-22 NOTE — PROGRESS NOTE ADULT - SUBJECTIVE AND OBJECTIVE BOX
Subjective: Patient seen and examined. No new events except as noted.   leg pain 2/2 gout  hemorrhoid pain     REVIEW OF SYSTEMS:    CONSTITUTIONAL: + weakness, fevers or chills  EYES/ENT: No visual changes;  No vertigo or throat pain   NECK: No pain or stiffness  RESPIRATORY: No cough, wheezing, hemoptysis; No shortness of breath  CARDIOVASCULAR: No chest pain or palpitations  GASTROINTESTINAL: No abdominal or epigastric pain. No nausea, vomiting, or hematemesis; No diarrhea or constipation. No melena or hematochezia.  GENITOURINARY: No dysuria, frequency or hematuria  NEUROLOGICAL: No numbness or weakness  SKIN: No itching, burning, rashes, or lesions   All other review of systems is negative unless indicated above.    MEDICATIONS:  MEDICATIONS  (STANDING):  allopurinol 100 milliGRAM(s) Oral daily  artificial  tears Solution 1 Drop(s) Both EYES four times a day  bisacodyl 5 milliGRAM(s) Oral at bedtime  docosanol 10% Cream 1 Application(s) Topical five times a day  gabapentin Solution 50 milliGRAM(s) Oral two times a day  heparin   Injectable 5000 Unit(s) SubCutaneous every 8 hours  hydrocortisone hemorrhoidal Suppository 1 Suppository(s) Rectal daily  latanoprost 0.005% Ophthalmic Solution 1 Drop(s) Both EYES at bedtime  levothyroxine 25 MICROGram(s) Oral daily  mesalamine Suppository 1000 milliGRAM(s) Rectal at bedtime  midodrine. 5 milliGRAM(s) Oral <User Schedule>  pantoprazole    Tablet 40 milliGRAM(s) Oral before breakfast  piperacillin/tazobactam IVPB..      piperacillin/tazobactam IVPB.. 3.375 Gram(s) IV Intermittent every 12 hours  polyethylene glycol 3350 17 Gram(s) Oral two times a day  sevelamer carbonate 800 milliGRAM(s) Oral three times a day with meals  simvastatin 40 milliGRAM(s) Oral at bedtime  sodium bicarbonate 650 milliGRAM(s) Oral two times a day  sodium zirconium cyclosilicate 5 Gram(s) Oral daily      PHYSICAL EXAM:  T(C): 36.7 (02-22-22 @ 07:33), Max: 36.9 (02-21-22 @ 17:31)  HR: 82 (02-22-22 @ 07:33) (62 - 87)  BP: 152/63 (02-22-22 @ 07:33) (135/65 - 162/71)  RR: 18 (02-22-22 @ 07:33) (18 - 18)  SpO2: 98% (02-22-22 @ 07:33) (97% - 99%)  Wt(kg): --  I&O's Summary    21 Feb 2022 07:01  -  22 Feb 2022 07:00  --------------------------------------------------------  IN: 860 mL / OUT: 1300 mL / NET: -440 mL      Appearance: NAD, c-collar on   HEENT: Normal oral mucosa, PERRL, EOMI	  Lymphatic: No lymphadenopathy , no edema  Cardiovascular: Normal S1 S2, No JVD, No murmurs , Peripheral pulses palpable 2+ bilaterally  Respiratory: Lungs clear to auscultation, normal effort 	  Gastrointestinal:  Soft, Non-tender, + BS	  Skin: No rashes, No ecchymoses, No cyanosis, warm to touch  Neuro: alert oriented x 3 attention comprehension are fair.  Able to name, repeat.   EOmi fundi not visualized   no nystagmus VFF to confrontation  Tongue is midline  Palate elevates symmetrically   Moving all 4 ext spontaneously no drift appreciated  Gait not assessed.   Ext: No edema      LABS:    CARDIAC MARKERS:                          7.2    9.42  )-----------( 236      ( 22 Feb 2022 06:06 )             24.0     02-22    140  |  109<H>  |  28<H>  ----------------------------<  89  4.7   |  19<L>  |  2.07<H>    Ca    8.8      22 Feb 2022 06:06  Phos  5.9     02-21  Mg     2.1     02-21      proBNP:   Lipid Profile:   HgA1c:   TSH:     TELEMETRY: 	    ECG:  	  RADIOLOGY:   DIAGNOSTIC TESTING:  [ ] Echocardiogram:  [ ]  Catheterization:  [ ] Stress Test:    OTHER:

## 2022-02-22 NOTE — PROGRESS NOTE ADULT - ASSESSMENT
constipation  hemorrhoids   ckd  C2 fracture       bowel regimen   Miralax BID  Anusol suppository QD  mesalamine suppository QD  monitor stool count   diet as tolerated   H/H stable   dc planning as per primary   will sign off, reconsult as needed

## 2022-02-22 NOTE — PROGRESS NOTE ADULT - SUBJECTIVE AND OBJECTIVE BOX
Patient seen and examined  no complaints    No Known Allergies    Hospital Medications:   MEDICATIONS  (STANDING):  allopurinol 100 milliGRAM(s) Oral daily  artificial  tears Solution 1 Drop(s) Both EYES four times a day  bisacodyl 5 milliGRAM(s) Oral at bedtime  docosanol 10% Cream 1 Application(s) Topical five times a day  gabapentin Solution 50 milliGRAM(s) Oral two times a day  heparin   Injectable 5000 Unit(s) SubCutaneous every 8 hours  hydrocortisone hemorrhoidal Suppository 1 Suppository(s) Rectal daily  latanoprost 0.005% Ophthalmic Solution 1 Drop(s) Both EYES at bedtime  levothyroxine 25 MICROGram(s) Oral daily  mesalamine Suppository 1000 milliGRAM(s) Rectal at bedtime  midodrine. 5 milliGRAM(s) Oral <User Schedule>  pantoprazole    Tablet 40 milliGRAM(s) Oral before breakfast  piperacillin/tazobactam IVPB..      piperacillin/tazobactam IVPB.. 3.375 Gram(s) IV Intermittent every 12 hours  polyethylene glycol 3350 17 Gram(s) Oral two times a day  sevelamer carbonate 800 milliGRAM(s) Oral three times a day with meals  simvastatin 40 milliGRAM(s) Oral at bedtime  sodium bicarbonate 650 milliGRAM(s) Oral two times a day  sodium zirconium cyclosilicate 5 Gram(s) Oral daily        VITALS:  T(F): 98.1 (22 @ 15:26), Max: 98.4 (22 @ 17:31)  HR: 81 (22 @ 15:26)  BP: 149/66 (22 @ 15:26)  RR: 18 (22 @ 15:26)  SpO2: 97% (22 @ 15:26)  Wt(kg): --     @ 07:01  -   @ 07:00  --------------------------------------------------------  IN: 860 mL / OUT: 1300 mL / NET: -440 mL     @ 07:01  -   @ 16:05  --------------------------------------------------------  IN: 760 mL / OUT: 1 mL / NET: 759 mL        Physical Exam :-  Constitutional: NAD, cervical color in place  Respiratory: Bilateral equal breath sounds, no Crackles present.  Cardiovascular: S1, S2 normal, positive Murmur  Gastrointestinal: Bowel Sounds present, soft, non tender.  Extremities: no Edema Feet  Neurological: Alert   Psychiatric: Normal mood, normal affect    LABS:      140  |  109<H>  |  28<H>  ----------------------------<  89  4.7   |  19<L>  |  2.07<H>    Ca    8.8      2022 06:06  Phos  5.9     02-21  Mg     2.1     -      Creatinine Trend: 2.07 <--, 1.91 <--, 1.80 <--, 2.00 <--, 1.93 <--, 2.59 <--, 2.18 <--                        7.2    9.42  )-----------( 236      ( 2022 06:06 )             24.0     Urine Studies:  Urinalysis Basic - ( 2022 18:58 )    Color: Light Yellow / Appearance: Clear / S.010 / pH:   Gluc:  / Ketone: Negative  / Bili: Negative / Urobili: Negative   Blood:  / Protein: 30 mg/dL / Nitrite: Negative   Leuk Esterase: Moderate / RBC: 2 /hpf / WBC 20 /HPF   Sq Epi:  / Non Sq Epi: 0 /hpf / Bacteria: Many        RADIOLOGY & ADDITIONAL STUDIES:

## 2022-02-22 NOTE — PROGRESS NOTE ADULT - ASSESSMENT
77 y/o female PMHx of CKD IV, Gout, HTN, HLD w/ presented to OSH s/p fall 2/2 syncopal episode found to have C2 fracture w/ hospital course c/b fever.   treated for gout flare w/ prednisone x 3 days    UTI  fever resolved  UA positive, pt endorsed urinary urgency and frequency  continues to endorse dysuria though states this started to improve 2/20  switched to zosyn on evening 2/19, c/w zosyn while culture pending  f/u blood cultures- NGTD  pt continues to endorse dysuria, spoke with nurse regarding removing purewick  urine cx positive for Klebsiella oxytoca/ Raoutella  can transition back to ceftriaxone 1g daily  plan for 7 day course of antibiotics (last day tomorrow)    gout  pt reports L knee pain and R foot pain 2/2 gout flare  on allopurinol  consider another round of steroids for gout pain    cervical spine fracture  s/p CT head/ C-spine- Acute C2 fracture of the body of the dens extending to the left lateral mass. Right C2 facet fracture. Mild acute compression fracture of the superior endplate of C3  evaluated by ortho  wearing C-collar    fall/syncope  on telemetry  s/p TTE- no mention of vegetations  orthostatic positive  on midodrine    CKD  renally dose medications  nephrology following    Covering for Dr. Yee Peña M.D.  [facility], Division of Infectious Diseases  [phone]  After 5pm on weekdays and all day on weekends - please call [phone]

## 2022-02-22 NOTE — PROGRESS NOTE ADULT - ASSESSMENT
76 year-old woman with history of multiple medical issues including hypertension, hyperlipidemia, and chronic kidney disease. She was transferred to [facility] yesterday from [facility] after being noted on imaging to have a C2 fracture, s/p syncopal episode. Following for CKD    CKD 4 - likely Hypertensive Nephropathy  Serum stable - at baseline  appears to be ranging around 1.8 to 2.0mg/dl  renal us shows cortical calcifications  cont monitor Serum Creatinine level  fluctuations expected in cr  Renal diet  daily BMP  on sevelamer for high phos     Hyperkalemia  improved  c/w Lokelma 5mg PO QD  Low K diet    Metabolic acidosis due to renal failure likely   on  Nabicarb 650mg po bid  trend bicarb  goal bicarb 22     Cervical fracture  pain management  Neurosx recs appreciated    Dr Brown  [phone]

## 2022-02-23 LAB
ANION GAP SERPL CALC-SCNC: 13 MMOL/L — SIGNIFICANT CHANGE UP (ref 5–17)
BLD GP AB SCN SERPL QL: NEGATIVE — SIGNIFICANT CHANGE UP
BUN SERPL-MCNC: 26 MG/DL — HIGH (ref 7–23)
CALCIUM SERPL-MCNC: 8.9 MG/DL — SIGNIFICANT CHANGE UP (ref 8.4–10.5)
CHLORIDE SERPL-SCNC: 108 MMOL/L — SIGNIFICANT CHANGE UP (ref 96–108)
CO2 SERPL-SCNC: 19 MMOL/L — LOW (ref 22–31)
CREAT SERPL-MCNC: 2.09 MG/DL — HIGH (ref 0.5–1.3)
CULTURE RESULTS: SIGNIFICANT CHANGE UP
CULTURE RESULTS: SIGNIFICANT CHANGE UP
GLUCOSE SERPL-MCNC: 169 MG/DL — HIGH (ref 70–99)
HCT VFR BLD CALC: 24.3 % — LOW (ref 34.5–45)
HGB BLD-MCNC: 7.6 G/DL — LOW (ref 11.5–15.5)
MCHC RBC-ENTMCNC: 26.5 PG — LOW (ref 27–34)
MCHC RBC-ENTMCNC: 31.3 GM/DL — LOW (ref 32–36)
MCV RBC AUTO: 84.7 FL — SIGNIFICANT CHANGE UP (ref 80–100)
NRBC # BLD: 0 /100 WBCS — SIGNIFICANT CHANGE UP (ref 0–0)
PHOSPHATE SERPL-MCNC: 5.9 MG/DL — HIGH (ref 2.5–4.5)
PLATELET # BLD AUTO: 250 K/UL — SIGNIFICANT CHANGE UP (ref 150–400)
POTASSIUM SERPL-MCNC: 4.7 MMOL/L — SIGNIFICANT CHANGE UP (ref 3.5–5.3)
POTASSIUM SERPL-SCNC: 4.7 MMOL/L — SIGNIFICANT CHANGE UP (ref 3.5–5.3)
RBC # BLD: 2.87 M/UL — LOW (ref 3.8–5.2)
RBC # FLD: 16.4 % — HIGH (ref 10.3–14.5)
RH IG SCN BLD-IMP: POSITIVE — SIGNIFICANT CHANGE UP
SODIUM SERPL-SCNC: 140 MMOL/L — SIGNIFICANT CHANGE UP (ref 135–145)
SPECIMEN SOURCE: SIGNIFICANT CHANGE UP
SPECIMEN SOURCE: SIGNIFICANT CHANGE UP
WBC # BLD: 12.46 K/UL — HIGH (ref 3.8–10.5)
WBC # FLD AUTO: 12.46 K/UL — HIGH (ref 3.8–10.5)

## 2022-02-23 PROCEDURE — 99222 1ST HOSP IP/OBS MODERATE 55: CPT

## 2022-02-23 RX ORDER — CEFTRIAXONE 500 MG/1
1000 INJECTION, POWDER, FOR SOLUTION INTRAMUSCULAR; INTRAVENOUS ONCE
Refills: 0 | Status: COMPLETED | OUTPATIENT
Start: 2022-02-23 | End: 2022-02-23

## 2022-02-23 RX ADMIN — Medication 0.6 MILLIGRAM(S): at 05:02

## 2022-02-23 RX ADMIN — SIMVASTATIN 40 MILLIGRAM(S): 20 TABLET, FILM COATED ORAL at 21:19

## 2022-02-23 RX ADMIN — PIPERACILLIN AND TAZOBACTAM 25 GRAM(S): 4; .5 INJECTION, POWDER, LYOPHILIZED, FOR SOLUTION INTRAVENOUS at 05:02

## 2022-02-23 RX ADMIN — Medication 650 MILLIGRAM(S): at 05:01

## 2022-02-23 RX ADMIN — Medication 1 DROP(S): at 05:00

## 2022-02-23 RX ADMIN — SEVELAMER CARBONATE 800 MILLIGRAM(S): 2400 POWDER, FOR SUSPENSION ORAL at 09:57

## 2022-02-23 RX ADMIN — SEVELAMER CARBONATE 800 MILLIGRAM(S): 2400 POWDER, FOR SUSPENSION ORAL at 12:05

## 2022-02-23 RX ADMIN — PANTOPRAZOLE SODIUM 40 MILLIGRAM(S): 20 TABLET, DELAYED RELEASE ORAL at 05:01

## 2022-02-23 RX ADMIN — Medication 1 DROP(S): at 00:00

## 2022-02-23 RX ADMIN — Medication 650 MILLIGRAM(S): at 17:31

## 2022-02-23 RX ADMIN — GABAPENTIN 50 MILLIGRAM(S): 400 CAPSULE ORAL at 17:30

## 2022-02-23 RX ADMIN — Medication 1 SUPPOSITORY(S): at 11:30

## 2022-02-23 RX ADMIN — Medication 100 MILLIGRAM(S): at 11:34

## 2022-02-23 RX ADMIN — SEVELAMER CARBONATE 800 MILLIGRAM(S): 2400 POWDER, FOR SUSPENSION ORAL at 17:30

## 2022-02-23 RX ADMIN — LATANOPROST 1 DROP(S): 0.05 SOLUTION/ DROPS OPHTHALMIC; TOPICAL at 21:20

## 2022-02-23 RX ADMIN — Medication 3 MILLIGRAM(S): at 21:19

## 2022-02-23 RX ADMIN — Medication 1 DROP(S): at 17:23

## 2022-02-23 RX ADMIN — Medication 1 TABLET(S): at 11:56

## 2022-02-23 RX ADMIN — Medication 25 MICROGRAM(S): at 05:01

## 2022-02-23 RX ADMIN — CEFTRIAXONE 100 MILLIGRAM(S): 500 INJECTION, POWDER, FOR SOLUTION INTRAMUSCULAR; INTRAVENOUS at 23:55

## 2022-02-23 RX ADMIN — GABAPENTIN 50 MILLIGRAM(S): 400 CAPSULE ORAL at 05:01

## 2022-02-23 RX ADMIN — Medication 1 DROP(S): at 11:29

## 2022-02-23 RX ADMIN — Medication 20 MILLIGRAM(S): at 05:05

## 2022-02-23 RX ADMIN — SODIUM ZIRCONIUM CYCLOSILICATE 5 GRAM(S): 10 POWDER, FOR SUSPENSION ORAL at 11:56

## 2022-02-23 NOTE — CONSULT NOTE ADULT - REASON FOR ADMISSION
syncope , cervical spine fracture

## 2022-02-23 NOTE — PROGRESS NOTE ADULT - SUBJECTIVE AND OBJECTIVE BOX
Patient is a 76y old  Female who presents with a chief complaint of syncope , cervical spine fracture (23 Feb 2022 17:46)      SUBJECTIVE / OVERNIGHT EVENTS: awaiting DC to [facility], started on steroids for acute gout, seen by rheum    MEDICATIONS  (STANDING):  allopurinol 100 milliGRAM(s) Oral daily  artificial  tears Solution 1 Drop(s) Both EYES four times a day  bisacodyl 5 milliGRAM(s) Oral at bedtime  colchicine 0.6 milliGRAM(s) Oral <User Schedule>  gabapentin Solution 50 milliGRAM(s) Oral two times a day  heparin   Injectable 5000 Unit(s) SubCutaneous every 8 hours  hydrocortisone hemorrhoidal Suppository 1 Suppository(s) Rectal daily  latanoprost 0.005% Ophthalmic Solution 1 Drop(s) Both EYES at bedtime  levothyroxine 25 MICROGram(s) Oral daily  mesalamine Suppository 1000 milliGRAM(s) Rectal at bedtime  midodrine. 5 milliGRAM(s) Oral <User Schedule>  Nephro-darcy 1 Tablet(s) Oral daily  pantoprazole    Tablet 40 milliGRAM(s) Oral before breakfast  piperacillin/tazobactam IVPB..      piperacillin/tazobactam IVPB.. 3.375 Gram(s) IV Intermittent every 12 hours  polyethylene glycol 3350 17 Gram(s) Oral two times a day  sevelamer carbonate 800 milliGRAM(s) Oral three times a day with meals  simvastatin 40 milliGRAM(s) Oral at bedtime  sodium bicarbonate 650 milliGRAM(s) Oral two times a day  sodium zirconium cyclosilicate 5 Gram(s) Oral daily    MEDICATIONS  (PRN):  acetaminophen     Tablet .. 650 milliGRAM(s) Oral every 6 hours PRN Mild Pain (1 - 3)  melatonin 3 milliGRAM(s) Oral at bedtime PRN Insomnia      Vital Signs Last 24 Hrs  T(F): 98 (02-23-22 @ 16:32), Max: 99.9 (02-22-22 @ 19:36)  HR: 79 (02-23-22 @ 16:32) (66 - 89)  BP: 162/67 (02-23-22 @ 16:32) (138/65 - 169/66)  RR: 18 (02-23-22 @ 16:32) (18 - 18)  SpO2: 100% (02-23-22 @ 16:32) (96% - 100%)  Telemetry:   CAPILLARY BLOOD GLUCOSE        I&O's Summary    22 Feb 2022 07:01  -  23 Feb 2022 07:00  --------------------------------------------------------  IN: 880 mL / OUT: 701 mL / NET: 179 mL        PHYSICAL EXAM:  GENERAL: NAD, well-developed  HEAD:  Atraumatic, Normocephalic  EYES: EOMI, PERRLA, conjunctiva and sclera clear  NECK: Supple, No JVD  CHEST/LUNG: Clear to auscultation bilaterally; No wheeze  HEART: Regular rate and rhythm; No murmurs, rubs, or gallops  ABDOMEN: Soft, Nontender, Nondistended; Bowel sounds present  EXTREMITIES:  2+ Peripheral Pulses, No clubbing, cyanosis, or edema  PSYCH: AAOx3  NEUROLOGY: non-focal  SKIN: No rashes or lesions    LABS:                        7.6    12.46 )-----------( 250      ( 23 Feb 2022 11:48 )             24.3     02-23    140  |  108  |  26<H>  ----------------------------<  169<H>  4.7   |  19<L>  |  2.09<H>    Ca    8.9      23 Feb 2022 06:02  Phos  5.9     02-23                RADIOLOGY & ADDITIONAL TESTS:    Imaging Personally Reviewed:    Consultant(s) Notes Reviewed:      Care Discussed with Consultants/Other Providers:

## 2022-02-23 NOTE — PROGRESS NOTE ADULT - ASSESSMENT
75 y/o female PMHx of CKD IV, Gout, HTN, HLD w/ presented to OSH s/p fall 2/2 syncopal episode found to have C2 fracture w/ hospital course c/b fever.   treated for gout flare w/ prednisone x 3 days    UTI  fever resolved  UA positive, pt endorsed urinary urgency and frequency  continues to endorse dysuria though states this started to improve 2/20  switched to zosyn on evening 2/19, c/w zosyn while culture pending  f/u blood cultures- NGTD  pt continues to endorse dysuria, purewick removed; unsure if patient is referring to hemorrhoid pain when questioned about dysuria  urine cx positive for Klebsiella oxytoca/ Raoutella  can transition back to ceftriaxone 1g daily  plan for 7 day course of antibiotics (last day today)    gout  pt reports L knee pain and R foot pain 2/2 gout flare  on allopurinol  consider another round of steroids for gout pain    cervical spine fracture  s/p CT head/ C-spine- Acute C2 fracture of the body of the dens extending to the left lateral mass. Right C2 facet fracture. Mild acute compression fracture of the superior endplate of C3  evaluated by ortho  wearing C-collar    fall/syncope  on telemetry  s/p TTE- no mention of vegetations  orthostatic positive  on midodrine    CKD  renally dose medications  nephrology following    Covering for Dr. Yee Peña M.D.  [facility], Division of Infectious Diseases  [phone]  After 5pm on weekdays and all day on weekends - please call [phone]

## 2022-02-23 NOTE — PROGRESS NOTE ADULT - ASSESSMENT
75yo female pt with PMHx of CKD (Last K-5.3, Bun/Cr.-29/1.93 on 7/18/2020), Anemia, Gout, HTN, HLD, was transferred, on cervical collar, from [facility] c/o head/neck pain, C2 fracture s/p fall this morning with a syncopal episode.

## 2022-02-23 NOTE — CONSULT NOTE ADULT - CONSULT REQUESTED DATE/TIME
07-Feb-2022 07:52
14-Feb-2022 14:02
06-Feb-2022 15:56
07-Feb-2022 17:25
23-Feb-2022 17:47
11-Feb-2022 18:28
08-Feb-2022 06:02
18-Feb-2022 14:59

## 2022-02-23 NOTE — PROGRESS NOTE ADULT - SUBJECTIVE AND OBJECTIVE BOX
Patient seen and examined  no complaints    No Known Allergies    Hospital Medications:   MEDICATIONS  (STANDING):  allopurinol 100 milliGRAM(s) Oral daily  artificial  tears Solution 1 Drop(s) Both EYES four times a day  bisacodyl 5 milliGRAM(s) Oral at bedtime  colchicine 0.6 milliGRAM(s) Oral <User Schedule>  gabapentin Solution 50 milliGRAM(s) Oral two times a day  heparin   Injectable 5000 Unit(s) SubCutaneous every 8 hours  hydrocortisone hemorrhoidal Suppository 1 Suppository(s) Rectal daily  latanoprost 0.005% Ophthalmic Solution 1 Drop(s) Both EYES at bedtime  levothyroxine 25 MICROGram(s) Oral daily  mesalamine Suppository 1000 milliGRAM(s) Rectal at bedtime  methylPREDNISolone sodium succinate Injectable 20 milliGRAM(s) IV Push every 12 hours  midodrine. 5 milliGRAM(s) Oral <User Schedule>  Nephro-darcy 1 Tablet(s) Oral daily  pantoprazole    Tablet 40 milliGRAM(s) Oral before breakfast  piperacillin/tazobactam IVPB..      piperacillin/tazobactam IVPB.. 3.375 Gram(s) IV Intermittent every 12 hours  polyethylene glycol 3350 17 Gram(s) Oral two times a day  sevelamer carbonate 800 milliGRAM(s) Oral three times a day with meals  simvastatin 40 milliGRAM(s) Oral at bedtime  sodium bicarbonate 650 milliGRAM(s) Oral two times a day  sodium zirconium cyclosilicate 5 Gram(s) Oral daily        VITALS:  T(F): 98.4 (22 @ 09:03), Max: 99.9 (22 @ 19:36)  HR: 85 (22 @ 09:03)  BP: 146/71 (22 @ 09:03)  RR: 18 (22 @ 09:03)  SpO2: 98% (22 @ 09:03)  Wt(kg): --     @ 07:01  -   @ 07:00  --------------------------------------------------------  IN: 880 mL / OUT: 701 mL / NET: 179 mL        Physical Exam :-  Constitutional: NAD, cervical color in place  Respiratory: Bilateral equal breath sounds, no Crackles present.  Cardiovascular: S1, S2 normal, positive Murmur  Gastrointestinal: Bowel Sounds present, soft, non tender.  Extremities: no Edema Feet  Neurological: Alert   Psychiatric: Normal mood, normal affect      LABS:      140  |  108  |  26<H>  ----------------------------<  169<H>  4.7   |  19<L>  |  2.09<H>    Ca    8.9      2022 06:02  Phos  5.9           Creatinine Trend: 2.09 <--, 2.07 <--, 1.91 <--, 1.80 <--, 2.00 <--, 1.93 <--                        7.6    12.46 )-----------( 250      ( 2022 11:48 )             24.3     Urine Studies:  Urinalysis Basic - ( 2022 18:58 )    Color: Light Yellow / Appearance: Clear / S.010 / pH:   Gluc:  / Ketone: Negative  / Bili: Negative / Urobili: Negative   Blood:  / Protein: 30 mg/dL / Nitrite: Negative   Leuk Esterase: Moderate / RBC: 2 /hpf / WBC 20 /HPF   Sq Epi:  / Non Sq Epi: 0 /hpf / Bacteria: Many        RADIOLOGY & ADDITIONAL STUDIES:

## 2022-02-23 NOTE — CONSULT NOTE ADULT - CONSULT REASON
fever
constipation
C2 fx
Syncope, Cardiac Eval
Azotemia/hyperkalemia
Polyarthritis
syncope
right foot gout flare up

## 2022-02-23 NOTE — PROGRESS NOTE ADULT - ASSESSMENT
77yo female pt with PMHx of CKD (Last K-5.3, Bun/Cr.-29/1.93 on 7/18/2020), Anemia, Gout, HTN, HLD, was transferred, on cervical collar, from [facility] [place] c/o head/neck pain, C2 fracture s/p fall this morning with a syncopal episode.

## 2022-02-23 NOTE — PROGRESS NOTE ADULT - SUBJECTIVE AND OBJECTIVE BOX
Subjective: Patient seen and examined. No new events except as noted.   OOB to chair.     REVIEW OF SYSTEMS:    CONSTITUTIONAL: + weakness, fevers or chills  EYES/ENT: No visual changes;  No vertigo or throat pain   NECK: No pain or stiffness  RESPIRATORY: No cough, wheezing, hemoptysis; No shortness of breath  CARDIOVASCULAR: No chest pain or palpitations  GASTROINTESTINAL: No abdominal or epigastric pain. No nausea, vomiting, or hematemesis; No diarrhea or constipation. No melena or hematochezia.  GENITOURINARY: No dysuria, frequency or hematuria  NEUROLOGICAL: No numbness or weakness  SKIN: No itching, burning, rashes, or lesions   All other review of systems is negative unless indicated above.    MEDICATIONS:  MEDICATIONS  (STANDING):  allopurinol 100 milliGRAM(s) Oral daily  artificial  tears Solution 1 Drop(s) Both EYES four times a day  bisacodyl 5 milliGRAM(s) Oral at bedtime  colchicine 0.6 milliGRAM(s) Oral <User Schedule>  gabapentin Solution 50 milliGRAM(s) Oral two times a day  heparin   Injectable 5000 Unit(s) SubCutaneous every 8 hours  hydrocortisone hemorrhoidal Suppository 1 Suppository(s) Rectal daily  latanoprost 0.005% Ophthalmic Solution 1 Drop(s) Both EYES at bedtime  levothyroxine 25 MICROGram(s) Oral daily  mesalamine Suppository 1000 milliGRAM(s) Rectal at bedtime  methylPREDNISolone sodium succinate Injectable 20 milliGRAM(s) IV Push every 12 hours  midodrine. 5 milliGRAM(s) Oral <User Schedule>  Nephro-darcy 1 Tablet(s) Oral daily  pantoprazole    Tablet 40 milliGRAM(s) Oral before breakfast  piperacillin/tazobactam IVPB..      piperacillin/tazobactam IVPB.. 3.375 Gram(s) IV Intermittent every 12 hours  polyethylene glycol 3350 17 Gram(s) Oral two times a day  sevelamer carbonate 800 milliGRAM(s) Oral three times a day with meals  simvastatin 40 milliGRAM(s) Oral at bedtime  sodium bicarbonate 650 milliGRAM(s) Oral two times a day  sodium zirconium cyclosilicate 5 Gram(s) Oral daily      PHYSICAL EXAM:  T(C): 36.9 (02-23-22 @ 09:03), Max: 37.7 (02-22-22 @ 19:36)  HR: 85 (02-23-22 @ 09:03) (66 - 89)  BP: 146/71 (02-23-22 @ 09:03) (138/65 - 169/66)  RR: 18 (02-23-22 @ 09:03) (18 - 18)  SpO2: 98% (02-23-22 @ 09:03) (96% - 99%)  Wt(kg): --  I&O's Summary    22 Feb 2022 07:01  -  23 Feb 2022 07:00  --------------------------------------------------------  IN: 880 mL / OUT: 701 mL / NET: 179 mL      Appearance: NAD, c-collar on   HEENT: Normal oral mucosa, PERRL, EOMI	  Lymphatic: No lymphadenopathy , no edema  Cardiovascular: Normal S1 S2, No JVD, No murmurs , Peripheral pulses palpable 2+ bilaterally  Respiratory: Lungs clear to auscultation, normal effort 	  Gastrointestinal:  Soft, Non-tender, + BS	  Skin: No rashes, No ecchymoses, No cyanosis, warm to touch  Neuro: alert oriented x 3 attention comprehension are fair.  Able to name, repeat.   EOmi fundi not visualized   no nystagmus VFF to confrontation  Tongue is midline  Palate elevates symmetrically   Moving all 4 ext spontaneously no drift appreciated  Gait not assessed.   Ext: No edema      LABS:    CARDIAC MARKERS:                          7.6    12.46 )-----------( 250      ( 23 Feb 2022 11:48 )             24.3     02-23    140  |  108  |  26<H>  ----------------------------<  169<H>  4.7   |  19<L>  |  2.09<H>    Ca    8.9      23 Feb 2022 06:02  Phos  5.9     02-23      proBNP:   Lipid Profile:   HgA1c:   TSH:     TELEMETRY: 	    ECG:  	  RADIOLOGY:   DIAGNOSTIC TESTING:  [ ] Echocardiogram:  [ ]  Catheterization:  [ ] Stress Test:    OTHER:

## 2022-02-23 NOTE — CONSULT NOTE ADULT - PROVIDER SPECIALTY LIST ADULT
Neurology
Nephrology
Rheumatology
Podiatry
Neurosurgery
Gastroenterology
Infectious Disease
Cardiology

## 2022-02-23 NOTE — PROGRESS NOTE ADULT - ASSESSMENT
76 year-old woman with history of multiple medical issues including hypertension, hyperlipidemia, and chronic kidney disease. She was transferred to [facility] yesterday from the [facility] ER after being noted on imaging to have a C2 fracture, s/p syncopal episode. Following for CKD    CKD 4 - likely Hypertensive Nephropathy  Serum stable - at baseline  appears to be ranging around 1.8 to 2.0mg/dl  renal us shows cortical calcifications  cont monitor Serum Creatinine level  fluctuations expected in cr  Renal diet  daily BMP  on sevelamer for high phos     Hyperkalemia  improved  c/w Lokelma 5mg PO QD  Low K diet    Metabolic acidosis due to renal failure likely   on  Nabicarb 650mg po bid  trend bicarb  goal bicarb 22     Cervical fracture  pain management  Neurosx recs appreciated    Dr Brown  [phone]

## 2022-02-23 NOTE — CONSULT NOTE ADULT - SUBJECTIVE AND OBJECTIVE BOX
HISTORY OF PRESENT ILLNESS:  76F with CKD, HTN, HLD, gout, OP presenting s/p fall with C2 fracture. During her hospitalization she has developed 2 flares of joint pain and swelling. Last week her right big toe became red tender and swollen. Podiatry was consulted and she was given dexamethasone which resolved the flare. A few days she developed acute onset left knee, right ankle, bilateral wrist pain and swelling, she was started on prednisone and is reporting feeling much better today. She denies any more wrist swelling, but reports improving right ankle swelling.     She has a longstanding history of gout, dg 15 years ago in the [facility] She has been on allopurinol for about a year, prescribed by her PCP. She has not seen a rheumatologist for her gout. She reports occasional mild pain in her toes for which she takes 3 days of colchicine with resolution of symptoms. She does not recall having a severe gout flare in years. She adheres to a low purine low fructose diet. Not on diuretics. No h/o uric acid kidney stones.  Strong family history of gout.    She has a longstanding history of osteoporosis, was on a monthly pill for 4 years over 10 years. She has not had a bone densitometry in many years.    PAST MEDICAL & SURGICAL HISTORY:  Anemia  HTN (hypertension)  HLD (hyperlipidemia)  H/O kidney injury  Cataract        MEDICATIONS  (STANDING):  allopurinol 100 milliGRAM(s) Oral daily  artificial  tears Solution 1 Drop(s) Both EYES four times a day  bisacodyl 5 milliGRAM(s) Oral at bedtime  colchicine 0.6 milliGRAM(s) Oral <User Schedule>  gabapentin Solution 50 milliGRAM(s) Oral two times a day  heparin   Injectable 5000 Unit(s) SubCutaneous every 8 hours  hydrocortisone hemorrhoidal Suppository 1 Suppository(s) Rectal daily  latanoprost 0.005% Ophthalmic Solution 1 Drop(s) Both EYES at bedtime  levothyroxine 25 MICROGram(s) Oral daily  mesalamine Suppository 1000 milliGRAM(s) Rectal at bedtime  midodrine. 5 milliGRAM(s) Oral <User Schedule>  Nephro-darcy 1 Tablet(s) Oral daily  pantoprazole    Tablet 40 milliGRAM(s) Oral before breakfast  piperacillin/tazobactam IVPB..      piperacillin/tazobactam IVPB.. 3.375 Gram(s) IV Intermittent every 12 hours  polyethylene glycol 3350 17 Gram(s) Oral two times a day  sevelamer carbonate 800 milliGRAM(s) Oral three times a day with meals  simvastatin 40 milliGRAM(s) Oral at bedtime  sodium bicarbonate 650 milliGRAM(s) Oral two times a day  sodium zirconium cyclosilicate 5 Gram(s) Oral daily    MEDICATIONS  (PRN):  acetaminophen     Tablet .. 650 milliGRAM(s) Oral every 6 hours PRN Mild Pain (1 - 3)  melatonin 3 milliGRAM(s) Oral at bedtime PRN Insomnia      Allergies    No Known Allergies    Intolerances        PERTINENT MEDICATION HISTORY:    SOCIAL HISTORY:    FAMILY HISTORY:      Vital Signs Last 24 Hrs  T(C): 36.7 (23 Feb 2022 16:32), Max: 37.7 (22 Feb 2022 19:36)  T(F): 98 (23 Feb 2022 16:32), Max: 99.9 (22 Feb 2022 19:36)  HR: 79 (23 Feb 2022 16:32) (66 - 89)  BP: 162/67 (23 Feb 2022 16:32) (138/65 - 169/66)  BP(mean): --  RR: 18 (23 Feb 2022 16:32) (18 - 18)  SpO2: 100% (23 Feb 2022 16:32) (96% - 100%)    PHYSICAL EXAM:  Constitutional: NAD  HEENT: cervical collar  Musculoskeletal: mild TTP of left knee, no warmth, erythema effusion, slightly limited rom. right ankle swollen, mildly erythematous, tender, good rom.  Skin: no tophi  Psychiatric: aaox3    LABS:                        7.6    12.46 )-----------( 250      ( 23 Feb 2022 11:48 )             24.3     02-23    140  |  108  |  26<H>  ----------------------------<  169<H>  4.7   |  19<L>  |  2.09<H>    Ca    8.9      23 Feb 2022 06:02  Phos  5.9     02-23    uric acid 6.1        RADIOLOGY & ADDITIONAL STUDIES:    < from: Xray Hand 3 Views, Left (02.06.22 @ 13:50) >  INTERPRETATION:  Hand pain.    3 views left hand.  There is no fracture, malalignment, or other significant bony or joint   abnormality.  Soft tissues grossly unremarkable.  IMPRESSION: Neative left hand.      < from: Xray Foot AP + Lateral + Oblique, Right (02.11.22 @ 19:39) >  IMPRESSION:  There is no evidence of acute fracture or dislocation. Joint spaces are   preserved.

## 2022-02-23 NOTE — PROGRESS NOTE ADULT - SUBJECTIVE AND OBJECTIVE BOX
[facility], Division of Infectious Diseases  KESHAV Pena Y. Patel, S. Shah, G. Casimir  [phone]  ([phone] - weekdays after 5pm and weekends)    Name: RADHA POLLACK  Age/Gender: 76y Female  MRN: [id number]    Interval History:  Patient seen this morning.   Notes reviewed.   No concerning overnight events.  Afebrile.   continues to endorse hemorrhoid pain and dysuria    Allergies: No Known Allergies      Objective:  Vitals:   T(F): 98.4 (02-23-22 @ 09:03), Max: 99.9 (02-22-22 @ 19:36)  HR: 85 (02-23-22 @ 09:03) (66 - 89)  BP: 146/71 (02-23-22 @ 09:03) (128/67 - 170/69)  RR: 18 (02-23-22 @ 09:03) (18 - 18)  SpO2: 98% (02-23-22 @ 09:03) (96% - 100%)  Physical Examination:  General: no acute distress, nontoxic appearing  HEENT: anicteric  Cardio: S1, S2 present, normal rate  Resp: clear to auscultation anteriorly  Abd: soft, ND, NT  : no evidence fungal dermatitis in either groin fold  Ext: no LE edema  Skin: warm, dry, no visible rash   Lines:    Laboratory Studies:  CBC:                       7.2    9.42  )-----------( 236      ( 22 Feb 2022 06:06 )             24.0     WBC Trend:  9.42 02-22-22 @ 06:06  8.37 02-21-22 @ 08:54  7.56 02-21-22 @ 06:20  12.14 02-19-22 @ 06:20  11.74 02-18-22 @ 17:40  10.32 02-18-22 @ 02:45  10.76 02-18-22 @ 00:36  10.40 02-16-22 @ 13:19    CMP: 02-23    140  |  108  |  26<H>  ----------------------------<  169<H>  4.7   |  19<L>  |  2.09<H>    Ca    8.9      23 Feb 2022 06:02  Phos  5.9     02-23              Microbiology: reviewed     Culture - Urine (collected 02-18-22 @ 02:59)  Source: Clean Catch Clean Catch (Midstream)  Final Report (02-21-22 @ 17:06):    >100,000 CFU/ml Klebsiella oxytoca/Raoutella ornithinolytica  Organism: Klebsiella oxytoca /Raoutella ornithinolytica (02-21-22 @ 17:06)  Organism: Klebsiella oxytoca /Raoutella ornithinolytica (02-21-22 @ 17:06)      -  Amikacin: S <=16      -  Amoxicillin/Clavulanic Acid: S <=8/4      -  Ampicillin: R >16 These ampicillin results predict results for amoxicillin      -  Ampicillin/Sulbactam: S <=4/2 Enterobacter, Klebsiella aerogenes, Citrobacter, and Serratia may develop resistance during prolonged therapy (3-4 days)      -  Aztreonam: S <=4      -  Cefazolin: I 4      -  Cefepime: S <=2      -  Cefoxitin: S <=8      -  Ceftriaxone: S <=1 Enterobacter, Klebsiella aerogenes, Citrobacter, and Serratia may develop resistance during prolonged therapy      -  Ciprofloxacin: S <=0.25      -  Ertapenem: S <=0.5      -  Gentamicin: S <=2      -  Imipenem: S <=1      -  Levofloxacin: S <=0.5      -  Meropenem: S <=1      -  Nitrofurantoin: S <=32 Should not be used to treat pyelonephritis      -  Piperacillin/Tazobactam: S <=8      -  Tigecycline: S <=2      -  Tobramycin: S <=2      -  Trimethoprim/Sulfamethoxazole: S <=0.5/9.5      Method Type: FANNIE    Culture - Blood (collected 02-18-22 @ 02:46)  Source: .Blood Blood-Peripheral  Final Report (02-23-22 @ 03:00):    No Growth Final    Culture - Blood (collected 02-18-22 @ 02:46)  Source: .Blood Blood-Peripheral  Final Report (02-23-22 @ 03:00):    No Growth Final      Radiology: reviewed     Medications:  acetaminophen     Tablet .. 650 milliGRAM(s) Oral every 6 hours PRN  allopurinol 100 milliGRAM(s) Oral daily  artificial  tears Solution 1 Drop(s) Both EYES four times a day  bisacodyl 5 milliGRAM(s) Oral at bedtime  colchicine 0.6 milliGRAM(s) Oral <User Schedule>  gabapentin Solution 50 milliGRAM(s) Oral two times a day  heparin   Injectable 5000 Unit(s) SubCutaneous every 8 hours  hydrocortisone hemorrhoidal Suppository 1 Suppository(s) Rectal daily  latanoprost 0.005% Ophthalmic Solution 1 Drop(s) Both EYES at bedtime  levothyroxine 25 MICROGram(s) Oral daily  melatonin 3 milliGRAM(s) Oral at bedtime PRN  mesalamine Suppository 1000 milliGRAM(s) Rectal at bedtime  methylPREDNISolone sodium succinate Injectable 20 milliGRAM(s) IV Push every 12 hours  midodrine. 5 milliGRAM(s) Oral <User Schedule>  Nephro-darcy 1 Tablet(s) Oral daily  pantoprazole    Tablet 40 milliGRAM(s) Oral before breakfast  piperacillin/tazobactam IVPB..      piperacillin/tazobactam IVPB.. 3.375 Gram(s) IV Intermittent every 12 hours  polyethylene glycol 3350 17 Gram(s) Oral two times a day  sevelamer carbonate 800 milliGRAM(s) Oral three times a day with meals  simvastatin 40 milliGRAM(s) Oral at bedtime  sodium bicarbonate 650 milliGRAM(s) Oral two times a day  sodium zirconium cyclosilicate 5 Gram(s) Oral daily    Antimicrobials:  piperacillin/tazobactam IVPB..      piperacillin/tazobactam IVPB.. 3.375 Gram(s) IV Intermittent every 12 hours

## 2022-02-23 NOTE — CONSULT NOTE ADULT - ASSESSMENT
76F with CKD, HTN, HLD, gout, OP, IBD admitted for C2 fractures s/p fall with recurrent polyarthritis responsive to steroids - suggestive of polyarticular crystalline arthropathy flare, most likely gout flare.  Uric acid 6.1, not very high, but can be falsely low in the setting of acute gout flare.    Plan:  # Polyarthritis - Gout flare  -recommend prednisone taper: prednisone 30mg daily for 3 days, 25mg daily for 3 days, 20mg daily for 3 days, 15mg daily for 3 days, 10mg daily for 3 days, 5mg daily for 3 days  -would recommend colchicine 0.6mg three times a week as gout prophylaxis  -low purine diet  -for now would continue with allopurinol 100mg, however once gout flare resolves would increase to 150mg. This can be done in outpatient setting  -check [id number] as outpatient prior to increasing allopurinol  -Patient and daughter provided with office information- patient can follow up with me via telehealth in 2 weeks. [address] [facility] [address], tel [phone]  -Signing off, please call back as needed    # C2 fracture, h/o OP  -pt will likely need treatment for OP  -needs outpatient DEXA    Dr. Leida Lakhani  Rheumatology Attending  [phone]  [email or web address]

## 2022-02-24 LAB
ANION GAP SERPL CALC-SCNC: 11 MMOL/L — SIGNIFICANT CHANGE UP (ref 5–17)
BUN SERPL-MCNC: 32 MG/DL — HIGH (ref 7–23)
CALCIUM SERPL-MCNC: 8.4 MG/DL — SIGNIFICANT CHANGE UP (ref 8.4–10.5)
CHLORIDE SERPL-SCNC: 108 MMOL/L — SIGNIFICANT CHANGE UP (ref 96–108)
CO2 SERPL-SCNC: 21 MMOL/L — LOW (ref 22–31)
CREAT SERPL-MCNC: 1.84 MG/DL — HIGH (ref 0.5–1.3)
GLUCOSE SERPL-MCNC: 90 MG/DL — SIGNIFICANT CHANGE UP (ref 70–99)
HCT VFR BLD CALC: 23.4 % — LOW (ref 34.5–45)
HCT VFR BLD CALC: 24.8 % — LOW (ref 34.5–45)
HGB BLD-MCNC: 7 G/DL — CRITICAL LOW (ref 11.5–15.5)
HGB BLD-MCNC: 7.6 G/DL — LOW (ref 11.5–15.5)
MAGNESIUM SERPL-MCNC: 2.1 MG/DL — SIGNIFICANT CHANGE UP (ref 1.6–2.6)
MCHC RBC-ENTMCNC: 26.2 PG — LOW (ref 27–34)
MCHC RBC-ENTMCNC: 26.7 PG — LOW (ref 27–34)
MCHC RBC-ENTMCNC: 29.9 GM/DL — LOW (ref 32–36)
MCHC RBC-ENTMCNC: 30.6 GM/DL — LOW (ref 32–36)
MCV RBC AUTO: 87 FL — SIGNIFICANT CHANGE UP (ref 80–100)
MCV RBC AUTO: 87.6 FL — SIGNIFICANT CHANGE UP (ref 80–100)
NRBC # BLD: 0 /100 WBCS — SIGNIFICANT CHANGE UP (ref 0–0)
NRBC # BLD: 0 /100 WBCS — SIGNIFICANT CHANGE UP (ref 0–0)
PHOSPHATE SERPL-MCNC: 4.4 MG/DL — SIGNIFICANT CHANGE UP (ref 2.5–4.5)
PLATELET # BLD AUTO: 268 K/UL — SIGNIFICANT CHANGE UP (ref 150–400)
PLATELET # BLD AUTO: 269 K/UL — SIGNIFICANT CHANGE UP (ref 150–400)
POTASSIUM SERPL-MCNC: 3.8 MMOL/L — SIGNIFICANT CHANGE UP (ref 3.5–5.3)
POTASSIUM SERPL-SCNC: 3.8 MMOL/L — SIGNIFICANT CHANGE UP (ref 3.5–5.3)
RBC # BLD: 2.67 M/UL — LOW (ref 3.8–5.2)
RBC # BLD: 2.85 M/UL — LOW (ref 3.8–5.2)
RBC # FLD: 16.6 % — HIGH (ref 10.3–14.5)
RBC # FLD: 16.9 % — HIGH (ref 10.3–14.5)
SODIUM SERPL-SCNC: 140 MMOL/L — SIGNIFICANT CHANGE UP (ref 135–145)
WBC # BLD: 11.95 K/UL — HIGH (ref 3.8–10.5)
WBC # BLD: 13.7 K/UL — HIGH (ref 3.8–10.5)
WBC # FLD AUTO: 11.95 K/UL — HIGH (ref 3.8–10.5)
WBC # FLD AUTO: 13.7 K/UL — HIGH (ref 3.8–10.5)

## 2022-02-24 RX ADMIN — SODIUM ZIRCONIUM CYCLOSILICATE 5 GRAM(S): 10 POWDER, FOR SUSPENSION ORAL at 12:36

## 2022-02-24 RX ADMIN — Medication 1000 MILLIGRAM(S): at 22:30

## 2022-02-24 RX ADMIN — LATANOPROST 1 DROP(S): 0.05 SOLUTION/ DROPS OPHTHALMIC; TOPICAL at 22:31

## 2022-02-24 RX ADMIN — MIDODRINE HYDROCHLORIDE 5 MILLIGRAM(S): 2.5 TABLET ORAL at 18:10

## 2022-02-24 RX ADMIN — Medication 650 MILLIGRAM(S): at 18:07

## 2022-02-24 RX ADMIN — SEVELAMER CARBONATE 800 MILLIGRAM(S): 2400 POWDER, FOR SUSPENSION ORAL at 08:00

## 2022-02-24 RX ADMIN — Medication 1 DROP(S): at 05:24

## 2022-02-24 RX ADMIN — SEVELAMER CARBONATE 800 MILLIGRAM(S): 2400 POWDER, FOR SUSPENSION ORAL at 18:08

## 2022-02-24 RX ADMIN — GABAPENTIN 50 MILLIGRAM(S): 400 CAPSULE ORAL at 05:23

## 2022-02-24 RX ADMIN — Medication 100 MILLIGRAM(S): at 13:32

## 2022-02-24 RX ADMIN — Medication 25 MICROGRAM(S): at 05:24

## 2022-02-24 RX ADMIN — Medication 40 MILLIGRAM(S): at 05:23

## 2022-02-24 RX ADMIN — Medication 650 MILLIGRAM(S): at 05:24

## 2022-02-24 RX ADMIN — Medication 1 TABLET(S): at 12:35

## 2022-02-24 RX ADMIN — Medication 1 DROP(S): at 18:17

## 2022-02-24 RX ADMIN — Medication 1 SUPPOSITORY(S): at 12:31

## 2022-02-24 RX ADMIN — Medication 5 MILLIGRAM(S): at 22:37

## 2022-02-24 RX ADMIN — PANTOPRAZOLE SODIUM 40 MILLIGRAM(S): 20 TABLET, DELAYED RELEASE ORAL at 05:24

## 2022-02-24 RX ADMIN — Medication 1 DROP(S): at 00:00

## 2022-02-24 RX ADMIN — SIMVASTATIN 40 MILLIGRAM(S): 20 TABLET, FILM COATED ORAL at 22:37

## 2022-02-24 RX ADMIN — Medication 1 DROP(S): at 12:35

## 2022-02-24 RX ADMIN — SEVELAMER CARBONATE 800 MILLIGRAM(S): 2400 POWDER, FOR SUSPENSION ORAL at 12:36

## 2022-02-24 NOTE — PROGRESS NOTE ADULT - SUBJECTIVE AND OBJECTIVE BOX
Patient is a 76y old  Female who presents with a chief complaint of syncope , cervical spine fracture (24 Feb 2022 11:44)      SUBJECTIVE / OVERNIGHT EVENTS: no new c/o    MEDICATIONS  (STANDING):  allopurinol 100 milliGRAM(s) Oral daily  artificial  tears Solution 1 Drop(s) Both EYES four times a day  bisacodyl 5 milliGRAM(s) Oral at bedtime  colchicine 0.6 milliGRAM(s) Oral <User Schedule>  gabapentin Solution 50 milliGRAM(s) Oral two times a day  heparin   Injectable 5000 Unit(s) SubCutaneous every 8 hours  hydrocortisone hemorrhoidal Suppository 1 Suppository(s) Rectal daily  latanoprost 0.005% Ophthalmic Solution 1 Drop(s) Both EYES at bedtime  levothyroxine 25 MICROGram(s) Oral daily  mesalamine Suppository 1000 milliGRAM(s) Rectal at bedtime  midodrine. 5 milliGRAM(s) Oral <User Schedule>  Nephro-darcy 1 Tablet(s) Oral daily  pantoprazole    Tablet 40 milliGRAM(s) Oral before breakfast  polyethylene glycol 3350 17 Gram(s) Oral two times a day  predniSONE   Tablet 40 milliGRAM(s) Oral daily  sevelamer carbonate 800 milliGRAM(s) Oral three times a day with meals  simvastatin 40 milliGRAM(s) Oral at bedtime  sodium bicarbonate 650 milliGRAM(s) Oral two times a day  sodium zirconium cyclosilicate 5 Gram(s) Oral daily    MEDICATIONS  (PRN):  acetaminophen     Tablet .. 650 milliGRAM(s) Oral every 6 hours PRN Mild Pain (1 - 3)  melatonin 3 milliGRAM(s) Oral at bedtime PRN Insomnia      Vital Signs Last 24 Hrs  T(F): 97.9 (02-24-22 @ 11:41), Max: 98.2 (02-23-22 @ 21:11)  HR: 84 (02-24-22 @ 11:41) (69 - 84)  BP: 164/73 (02-24-22 @ 11:41) (123/61 - 164/73)  RR: 18 (02-24-22 @ 11:41) (18 - 18)  SpO2: 99% (02-24-22 @ 11:41) (98% - 100%)  Telemetry:   CAPILLARY BLOOD GLUCOSE        I&O's Summary    23 Feb 2022 07:01  -  24 Feb 2022 07:00  --------------------------------------------------------  IN: 0 mL / OUT: 650 mL / NET: -650 mL    24 Feb 2022 07:01  -  24 Feb 2022 16:49  --------------------------------------------------------  IN: 960 mL / OUT: 0 mL / NET: 960 mL        PHYSICAL EXAM:  GENERAL: NAD, well-developed  HEAD:  Atraumatic, Normocephalic  EYES: EOMI, PERRLA, conjunctiva and sclera clear  NECK: Supple, No JVD  CHEST/LUNG: Clear to auscultation bilaterally; No wheeze  HEART: Regular rate and rhythm; No murmurs, rubs, or gallops  ABDOMEN: Soft, Nontender, Nondistended; Bowel sounds present  EXTREMITIES:  2+ Peripheral Pulses, No clubbing, cyanosis, or edema  PSYCH: AAOx3  NEUROLOGY: non-focal  SKIN: No rashes or lesions    LABS:                        7.6    13.70 )-----------( 268      ( 24 Feb 2022 10:24 )             24.8     02-24    140  |  108  |  32<H>  ----------------------------<  90  3.8   |  21<L>  |  1.84<H>    Ca    8.4      24 Feb 2022 07:28  Phos  4.4     02-24  Mg     2.1     02-24                RADIOLOGY & ADDITIONAL TESTS:    Imaging Personally Reviewed:    Consultant(s) Notes Reviewed:      Care Discussed with Consultants/Other Providers:

## 2022-02-24 NOTE — PROGRESS NOTE ADULT - ASSESSMENT
76 year-old woman with history of multiple medical issues including hypertension, hyperlipidemia, and chronic kidney disease. She was transferred to [facility] yesterday from the [facility] ER after being noted on imaging to have a C2 fracture, s/p syncopal episode. Following for CKD    CKD 4 - likely Hypertensive Nephropathy  Serum stable - at baseline  appears to be ranging around 1.8 to 2.0mg/dl  renal us shows cortical calcifications  cont monitor Serum Creatinine level  fluctuations expected in cr  Renal diet  daily BMP  on sevelamer for high phos     Hyperkalemia  improved  c/w Lokelma 5mg PO QD  Low K diet    Metabolic acidosis due to renal failure likely   on  Nabicarb 650mg po bid  trend bicarb  goal bicarb 22     Cervical fracture  pain management  Neurosx recs appreciated      For any question, call:  Cell # [phone]  Pager # [phone]  Callback # [phone]

## 2022-02-24 NOTE — PROGRESS NOTE ADULT - SUBJECTIVE AND OBJECTIVE BOX
Subjective: Patient seen and examined. No new events except as noted.   Sitting up in bed, eating breakfast.  Says her leg pain and hemorrhoidal has improved.    REVIEW OF SYSTEMS:    CONSTITUTIONAL: + weakness, fevers or chills  EYES/ENT: No visual changes;  No vertigo or throat pain   NECK: No pain or stiffness  RESPIRATORY: No cough, wheezing, hemoptysis; No shortness of breath  CARDIOVASCULAR: No chest pain or palpitations  GASTROINTESTINAL: No abdominal or epigastric pain. No nausea, vomiting, or hematemesis; No diarrhea or constipation. No melena or hematochezia.  GENITOURINARY: No dysuria, frequency or hematuria  NEUROLOGICAL: No numbness or weakness  SKIN: No itching, burning, rashes, or lesions   All other review of systems is negative unless indicated above.    MEDICATIONS:  MEDICATIONS  (STANDING):  allopurinol 100 milliGRAM(s) Oral daily  artificial  tears Solution 1 Drop(s) Both EYES four times a day  bisacodyl 5 milliGRAM(s) Oral at bedtime  colchicine 0.6 milliGRAM(s) Oral <User Schedule>  gabapentin Solution 50 milliGRAM(s) Oral two times a day  heparin   Injectable 5000 Unit(s) SubCutaneous every 8 hours  hydrocortisone hemorrhoidal Suppository 1 Suppository(s) Rectal daily  latanoprost 0.005% Ophthalmic Solution 1 Drop(s) Both EYES at bedtime  levothyroxine 25 MICROGram(s) Oral daily  mesalamine Suppository 1000 milliGRAM(s) Rectal at bedtime  midodrine. 5 milliGRAM(s) Oral <User Schedule>  Nephro-darcy 1 Tablet(s) Oral daily  pantoprazole    Tablet 40 milliGRAM(s) Oral before breakfast  polyethylene glycol 3350 17 Gram(s) Oral two times a day  predniSONE   Tablet 40 milliGRAM(s) Oral daily  sevelamer carbonate 800 milliGRAM(s) Oral three times a day with meals  simvastatin 40 milliGRAM(s) Oral at bedtime  sodium bicarbonate 650 milliGRAM(s) Oral two times a day  sodium zirconium cyclosilicate 5 Gram(s) Oral daily      PHYSICAL EXAM:  T(C): 36.6 (02-24-22 @ 08:10), Max: 36.8 (02-23-22 @ 21:11)  HR: 76 (02-24-22 @ 08:10) (69 - 79)  BP: 150/63 (02-24-22 @ 08:10) (123/61 - 162/67)  RR: 18 (02-24-22 @ 08:10) (18 - 18)  SpO2: 98% (02-24-22 @ 08:10) (98% - 100%)  Wt(kg): --  I&O's Summary    23 Feb 2022 07:01  -  24 Feb 2022 07:00  --------------------------------------------------------  IN: 0 mL / OUT: 650 mL / NET: -650 mL    24 Feb 2022 07:01  -  24 Feb 2022 11:33  --------------------------------------------------------  IN: 480 mL / OUT: 0 mL / NET: 480 mL    Appearance: NAD, c-collar on   HEENT: Normal oral mucosa, PERRL, EOMI	  Lymphatic: No lymphadenopathy , no edema  Cardiovascular: Normal S1 S2, No JVD, No murmurs , Peripheral pulses palpable 2+ bilaterally  Respiratory: Lungs clear to auscultation, normal effort 	  Gastrointestinal:  Soft, Non-tender, + BS	  Skin: No rashes, No ecchymoses, No cyanosis, warm to touch  Neuro: alert oriented x 3 attention comprehension are fair.  Able to name, repeat.   EOmi fundi not visualized   no nystagmus VFF to confrontation  Tongue is midline  Palate elevates symmetrically   Moving all 4 ext spontaneously no drift appreciated  Gait not assessed.   Ext: No edema    LABS:    CARDIAC MARKERS:                          7.6    13.70 )-----------( 268      ( 24 Feb 2022 10:24 )             24.8     02-24    140  |  108  |  32<H>  ----------------------------<  90  3.8   |  21<L>  |  1.84<H>    Ca    8.4      24 Feb 2022 07:28  Phos  4.4     02-24  Mg     2.1     02-24      proBNP:   Lipid Profile:   HgA1c:   TSH:     TELEMETRY: 	    ECG:  	  RADIOLOGY:   DIAGNOSTIC TESTING:  [ ] Echocardiogram:  [ ]  Catheterization:  [ ] Stress Test:    OTHER:

## 2022-02-24 NOTE — PROGRESS NOTE ADULT - ASSESSMENT
75yo female pt with PMHx of CKD (Last K-5.3, Bun/Cr.-29/1.93 on 7/18/2020), Anemia, Gout, HTN, HLD, was transferred, on cervical collar, from [facility] c/o head/neck pain, C2 fracture s/p fall this morning with a syncopal episode.

## 2022-02-24 NOTE — PROGRESS NOTE ADULT - ASSESSMENT
77 y/o female PMHx of CKD IV, Gout, HTN, HLD w/ presented to OSH s/p fall 2/2 syncopal episode found to have C2 fracture w/ hospital course c/b fever.   treated for gout flare w/ prednisone x 3 days    UTI  fever resolved  UA positive, pt endorsed urinary urgency and frequency  continues to endorse dysuria though states this started to improve 2/20  switched to zosyn on evening 2/19, c/w zosyn while culture pending  blood cultures- no growth final  urine cx positive for Klebsiella oxytoca/ Raoutella  s/p 7 day course of antibiotics  reports mild dysuria, after discussion w/ patient believe this sensation is likely more related to her hemorrhoid pain  monitor off antibiotics    gout  pt reports L knee pain and R foot pain 2/2 gout flare  on allopurinol  rheumatology consulted  started on prednisone taper    cervical spine fracture  s/p CT head/ C-spine- Acute C2 fracture of the body of the dens extending to the left lateral mass. Right C2 facet fracture. Mild acute compression fracture of the superior endplate of C3  evaluated by ortho  wearing C-collar    fall/syncope  on telemetry  s/p TTE- no mention of vegetations  orthostatic positive  on midodrine    CKD  renally dose medications  nephrology following    We will sign off. Thank you for allowing us to participate in the care of Ms. Avery. Please feel free to call with any questions or concerns.     Covering for Dr. Yee Peña M.D.  [facility], Division of Infectious Diseases  [phone]  After 5pm on weekdays and all day on weekends - please call [phone]  75 y/o female PMHx of CKD IV, Gout, HTN, HLD w/ presented to OSH s/p fall 2/2 syncopal episode found to have C2 fracture w/ hospital course c/b fever.   treated for gout flare w/ prednisone x 3 days    UTI  fever resolved  UA positive, pt endorsed urinary urgency and frequency  continues to endorse dysuria though states this started to improve 2/20  switched to zosyn on evening 2/19, c/w zosyn while culture pending  blood cultures- no growth final  urine cx positive for Klebsiella oxytoca/ Raoutella  s/p 7 day course of antibiotics  reports mild dysuria, after discussion w/ patient believe this sensation is likely more related to her hemorrhoid pain  monitor off antibiotics    gout  pt reports L knee pain and R foot pain 2/2 gout flare  on allopurinol  rheumatology consulted  started on prednisone taper    leukocytosis  likely 2/2 steroids    cervical spine fracture  s/p CT head/ C-spine- Acute C2 fracture of the body of the dens extending to the left lateral mass. Right C2 facet fracture. Mild acute compression fracture of the superior endplate of C3  evaluated by ortho  wearing C-collar    fall/syncope  on telemetry  s/p TTE- no mention of vegetations  orthostatic positive  on midodrine    CKD  renally dose medications  nephrology following    We will sign off. Thank you for allowing us to participate in the care of Ms. Avery. Please feel free to call with any questions or concerns.     Covering for Dr. Yee Peña M.D.  [facility], Division of Infectious Diseases  [phone]  After 5pm on weekdays and all day on weekends - please call [phone]

## 2022-02-24 NOTE — PROGRESS NOTE ADULT - SUBJECTIVE AND OBJECTIVE BOX
[facility], Division of Infectious Diseases  KESHAV Pena Y. Patel, S. Shah, G. Casimir  [phone]  ([phone] - weekdays after 5pm and weekends)    Name: RADHA POLLACK  Age/Gender: 76y Female  MRN: [id number]    Interval History:  Patient seen this morning.   Notes reviewed.   No concerning overnight events.  Afebrile.   reports gout pain better  reports mild dysuria  completed antibiotics yesterday    Allergies: No Known Allergies      Objective:  Vitals:   T(F): 97.9 (02-24-22 @ 11:41), Max: 98.2 (02-23-22 @ 21:11)  HR: 84 (02-24-22 @ 11:41) (69 - 84)  BP: 164/73 (02-24-22 @ 11:41) (123/61 - 164/73)  RR: 18 (02-24-22 @ 11:41) (18 - 18)  SpO2: 99% (02-24-22 @ 11:41) (98% - 100%)  Physical Examination:  General: no acute distress, nontoxic appearing  HEENT: anicteric  Cardio: S1, S2, normal rate  Resp: breathing comfortably on RA  Abd: soft, ND, NT  : no evidence fungal dermatitis in either groin fold  Ext: no LE edema  Skin: warm, dry, no visible rash   Lines:    Laboratory Studies:  CBC:                       7.6    13.70 )-----------( 268      ( 24 Feb 2022 10:24 )             24.8     WBC Trend:  13.70 02-24-22 @ 10:24  11.95 02-24-22 @ 07:28  12.46 02-23-22 @ 11:48  9.42 02-22-22 @ 06:06  8.37 02-21-22 @ 08:54  7.56 02-21-22 @ 06:20  12.14 02-19-22 @ 06:20  11.74 02-18-22 @ 17:40  10.32 02-18-22 @ 02:45  10.76 02-18-22 @ 00:36    CMP: 02-24    140  |  108  |  32<H>  ----------------------------<  90  3.8   |  21<L>  |  1.84<H>    Ca    8.4      24 Feb 2022 07:28  Phos  4.4     02-24  Mg     2.1     02-24              Microbiology: reviewed     Culture - Urine (collected 02-18-22 @ 02:59)  Source: Clean Catch Clean Catch (Midstream)  Final Report (02-21-22 @ 17:06):    >100,000 CFU/ml Klebsiella oxytoca/Raoutella ornithinolytica  Organism: Klebsiella oxytoca /Raoutella ornithinolytica (02-21-22 @ 17:06)  Organism: Klebsiella oxytoca /Raoutella ornithinolytica (02-21-22 @ 17:06)      -  Amikacin: S <=16      -  Amoxicillin/Clavulanic Acid: S <=8/4      -  Ampicillin: R >16 These ampicillin results predict results for amoxicillin      -  Ampicillin/Sulbactam: S <=4/2 Enterobacter, Klebsiella aerogenes, Citrobacter, and Serratia may develop resistance during prolonged therapy (3-4 days)      -  Aztreonam: S <=4      -  Cefazolin: I 4      -  Cefepime: S <=2      -  Cefoxitin: S <=8      -  Ceftriaxone: S <=1 Enterobacter, Klebsiella aerogenes, Citrobacter, and Serratia may develop resistance during prolonged therapy      -  Ciprofloxacin: S <=0.25      -  Ertapenem: S <=0.5      -  Gentamicin: S <=2      -  Imipenem: S <=1      -  Levofloxacin: S <=0.5      -  Meropenem: S <=1      -  Nitrofurantoin: S <=32 Should not be used to treat pyelonephritis      -  Piperacillin/Tazobactam: S <=8      -  Tigecycline: S <=2      -  Tobramycin: S <=2      -  Trimethoprim/Sulfamethoxazole: S <=0.5/9.5      Method Type: FANNIE    Culture - Blood (collected 02-18-22 @ 02:46)  Source: .Blood Blood-Peripheral  Final Report (02-23-22 @ 03:00):    No Growth Final    Culture - Blood (collected 02-18-22 @ 02:46)  Source: .Blood Blood-Peripheral  Final Report (02-23-22 @ 03:00):    No Growth Final      Radiology: reviewed     Medications:  acetaminophen     Tablet .. 650 milliGRAM(s) Oral every 6 hours PRN  allopurinol 100 milliGRAM(s) Oral daily  artificial  tears Solution 1 Drop(s) Both EYES four times a day  bisacodyl 5 milliGRAM(s) Oral at bedtime  colchicine 0.6 milliGRAM(s) Oral <User Schedule>  gabapentin Solution 50 milliGRAM(s) Oral two times a day  heparin   Injectable 5000 Unit(s) SubCutaneous every 8 hours  hydrocortisone hemorrhoidal Suppository 1 Suppository(s) Rectal daily  latanoprost 0.005% Ophthalmic Solution 1 Drop(s) Both EYES at bedtime  levothyroxine 25 MICROGram(s) Oral daily  melatonin 3 milliGRAM(s) Oral at bedtime PRN  mesalamine Suppository 1000 milliGRAM(s) Rectal at bedtime  midodrine. 5 milliGRAM(s) Oral <User Schedule>  Nephro-darcy 1 Tablet(s) Oral daily  pantoprazole    Tablet 40 milliGRAM(s) Oral before breakfast  polyethylene glycol 3350 17 Gram(s) Oral two times a day  predniSONE   Tablet 40 milliGRAM(s) Oral daily  sevelamer carbonate 800 milliGRAM(s) Oral three times a day with meals  simvastatin 40 milliGRAM(s) Oral at bedtime  sodium bicarbonate 650 milliGRAM(s) Oral two times a day  sodium zirconium cyclosilicate 5 Gram(s) Oral daily    Antimicrobials:

## 2022-02-24 NOTE — PROGRESS NOTE ADULT - ASSESSMENT
77yo female pt with PMHx of CKD (Last K-5.3, Bun/Cr.-29/1.93 on 7/18/2020), Anemia, Gout, HTN, HLD, was transferred, on cervical collar, from [facility] c/o head/neck pain, C2 fracture s/p fall this morning with a syncopal episode.

## 2022-02-24 NOTE — PROGRESS NOTE ADULT - SUBJECTIVE AND OBJECTIVE BOX
[facility] [place] [facility] - MERCEDES Howe / MERCEDES Ballesteros / YAQUELIN Crouch/ MERCEDES Brown/ MERCEDES Car/ LUZ Hartmann / MAGDALENO Alonoz / TERRY Holman  ---------------------------------------------------------------------------------------------------------------  seen and examined today for CKD  Interval : serum creatinine stable  VITALS:  T(F): 97.9 (02-24-22 @ 08:10), Max: 98.2 (02-23-22 @ 21:11)  HR: 76 (02-24-22 @ 08:10)  BP: 150/63 (02-24-22 @ 08:10)  RR: 18 (02-24-22 @ 08:10)  SpO2: 98% (02-24-22 @ 08:10)  Wt(kg): --    02-23 @ 07:01  -  02-24 @ 07:00  --------------------------------------------------------  IN: 0 mL / OUT: 650 mL / NET: -650 mL    02-24 @ 07:01  -  02-24 @ 10:31  --------------------------------------------------------  IN: 480 mL / OUT: 0 mL / NET: 480 mL      Physical Exam :-  Constitutional: NAD  Neck: Supple.  Respiratory: Bilateral equal breath sounds,  Cardiovascular: S1, S2 normal,  Gastrointestinal: Bowel Sounds present, soft, non tender.  Extremities: No edema  Neurological: Alert and Oriented x 3, no focal deficits  Psychiatric: Normal mood, normal affect  Data:-  Allergies :   No Known Allergies    Hospital Medications:   MEDICATIONS  (STANDING):  allopurinol 100 milliGRAM(s) Oral daily  artificial  tears Solution 1 Drop(s) Both EYES four times a day  bisacodyl 5 milliGRAM(s) Oral at bedtime  colchicine 0.6 milliGRAM(s) Oral <User Schedule>  gabapentin Solution 50 milliGRAM(s) Oral two times a day  heparin   Injectable 5000 Unit(s) SubCutaneous every 8 hours  hydrocortisone hemorrhoidal Suppository 1 Suppository(s) Rectal daily  latanoprost 0.005% Ophthalmic Solution 1 Drop(s) Both EYES at bedtime  levothyroxine 25 MICROGram(s) Oral daily  mesalamine Suppository 1000 milliGRAM(s) Rectal at bedtime  midodrine. 5 milliGRAM(s) Oral <User Schedule>  Nephro-darcy 1 Tablet(s) Oral daily  pantoprazole    Tablet 40 milliGRAM(s) Oral before breakfast  polyethylene glycol 3350 17 Gram(s) Oral two times a day  predniSONE   Tablet 40 milliGRAM(s) Oral daily  sevelamer carbonate 800 milliGRAM(s) Oral three times a day with meals  simvastatin 40 milliGRAM(s) Oral at bedtime  sodium bicarbonate 650 milliGRAM(s) Oral two times a day  sodium zirconium cyclosilicate 5 Gram(s) Oral daily    02-24    140  |  108  |  32<H>  ----------------------------<  90  3.8   |  21<L>  |  1.84<H>    Ca    8.4      24 Feb 2022 07:28  Phos  4.4     02-24  Mg     2.1     02-24      Creatinine Trend: 1.84 <--, 2.09 <--, 2.07 <--, 1.91 <--, 1.80 <--, 2.00 <--                        7.0    11.95 )-----------( 269      ( 24 Feb 2022 07:28 )             23.4

## 2022-02-25 LAB
ANION GAP SERPL CALC-SCNC: 13 MMOL/L — SIGNIFICANT CHANGE UP (ref 5–17)
BUN SERPL-MCNC: 36 MG/DL — HIGH (ref 7–23)
CALCIUM SERPL-MCNC: 8.6 MG/DL — SIGNIFICANT CHANGE UP (ref 8.4–10.5)
CHLORIDE SERPL-SCNC: 110 MMOL/L — HIGH (ref 96–108)
CO2 SERPL-SCNC: 20 MMOL/L — LOW (ref 22–31)
CREAT SERPL-MCNC: 1.66 MG/DL — HIGH (ref 0.5–1.3)
GLUCOSE SERPL-MCNC: 86 MG/DL — SIGNIFICANT CHANGE UP (ref 70–99)
HCT VFR BLD CALC: 21.3 % — LOW (ref 34.5–45)
HCT VFR BLD CALC: 31.8 % — LOW (ref 34.5–45)
HGB BLD-MCNC: 10 G/DL — LOW (ref 11.5–15.5)
HGB BLD-MCNC: 6.5 G/DL — CRITICAL LOW (ref 11.5–15.5)
MCHC RBC-ENTMCNC: 26.2 PG — LOW (ref 27–34)
MCHC RBC-ENTMCNC: 27.2 PG — SIGNIFICANT CHANGE UP (ref 27–34)
MCHC RBC-ENTMCNC: 30.5 GM/DL — LOW (ref 32–36)
MCHC RBC-ENTMCNC: 31.4 GM/DL — LOW (ref 32–36)
MCV RBC AUTO: 85.9 FL — SIGNIFICANT CHANGE UP (ref 80–100)
MCV RBC AUTO: 86.4 FL — SIGNIFICANT CHANGE UP (ref 80–100)
NRBC # BLD: 0 /100 WBCS — SIGNIFICANT CHANGE UP (ref 0–0)
NRBC # BLD: 0 /100 WBCS — SIGNIFICANT CHANGE UP (ref 0–0)
OB PNL STL: NEGATIVE — SIGNIFICANT CHANGE UP
PLATELET # BLD AUTO: 243 K/UL — SIGNIFICANT CHANGE UP (ref 150–400)
PLATELET # BLD AUTO: 277 K/UL — SIGNIFICANT CHANGE UP (ref 150–400)
POTASSIUM SERPL-MCNC: 3.8 MMOL/L — SIGNIFICANT CHANGE UP (ref 3.5–5.3)
POTASSIUM SERPL-SCNC: 3.8 MMOL/L — SIGNIFICANT CHANGE UP (ref 3.5–5.3)
RBC # BLD: 2.48 M/UL — LOW (ref 3.8–5.2)
RBC # BLD: 3.68 M/UL — LOW (ref 3.8–5.2)
RBC # FLD: 15.6 % — HIGH (ref 10.3–14.5)
RBC # FLD: 16.5 % — HIGH (ref 10.3–14.5)
SODIUM SERPL-SCNC: 143 MMOL/L — SIGNIFICANT CHANGE UP (ref 135–145)
WBC # BLD: 10.31 K/UL — SIGNIFICANT CHANGE UP (ref 3.8–10.5)
WBC # BLD: 9.93 K/UL — SIGNIFICANT CHANGE UP (ref 3.8–10.5)
WBC # FLD AUTO: 10.31 K/UL — SIGNIFICANT CHANGE UP (ref 3.8–10.5)
WBC # FLD AUTO: 9.93 K/UL — SIGNIFICANT CHANGE UP (ref 3.8–10.5)

## 2022-02-25 RX ADMIN — Medication 25 MICROGRAM(S): at 05:58

## 2022-02-25 RX ADMIN — Medication 1 DROP(S): at 00:00

## 2022-02-25 RX ADMIN — LATANOPROST 1 DROP(S): 0.05 SOLUTION/ DROPS OPHTHALMIC; TOPICAL at 22:50

## 2022-02-25 RX ADMIN — Medication 1 TABLET(S): at 11:18

## 2022-02-25 RX ADMIN — GABAPENTIN 50 MILLIGRAM(S): 400 CAPSULE ORAL at 17:46

## 2022-02-25 RX ADMIN — Medication 40 MILLIGRAM(S): at 05:59

## 2022-02-25 RX ADMIN — SODIUM ZIRCONIUM CYCLOSILICATE 5 GRAM(S): 10 POWDER, FOR SUSPENSION ORAL at 11:17

## 2022-02-25 RX ADMIN — Medication 100 MILLIGRAM(S): at 11:18

## 2022-02-25 RX ADMIN — Medication 1 DROP(S): at 17:08

## 2022-02-25 RX ADMIN — Medication 1 SUPPOSITORY(S): at 11:18

## 2022-02-25 RX ADMIN — SEVELAMER CARBONATE 800 MILLIGRAM(S): 2400 POWDER, FOR SUSPENSION ORAL at 12:08

## 2022-02-25 RX ADMIN — Medication 1 DROP(S): at 11:19

## 2022-02-25 RX ADMIN — SEVELAMER CARBONATE 800 MILLIGRAM(S): 2400 POWDER, FOR SUSPENSION ORAL at 17:00

## 2022-02-25 RX ADMIN — Medication 650 MILLIGRAM(S): at 05:58

## 2022-02-25 RX ADMIN — SIMVASTATIN 40 MILLIGRAM(S): 20 TABLET, FILM COATED ORAL at 22:49

## 2022-02-25 RX ADMIN — SEVELAMER CARBONATE 800 MILLIGRAM(S): 2400 POWDER, FOR SUSPENSION ORAL at 08:51

## 2022-02-25 RX ADMIN — Medication 650 MILLIGRAM(S): at 17:00

## 2022-02-25 RX ADMIN — Medication 0.6 MILLIGRAM(S): at 05:58

## 2022-02-25 RX ADMIN — Medication 5 MILLIGRAM(S): at 22:49

## 2022-02-25 RX ADMIN — Medication 1 DROP(S): at 05:58

## 2022-02-25 RX ADMIN — GABAPENTIN 50 MILLIGRAM(S): 400 CAPSULE ORAL at 06:01

## 2022-02-25 NOTE — PROGRESS NOTE ADULT - ASSESSMENT
76 year-old woman with history of multiple medical issues including hypertension, hyperlipidemia, and chronic kidney disease. She was transferred to [facility] yesterday from the [facility] ER after being noted on imaging to have a C2 fracture, s/p syncopal episode. Following for CKD    CKD 4 - likely Hypertensive Nephropathy  Serum stable - at baseline  appears to be ranging around 1.8 to 2.0mg/dl  renal us shows cortical calcifications  cont monitor Serum Creatinine level  fluctuations expected in cr  Renal diet  daily BMP  on sevelamer for high phos     Hyperkalemia  improved  c/w Lokelma 5mg PO QD  Low K diet    Metabolic acidosis due to renal failure likely   on  Nabicarb 650mg po bid  trend bicarb  goal bicarb 22     Cervical fracture  pain management  Neurosx recs appreciated      For any question, call:  Cell # [phone]  Pager # [phone]  Callback # [phone]

## 2022-02-25 NOTE — PROGRESS NOTE ADULT - SUBJECTIVE AND OBJECTIVE BOX
[facility] [place] [facility] - MERCEDES Howe / MERCEDES Ballesteros / YAQUELIN Crouch/ MERCEDES Brown/ MERCEDES Car/ LUZ Hartmann / MAGDALENO Alonzo / TERRY Holman  ---------------------------------------------------------------------------------------------------------------  seen and examined today for CKD  Interval : NAD  VITALS:  T(F): 98.1 (02-25-22 @ 08:21), Max: 98.4 (02-24-22 @ 19:30)  HR: 65 (02-25-22 @ 08:21)  BP: 158/63 (02-25-22 @ 08:21)  RR: 18 (02-25-22 @ 08:21)  SpO2: 97% (02-25-22 @ 08:21)  Wt(kg): --    02-24 @ 07:01  -  02-25 @ 07:00  --------------------------------------------------------  IN: 1080 mL / OUT: 300 mL / NET: 780 mL    02-25 @ 07:01  -  02-25 @ 10:18  --------------------------------------------------------  IN: 360 mL / OUT: 0 mL / NET: 360 mL      Physical Exam :-  Constitutional: NAD  Neck: Supple.  Respiratory: Bilateral equal breath sounds,  Cardiovascular: S1, S2 normal,  Gastrointestinal: Bowel Sounds present, soft, non tender.  Extremities: No edema  Neurological: Alert and Oriented x 3, no focal deficits  Psychiatric: Normal mood, normal affect  Data:-  Allergies :   No Known Allergies    Hospital Medications:   MEDICATIONS  (STANDING):  allopurinol 100 milliGRAM(s) Oral daily  artificial  tears Solution 1 Drop(s) Both EYES four times a day  bisacodyl 5 milliGRAM(s) Oral at bedtime  colchicine 0.6 milliGRAM(s) Oral <User Schedule>  gabapentin Solution 50 milliGRAM(s) Oral two times a day  heparin   Injectable 5000 Unit(s) SubCutaneous every 8 hours  hydrocortisone hemorrhoidal Suppository 1 Suppository(s) Rectal daily  latanoprost 0.005% Ophthalmic Solution 1 Drop(s) Both EYES at bedtime  levothyroxine 25 MICROGram(s) Oral daily  mesalamine Suppository 1000 milliGRAM(s) Rectal at bedtime  midodrine. 5 milliGRAM(s) Oral <User Schedule>  Nephro-darcy 1 Tablet(s) Oral daily  pantoprazole    Tablet 40 milliGRAM(s) Oral before breakfast  polyethylene glycol 3350 17 Gram(s) Oral two times a day  predniSONE   Tablet 40 milliGRAM(s) Oral daily  sevelamer carbonate 800 milliGRAM(s) Oral three times a day with meals  simvastatin 40 milliGRAM(s) Oral at bedtime  sodium bicarbonate 650 milliGRAM(s) Oral two times a day  sodium zirconium cyclosilicate 5 Gram(s) Oral daily    02-25    143  |  110<H>  |  36<H>  ----------------------------<  86  3.8   |  20<L>  |  1.66<H>    Ca    8.6      25 Feb 2022 05:57  Phos  4.4     02-24  Mg     2.1     02-24      Creatinine Trend: 1.66 <--, 1.84 <--, 2.09 <--, 2.07 <--, 1.91 <--, 1.80 <--                        6.5    10.31 )-----------( 243      ( 25 Feb 2022 05:57 )             21.3

## 2022-02-25 NOTE — PROGRESS NOTE ADULT - ASSESSMENT
75yo female pt with PMHx of CKD (Last K-5.3, Bun/Cr.-29/1.93 on 7/18/2020), Anemia, Gout, HTN, HLD, was transferred, on cervical collar, from [facility] c/o head/neck pain, C2 fracture s/p fall this morning with a syncopal episode.

## 2022-02-25 NOTE — PROGRESS NOTE ADULT - SUBJECTIVE AND OBJECTIVE BOX
Subjective: Patient seen and examined. No new events except as noted.     REVIEW OF SYSTEMS:    CONSTITUTIONAL: + weakness, fevers or chills  EYES/ENT: No visual changes;  No vertigo or throat pain   NECK: No pain or stiffness  RESPIRATORY: No cough, wheezing, hemoptysis; No shortness of breath  CARDIOVASCULAR: No chest pain or palpitations  GASTROINTESTINAL: No abdominal or epigastric pain. No nausea, vomiting, or hematemesis; No diarrhea or constipation. No melena or hematochezia.  GENITOURINARY: No dysuria, frequency or hematuria  NEUROLOGICAL: No numbness or weakness  SKIN: No itching, burning, rashes, or lesions   All other review of systems is negative unless indicated above.    MEDICATIONS:  MEDICATIONS  (STANDING):  allopurinol 100 milliGRAM(s) Oral daily  artificial  tears Solution 1 Drop(s) Both EYES four times a day  bisacodyl 5 milliGRAM(s) Oral at bedtime  colchicine 0.6 milliGRAM(s) Oral <User Schedule>  gabapentin Solution 50 milliGRAM(s) Oral two times a day  heparin   Injectable 5000 Unit(s) SubCutaneous every 8 hours  hydrocortisone hemorrhoidal Suppository 1 Suppository(s) Rectal daily  latanoprost 0.005% Ophthalmic Solution 1 Drop(s) Both EYES at bedtime  levothyroxine 25 MICROGram(s) Oral daily  mesalamine Suppository 1000 milliGRAM(s) Rectal at bedtime  midodrine. 5 milliGRAM(s) Oral <User Schedule>  Nephro-darcy 1 Tablet(s) Oral daily  pantoprazole    Tablet 40 milliGRAM(s) Oral before breakfast  polyethylene glycol 3350 17 Gram(s) Oral two times a day  predniSONE   Tablet 40 milliGRAM(s) Oral daily  sevelamer carbonate 800 milliGRAM(s) Oral three times a day with meals  simvastatin 40 milliGRAM(s) Oral at bedtime  sodium bicarbonate 650 milliGRAM(s) Oral two times a day  sodium zirconium cyclosilicate 5 Gram(s) Oral daily      PHYSICAL EXAM:  T(C): 36.7 (02-25-22 @ 08:21), Max: 36.9 (02-24-22 @ 19:30)  HR: 65 (02-25-22 @ 08:21) (63 - 84)  BP: 158/63 (02-25-22 @ 08:21) (153/56 - 164/73)  RR: 18 (02-25-22 @ 08:21) (18 - 18)  SpO2: 97% (02-25-22 @ 08:21) (97% - 100%)  Wt(kg): --  I&O's Summary    24 Feb 2022 07:01  -  25 Feb 2022 07:00  --------------------------------------------------------  IN: 1080 mL / OUT: 300 mL / NET: 780 mL    25 Feb 2022 07:01  -  25 Feb 2022 11:10  --------------------------------------------------------  IN: 360 mL / OUT: 0 mL / NET: 360 mL        Appearance: NAD, c-collar on   HEENT: Normal oral mucosa, PERRL, EOMI	  Lymphatic: No lymphadenopathy , no edema  Cardiovascular: Normal S1 S2, No JVD, No murmurs , Peripheral pulses palpable 2+ bilaterally  Respiratory: Lungs clear to auscultation, normal effort 	  Gastrointestinal:  Soft, Non-tender, + BS	  Skin: No rashes, No ecchymoses, No cyanosis, warm to touch  Neuro: alert oriented x 3 attention comprehension are fair.  Able to name, repeat.   EOmi fundi not visualized   no nystagmus VFF to confrontation  Tongue is midline  Palate elevates symmetrically   Moving all 4 ext spontaneously no drift appreciated  Gait not assessed.   Ext: No edema      LABS:    CARDIAC MARKERS:                                6.5    10.31 )-----------( 243      ( 25 Feb 2022 05:57 )             21.3     02-25    143  |  110<H>  |  36<H>  ----------------------------<  86  3.8   |  20<L>  |  1.66<H>    Ca    8.6      25 Feb 2022 05:57  Phos  4.4     02-24  Mg     2.1     02-24      proBNP:   Lipid Profile:   HgA1c:   TSH:             TELEMETRY: 	    ECG:  	  RADIOLOGY:   DIAGNOSTIC TESTING:  [ ] Echocardiogram:  [ ]  Catheterization:  [ ] Stress Test:    OTHER:

## 2022-02-25 NOTE — PROGRESS NOTE ADULT - PROBLEM SELECTOR PLAN 2
continue to check orthostatics   Midodrine recently d/c'd (2/14) by medicine NP after improved orthostatics..24-48hrs later (2/15 & 2/16), pt with +orthostatics with SBPs down to 90s (2/16) - midodrine restarted 2/16.  Continue to monitor while pt refusing Midodrine.  continue to hold all antihypertensives - stable off meds  continue to monitor

## 2022-02-25 NOTE — PROVIDER CONTACT NOTE (CRITICAL VALUE NOTIFICATION) - ACTION/TREATMENT ORDERED:
Provider made aware, no new interventions ordered at this time. Provider made aware, one unit PRBC ordered.

## 2022-02-25 NOTE — PROGRESS NOTE ADULT - ASSESSMENT
75yo female pt with PMHx of CKD (Last K-5.3, Bun/Cr.-29/1.93 on 7/18/2020), Anemia, Gout, HTN, HLD, was transferred, on cervical collar, from [facility] c/o head/neck pain, C2 fracture s/p fall this morning with a syncopal episode.

## 2022-02-25 NOTE — PROGRESS NOTE ADULT - SUBJECTIVE AND OBJECTIVE BOX
Patient is a 76y old  Female who presents with a chief complaint of syncope , cervical spine fracture (25 Feb 2022 11:10)      SUBJECTIVE / OVERNIGHT EVENTS: no new c/o, awaiting DC to [facility], on prednisone taper for acute gout    MEDICATIONS  (STANDING):  allopurinol 100 milliGRAM(s) Oral daily  artificial  tears Solution 1 Drop(s) Both EYES four times a day  bisacodyl 5 milliGRAM(s) Oral at bedtime  colchicine 0.6 milliGRAM(s) Oral <User Schedule>  gabapentin Solution 50 milliGRAM(s) Oral two times a day  heparin   Injectable 5000 Unit(s) SubCutaneous every 8 hours  hydrocortisone hemorrhoidal Suppository 1 Suppository(s) Rectal daily  latanoprost 0.005% Ophthalmic Solution 1 Drop(s) Both EYES at bedtime  levothyroxine 25 MICROGram(s) Oral daily  mesalamine Suppository 1000 milliGRAM(s) Rectal at bedtime  midodrine. 5 milliGRAM(s) Oral <User Schedule>  Nephro-darcy 1 Tablet(s) Oral daily  pantoprazole    Tablet 40 milliGRAM(s) Oral before breakfast  polyethylene glycol 3350 17 Gram(s) Oral two times a day  predniSONE   Tablet 40 milliGRAM(s) Oral daily  sevelamer carbonate 800 milliGRAM(s) Oral three times a day with meals  simvastatin 40 milliGRAM(s) Oral at bedtime  sodium bicarbonate 650 milliGRAM(s) Oral two times a day  sodium zirconium cyclosilicate 5 Gram(s) Oral daily    MEDICATIONS  (PRN):  acetaminophen     Tablet .. 650 milliGRAM(s) Oral every 6 hours PRN Mild Pain (1 - 3)  melatonin 3 milliGRAM(s) Oral at bedtime PRN Insomnia      Vital Signs Last 24 Hrs  T(F): 98.2 (02-25-22 @ 11:38), Max: 98.4 (02-24-22 @ 19:30)  HR: 69 (02-25-22 @ 11:38) (63 - 84)  BP: 163/72 (02-25-22 @ 11:38) (153/56 - 163/72)  RR: 18 (02-25-22 @ 11:38) (18 - 18)  SpO2: 99% (02-25-22 @ 11:38) (97% - 100%)  Telemetry:   CAPILLARY BLOOD GLUCOSE        I&O's Summary    24 Feb 2022 07:01  -  25 Feb 2022 07:00  --------------------------------------------------------  IN: 1080 mL / OUT: 300 mL / NET: 780 mL    25 Feb 2022 07:01  -  25 Feb 2022 15:01  --------------------------------------------------------  IN: 700 mL / OUT: 0 mL / NET: 700 mL        PHYSICAL EXAM:  GENERAL: NAD, well-developed  HEAD:  Atraumatic, Normocephalic  EYES: EOMI, PERRLA, conjunctiva and sclera clear  NECK: Supple, No JVD  CHEST/LUNG: Clear to auscultation bilaterally; No wheeze  HEART: Regular rate and rhythm; No murmurs, rubs, or gallops  ABDOMEN: Soft, Nontender, Nondistended; Bowel sounds present  EXTREMITIES:  2+ Peripheral Pulses, No clubbing, cyanosis, or edema  PSYCH: AAOx3  NEUROLOGY: non-focal  SKIN: No rashes or lesions    LABS:                        10.0   9.93  )-----------( 277      ( 25 Feb 2022 12:25 )             31.8     02-25    143  |  110<H>  |  36<H>  ----------------------------<  86  3.8   |  20<L>  |  1.66<H>    Ca    8.6      25 Feb 2022 05:57  Phos  4.4     02-24  Mg     2.1     02-24                RADIOLOGY & ADDITIONAL TESTS:    Imaging Personally Reviewed:    Consultant(s) Notes Reviewed:      Care Discussed with Consultants/Other Providers:

## 2022-02-25 NOTE — PROVIDER CONTACT NOTE (CRITICAL VALUE NOTIFICATION) - ASSESSMENT
Asymptomatic. Pt a&ox4, no c/o dizziness VS WDL.
pt asymptomatic. previously treated fever
VSS except for fever of 101.3. fever work up completed.

## 2022-02-26 LAB
ANION GAP SERPL CALC-SCNC: 15 MMOL/L — SIGNIFICANT CHANGE UP (ref 5–17)
BUN SERPL-MCNC: 34 MG/DL — HIGH (ref 7–23)
CALCIUM SERPL-MCNC: 9.1 MG/DL — SIGNIFICANT CHANGE UP (ref 8.4–10.5)
CHLORIDE SERPL-SCNC: 109 MMOL/L — HIGH (ref 96–108)
CO2 SERPL-SCNC: 19 MMOL/L — LOW (ref 22–31)
CREAT SERPL-MCNC: 1.56 MG/DL — HIGH (ref 0.5–1.3)
GLUCOSE SERPL-MCNC: 82 MG/DL — SIGNIFICANT CHANGE UP (ref 70–99)
HCT VFR BLD CALC: 29 % — LOW (ref 34.5–45)
HGB BLD-MCNC: 9.2 G/DL — LOW (ref 11.5–15.5)
MCHC RBC-ENTMCNC: 27.4 PG — SIGNIFICANT CHANGE UP (ref 27–34)
MCHC RBC-ENTMCNC: 31.7 GM/DL — LOW (ref 32–36)
MCV RBC AUTO: 86.3 FL — SIGNIFICANT CHANGE UP (ref 80–100)
NRBC # BLD: 0 /100 WBCS — SIGNIFICANT CHANGE UP (ref 0–0)
PLATELET # BLD AUTO: 249 K/UL — SIGNIFICANT CHANGE UP (ref 150–400)
POTASSIUM SERPL-MCNC: 3.8 MMOL/L — SIGNIFICANT CHANGE UP (ref 3.5–5.3)
POTASSIUM SERPL-SCNC: 3.8 MMOL/L — SIGNIFICANT CHANGE UP (ref 3.5–5.3)
RBC # BLD: 3.36 M/UL — LOW (ref 3.8–5.2)
RBC # FLD: 15.4 % — HIGH (ref 10.3–14.5)
SARS-COV-2 RNA SPEC QL NAA+PROBE: SIGNIFICANT CHANGE UP
SODIUM SERPL-SCNC: 143 MMOL/L — SIGNIFICANT CHANGE UP (ref 135–145)
WBC # BLD: 9.73 K/UL — SIGNIFICANT CHANGE UP (ref 3.8–10.5)
WBC # FLD AUTO: 9.73 K/UL — SIGNIFICANT CHANGE UP (ref 3.8–10.5)

## 2022-02-26 RX ORDER — HYDRALAZINE HCL 50 MG
5 TABLET ORAL EVERY 6 HOURS
Refills: 0 | Status: DISCONTINUED | OUTPATIENT
Start: 2022-02-26 | End: 2022-03-02

## 2022-02-26 RX ORDER — HYDRALAZINE HCL 50 MG
2.5 TABLET ORAL EVERY 6 HOURS
Refills: 0 | Status: DISCONTINUED | OUTPATIENT
Start: 2022-02-26 | End: 2022-02-26

## 2022-02-26 RX ORDER — SODIUM BICARBONATE 1 MEQ/ML
650 SYRINGE (ML) INTRAVENOUS THREE TIMES A DAY
Refills: 0 | Status: DISCONTINUED | OUTPATIENT
Start: 2022-02-26 | End: 2022-03-02

## 2022-02-26 RX ORDER — HYDRALAZINE HCL 50 MG
5 TABLET ORAL ONCE
Refills: 0 | Status: COMPLETED | OUTPATIENT
Start: 2022-02-26 | End: 2022-02-26

## 2022-02-26 RX ADMIN — Medication 1 DROP(S): at 00:00

## 2022-02-26 RX ADMIN — Medication 1 TABLET(S): at 12:52

## 2022-02-26 RX ADMIN — Medication 650 MILLIGRAM(S): at 17:44

## 2022-02-26 RX ADMIN — GABAPENTIN 50 MILLIGRAM(S): 400 CAPSULE ORAL at 05:50

## 2022-02-26 RX ADMIN — Medication 1 DROP(S): at 05:52

## 2022-02-26 RX ADMIN — SIMVASTATIN 40 MILLIGRAM(S): 20 TABLET, FILM COATED ORAL at 22:50

## 2022-02-26 RX ADMIN — Medication 1 DROP(S): at 17:44

## 2022-02-26 RX ADMIN — SEVELAMER CARBONATE 800 MILLIGRAM(S): 2400 POWDER, FOR SUSPENSION ORAL at 17:44

## 2022-02-26 RX ADMIN — PANTOPRAZOLE SODIUM 40 MILLIGRAM(S): 20 TABLET, DELAYED RELEASE ORAL at 05:51

## 2022-02-26 RX ADMIN — GABAPENTIN 50 MILLIGRAM(S): 400 CAPSULE ORAL at 17:44

## 2022-02-26 RX ADMIN — SEVELAMER CARBONATE 800 MILLIGRAM(S): 2400 POWDER, FOR SUSPENSION ORAL at 12:52

## 2022-02-26 RX ADMIN — SODIUM ZIRCONIUM CYCLOSILICATE 5 GRAM(S): 10 POWDER, FOR SUSPENSION ORAL at 12:52

## 2022-02-26 RX ADMIN — HEPARIN SODIUM 5000 UNIT(S): 5000 INJECTION INTRAVENOUS; SUBCUTANEOUS at 17:43

## 2022-02-26 RX ADMIN — Medication 1 DROP(S): at 12:52

## 2022-02-26 RX ADMIN — Medication 1 SUPPOSITORY(S): at 12:51

## 2022-02-26 RX ADMIN — Medication 40 MILLIGRAM(S): at 05:51

## 2022-02-26 RX ADMIN — Medication 650 MILLIGRAM(S): at 22:50

## 2022-02-26 RX ADMIN — LATANOPROST 1 DROP(S): 0.05 SOLUTION/ DROPS OPHTHALMIC; TOPICAL at 22:51

## 2022-02-26 RX ADMIN — Medication 1 DROP(S): at 22:50

## 2022-02-26 RX ADMIN — Medication 100 MILLIGRAM(S): at 12:52

## 2022-02-26 RX ADMIN — Medication 2.5 MILLIGRAM(S): at 17:45

## 2022-02-26 RX ADMIN — Medication 650 MILLIGRAM(S): at 05:51

## 2022-02-26 RX ADMIN — SEVELAMER CARBONATE 800 MILLIGRAM(S): 2400 POWDER, FOR SUSPENSION ORAL at 07:50

## 2022-02-26 RX ADMIN — Medication 5 MILLIGRAM(S): at 18:47

## 2022-02-26 RX ADMIN — Medication 25 MICROGRAM(S): at 05:51

## 2022-02-26 NOTE — PROGRESS NOTE ADULT - ASSESSMENT
76 year-old woman with history of multiple medical issues including hypertension, hyperlipidemia, and chronic kidney disease. She was transferred to [facility] yesterday from the [facility] ER after being noted on imaging to have a C2 fracture, s/p syncopal episode. Following for CKD    FLORIDALMA on CKD 4 - likely Hypertensive Nephropathy  Creatinine Trend: 1.56 <--, 1.66 <--, 1.84 <--, 2.09 <--, 2.07 <--, 1.91 <--  Serum Cr improving  renal us shows cortical calcifications  cont monitor Serum Creatinine level  fluctuations expected in cr    low Na/K in diet  daily BMP  on sevelamer for high phos     s/p Hyperkalemia  resolved  can stop Lokelma 5mg PO QD  Low K diet    Metabolic acidosis due to renal failure likely   inc Nabicarb 650mg po bid>tid  trend bicarb  goal bicarb 22   bp high- d/c midodrine    Cervical fracture  pain management per team  Neurosx recs     labs, chart reviewed  For any question, pl call:  Nephrology  Cell -[phone]  Office [phone]  Ans Serv [phone]  [email or web address] - [employer]yk[employer] ( wkend coverage for Hospital)

## 2022-02-26 NOTE — PROGRESS NOTE ADULT - ASSESSMENT
75yo female pt with PMHx of CKD (Last K-5.3, Bun/Cr.-29/1.93 on 7/18/2020), Anemia, Gout, HTN, HLD, was transferred, on cervical collar, from [facility] c/o head/neck pain, C2 fracture s/p fall this morning with a syncopal episode.

## 2022-02-26 NOTE — PROGRESS NOTE ADULT - SUBJECTIVE AND OBJECTIVE BOX
[facility] - S Shyam / Favian S /D Willa/ S Stephanie/ S Josep/ Michael Hartmann / AKIRA Jacobsu/ O Manda  service -[phone], office [phone]  ---------------------------------------------------------------------------------------------------------------    Patient seen and examined bedside    Subjective and Objective: No overnight events, sob resolved. No complaints today. feeling better    Allergies: No Known Allergies      Hospital Medications:   MEDICATIONS  (STANDING):  allopurinol 100 milliGRAM(s) Oral daily  artificial  tears Solution 1 Drop(s) Both EYES four times a day  bisacodyl 5 milliGRAM(s) Oral at bedtime  colchicine 0.6 milliGRAM(s) Oral <User Schedule>  gabapentin Solution 50 milliGRAM(s) Oral two times a day  heparin   Injectable 5000 Unit(s) SubCutaneous every 8 hours  hydrocortisone hemorrhoidal Suppository 1 Suppository(s) Rectal daily  latanoprost 0.005% Ophthalmic Solution 1 Drop(s) Both EYES at bedtime  levothyroxine 25 MICROGram(s) Oral daily  mesalamine Suppository 1000 milliGRAM(s) Rectal at bedtime  midodrine. 5 milliGRAM(s) Oral <User Schedule>  Nephro-darcy 1 Tablet(s) Oral daily  pantoprazole    Tablet 40 milliGRAM(s) Oral before breakfast  polyethylene glycol 3350 17 Gram(s) Oral two times a day  sevelamer carbonate 800 milliGRAM(s) Oral three times a day with meals  simvastatin 40 milliGRAM(s) Oral at bedtime  sodium bicarbonate 650 milliGRAM(s) Oral two times a day  sodium zirconium cyclosilicate 5 Gram(s) Oral daily      REVIEW OF SYSTEMS:  CONSTITUTIONAL: No weakness, fevers or chills  EYES/ENT: No visual changes;  No vertigo or throat pain   NECK: No pain or stiffness  RESPIRATORY: No cough, wheezing, hemoptysis; No shortness of breath  CARDIOVASCULAR: No chest pain or palpitations.  GASTROINTESTINAL: No abdominal or epigastric pain. No nausea, vomiting, or hematemesis; No diarrhea or constipation. No melena or hematochezia.  GENITOURINARY: No dysuria, frequency, foamy urine, urinary urgency, incontinence or hematuria  NEUROLOGICAL: No numbness or weakness  SKIN: No itching, burning, rashes, or lesions   VASCULAR: No bilateral lower extremity edema.   All other review of systems is negative unless indicated above.    VITALS:  T(F): 98.3 (02-26-22 @ 12:24), Max: 99 (02-25-22 @ 16:06)  HR: 73 (02-26-22 @ 12:24)  BP: 183/73 (02-26-22 @ 12:24)  RR: 18 (02-26-22 @ 12:24)  SpO2: 98% (02-26-22 @ 12:24)  Wt(kg): --    02-25 @ 07:01  -  02-26 @ 07:00  --------------------------------------------------------  IN: 820 mL / OUT: 300 mL / NET: 520 mL    02-26 @ 07:01  -  02-26 @ 13:57  --------------------------------------------------------  IN: 680 mL / OUT: 1 mL / NET: 679 mL          PHYSICAL EXAM:  Constitutional: NAD  HEENT: anicteric sclera, oropharynx clear  Neck: No JVD  Respiratory: CTAB, no wheezes, rales or rhonchi  Cardiovascular: S1, S2, RRR  Gastrointestinal: BS+, soft, NT/ND  Extremities: No cyanosis or clubbing. No peripheral edema  Neurological: A/O x 3, no focal deficits  Psychiatric: Normal mood, normal affect  : No CVA tenderness. No williamson.   Skin: No rashes  Vascular Access:    LABS:  02-26    143  |  109<H>  |  34<H>  ----------------------------<  82  3.8   |  19<L>  |  1.56<H>    Ca    9.1      26 Feb 2022 06:23      Creatinine Trend: 1.56 <--, 1.66 <--, 1.84 <--, 2.09 <--, 2.07 <--, 1.91 <--                        9.2    9.73  )-----------( 249      ( 26 Feb 2022 06:23 )             29.0     Urine Studies:        RADIOLOGY & ADDITIONAL STUDIES:   [facility] - S Shyam / Favian S /D Willa/ S Stephanie/ S Josep/ Michael Hartmann / AKIRA Alonzo/ O Manda  service -[phone], office [phone]  ---------------------------------------------------------------------------------------------------------------    Patient seen and examined bedside    Subjective and Objective: No overnight events, denied sob. No complaints today. feeling better    Allergies: No Known Allergies      Hospital Medications:   MEDICATIONS  (STANDING):  allopurinol 100 milliGRAM(s) Oral daily  artificial  tears Solution 1 Drop(s) Both EYES four times a day  bisacodyl 5 milliGRAM(s) Oral at bedtime  colchicine 0.6 milliGRAM(s) Oral <User Schedule>  gabapentin Solution 50 milliGRAM(s) Oral two times a day  heparin   Injectable 5000 Unit(s) SubCutaneous every 8 hours  hydrocortisone hemorrhoidal Suppository 1 Suppository(s) Rectal daily  latanoprost 0.005% Ophthalmic Solution 1 Drop(s) Both EYES at bedtime  levothyroxine 25 MICROGram(s) Oral daily  mesalamine Suppository 1000 milliGRAM(s) Rectal at bedtime  midodrine. 5 milliGRAM(s) Oral <User Schedule>  Nephro-darcy 1 Tablet(s) Oral daily  pantoprazole    Tablet 40 milliGRAM(s) Oral before breakfast  polyethylene glycol 3350 17 Gram(s) Oral two times a day  sevelamer carbonate 800 milliGRAM(s) Oral three times a day with meals  simvastatin 40 milliGRAM(s) Oral at bedtime  sodium bicarbonate 650 milliGRAM(s) Oral two times a day  sodium zirconium cyclosilicate 5 Gram(s) Oral daily    VITALS:  T(F): 98.3 (02-26-22 @ 12:24), Max: 99 (02-25-22 @ 16:06)  HR: 73 (02-26-22 @ 12:24)  BP: 183/73 (02-26-22 @ 12:24)  RR: 18 (02-26-22 @ 12:24)  SpO2: 98% (02-26-22 @ 12:24)  Wt(kg): --    02-25 @ 07:01  -  02-26 @ 07:00  --------------------------------------------------------  IN: 820 mL / OUT: 300 mL / NET: 520 mL    02-26 @ 07:01  -  02-26 @ 13:57  --------------------------------------------------------  IN: 680 mL / OUT: 1 mL / NET: 679 mL      PHYSICAL EXAM:  Constitutional: NAD  HEENT: anicteric sclera. neck brace  Neck: No JVD  Respiratory: CTAB, no wheezes, rales or rhonchi  Cardiovascular: S1, S2, RRR  Gastrointestinal: BS+, soft, NT/ND  Extremities: no peripheral edema  Neurological: A/O x 3  Psychiatric: Normal mood, normal affect  : No williamson.     LABS:  02-26    143  |  109<H>  |  34<H>  ----------------------------<  82  3.8   |  19<L>  |  1.56<H>    Ca    9.1      26 Feb 2022 06:23      Creatinine Trend: 1.56 <--, 1.66 <--, 1.84 <--, 2.09 <--, 2.07 <--, 1.91 <--                        9.2    9.73  )-----------( 249      ( 26 Feb 2022 06:23 )             29.0     Urine Studies:        RADIOLOGY & ADDITIONAL STUDIES:

## 2022-02-26 NOTE — PROGRESS NOTE ADULT - SUBJECTIVE AND OBJECTIVE BOX
Patient is a 76y old  Female who presents with a chief complaint of syncope , cervical spine fracture (26 Feb 2022 20:02)      SUBJECTIVE / OVERNIGHT EVENTS: no new events    MEDICATIONS  (STANDING):  allopurinol 100 milliGRAM(s) Oral daily  artificial  tears Solution 1 Drop(s) Both EYES four times a day  bisacodyl 5 milliGRAM(s) Oral at bedtime  colchicine 0.6 milliGRAM(s) Oral <User Schedule>  gabapentin Solution 50 milliGRAM(s) Oral two times a day  heparin   Injectable 5000 Unit(s) SubCutaneous every 8 hours  hydrocortisone hemorrhoidal Suppository 1 Suppository(s) Rectal daily  latanoprost 0.005% Ophthalmic Solution 1 Drop(s) Both EYES at bedtime  levothyroxine 25 MICROGram(s) Oral daily  mesalamine Suppository 1000 milliGRAM(s) Rectal at bedtime  midodrine. 5 milliGRAM(s) Oral <User Schedule>  Nephro-darcy 1 Tablet(s) Oral daily  pantoprazole    Tablet 40 milliGRAM(s) Oral before breakfast  polyethylene glycol 3350 17 Gram(s) Oral two times a day  sevelamer carbonate 800 milliGRAM(s) Oral three times a day with meals  simvastatin 40 milliGRAM(s) Oral at bedtime  sodium bicarbonate 650 milliGRAM(s) Oral three times a day    MEDICATIONS  (PRN):  acetaminophen     Tablet .. 650 milliGRAM(s) Oral every 6 hours PRN Mild Pain (1 - 3)  hydrALAZINE Injectable 5 milliGRAM(s) IV Push every 6 hours PRN SBP > 180  melatonin 3 milliGRAM(s) Oral at bedtime PRN Insomnia      Vital Signs Last 24 Hrs  T(F): 98.2 (02-26-22 @ 19:14), Max: 98.4 (02-25-22 @ 23:29)  HR: 78 (02-26-22 @ 19:14) (60 - 78)  BP: 178/77 (02-26-22 @ 19:14) (160/76 - 200/90)  RR: 18 (02-26-22 @ 19:14) (18 - 18)  SpO2: 98% (02-26-22 @ 19:14) (98% - 99%)  Telemetry:   CAPILLARY BLOOD GLUCOSE        I&O's Summary    25 Feb 2022 07:01  -  26 Feb 2022 07:00  --------------------------------------------------------  IN: 820 mL / OUT: 300 mL / NET: 520 mL    26 Feb 2022 07:01  -  26 Feb 2022 22:38  --------------------------------------------------------  IN: 680 mL / OUT: 901 mL / NET: -221 mL        PHYSICAL EXAM:  GENERAL: NAD, well-developed  HEAD:  Atraumatic, Normocephalic  EYES: EOMI, PERRLA, conjunctiva and sclera clear  NECK: Supple, No JVD  CHEST/LUNG: Clear to auscultation bilaterally; No wheeze  HEART: Regular rate and rhythm; No murmurs, rubs, or gallops  ABDOMEN: Soft, Nontender, Nondistended; Bowel sounds present  EXTREMITIES:  2+ Peripheral Pulses, No clubbing, cyanosis, or edema  PSYCH: AAOx3  NEUROLOGY: non-focal  SKIN: No rashes or lesions    LABS:                        9.2    9.73  )-----------( 249      ( 26 Feb 2022 06:23 )             29.0     02-26    143  |  109<H>  |  34<H>  ----------------------------<  82  3.8   |  19<L>  |  1.56<H>    Ca    9.1      26 Feb 2022 06:23                RADIOLOGY & ADDITIONAL TESTS:    Imaging Personally Reviewed:    Consultant(s) Notes Reviewed:      Care Discussed with Consultants/Other Providers:

## 2022-02-26 NOTE — PROGRESS NOTE ADULT - ASSESSMENT
77yo female pt with PMHx of CKD (Last K-5.3, Bun/Cr.-29/1.93 on 7/18/2020), Anemia, Gout, HTN, HLD, was transferred, on cervical collar, from [facility] c/o head/neck pain, C2 fracture s/p fall this morning with a syncopal episode.

## 2022-02-26 NOTE — PROGRESS NOTE ADULT - SUBJECTIVE AND OBJECTIVE BOX
Subjective: Patient seen and examined. No new events except as noted.     REVIEW OF SYSTEMS:    CONSTITUTIONAL:+ weakness, fevers or chills  EYES/ENT: No visual changes;  No vertigo or throat pain   NECK: No pain or stiffness  RESPIRATORY: No cough, wheezing, hemoptysis; No shortness of breath  CARDIOVASCULAR: No chest pain or palpitations  GASTROINTESTINAL: No abdominal or epigastric pain. No nausea, vomiting, or hematemesis; No diarrhea or constipation. No melena or hematochezia.  GENITOURINARY: No dysuria, frequency or hematuria  NEUROLOGICAL: No numbness or weakness  SKIN: No itching, burning, rashes, or lesions   All other review of systems is negative unless indicated above.    MEDICATIONS:  MEDICATIONS  (STANDING):  allopurinol 100 milliGRAM(s) Oral daily  artificial  tears Solution 1 Drop(s) Both EYES four times a day  bisacodyl 5 milliGRAM(s) Oral at bedtime  colchicine 0.6 milliGRAM(s) Oral <User Schedule>  gabapentin Solution 50 milliGRAM(s) Oral two times a day  heparin   Injectable 5000 Unit(s) SubCutaneous every 8 hours  hydrocortisone hemorrhoidal Suppository 1 Suppository(s) Rectal daily  latanoprost 0.005% Ophthalmic Solution 1 Drop(s) Both EYES at bedtime  levothyroxine 25 MICROGram(s) Oral daily  mesalamine Suppository 1000 milliGRAM(s) Rectal at bedtime  midodrine. 5 milliGRAM(s) Oral <User Schedule>  Nephro-darcy 1 Tablet(s) Oral daily  pantoprazole    Tablet 40 milliGRAM(s) Oral before breakfast  polyethylene glycol 3350 17 Gram(s) Oral two times a day  sevelamer carbonate 800 milliGRAM(s) Oral three times a day with meals  simvastatin 40 milliGRAM(s) Oral at bedtime  sodium bicarbonate 650 milliGRAM(s) Oral three times a day      PHYSICAL EXAM:  T(C): 36.8 (02-26-22 @ 19:14), Max: 36.9 (02-25-22 @ 23:29)  HR: 78 (02-26-22 @ 19:14) (60 - 78)  BP: 178/77 (02-26-22 @ 19:14) (160/76 - 200/90)  RR: 18 (02-26-22 @ 19:14) (18 - 18)  SpO2: 98% (02-26-22 @ 19:14) (98% - 99%)  Wt(kg): --  I&O's Summary    25 Feb 2022 07:01  -  26 Feb 2022 07:00  --------------------------------------------------------  IN: 820 mL / OUT: 300 mL / NET: 520 mL    26 Feb 2022 07:01  -  26 Feb 2022 20:03  --------------------------------------------------------  IN: 680 mL / OUT: 901 mL / NET: -221 mL            Appearance: NAD, c-collar on   HEENT: Normal oral mucosa, PERRL, EOMI	  Lymphatic: No lymphadenopathy , no edema  Cardiovascular: Normal S1 S2, No JVD, No murmurs , Peripheral pulses palpable 2+ bilaterally  Respiratory: Lungs clear to auscultation, normal effort 	  Gastrointestinal:  Soft, Non-tender, + BS	  Skin: No rashes, No ecchymoses, No cyanosis, warm to touch  Neuro: alert oriented x 3 attention comprehension are fair.  Able to name, repeat.   EOmi fundi not visualized   no nystagmus VFF to confrontation  Tongue is midline  Palate elevates symmetrically   Moving all 4 ext spontaneously no drift appreciated  Gait not assessed.   Ext: No edema        LABS:    CARDIAC MARKERS:                                9.2    9.73  )-----------( 249      ( 26 Feb 2022 06:23 )             29.0     02-26    143  |  109<H>  |  34<H>  ----------------------------<  82  3.8   |  19<L>  |  1.56<H>    Ca    9.1      26 Feb 2022 06:23      proBNP:   Lipid Profile:   HgA1c:   TSH:             TELEMETRY: 	    ECG:  	  RADIOLOGY:   DIAGNOSTIC TESTING:  [ ] Echocardiogram:  [ ]  Catheterization:  [ ] Stress Test:    OTHER:

## 2022-02-27 ENCOUNTER — NON-APPOINTMENT (OUTPATIENT)
Age: 77
End: 2022-02-27

## 2022-02-27 LAB
ANION GAP SERPL CALC-SCNC: 14 MMOL/L — SIGNIFICANT CHANGE UP (ref 5–17)
BUN SERPL-MCNC: 36 MG/DL — HIGH (ref 7–23)
CALCIUM SERPL-MCNC: 8.9 MG/DL — SIGNIFICANT CHANGE UP (ref 8.4–10.5)
CHLORIDE SERPL-SCNC: 108 MMOL/L — SIGNIFICANT CHANGE UP (ref 96–108)
CO2 SERPL-SCNC: 21 MMOL/L — LOW (ref 22–31)
CREAT SERPL-MCNC: 1.47 MG/DL — HIGH (ref 0.5–1.3)
GLUCOSE SERPL-MCNC: 80 MG/DL — SIGNIFICANT CHANGE UP (ref 70–99)
HCT VFR BLD CALC: 31.2 % — LOW (ref 34.5–45)
HGB BLD-MCNC: 9.7 G/DL — LOW (ref 11.5–15.5)
MAGNESIUM SERPL-MCNC: 2.1 MG/DL — SIGNIFICANT CHANGE UP (ref 1.6–2.6)
MCHC RBC-ENTMCNC: 26.6 PG — LOW (ref 27–34)
MCHC RBC-ENTMCNC: 31.1 GM/DL — LOW (ref 32–36)
MCV RBC AUTO: 85.5 FL — SIGNIFICANT CHANGE UP (ref 80–100)
NRBC # BLD: 0 /100 WBCS — SIGNIFICANT CHANGE UP (ref 0–0)
PHOSPHATE SERPL-MCNC: 5 MG/DL — HIGH (ref 2.5–4.5)
PLATELET # BLD AUTO: 276 K/UL — SIGNIFICANT CHANGE UP (ref 150–400)
POTASSIUM SERPL-MCNC: 3.8 MMOL/L — SIGNIFICANT CHANGE UP (ref 3.5–5.3)
POTASSIUM SERPL-SCNC: 3.8 MMOL/L — SIGNIFICANT CHANGE UP (ref 3.5–5.3)
RBC # BLD: 3.65 M/UL — LOW (ref 3.8–5.2)
RBC # FLD: 15.7 % — HIGH (ref 10.3–14.5)
SODIUM SERPL-SCNC: 143 MMOL/L — SIGNIFICANT CHANGE UP (ref 135–145)
WBC # BLD: 9.18 K/UL — SIGNIFICANT CHANGE UP (ref 3.8–10.5)
WBC # FLD AUTO: 9.18 K/UL — SIGNIFICANT CHANGE UP (ref 3.8–10.5)

## 2022-02-27 RX ORDER — MIDODRINE HYDROCHLORIDE 2.5 MG/1
5 TABLET ORAL
Refills: 0 | Status: DISCONTINUED | OUTPATIENT
Start: 2022-02-27 | End: 2022-02-27

## 2022-02-27 RX ORDER — HYDRALAZINE HCL 50 MG
25 TABLET ORAL THREE TIMES A DAY
Refills: 0 | Status: DISCONTINUED | OUTPATIENT
Start: 2022-02-27 | End: 2022-03-01

## 2022-02-27 RX ADMIN — Medication 25 MILLIGRAM(S): at 21:40

## 2022-02-27 RX ADMIN — SEVELAMER CARBONATE 800 MILLIGRAM(S): 2400 POWDER, FOR SUSPENSION ORAL at 12:39

## 2022-02-27 RX ADMIN — SIMVASTATIN 40 MILLIGRAM(S): 20 TABLET, FILM COATED ORAL at 21:40

## 2022-02-27 RX ADMIN — Medication 30 MILLIGRAM(S): at 06:23

## 2022-02-27 RX ADMIN — Medication 650 MILLIGRAM(S): at 14:36

## 2022-02-27 RX ADMIN — PANTOPRAZOLE SODIUM 40 MILLIGRAM(S): 20 TABLET, DELAYED RELEASE ORAL at 06:23

## 2022-02-27 RX ADMIN — Medication 650 MILLIGRAM(S): at 06:23

## 2022-02-27 RX ADMIN — LATANOPROST 1 DROP(S): 0.05 SOLUTION/ DROPS OPHTHALMIC; TOPICAL at 21:41

## 2022-02-27 RX ADMIN — Medication 1 SUPPOSITORY(S): at 12:39

## 2022-02-27 RX ADMIN — Medication 1 DROP(S): at 18:31

## 2022-02-27 RX ADMIN — Medication 25 MILLIGRAM(S): at 14:36

## 2022-02-27 RX ADMIN — Medication 1 DROP(S): at 06:23

## 2022-02-27 RX ADMIN — GABAPENTIN 50 MILLIGRAM(S): 400 CAPSULE ORAL at 18:31

## 2022-02-27 RX ADMIN — Medication 1 DROP(S): at 12:38

## 2022-02-27 RX ADMIN — SEVELAMER CARBONATE 800 MILLIGRAM(S): 2400 POWDER, FOR SUSPENSION ORAL at 07:46

## 2022-02-27 RX ADMIN — Medication 1 DROP(S): at 23:43

## 2022-02-27 RX ADMIN — Medication 100 MILLIGRAM(S): at 12:38

## 2022-02-27 RX ADMIN — Medication 1 TABLET(S): at 12:39

## 2022-02-27 RX ADMIN — Medication 25 MICROGRAM(S): at 06:23

## 2022-02-27 RX ADMIN — GABAPENTIN 50 MILLIGRAM(S): 400 CAPSULE ORAL at 06:24

## 2022-02-27 RX ADMIN — Medication 650 MILLIGRAM(S): at 21:40

## 2022-02-27 RX ADMIN — Medication 5 MILLIGRAM(S): at 22:44

## 2022-02-27 RX ADMIN — SEVELAMER CARBONATE 800 MILLIGRAM(S): 2400 POWDER, FOR SUSPENSION ORAL at 18:32

## 2022-02-27 NOTE — PROGRESS NOTE ADULT - SUBJECTIVE AND OBJECTIVE BOX
Patient is a 76y old  Female who presents with a chief complaint of syncope , cervical spine fracture (27 Feb 2022 13:41)      SUBJECTIVE / OVERNIGHT EVENTS: BP is high, orthostasis resolved, ptn has been refusing Midodrine for 5 days, hydralazine started    MEDICATIONS  (STANDING):  allopurinol 100 milliGRAM(s) Oral daily  artificial  tears Solution 1 Drop(s) Both EYES four times a day  bisacodyl 5 milliGRAM(s) Oral at bedtime  colchicine 0.6 milliGRAM(s) Oral <User Schedule>  gabapentin Solution 50 milliGRAM(s) Oral two times a day  heparin   Injectable 5000 Unit(s) SubCutaneous every 8 hours  hydrALAZINE 25 milliGRAM(s) Oral three times a day  hydrocortisone hemorrhoidal Suppository 1 Suppository(s) Rectal daily  latanoprost 0.005% Ophthalmic Solution 1 Drop(s) Both EYES at bedtime  levothyroxine 25 MICROGram(s) Oral daily  mesalamine Suppository 1000 milliGRAM(s) Rectal at bedtime  Nephro-darcy 1 Tablet(s) Oral daily  pantoprazole    Tablet 40 milliGRAM(s) Oral before breakfast  polyethylene glycol 3350 17 Gram(s) Oral two times a day  predniSONE   Tablet 30 milliGRAM(s) Oral daily  sevelamer carbonate 800 milliGRAM(s) Oral three times a day with meals  simvastatin 40 milliGRAM(s) Oral at bedtime  sodium bicarbonate 650 milliGRAM(s) Oral three times a day    MEDICATIONS  (PRN):  acetaminophen     Tablet .. 650 milliGRAM(s) Oral every 6 hours PRN Mild Pain (1 - 3)  hydrALAZINE Injectable 5 milliGRAM(s) IV Push every 6 hours PRN SBP > 180  melatonin 3 milliGRAM(s) Oral at bedtime PRN Insomnia      Vital Signs Last 24 Hrs  T(F): 97.9 (02-27-22 @ 19:15), Max: 98.6 (02-27-22 @ 11:03)  HR: 78 (02-27-22 @ 19:15) (65 - 85)  BP: 157/71 (02-27-22 @ 19:15) (152/62 - 180/69)  RR: 18 (02-27-22 @ 19:15) (17 - 18)  SpO2: 100% (02-27-22 @ 19:15) (98% - 100%)  Telemetry:   CAPILLARY BLOOD GLUCOSE        I&O's Summary    26 Feb 2022 07:01  -  27 Feb 2022 07:00  --------------------------------------------------------  IN: 680 mL / OUT: 2151 mL / NET: -1471 mL        PHYSICAL EXAM:  GENERAL: NAD, well-developed  HEAD:  Atraumatic, Normocephalic  EYES: EOMI, PERRLA, conjunctiva and sclera clear  NECK: Supple, No JVD  CHEST/LUNG: Clear to auscultation bilaterally; No wheeze  HEART: Regular rate and rhythm; No murmurs, rubs, or gallops  ABDOMEN: Soft, Nontender, Nondistended; Bowel sounds present  EXTREMITIES:  2+ Peripheral Pulses, No clubbing, cyanosis, or edema  PSYCH: AAOx3  NEUROLOGY: non-focal  SKIN: No rashes or lesions    LABS:                        9.7    9.18  )-----------( 276      ( 27 Feb 2022 06:23 )             31.2     02-27    143  |  108  |  36<H>  ----------------------------<  80  3.8   |  21<L>  |  1.47<H>    Ca    8.9      27 Feb 2022 06:23  Phos  5.0     02-27  Mg     2.1     02-27                RADIOLOGY & ADDITIONAL TESTS:    Imaging Personally Reviewed:    Consultant(s) Notes Reviewed:      Care Discussed with Consultants/Other Providers:

## 2022-02-27 NOTE — PROGRESS NOTE ADULT - PROBLEM SELECTOR PLAN 2
orthostatics resolved - continue to check orthostatics     - d/c midodrine as long as orthostatics are negative today  started hydralazine 25mg PO TID  continue to monitor orthostatics resolved - continue to check orthostatics     - d/c midodrine as long as orthostatics are negative today  started hydralazine 25mg PO TID    - d/c if orthostatics positive today   continue to monitor

## 2022-02-27 NOTE — PROGRESS NOTE ADULT - ASSESSMENT
77yo female pt with PMHx of CKD (Last K-5.3, Bun/Cr.-29/1.93 on 7/18/2020), Anemia, Gout, HTN, HLD, was transferred, on cervical collar, from [facility] c/o head/neck pain, C2 fracture s/p fall this morning with a syncopal episode.

## 2022-02-27 NOTE — PROGRESS NOTE ADULT - SUBJECTIVE AND OBJECTIVE BOX
[facility] - S Shyam / Favian S /D Willa/ S Stephanie/ S Josep/ Michael Hartmann / AKIRA Jacobsu/ O Manda  service -[phone], office [phone]  ---------------------------------------------------------------------------------------------------------------    Patient seen and examined bedside    Subjective and Objective: No overnight events, sob resolved. No complaints today. feeling better    Allergies: No Known Allergies      Hospital Medications:   MEDICATIONS  (STANDING):  allopurinol 100 milliGRAM(s) Oral daily  artificial  tears Solution 1 Drop(s) Both EYES four times a day  bisacodyl 5 milliGRAM(s) Oral at bedtime  colchicine 0.6 milliGRAM(s) Oral <User Schedule>  gabapentin Solution 50 milliGRAM(s) Oral two times a day  heparin   Injectable 5000 Unit(s) SubCutaneous every 8 hours  hydrALAZINE 25 milliGRAM(s) Oral three times a day  hydrocortisone hemorrhoidal Suppository 1 Suppository(s) Rectal daily  latanoprost 0.005% Ophthalmic Solution 1 Drop(s) Both EYES at bedtime  levothyroxine 25 MICROGram(s) Oral daily  mesalamine Suppository 1000 milliGRAM(s) Rectal at bedtime  Nephro-darcy 1 Tablet(s) Oral daily  pantoprazole    Tablet 40 milliGRAM(s) Oral before breakfast  polyethylene glycol 3350 17 Gram(s) Oral two times a day  predniSONE   Tablet 30 milliGRAM(s) Oral daily  sevelamer carbonate 800 milliGRAM(s) Oral three times a day with meals  simvastatin 40 milliGRAM(s) Oral at bedtime  sodium bicarbonate 650 milliGRAM(s) Oral three times a day      REVIEW OF SYSTEMS:  CONSTITUTIONAL: No weakness, fevers or chills  EYES/ENT: No visual changes;  No vertigo or throat pain   NECK: No pain or stiffness  RESPIRATORY: No cough, wheezing, hemoptysis; No shortness of breath  CARDIOVASCULAR: No chest pain or palpitations.  GASTROINTESTINAL: No abdominal or epigastric pain. No nausea, vomiting, or hematemesis; No diarrhea or constipation. No melena or hematochezia.  GENITOURINARY: No dysuria, frequency, foamy urine, urinary urgency, incontinence or hematuria  NEUROLOGICAL: No numbness or weakness  SKIN: No itching, burning, rashes, or lesions   VASCULAR: No bilateral lower extremity edema.   All other review of systems is negative unless indicated above.    VITALS:  T(F): 98.6 (02-27-22 @ 11:03), Max: 98.6 (02-27-22 @ 11:03)  HR: 72 (02-27-22 @ 11:03)  BP: 152/62 (02-27-22 @ 11:03)  RR: 17 (02-27-22 @ 11:03)  SpO2: 98% (02-27-22 @ 11:03)  Wt(kg): --    02-26 @ 07:01  -  02-27 @ 07:00  --------------------------------------------------------  IN: 680 mL / OUT: 2151 mL / NET: -1471 mL          PHYSICAL EXAM:  Constitutional: NAD  HEENT: anicteric sclera, oropharynx clear  Neck: No JVD  Respiratory: CTAB, no wheezes, rales or rhonchi  Cardiovascular: S1, S2, RRR  Gastrointestinal: BS+, soft, NT/ND  Extremities: No cyanosis or clubbing. No peripheral edema  Neurological: A/O x 3, no focal deficits  Psychiatric: Normal mood, normal affect  : No CVA tenderness. No williamson.   Skin: No rashes  Vascular Access:    LABS:  02-27    143  |  108  |  36<H>  ----------------------------<  80  3.8   |  21<L>  |  1.47<H>    Ca    8.9      27 Feb 2022 06:23  Phos  5.0     02-27  Mg     2.1     02-27      Creatinine Trend: 1.47 <--, 1.56 <--, 1.66 <--, 1.84 <--, 2.09 <--, 2.07 <--, 1.91 <--                        9.7    9.18  )-----------( 276      ( 27 Feb 2022 06:23 )             31.2     Urine Studies:        RADIOLOGY & ADDITIONAL STUDIES:   [facility] - S Shyam / Favian S /D Willa/ S Stephanie/ S Josep/ Michael Hartmann / AKIRA Jacobsu/ O Manda  service -[phone], office [phone]  ---------------------------------------------------------------------------------------------------------------    Patient seen and examined bedside    Subjective and Objective: No overnight events, denied sob. No complaints today.     Allergies: No Known Allergies      Hospital Medications:   MEDICATIONS  (STANDING):  allopurinol 100 milliGRAM(s) Oral daily  artificial  tears Solution 1 Drop(s) Both EYES four times a day  bisacodyl 5 milliGRAM(s) Oral at bedtime  colchicine 0.6 milliGRAM(s) Oral <User Schedule>  gabapentin Solution 50 milliGRAM(s) Oral two times a day  heparin   Injectable 5000 Unit(s) SubCutaneous every 8 hours  hydrALAZINE 25 milliGRAM(s) Oral three times a day  hydrocortisone hemorrhoidal Suppository 1 Suppository(s) Rectal daily  latanoprost 0.005% Ophthalmic Solution 1 Drop(s) Both EYES at bedtime  levothyroxine 25 MICROGram(s) Oral daily  mesalamine Suppository 1000 milliGRAM(s) Rectal at bedtime  Nephro-darcy 1 Tablet(s) Oral daily  pantoprazole    Tablet 40 milliGRAM(s) Oral before breakfast  polyethylene glycol 3350 17 Gram(s) Oral two times a day  predniSONE   Tablet 30 milliGRAM(s) Oral daily  sevelamer carbonate 800 milliGRAM(s) Oral three times a day with meals  simvastatin 40 milliGRAM(s) Oral at bedtime  sodium bicarbonate 650 milliGRAM(s) Oral three times a day    VITALS:  T(F): 98.6 (02-27-22 @ 11:03), Max: 98.6 (02-27-22 @ 11:03)  HR: 72 (02-27-22 @ 11:03)  BP: 152/62 (02-27-22 @ 11:03)  RR: 17 (02-27-22 @ 11:03)  SpO2: 98% (02-27-22 @ 11:03)  Wt(kg): --    02-26 @ 07:01  -  02-27 @ 07:00  --------------------------------------------------------  IN: 680 mL / OUT: 2151 mL / NET: -1471 mL      PHYSICAL EXAM:  Constitutional: NAD  HEENT: anicteric sclera. neck brace  Neck: No JVD  Respiratory: CTAB, no wheezes, rales or rhonchi  Cardiovascular: S1, S2, RRR  Gastrointestinal: BS+, soft, NT/ND  Extremities: no peripheral edema  Neurological: A/O x 3  Psychiatric: Normal mood, normal affect  : No williamson.     LABS:  02-27    143  |  108  |  36<H>  ----------------------------<  80  3.8   |  21<L>  |  1.47<H>    Ca    8.9      27 Feb 2022 06:23  Phos  5.0     02-27  Mg     2.1     02-27      Creatinine Trend: 1.47 <--, 1.56 <--, 1.66 <--, 1.84 <--, 2.09 <--, 2.07 <--, 1.91 <--                        9.7    9.18  )-----------( 276      ( 27 Feb 2022 06:23 )             31.2     Urine Studies:        RADIOLOGY & ADDITIONAL STUDIES:

## 2022-02-27 NOTE — PROGRESS NOTE ADULT - SUBJECTIVE AND OBJECTIVE BOX
Subjective: Patient seen and examined. No new events except as noted.   Sitting up in bed eating breakfast.  Pain has improved overall.     REVIEW OF SYSTEMS:    CONSTITUTIONAL: + weakness, fevers or chills  EYES/ENT: No visual changes;  No vertigo or throat pain   NECK: No pain or stiffness  RESPIRATORY: No cough, wheezing, hemoptysis; No shortness of breath  CARDIOVASCULAR: No chest pain or palpitations  GASTROINTESTINAL: No abdominal or epigastric pain. No nausea, vomiting, or hematemesis; No diarrhea or constipation. No melena or hematochezia.  GENITOURINARY: No dysuria, frequency or hematuria  NEUROLOGICAL: No numbness or weakness  SKIN: No itching, burning, rashes, or lesions   All other review of systems is negative unless indicated above.    MEDICATIONS:  MEDICATIONS  (STANDING):  allopurinol 100 milliGRAM(s) Oral daily  artificial  tears Solution 1 Drop(s) Both EYES four times a day  bisacodyl 5 milliGRAM(s) Oral at bedtime  colchicine 0.6 milliGRAM(s) Oral <User Schedule>  gabapentin Solution 50 milliGRAM(s) Oral two times a day  heparin   Injectable 5000 Unit(s) SubCutaneous every 8 hours  hydrALAZINE 25 milliGRAM(s) Oral three times a day  hydrocortisone hemorrhoidal Suppository 1 Suppository(s) Rectal daily  latanoprost 0.005% Ophthalmic Solution 1 Drop(s) Both EYES at bedtime  levothyroxine 25 MICROGram(s) Oral daily  mesalamine Suppository 1000 milliGRAM(s) Rectal at bedtime  midodrine. 5 milliGRAM(s) Oral <User Schedule>  Nephro-darcy 1 Tablet(s) Oral daily  pantoprazole    Tablet 40 milliGRAM(s) Oral before breakfast  polyethylene glycol 3350 17 Gram(s) Oral two times a day  predniSONE   Tablet 30 milliGRAM(s) Oral daily  sevelamer carbonate 800 milliGRAM(s) Oral three times a day with meals  simvastatin 40 milliGRAM(s) Oral at bedtime  sodium bicarbonate 650 milliGRAM(s) Oral three times a day      PHYSICAL EXAM:  T(C): 36.9 (02-27-22 @ 07:59), Max: 36.9 (02-27-22 @ 07:59)  HR: 67 (02-27-22 @ 07:59) (65 - 78)  BP: 180/69 (02-27-22 @ 07:59) (162/71 - 200/90)  RR: 17 (02-27-22 @ 07:59) (17 - 18)  SpO2: 100% (02-27-22 @ 07:59) (98% - 100%)  Wt(kg): --  I&O's Summary    26 Feb 2022 07:01  -  27 Feb 2022 07:00  --------------------------------------------------------  IN: 680 mL / OUT: 2151 mL / NET: -1471 mL      Appearance: NAD, c-collar on   HEENT: Normal oral mucosa, PERRL, EOMI	  Lymphatic: No lymphadenopathy , no edema  Cardiovascular: Normal S1 S2, No JVD, No murmurs , Peripheral pulses palpable 2+ bilaterally  Respiratory: Lungs clear to auscultation, normal effort 	  Gastrointestinal:  Soft, Non-tender, + BS	  Skin: No rashes, No ecchymoses, No cyanosis, warm to touch  Neuro: alert oriented x 3 attention comprehension are fair.  Able to name, repeat.   EOmi fundi not visualized   no nystagmus VFF to confrontation  Tongue is midline  Palate elevates symmetrically   Moving all 4 ext spontaneously no drift appreciated  Gait not assessed.   Ext: No edema      LABS:    CARDIAC MARKERS:                          9.7    9.18  )-----------( 276      ( 27 Feb 2022 06:23 )             31.2     02-27    143  |  108  |  36<H>  ----------------------------<  80  3.8   |  21<L>  |  1.47<H>    Ca    8.9      27 Feb 2022 06:23  Phos  5.0     02-27  Mg     2.1     02-27      proBNP:   Lipid Profile:   HgA1c:   TSH:       TELEMETRY: SR	    ECG:  	  RADIOLOGY:   DIAGNOSTIC TESTING:  [ ] Echocardiogram:  [ ]  Catheterization:  [ ] Stress Test:    OTHER:

## 2022-02-27 NOTE — PROGRESS NOTE ADULT - ASSESSMENT
76 year-old woman with history of multiple medical issues including hypertension, hyperlipidemia, and chronic kidney disease. She was transferred to [facility] yesterday from the [facility] ER after being noted on imaging to have a C2 fracture, s/p syncopal episode. Following for CKD    FLORIDALMA on CKD 4 -   CKD likely Hypertensive Nephropathy  Creatinine Trend: 1.47 <--, 1.56 <--, 1.66 <--, 1.84 <--, 2.09 <--, 2.07 <--, 1.91 <--  Serum Cr improving  renal us shows cortical calcifications  cont monitor Serum Creatinine level  fluctuations expected in cr    low Na/K in diet  daily BMP  on sevelamer for high phos     s/p Hyperkalemia  resolved  stopped rtc Lokelma 5mg PO QD  c/w Low K diet    Metabolic acidosis due to renal failure likely - improving  increased Nabicarb 650mg po bid>tid 2/26-continue  trend bicarb  goal bicarb 22   HTN, controlled. bp was high, better today. off midodrine. c/w anti-htn meds    Cervical fracture  pain management per team  Neurosx recs     labs, chart reviewed  For any question, pl call:  Nephrology  Cell -[phone]  Office [phone]  Ans Serv [phone]  [email or web address] ( wkend coverage for Hospital)

## 2022-02-28 LAB
ALBUMIN SERPL ELPH-MCNC: 3.2 G/DL — LOW (ref 3.3–5)
ALP SERPL-CCNC: 77 U/L — SIGNIFICANT CHANGE UP (ref 40–120)
ALT FLD-CCNC: 43 U/L — SIGNIFICANT CHANGE UP (ref 10–45)
ANION GAP SERPL CALC-SCNC: 14 MMOL/L — SIGNIFICANT CHANGE UP (ref 5–17)
AST SERPL-CCNC: 24 U/L — SIGNIFICANT CHANGE UP (ref 10–40)
BASOPHILS # BLD AUTO: 0.01 K/UL — SIGNIFICANT CHANGE UP (ref 0–0.2)
BASOPHILS NFR BLD AUTO: 0.1 % — SIGNIFICANT CHANGE UP (ref 0–2)
BILIRUB SERPL-MCNC: 0.1 MG/DL — LOW (ref 0.2–1.2)
BUN SERPL-MCNC: 38 MG/DL — HIGH (ref 7–23)
CALCIUM SERPL-MCNC: 9 MG/DL — SIGNIFICANT CHANGE UP (ref 8.4–10.5)
CHLORIDE SERPL-SCNC: 108 MMOL/L — SIGNIFICANT CHANGE UP (ref 96–108)
CO2 SERPL-SCNC: 21 MMOL/L — LOW (ref 22–31)
CREAT SERPL-MCNC: 1.51 MG/DL — HIGH (ref 0.5–1.3)
EOSINOPHIL # BLD AUTO: 0.09 K/UL — SIGNIFICANT CHANGE UP (ref 0–0.5)
EOSINOPHIL NFR BLD AUTO: 1 % — SIGNIFICANT CHANGE UP (ref 0–6)
GLUCOSE SERPL-MCNC: 68 MG/DL — LOW (ref 70–99)
HCT VFR BLD CALC: 31.3 % — LOW (ref 34.5–45)
HGB BLD-MCNC: 9.7 G/DL — LOW (ref 11.5–15.5)
IMM GRANULOCYTES NFR BLD AUTO: 1.3 % — SIGNIFICANT CHANGE UP (ref 0–1.5)
LYMPHOCYTES # BLD AUTO: 1.45 K/UL — SIGNIFICANT CHANGE UP (ref 1–3.3)
LYMPHOCYTES # BLD AUTO: 15.4 % — SIGNIFICANT CHANGE UP (ref 13–44)
MAGNESIUM SERPL-MCNC: 2.1 MG/DL — SIGNIFICANT CHANGE UP (ref 1.6–2.6)
MCHC RBC-ENTMCNC: 26.6 PG — LOW (ref 27–34)
MCHC RBC-ENTMCNC: 31 GM/DL — LOW (ref 32–36)
MCV RBC AUTO: 85.8 FL — SIGNIFICANT CHANGE UP (ref 80–100)
MONOCYTES # BLD AUTO: 0.67 K/UL — SIGNIFICANT CHANGE UP (ref 0–0.9)
MONOCYTES NFR BLD AUTO: 7.1 % — SIGNIFICANT CHANGE UP (ref 2–14)
NEUTROPHILS # BLD AUTO: 7.07 K/UL — SIGNIFICANT CHANGE UP (ref 1.8–7.4)
NEUTROPHILS NFR BLD AUTO: 75.1 % — SIGNIFICANT CHANGE UP (ref 43–77)
NRBC # BLD: 0 /100 WBCS — SIGNIFICANT CHANGE UP (ref 0–0)
PHOSPHATE SERPL-MCNC: 4.6 MG/DL — HIGH (ref 2.5–4.5)
PLATELET # BLD AUTO: 289 K/UL — SIGNIFICANT CHANGE UP (ref 150–400)
POTASSIUM SERPL-MCNC: 4.3 MMOL/L — SIGNIFICANT CHANGE UP (ref 3.5–5.3)
POTASSIUM SERPL-SCNC: 4.3 MMOL/L — SIGNIFICANT CHANGE UP (ref 3.5–5.3)
PROT SERPL-MCNC: 6.6 G/DL — SIGNIFICANT CHANGE UP (ref 6–8.3)
RBC # BLD: 3.65 M/UL — LOW (ref 3.8–5.2)
RBC # FLD: 15.9 % — HIGH (ref 10.3–14.5)
SODIUM SERPL-SCNC: 143 MMOL/L — SIGNIFICANT CHANGE UP (ref 135–145)
WBC # BLD: 9.41 K/UL — SIGNIFICANT CHANGE UP (ref 3.8–10.5)
WBC # FLD AUTO: 9.41 K/UL — SIGNIFICANT CHANGE UP (ref 3.8–10.5)

## 2022-02-28 PROCEDURE — 93010 ELECTROCARDIOGRAM REPORT: CPT

## 2022-02-28 RX ADMIN — Medication 1 DROP(S): at 05:44

## 2022-02-28 RX ADMIN — SEVELAMER CARBONATE 800 MILLIGRAM(S): 2400 POWDER, FOR SUSPENSION ORAL at 11:13

## 2022-02-28 RX ADMIN — Medication 650 MILLIGRAM(S): at 22:04

## 2022-02-28 RX ADMIN — LATANOPROST 1 DROP(S): 0.05 SOLUTION/ DROPS OPHTHALMIC; TOPICAL at 22:04

## 2022-02-28 RX ADMIN — Medication 25 MICROGRAM(S): at 05:42

## 2022-02-28 RX ADMIN — Medication 30 MILLIGRAM(S): at 05:42

## 2022-02-28 RX ADMIN — Medication 1 TABLET(S): at 11:11

## 2022-02-28 RX ADMIN — Medication 25 MILLIGRAM(S): at 14:01

## 2022-02-28 RX ADMIN — Medication 25 MILLIGRAM(S): at 05:43

## 2022-02-28 RX ADMIN — Medication 650 MILLIGRAM(S): at 05:43

## 2022-02-28 RX ADMIN — Medication 650 MILLIGRAM(S): at 14:01

## 2022-02-28 RX ADMIN — GABAPENTIN 50 MILLIGRAM(S): 400 CAPSULE ORAL at 05:43

## 2022-02-28 RX ADMIN — Medication 1 DROP(S): at 17:09

## 2022-02-28 RX ADMIN — Medication 100 MILLIGRAM(S): at 11:11

## 2022-02-28 RX ADMIN — GABAPENTIN 50 MILLIGRAM(S): 400 CAPSULE ORAL at 19:11

## 2022-02-28 RX ADMIN — SEVELAMER CARBONATE 800 MILLIGRAM(S): 2400 POWDER, FOR SUSPENSION ORAL at 17:08

## 2022-02-28 RX ADMIN — SEVELAMER CARBONATE 800 MILLIGRAM(S): 2400 POWDER, FOR SUSPENSION ORAL at 14:01

## 2022-02-28 RX ADMIN — Medication 1 DROP(S): at 11:11

## 2022-02-28 RX ADMIN — Medication 25 MILLIGRAM(S): at 22:04

## 2022-02-28 RX ADMIN — PANTOPRAZOLE SODIUM 40 MILLIGRAM(S): 20 TABLET, DELAYED RELEASE ORAL at 06:29

## 2022-02-28 RX ADMIN — Medication 1 SUPPOSITORY(S): at 11:11

## 2022-02-28 RX ADMIN — Medication 5 MILLIGRAM(S): at 20:10

## 2022-02-28 RX ADMIN — SIMVASTATIN 40 MILLIGRAM(S): 20 TABLET, FILM COATED ORAL at 22:04

## 2022-02-28 NOTE — CHART NOTE - NSCHARTNOTESELECT_GEN_ALL_CORE
Cancelled Carotid Dopplers/Event Note
Infectious Disease/Event Note
Nutrition Services
Event Note
Event Note
Neurosurgery/Event Note

## 2022-02-28 NOTE — PROGRESS NOTE ADULT - SUBJECTIVE AND OBJECTIVE BOX
[facility]      Seen earlier today, and examined.  - Today, patient is without complaints.  sitting at bedside        *****MEDICATIONS: Current medication reviewed and documented.    MEDICATIONS  (STANDING):  allopurinol 100 milliGRAM(s) Oral daily  artificial  tears Solution 1 Drop(s) Both EYES four times a day  bisacodyl 5 milliGRAM(s) Oral at bedtime  colchicine 0.6 milliGRAM(s) Oral <User Schedule>  gabapentin Solution 50 milliGRAM(s) Oral two times a day  heparin   Injectable 5000 Unit(s) SubCutaneous every 8 hours  hydrALAZINE 25 milliGRAM(s) Oral three times a day  hydrocortisone hemorrhoidal Suppository 1 Suppository(s) Rectal daily  latanoprost 0.005% Ophthalmic Solution 1 Drop(s) Both EYES at bedtime  levothyroxine 25 MICROGram(s) Oral daily  mesalamine Suppository 1000 milliGRAM(s) Rectal at bedtime  Nephro-darcy 1 Tablet(s) Oral daily  pantoprazole    Tablet 40 milliGRAM(s) Oral before breakfast  polyethylene glycol 3350 17 Gram(s) Oral two times a day  predniSONE   Tablet 30 milliGRAM(s) Oral daily  sevelamer carbonate 800 milliGRAM(s) Oral three times a day with meals  simvastatin 40 milliGRAM(s) Oral at bedtime  sodium bicarbonate 650 milliGRAM(s) Oral three times a day    MEDICATIONS  (PRN):  acetaminophen     Tablet .. 650 milliGRAM(s) Oral every 6 hours PRN Mild Pain (1 - 3)  hydrALAZINE Injectable 5 milliGRAM(s) IV Push every 6 hours PRN SBP > 180  melatonin 3 milliGRAM(s) Oral at bedtime PRN Insomnia          ***** VITAL SIGNS:  T(F): 98.3 (22 @ 16:09), Max: 98.5 (22 @ 11:23)  HR: 78 (22 @ 16:09) (66 - 83)  BP: 170/76 (22 @ 16:09) (154/70 - 184/75)  RR: 18 (22 @ 16:09) (18 - 18)  SpO2: 98% (22 @ 16:09) (96% - 100%)  Wt(kg): --  ,   I&O's Summary    2022 07:01  -  2022 07:00  --------------------------------------------------------  IN: 200 mL / OUT: 1350 mL / NET: -1150 mL    2022 07:01  -  2022 16:45  --------------------------------------------------------  IN: 360 mL / OUT: 0 mL / NET: 360 mL             *****PHYSICAL EXAM:    alert oriented x 3 attention comprehension are fair.  Able to name, repeat.   EOmi fundi not visualized   no nystagmus VFF to confrontation  Tongue is midline   Moving all 4 ext spontaneously no drift appreciated    Gait not assessed.          *****LAB AND IMAGIN.7    9.41  )-----------( 289      ( 2022 06:19 )             31.3               -    143  |  108  |  38<H>  ----------------------------<  68<L>  4.3   |  21<L>  |  1.51<H>    Ca    9.0      2022 06:19  Phos  4.6       Mg     2.1         TPro  6.6  /  Alb  3.2<L>  /  TBili  0.1<L>  /  DBili  x   /  AST  24  /  ALT  43  /  AlkPhos  77                           [All pertinent recent Imaging/Reports reviewed]           *****A S S E S S M E N T   A N D   P L A N :      77yo female pt with PMHx of CKD (Last K-5.3, Bun/Cr.-29/1.93 on 2020), Anemia, Gout, HTN, HLD, was transferred, on cervical collar, from [facility] c/o head/neck pain, C2 fracture s/p fall this morning with a syncopal episode.   Pt stated she felt dizzy after urination in her bathroom at 8:30am and fell. +LOC and noticed severe headache/neck pain.   She was evaluated in [facility] and sent to ED for spine consult 2/2 C2 fracture.   Denies visual changes or N/V. Denies sensory changes or weakness to extremities. Denies CP/SOB/Palpitation/abd pain. Denies lower back pain. Denies urinary problems. Denies fever, chills, cough or congestion.            Problem/Recommendations 1: C2 fracture    CT C: acute C2 fx of body of the dens extending to left lateral mass (type 3), R C2 facet fx, mild comp fx sup endplate C3  nsx no surgical intervention , c collar at all times follow up with dr COMER   pt eval /ot eval   neuro checks q 4   gabapentin 50 bid for pain    adequate plain control   pain is controlled     Problem/Recommendations 2:   hyponatremia   pain related siadh   now resolved   watch bun/cr     Problem/Recommendations 3: anemia   trend h/h   gi on board   monitor stools   now resolved       Thank you for allowing me to participate in the care of this patient. Will continue to follow patient periodically. Please do not hesitate to call me if you have any  questions or if there has been a change in patients neurological status     ________________  Li Isaac MD  [facility] ([facility]  [phone]      33 minutes spent on total encounter; more than 50 % of the visit was  spent counseling about plan of care, compliance to diet/exercise and medication regimen and or  coordinating care by the attending physician.      It is advised that stroke patients follow up with TALIA Krishna @ [phone] in 1- 2 weeks.   Others please follow up with Dr. Michael Nissenbaum [phone]

## 2022-02-28 NOTE — PROGRESS NOTE ADULT - SUBJECTIVE AND OBJECTIVE BOX
Patient is a 76y old  Female who presents with a chief complaint of syncope , cervical spine fracture (28 Feb 2022 16:44)      SUBJECTIVE / OVERNIGHT EVENTS: ptn had episodes of PAT, cardiology aware    MEDICATIONS  (STANDING):  allopurinol 100 milliGRAM(s) Oral daily  artificial  tears Solution 1 Drop(s) Both EYES four times a day  bisacodyl 5 milliGRAM(s) Oral at bedtime  colchicine 0.6 milliGRAM(s) Oral <User Schedule>  gabapentin Solution 50 milliGRAM(s) Oral two times a day  heparin   Injectable 5000 Unit(s) SubCutaneous every 8 hours  hydrALAZINE 25 milliGRAM(s) Oral three times a day  hydrocortisone hemorrhoidal Suppository 1 Suppository(s) Rectal daily  latanoprost 0.005% Ophthalmic Solution 1 Drop(s) Both EYES at bedtime  levothyroxine 25 MICROGram(s) Oral daily  mesalamine Suppository 1000 milliGRAM(s) Rectal at bedtime  Nephro-darcy 1 Tablet(s) Oral daily  pantoprazole    Tablet 40 milliGRAM(s) Oral before breakfast  polyethylene glycol 3350 17 Gram(s) Oral two times a day  predniSONE   Tablet 30 milliGRAM(s) Oral daily  sevelamer carbonate 800 milliGRAM(s) Oral three times a day with meals  simvastatin 40 milliGRAM(s) Oral at bedtime  sodium bicarbonate 650 milliGRAM(s) Oral three times a day    MEDICATIONS  (PRN):  acetaminophen     Tablet .. 650 milliGRAM(s) Oral every 6 hours PRN Mild Pain (1 - 3)  hydrALAZINE Injectable 5 milliGRAM(s) IV Push every 6 hours PRN SBP > 180  melatonin 3 milliGRAM(s) Oral at bedtime PRN Insomnia      Vital Signs Last 24 Hrs  T(F): 98.2 (02-28-22 @ 20:12), Max: 98.5 (02-28-22 @ 11:23)  HR: 71 (02-28-22 @ 20:12) (66 - 83)  BP: 185/73 (02-28-22 @ 20:12) (154/70 - 185/73)  RR: 18 (02-28-22 @ 20:12) (18 - 18)  SpO2: 99% (02-28-22 @ 20:12) (96% - 99%)  Telemetry:   CAPILLARY BLOOD GLUCOSE        I&O's Summary    27 Feb 2022 07:01  -  28 Feb 2022 07:00  --------------------------------------------------------  IN: 200 mL / OUT: 1350 mL / NET: -1150 mL    28 Feb 2022 07:01  -  28 Feb 2022 23:07  --------------------------------------------------------  IN: 360 mL / OUT: 450 mL / NET: -90 mL        PHYSICAL EXAM:  GENERAL: NAD, well-developed  HEAD:  Atraumatic, Normocephalic  EYES: EOMI, PERRLA, conjunctiva and sclera clear  NECK: Supple, No JVD  CHEST/LUNG: Clear to auscultation bilaterally; No wheeze  HEART: Regular rate and rhythm; No murmurs, rubs, or gallops  ABDOMEN: Soft, Nontender, Nondistended; Bowel sounds present  EXTREMITIES:  2+ Peripheral Pulses, No clubbing, cyanosis, or edema  PSYCH: AAOx3  NEUROLOGY: non-focal  SKIN: No rashes or lesions    LABS:                        9.7    9.41  )-----------( 289      ( 28 Feb 2022 06:19 )             31.3     02-28    143  |  108  |  38<H>  ----------------------------<  68<L>  4.3   |  21<L>  |  1.51<H>    Ca    9.0      28 Feb 2022 06:19  Phos  4.6     02-28  Mg     2.1     02-28    TPro  6.6  /  Alb  3.2<L>  /  TBili  0.1<L>  /  DBili  x   /  AST  24  /  ALT  43  /  AlkPhos  77  02-28              RADIOLOGY & ADDITIONAL TESTS:    Imaging Personally Reviewed:    Consultant(s) Notes Reviewed:      Care Discussed with Consultants/Other Providers:

## 2022-02-28 NOTE — PROGRESS NOTE ADULT - ASSESSMENT
77yo female pt with PMHx of CKD (Last K-5.3, Bun/Cr.-29/1.93 on 7/18/2020), Anemia, Gout, HTN, HLD, was transferred, on cervical collar, from [facility] c/o head/neck pain, C2 fracture s/p fall this morning with a syncopal episode.

## 2022-02-28 NOTE — CHART NOTE - NSCHARTNOTEFT_GEN_A_CORE
77 y/o F with PMHx of CKD (Last K-5.3, Bun/Cr.-29/1.93 on 7/18/2020), Anemia, Gout, HTN, HLD, was transferred, on cervical collar, from [facility] c/o head/neck pain, p/w C2 fracture s/p fall Syncopy - trop negative, orthostatic negative midodrine was dc'd.      RN Notified  at 11 a.m patient with 5 seconds of PAT HR up to 130  pt converted back to NSR @ 80 bpm. Patient seen and examined  No chest pain, SOB, dizziness, no palpitations.    STAT 12 Lead EKG revealed NSR @ 80 bpm  qtc 460  Blood work this morning at baseline no acute changes  K>4, Mg >2  TTE on this admission showed Hyperdynamic EF >75%    Pt is asymptomatic post event she is ambulating with assistance using rolling walker in hallways.  Patient with HTN B/P 180/80 Hydralazine increased to 25mg tid.  Cardio Dr Razo notified of above events hold off on any BB or CCB for now observe for 24 hours if stable  D/C planning to [facility] by tomorrow 2/29/.
Interval Hx:  Informed by RN patient w/ BRBPR, w/ clots. Of note, patient does have hx of hemorrhoids. Has been recently refusing hemorrhoid cream. Patient also noted to have nosebleed, which self resolved. Also incidentally noted w/ a R. lower lip canker sore.    Seen and evaluated patient at bedside.  Patient states she does have pain from hemorrhoids.  AM CBC w/ H/H 8.5/27.9. Improvement from 7.8/25.8  GI to follow to see if patient would benefit from any other invasive therapies/diagnostics.  Will continue to trend CBC and closely follow and assess patient.  If nosebleed recurs, would apply pressure. ENT consult if persists.  Abreva started for canker sore.  Day team and attending to follow.    -Edil Rodriguez PA-C, [id number], Dept of Medicine
Patient can have C-collar temporarily taken off for carotid Doppler study. Neck should be minimally manipulated/moved during study.
Carotid dopplers were cancelled by radiology because they cannot do the test with the patient in a C-collar.  Pt will need to be cleared by Neurosurgery to remove the collar to do the test.  If the decision is made to pursue the Carotid Dopplers, the test with need to be reordered with instructions given to the radiology team.
Nutrition Follow Up Note  Patient seen for: nutrition follow up.     Chart reviewed, events noted. Pt is a 77yo female pt with PMHx of CKD (Last K-5.3, Bun/Cr.-29/1.93 on 7/18/2020), Anemia, Gout, HTN, HLD, was transferred, on cervical collar, from [facility] c/o head/neck pain, C2 fracture s/p fall with a syncopal episode.     Source: [x] Patient       [x] EMR        [] RN        [x] Family at bedside       [] Other:    -If unable to interview patient: [] Trach/Vent/BiPAP  [] Disoriented/confused/inappropriate to interview    Diet Order:   Diet, Regular:   No Concentrated Potassium  No Concentrated Phosphorus  Low Sodium (02-08-22)    - Is current order appropriate/adequate? [x] Yes  []  No:     - PO intake :   [] >75%  Adequate    [x] 50-75%  Fair       [] <50%  Poor    - Nutrition-related concerns:   - Per pt's daughter at bedside, pt eating well. Pt reports eating oatmeal and eggs in the morning and normally eats rice from meal trays. Daughter states pt not consuming salmon. Noted foods being brought in from home, daughter states she is aware of pt's recommended dietary restrictions (low K, Phos) and has a list at home. Per flow sheets, pt consuming 50-75% of meal trays.    GI:  Last BM today.   Bowel Regimen? [x] Yes   [] No    Nutrition focused physical exam performed with pt consent: exam significant for mild muscle loss to temples, mild fat loss to triceps.     Weights: Note per initial assessment pt with "25.1 pound / 22.8% weight loss x 1 year"  no updated weights available at this time, will continue to monitor weights for changes if any     Nutritionally Pertinent MEDICATIONS  (STANDING):  allopurinol  bisacodyl  levothyroxine  mesalamine Suppository  midodrine.  pantoprazole    Tablet  piperacillin/tazobactam IVPB..  piperacillin/tazobactam IVPB..  polyethylene glycol 3350  simvastatin  sodium bicarbonate    Pertinent Labs: 02-22 @ 06:06: Na 140, BUN 28<H>, Cr 2.07<H>, BG 89, K+ 4.7, Phos --, Mg --, Alk Phos --, ALT/SGPT --, AST/SGOT --, HbA1c --    A1C with Estimated Average Glucose Result: 5.8 % (02-07-22 @ 09:16)    Finger Sticks: n/a    Skin per nursing documentation: no pressure injuries noted   Edema: No noted edema as per flow sheets.     Estimated Needs:   [x] no change since previous assessment  [] recalculated:     Previous Nutrition Diagnosis: Underweight, Unintentional Weight Loss   Nutrition Diagnosis is: [x] ongoing  [] resolved [] not applicable     New Nutrition Diagnosis: [x] Moderate chronic malnutrition related to increased energy needs related to CKD, suspected inadequate protein energy intake as evidenced by pt with mild muscle and fat loss, 23% weight loss x 1 year, BMI 16.6    Nutrition Care Plan:  [x] In Progress  [] Achieved  [] Not applicable    Nutrition Interventions:     Education Provided:       [x] Yes: Discussed importance of adequate calorie intake while limiting concentrated phosphorus and potassium foods. Discussed option of oral nutrition supplement - pt and pt's daughter amenable to trying Suplena 1 x daily.     Recommendations:      1. Continue low sodium, no concentrated Phos, no concentrated Potassium diet.   2. Suplena supplement 1 x daily to promote PO intake.  3. Consider Nephro-darcy if not otherwise contraindicated.  4. Provide encouragement with PO intake, menu selections, and assistance with meals as needed.   5. Reinforce nutrition education as needed - RD remains available.     Monitoring and Evaluation:   Continue to monitor nutritional intake, tolerance to diet prescription, weights, labs, skin integrity    RD remains available upon request and will follow up per protocol    Minnie Pierre MS, RD, CDN, [facility] #[phone]
Ucx growing GNR >100K.  Abx broadened to zosyn for elevated WBC and dysuria.  f/u repeat cbc.  f/u Ucx ID/sensis.    Yee Chavez  [phone]

## 2022-02-28 NOTE — PROGRESS NOTE ADULT - SUBJECTIVE AND OBJECTIVE BOX
Subjective: Patient seen and examined. No new events except as noted.   Comfortable this am.  Notified for PAT 130s x5secs - resolved without intervention, asymptomatic, EKG back to SR 80s - plan: no change in current treatment, continue to monitor.    REVIEW OF SYSTEMS:    CONSTITUTIONAL: + weakness, fevers or chills  EYES/ENT: No visual changes;  No vertigo or throat pain   NECK: No pain or stiffness  RESPIRATORY: No cough, wheezing, hemoptysis; No shortness of breath  CARDIOVASCULAR: No chest pain or palpitations  GASTROINTESTINAL: No abdominal or epigastric pain. No nausea, vomiting, or hematemesis; No diarrhea or constipation. No melena or hematochezia.  GENITOURINARY: No dysuria, frequency or hematuria  NEUROLOGICAL: No numbness or weakness  SKIN: No itching, burning, rashes, or lesions   All other review of systems is negative unless indicated above.    MEDICATIONS:  MEDICATIONS  (STANDING):  allopurinol 100 milliGRAM(s) Oral daily  artificial  tears Solution 1 Drop(s) Both EYES four times a day  bisacodyl 5 milliGRAM(s) Oral at bedtime  colchicine 0.6 milliGRAM(s) Oral <User Schedule>  gabapentin Solution 50 milliGRAM(s) Oral two times a day  heparin   Injectable 5000 Unit(s) SubCutaneous every 8 hours  hydrALAZINE 25 milliGRAM(s) Oral three times a day  hydrocortisone hemorrhoidal Suppository 1 Suppository(s) Rectal daily  latanoprost 0.005% Ophthalmic Solution 1 Drop(s) Both EYES at bedtime  levothyroxine 25 MICROGram(s) Oral daily  mesalamine Suppository 1000 milliGRAM(s) Rectal at bedtime  Nephro-darcy 1 Tablet(s) Oral daily  pantoprazole    Tablet 40 milliGRAM(s) Oral before breakfast  polyethylene glycol 3350 17 Gram(s) Oral two times a day  predniSONE   Tablet 30 milliGRAM(s) Oral daily  sevelamer carbonate 800 milliGRAM(s) Oral three times a day with meals  simvastatin 40 milliGRAM(s) Oral at bedtime  sodium bicarbonate 650 milliGRAM(s) Oral three times a day      PHYSICAL EXAM:  T(C): 36.9 (02-28-22 @ 11:23), Max: 36.9 (02-27-22 @ 23:42)  HR: 79 (02-28-22 @ 11:23) (66 - 83)  BP: 154/70 (02-28-22 @ 11:23) (154/70 - 184/75)  RR: 18 (02-28-22 @ 11:23) (18 - 18)  SpO2: 98% (02-28-22 @ 11:23) (96% - 100%)  Wt(kg): --  I&O's Summary    27 Feb 2022 07:01  -  28 Feb 2022 07:00  --------------------------------------------------------  IN: 200 mL / OUT: 1350 mL / NET: -1150 mL    28 Feb 2022 07:01  -  28 Feb 2022 14:28  --------------------------------------------------------  IN: 360 mL / OUT: 0 mL / NET: 360 mL    Appearance: NAD, c-collar on   HEENT: Normal oral mucosa, PERRL, EOMI	  Lymphatic: No lymphadenopathy , no edema  Cardiovascular: Normal S1 S2, No JVD, No murmurs , Peripheral pulses palpable 2+ bilaterally  Respiratory: Lungs clear to auscultation, normal effort 	  Gastrointestinal:  Soft, Non-tender, + BS	  Skin: No rashes, No ecchymoses, No cyanosis, warm to touch  Neuro: alert oriented x 3 attention comprehension are fair.  Able to name, repeat.   EOmi fundi not visualized   no nystagmus VFF to confrontation  Tongue is midline  Palate elevates symmetrically   Moving all 4 ext spontaneously no drift appreciated  Gait not assessed.   Ext: No edema    LABS:    CARDIAC MARKERS:                        9.7    9.41  )-----------( 289      ( 28 Feb 2022 06:19 )             31.3     02-28    143  |  108  |  38<H>  ----------------------------<  68<L>  4.3   |  21<L>  |  1.51<H>    Ca    9.0      28 Feb 2022 06:19  Phos  4.6     02-28  Mg     2.1     02-28    TPro  6.6  /  Alb  3.2<L>  /  TBili  0.1<L>  /  DBili  x   /  AST  24  /  ALT  43  /  AlkPhos  77  02-28    proBNP:   Lipid Profile:   HgA1c:   TSH:     TELEMETRY:  SR 60-80s 	    ECG:  	  RADIOLOGY:   DIAGNOSTIC TESTING:  [ ] Echocardiogram:  [ ]  Catheterization:  [ ] Stress Test:    OTHER:

## 2022-02-28 NOTE — PROGRESS NOTE ADULT - SUBJECTIVE AND OBJECTIVE BOX
Patient seen and examined  no complaints    No Known Allergies    Hospital Medications:   MEDICATIONS  (STANDING):  allopurinol 100 milliGRAM(s) Oral daily  artificial  tears Solution 1 Drop(s) Both EYES four times a day  bisacodyl 5 milliGRAM(s) Oral at bedtime  colchicine 0.6 milliGRAM(s) Oral <User Schedule>  gabapentin Solution 50 milliGRAM(s) Oral two times a day  heparin   Injectable 5000 Unit(s) SubCutaneous every 8 hours  hydrALAZINE 25 milliGRAM(s) Oral three times a day  hydrocortisone hemorrhoidal Suppository 1 Suppository(s) Rectal daily  latanoprost 0.005% Ophthalmic Solution 1 Drop(s) Both EYES at bedtime  levothyroxine 25 MICROGram(s) Oral daily  mesalamine Suppository 1000 milliGRAM(s) Rectal at bedtime  Nephro-darcy 1 Tablet(s) Oral daily  pantoprazole    Tablet 40 milliGRAM(s) Oral before breakfast  polyethylene glycol 3350 17 Gram(s) Oral two times a day  predniSONE   Tablet 30 milliGRAM(s) Oral daily  sevelamer carbonate 800 milliGRAM(s) Oral three times a day with meals  simvastatin 40 milliGRAM(s) Oral at bedtime  sodium bicarbonate 650 milliGRAM(s) Oral three times a day        VITALS:  T(F): 98.5 (02-28-22 @ 11:23), Max: 98.5 (02-28-22 @ 11:23)  HR: 79 (02-28-22 @ 11:23)  BP: 154/70 (02-28-22 @ 11:23)  RR: 18 (02-28-22 @ 11:23)  SpO2: 98% (02-28-22 @ 11:23)  Wt(kg): --    02-27 @ 07:01  -  02-28 @ 07:00  --------------------------------------------------------  IN: 200 mL / OUT: 1350 mL / NET: -1150 mL    02-28 @ 07:01  -  02-28 @ 13:29  --------------------------------------------------------  IN: 360 mL / OUT: 0 mL / NET: 360 mL      PHYSICAL EXAM:  Constitutional: NAD  HEENT: anicteric sclera. neck brace  Neck: No JVD  Respiratory: CTAB, no wheezes, rales or rhonchi  Cardiovascular: S1, S2, RRR  Gastrointestinal: BS+, soft, NT/ND  Extremities: no peripheral edema  Neurological: A/O x 3    LABS:  02-28    143  |  108  |  38<H>  ----------------------------<  68<L>  4.3   |  21<L>  |  1.51<H>    Ca    9.0      28 Feb 2022 06:19  Phos  4.6     02-28  Mg     2.1     02-28    TPro  6.6  /  Alb  3.2<L>  /  TBili  0.1<L>  /  DBili      /  AST  24  /  ALT  43  /  AlkPhos  77  02-28    Creatinine Trend: 1.51 <--, 1.47 <--, 1.56 <--, 1.66 <--, 1.84 <--, 2.09 <--, 2.07 <--                        9.7    9.41  )-----------( 289      ( 28 Feb 2022 06:19 )             31.3     Urine Studies:      RADIOLOGY & ADDITIONAL STUDIES:

## 2022-02-28 NOTE — PROGRESS NOTE ADULT - ASSESSMENT
76 year-old woman with history of multiple medical issues including hypertension, hyperlipidemia, and chronic kidney disease. She was transferred to [facility] yesterday from the [facility] ER after being noted on imaging to have a C2 fracture, s/p syncopal episode. Following for CKD    FLORIDALMA on CKD 4 -   CKD likely 2/2 Hypertensive Nephropathy  Creatinine Trend: 1.51 <--, 1.47 <--, 1.56 <--, 1.66 <--, 1.84 <--, 2.09 <--, 2.07 <--  Serum Cr improving  renal us shows cortical calcifications  cont monitor Serum Creatinine level  fluctuations expected in cr  low Na/K in diet  daily BMP  on sevelamer for high phos     s/p Hyperkalemia  resolved  stopped rtc Lokelma 5mg PO QD  c/w Low K diet    Metabolic acidosis due to renal failure likely - improving  Nabicarb 650mg po tid  trend bicarb  goal bicarb 22   HTN, controlled. -> c/w current meds    Cervical fracture  pain management per team  Neurosx recs     labs, chart reviewed  For any question, pl call:  Nephrology  Dr Brown  cell -[phone]  Office [phone]  Ans Serv [phone]  [email or web address] [facility] [email or web address] ( wkend coverage for Hospital)

## 2022-03-01 LAB
ANION GAP SERPL CALC-SCNC: 13 MMOL/L — SIGNIFICANT CHANGE UP (ref 5–17)
BUN SERPL-MCNC: 39 MG/DL — HIGH (ref 7–23)
CALCIUM SERPL-MCNC: 9 MG/DL — SIGNIFICANT CHANGE UP (ref 8.4–10.5)
CHLORIDE SERPL-SCNC: 108 MMOL/L — SIGNIFICANT CHANGE UP (ref 96–108)
CO2 SERPL-SCNC: 23 MMOL/L — SIGNIFICANT CHANGE UP (ref 22–31)
CREAT SERPL-MCNC: 1.61 MG/DL — HIGH (ref 0.5–1.3)
EGFR: 33 ML/MIN/1.73M2 — LOW
GLUCOSE SERPL-MCNC: 80 MG/DL — SIGNIFICANT CHANGE UP (ref 70–99)
MAGNESIUM SERPL-MCNC: 2.2 MG/DL — SIGNIFICANT CHANGE UP (ref 1.6–2.6)
POTASSIUM SERPL-MCNC: 4.2 MMOL/L — SIGNIFICANT CHANGE UP (ref 3.5–5.3)
POTASSIUM SERPL-SCNC: 4.2 MMOL/L — SIGNIFICANT CHANGE UP (ref 3.5–5.3)
SODIUM SERPL-SCNC: 144 MMOL/L — SIGNIFICANT CHANGE UP (ref 135–145)

## 2022-03-01 RX ORDER — METOPROLOL TARTRATE 50 MG
25 TABLET ORAL DAILY
Refills: 0 | Status: DISCONTINUED | OUTPATIENT
Start: 2022-03-01 | End: 2022-03-02

## 2022-03-01 RX ORDER — SODIUM BICARBONATE 1 MEQ/ML
1 SYRINGE (ML) INTRAVENOUS
Qty: 90 | Refills: 0
Start: 2022-03-01 | End: 2022-03-30

## 2022-03-01 RX ORDER — COLCHICINE 0.6 MG
1 TABLET ORAL
Qty: 0 | Refills: 0 | DISCHARGE
Start: 2022-03-01

## 2022-03-01 RX ORDER — METOPROLOL TARTRATE 50 MG
1 TABLET ORAL
Qty: 0 | Refills: 0 | DISCHARGE
Start: 2022-03-01

## 2022-03-01 RX ORDER — LANOLIN ALCOHOL/MO/W.PET/CERES
1 CREAM (GRAM) TOPICAL
Qty: 0 | Refills: 0 | DISCHARGE
Start: 2022-03-01

## 2022-03-01 RX ORDER — HYDRALAZINE HCL 50 MG
50 TABLET ORAL THREE TIMES A DAY
Refills: 0 | Status: DISCONTINUED | OUTPATIENT
Start: 2022-03-01 | End: 2022-03-02

## 2022-03-01 RX ADMIN — Medication 1 DROP(S): at 05:44

## 2022-03-01 RX ADMIN — SEVELAMER CARBONATE 800 MILLIGRAM(S): 2400 POWDER, FOR SUSPENSION ORAL at 08:26

## 2022-03-01 RX ADMIN — Medication 5 MILLIGRAM(S): at 20:15

## 2022-03-01 RX ADMIN — Medication 650 MILLIGRAM(S): at 23:03

## 2022-03-01 RX ADMIN — Medication 1 TABLET(S): at 11:15

## 2022-03-01 RX ADMIN — Medication 1 DROP(S): at 11:16

## 2022-03-01 RX ADMIN — Medication 25 MILLIGRAM(S): at 13:02

## 2022-03-01 RX ADMIN — GABAPENTIN 50 MILLIGRAM(S): 400 CAPSULE ORAL at 05:44

## 2022-03-01 RX ADMIN — Medication 1 DROP(S): at 00:04

## 2022-03-01 RX ADMIN — SEVELAMER CARBONATE 800 MILLIGRAM(S): 2400 POWDER, FOR SUSPENSION ORAL at 11:39

## 2022-03-01 RX ADMIN — Medication 1 DROP(S): at 16:40

## 2022-03-01 RX ADMIN — PANTOPRAZOLE SODIUM 40 MILLIGRAM(S): 20 TABLET, DELAYED RELEASE ORAL at 06:00

## 2022-03-01 RX ADMIN — Medication 650 MILLIGRAM(S): at 13:02

## 2022-03-01 RX ADMIN — Medication 100 MILLIGRAM(S): at 11:15

## 2022-03-01 RX ADMIN — Medication 25 MICROGRAM(S): at 05:44

## 2022-03-01 RX ADMIN — Medication 25 MILLIGRAM(S): at 11:14

## 2022-03-01 RX ADMIN — LATANOPROST 1 DROP(S): 0.05 SOLUTION/ DROPS OPHTHALMIC; TOPICAL at 23:02

## 2022-03-01 RX ADMIN — SIMVASTATIN 40 MILLIGRAM(S): 20 TABLET, FILM COATED ORAL at 23:04

## 2022-03-01 RX ADMIN — GABAPENTIN 50 MILLIGRAM(S): 400 CAPSULE ORAL at 17:40

## 2022-03-01 RX ADMIN — Medication 650 MILLIGRAM(S): at 05:43

## 2022-03-01 RX ADMIN — SEVELAMER CARBONATE 800 MILLIGRAM(S): 2400 POWDER, FOR SUSPENSION ORAL at 16:42

## 2022-03-01 RX ADMIN — Medication 25 MILLIGRAM(S): at 05:43

## 2022-03-01 RX ADMIN — Medication 30 MILLIGRAM(S): at 05:44

## 2022-03-01 NOTE — PROVIDER CONTACT NOTE (OTHER) - DATE AND TIME:
15-Feb-2022 15:00
13-Feb-2022 12:53
18-Feb-2022 04:45
28-Feb-2022 11:20
10-Feb-2022 19:30
15-Feb-2022 08:25
11-Feb-2022 08:28
20-Feb-2022 06:50
15-Feb-2022 12:10
27-Feb-2022 22:45
21-Feb-2022 00:12
21-Feb-2022 06:47
19-Feb-2022 05:40
28-Feb-2022 20:15

## 2022-03-01 NOTE — PROVIDER CONTACT NOTE (OTHER) - REASON
Pt /74, HR 75. Due for midodrine 5mg
Pt had rectal bleeding, bloody noses last 2 days. Heparin injection was being held by day providers.
Pt is febrile with H&H of 6.5.
/67
Patient having rectal bleeding, and /74
C/o headache. BP -184/75 HR 68
Lab called for VIKKI Licea ESR
Pt BP elevated. Not on any home BP medications. Pt family wishes to have PFL forms filled
BP -185/73, HR-78
Decrease in Hemoglobin and hematocrit levels
Pt complaining of abdominal pain
x1 emesis episode
Elevated BP
Tele reported patient had 5.39 sec period of PAT
Pt BP elevated 181/77. Pt also complaining of bad hemorrhoid pain

## 2022-03-01 NOTE — PROGRESS NOTE ADULT - PROBLEM SELECTOR PLAN 2
orthostatics resolved     - d/c'd  midodrine   c/w hydralazine 25mg PO TID  started Toprol XL 25mg PO daily   continue to monitor

## 2022-03-01 NOTE — PROVIDER CONTACT NOTE (OTHER) - SITUATION
Pt has no BP parameters but BP elevated at 181/77. Complains of 10/10 hemorrhoid pain.
Pt /74, HR 75. Due for midodrine 5mg
Pt complaining of abdominal pain. Feels cramping pain in abdomen.
Pt had rectal and nose bleed on 2/19 and was given 1 unit of blood on 2/18 for low hemoglobin and  hematocrit. Labs drawn this morning and new levels have decreased significantly from previous levels
Patient having rectal bleeding. Passed huge clot. Also had a nose bleed. Bp 182/74
Notified NP Trace Hennessy
Pts BP is elevated
Pt does not have BP parameters but BP elevated throughout shift -180s. Not on home medication.
Pt had rectal bleeding, bloody noses last 2 days. Heparin injection was being held by day providers. Pt has heparin ordered at 10PM, 6AM
/67
BP -185/73, HR-78
Pt felt nausea and had x1 emesis
Tele reported patient had 5.39 sec period of PAT with a heartrate of 132.
pt requires blood transfusion with H&H of 6.5. Blood product request from blood bank and on floor. pt is febrile with temp of 101.7.
C/o headache. BP -184/75 HR 68

## 2022-03-01 NOTE — PROVIDER CONTACT NOTE (OTHER) - NAME OF MD/NP/PA/DO NOTIFIED:
ELIZABETH Munroe
Joe arellano
TALIA Corral
Trace Hennessy
VALENTIN Perdomo
TRACY Perdomo
Cathy Marcial NP
Aly Watts, PA
VALENTIN Cervantes
ELIZABETH Rodriguez
Cathy Marcial NP
TALIA Corral
TALIA Corral
Brynn Vann ACP
ELIZABETH Rodriguez

## 2022-03-01 NOTE — PROVIDER CONTACT NOTE (OTHER) - ACTION/TREATMENT ORDERED:
Assess patient at bedside. Hydralyzine for blood pressure. F/u with CBC
hydralazine ordered and given  see flowsheet for vitals recheck
Hydralazine 5mg IVP PRN orderx1 given
No interventions at this time continue to monitor
NP notified. Ok to hold nighttime and morning dose of heparin injection.
NP will order abdominal X-ray. NP will see if any other pain meds can be given.
Provider aware, no interventions given at this time
Provider made aware of situation. Suggests holding transfusion until pt is afebrile. 750 mg IV tylenol IVPB ordered to help fever. 1 unit PRBC walked down to blood bank with RN Andyoseph. monitor fever.
12 lead EKG ordered and completed. PA assessing patient at bedside. Continue to monitor vitals.
Pt had hydrocortisone suppository yesterday, still complaining of pain.   Pt has GI consult. Waiting further orders for high BP and pain.
Zofran ordered as per provider and will recheck BP.
Advised to give hydralazine 5mg PRN order and Tylenol. Pt refused Tylenol. NP made aware
NP notified. Check orthostatic blood pressure and reschedule medication to 8AM
Waiting followup from provider. Pending abdominal X ray order. NP will notify Attending to have PFL paperwork filled out when she has the chance.

## 2022-03-01 NOTE — PROGRESS NOTE ADULT - SUBJECTIVE AND OBJECTIVE BOX
Patient is a 76y old  Female who presents with a chief complaint of syncope , cervical spine fracture (01 Mar 2022 13:43)      SUBJECTIVE / OVERNIGHT EVENTS: no new c/o    MEDICATIONS  (STANDING):  allopurinol 100 milliGRAM(s) Oral daily  artificial  tears Solution 1 Drop(s) Both EYES four times a day  bisacodyl 5 milliGRAM(s) Oral at bedtime  colchicine 0.6 milliGRAM(s) Oral <User Schedule>  gabapentin Solution 50 milliGRAM(s) Oral two times a day  heparin   Injectable 5000 Unit(s) SubCutaneous every 8 hours  hydrALAZINE 25 milliGRAM(s) Oral three times a day  hydrocortisone hemorrhoidal Suppository 1 Suppository(s) Rectal daily  latanoprost 0.005% Ophthalmic Solution 1 Drop(s) Both EYES at bedtime  levothyroxine 25 MICROGram(s) Oral daily  mesalamine Suppository 1000 milliGRAM(s) Rectal at bedtime  metoprolol succinate ER 25 milliGRAM(s) Oral daily  Nephro-darcy 1 Tablet(s) Oral daily  pantoprazole    Tablet 40 milliGRAM(s) Oral before breakfast  polyethylene glycol 3350 17 Gram(s) Oral two times a day  sevelamer carbonate 800 milliGRAM(s) Oral three times a day with meals  simvastatin 40 milliGRAM(s) Oral at bedtime  sodium bicarbonate 650 milliGRAM(s) Oral three times a day    MEDICATIONS  (PRN):  acetaminophen     Tablet .. 650 milliGRAM(s) Oral every 6 hours PRN Mild Pain (1 - 3)  hydrALAZINE Injectable 5 milliGRAM(s) IV Push every 6 hours PRN SBP > 180  melatonin 3 milliGRAM(s) Oral at bedtime PRN Insomnia      Vital Signs Last 24 Hrs  T(F): 98 (03-01-22 @ 16:08), Max: 98.9 (03-01-22 @ 12:41)  HR: 71 (03-01-22 @ 16:08) (62 - 80)  BP: 167/73 (03-01-22 @ 16:08) (165/72 - 185/73)  RR: 18 (03-01-22 @ 16:08) (18 - 18)  SpO2: 99% (03-01-22 @ 16:08) (97% - 99%)  Telemetry:   CAPILLARY BLOOD GLUCOSE        I&O's Summary    28 Feb 2022 07:01  -  01 Mar 2022 07:00  --------------------------------------------------------  IN: 360 mL / OUT: 801 mL / NET: -441 mL    01 Mar 2022 07:01  -  01 Mar 2022 17:58  --------------------------------------------------------  IN: 720 mL / OUT: 201 mL / NET: 519 mL        PHYSICAL EXAM:  GENERAL: NAD, well-developed  HEAD:  Atraumatic, Normocephalic  EYES: EOMI, PERRLA, conjunctiva and sclera clear  NECK: Supple, No JVD  CHEST/LUNG: Clear to auscultation bilaterally; No wheeze  HEART: Regular rate and rhythm; No murmurs, rubs, or gallops  ABDOMEN: Soft, Nontender, Nondistended; Bowel sounds present  EXTREMITIES:  2+ Peripheral Pulses, No clubbing, cyanosis, or edema  PSYCH: AAOx3  NEUROLOGY: non-focal  SKIN: No rashes or lesions    LABS:                        9.7    9.41  )-----------( 289      ( 28 Feb 2022 06:19 )             31.3     03-01    144  |  108  |  39<H>  ----------------------------<  80  4.2   |  23  |  1.61<H>    Ca    9.0      01 Mar 2022 05:44  Phos  4.6     02-28  Mg     2.2     03-01    TPro  6.6  /  Alb  3.2<L>  /  TBili  0.1<L>  /  DBili  x   /  AST  24  /  ALT  43  /  AlkPhos  77  02-28              RADIOLOGY & ADDITIONAL TESTS:    Imaging Personally Reviewed:    Consultant(s) Notes Reviewed:      Care Discussed with Consultants/Other Providers:

## 2022-03-01 NOTE — PROGRESS NOTE ADULT - SUBJECTIVE AND OBJECTIVE BOX
[facility] - MERCEDES Howe / MERCEDES Ballesteros / YAQUELIN Crouch/ MERCEDES Brown/ MERCEDES Car/ LUZ Hartmann / MAGDALENO Alonzo / TERRY Holman  ---------------------------------------------------------------------------------------------------------------  seen and examined today for FLORIDALMA on CKD  Interval : serum creatinine stable  VITALS:  T(F): 98.9 (03-01-22 @ 12:41), Max: 98.9 (03-01-22 @ 12:41)  HR: 80 (03-01-22 @ 12:41)  BP: 166/67 (03-01-22 @ 12:41)  RR: 18 (03-01-22 @ 12:41)  SpO2: 97% (03-01-22 @ 12:41)  Wt(kg): --    02-28 @ 07:01  -  03-01 @ 07:00  --------------------------------------------------------  IN: 360 mL / OUT: 801 mL / NET: -441 mL    03-01 @ 07:01  -  03-01 @ 13:45  --------------------------------------------------------  IN: 360 mL / OUT: 0 mL / NET: 360 mL      Physical Exam :-  Constitutional: NAD  Neck: in brace  Respiratory: Bilateral equal breath sounds,  Cardiovascular: S1, S2 normal,  Gastrointestinal: Bowel Sounds present, soft, non tender.  Extremities: No edema  Neurological: Alert and Oriented x 3, no focal deficits  Psychiatric: Normal mood, normal affect  Data:-  Allergies :   No Known Allergies    Hospital Medications:   MEDICATIONS  (STANDING):  allopurinol 100 milliGRAM(s) Oral daily  artificial  tears Solution 1 Drop(s) Both EYES four times a day  bisacodyl 5 milliGRAM(s) Oral at bedtime  colchicine 0.6 milliGRAM(s) Oral <User Schedule>  gabapentin Solution 50 milliGRAM(s) Oral two times a day  heparin   Injectable 5000 Unit(s) SubCutaneous every 8 hours  hydrALAZINE 25 milliGRAM(s) Oral three times a day  hydrocortisone hemorrhoidal Suppository 1 Suppository(s) Rectal daily  latanoprost 0.005% Ophthalmic Solution 1 Drop(s) Both EYES at bedtime  levothyroxine 25 MICROGram(s) Oral daily  mesalamine Suppository 1000 milliGRAM(s) Rectal at bedtime  metoprolol succinate ER 25 milliGRAM(s) Oral daily  Nephro-darcy 1 Tablet(s) Oral daily  pantoprazole    Tablet 40 milliGRAM(s) Oral before breakfast  polyethylene glycol 3350 17 Gram(s) Oral two times a day  sevelamer carbonate 800 milliGRAM(s) Oral three times a day with meals  simvastatin 40 milliGRAM(s) Oral at bedtime  sodium bicarbonate 650 milliGRAM(s) Oral three times a day    03-01    144  |  108  |  39<H>  ----------------------------<  80  4.2   |  23  |  1.61<H>    Ca    9.0      01 Mar 2022 05:44  Phos  4.6     02-28  Mg     2.2     03-01    TPro  6.6  /  Alb  3.2<L>  /  TBili  0.1<L>  /  DBili      /  AST  24  /  ALT  43  /  AlkPhos  77  02-28    Creatinine Trend: 1.61 <--, 1.51 <--, 1.47 <--, 1.56 <--, 1.66 <--, 1.84 <--, 2.09 <--                        9.7    9.41  )-----------( 289      ( 28 Feb 2022 06:19 )             31.3

## 2022-03-01 NOTE — PROVIDER CONTACT NOTE (OTHER) - BACKGROUND
Pt is a 76 year old female with PMH of CKD, gout and HTN s/p fall on 2/6 presenting as a transfer from [place] with c/o head and neck pain found to have a C2 Fracture
Cervical fracture
Pt 77yo female admitted s/p fall with C2 fracture
Pt admitted for fall, fracture of neck.
s/p fall with C2 acute fx.
Fracture neck
Pt is 76y.o female admitted s/p fall with C2 fracture
Pt is a 76 year old female with PMH of CKD, gout and HTN s/p fall on 2/6 presenting as a transfer from [place] with c/o head and neck pain found to have a C2 Fracture
Patient s/p fall with cervical fx.
Pt admitted for
S/P fall   FLORIDALMA
Pt has hx of hemorrhoids, anemia and kidney disease. s/p fall w c2 fx.
Pt 76yr old female admitted s/p fall with C2 fracture.
pt has C2 acute fx post fall. pt received extra pain medication today

## 2022-03-01 NOTE — PROGRESS NOTE ADULT - SUBJECTIVE AND OBJECTIVE BOX
Subjective: Patient seen and examined. No new events except as noted.   PAT overnight for 3secs, SBPs 160s-180s - will start Toprol XL.      REVIEW OF SYSTEMS:    CONSTITUTIONAL: + weakness, fevers or chills  EYES/ENT: No visual changes;  No vertigo or throat pain   NECK: No pain or stiffness  RESPIRATORY: No cough, wheezing, hemoptysis; No shortness of breath  CARDIOVASCULAR: No chest pain or palpitations  GASTROINTESTINAL: No abdominal or epigastric pain. No nausea, vomiting, or hematemesis; No diarrhea or constipation. No melena or hematochezia.  GENITOURINARY: No dysuria, frequency or hematuria  NEUROLOGICAL: No numbness or weakness  SKIN: No itching, burning, rashes, or lesions   All other review of systems is negative unless indicated above.    MEDICATIONS:  MEDICATIONS  (STANDING):  allopurinol 100 milliGRAM(s) Oral daily  artificial  tears Solution 1 Drop(s) Both EYES four times a day  bisacodyl 5 milliGRAM(s) Oral at bedtime  colchicine 0.6 milliGRAM(s) Oral <User Schedule>  gabapentin Solution 50 milliGRAM(s) Oral two times a day  heparin   Injectable 5000 Unit(s) SubCutaneous every 8 hours  hydrALAZINE 25 milliGRAM(s) Oral three times a day  hydrocortisone hemorrhoidal Suppository 1 Suppository(s) Rectal daily  latanoprost 0.005% Ophthalmic Solution 1 Drop(s) Both EYES at bedtime  levothyroxine 25 MICROGram(s) Oral daily  mesalamine Suppository 1000 milliGRAM(s) Rectal at bedtime  metoprolol succinate ER 25 milliGRAM(s) Oral daily  Nephro-darcy 1 Tablet(s) Oral daily  pantoprazole    Tablet 40 milliGRAM(s) Oral before breakfast  polyethylene glycol 3350 17 Gram(s) Oral two times a day  sevelamer carbonate 800 milliGRAM(s) Oral three times a day with meals  simvastatin 40 milliGRAM(s) Oral at bedtime  sodium bicarbonate 650 milliGRAM(s) Oral three times a day      PHYSICAL EXAM:  T(C): 36.7 (03-01-22 @ 08:18), Max: 36.9 (02-28-22 @ 11:23)  HR: 71 (03-01-22 @ 08:18) (62 - 79)  BP: 169/73 (03-01-22 @ 08:18) (154/70 - 185/73)  RR: 18 (03-01-22 @ 08:18) (18 - 18)  SpO2: 98% (03-01-22 @ 08:18) (98% - 99%)  Wt(kg): --  I&O's Summary    28 Feb 2022 07:01  -  01 Mar 2022 07:00  --------------------------------------------------------  IN: 360 mL / OUT: 801 mL / NET: -441 mL    01 Mar 2022 07:01  -  01 Mar 2022 09:50  --------------------------------------------------------  IN: 360 mL / OUT: 0 mL / NET: 360 mL      Appearance: NAD, c-collar on   HEENT: Normal oral mucosa, PERRL, EOMI	  Lymphatic: No lymphadenopathy , no edema  Cardiovascular: Normal S1 S2, No JVD, No murmurs , Peripheral pulses palpable 2+ bilaterally  Respiratory: Lungs clear to auscultation, normal effort 	  Gastrointestinal:  Soft, Non-tender, + BS	  Skin: No rashes, No ecchymoses, No cyanosis, warm to touch  Neuro: alert oriented x 3 attention comprehension are fair.  Able to name, repeat.   EOmi fundi not visualized   no nystagmus VFF to confrontation  Tongue is midline  Palate elevates symmetrically   Moving all 4 ext spontaneously no drift appreciated  Gait not assessed.   Ext: No edema      LABS:    CARDIAC MARKERS:                         9.7    9.41  )-----------( 289      ( 28 Feb 2022 06:19 )             31.3     03-01    144  |  108  |  39<H>  ----------------------------<  80  4.2   |  23  |  1.61<H>    Ca    9.0      01 Mar 2022 05:44  Phos  4.6     02-28  Mg     2.2     03-01    TPro  6.6  /  Alb  3.2<L>  /  TBili  0.1<L>  /  DBili  x   /  AST  24  /  ALT  43  /  AlkPhos  77  02-28    proBNP:   Lipid Profile:   HgA1c:   TSH:     TELEMETRY: SR    ECG:  	  RADIOLOGY:   DIAGNOSTIC TESTING:  [ ] Echocardiogram:  [ ]  Catheterization:  [ ] Stress Test:    OTHER:

## 2022-03-01 NOTE — PROVIDER CONTACT NOTE (OTHER) - ASSESSMENT
BP -185/73, HR-78
Pt. at neurological baseline ( A&Ox4), 154/70. HR 79, Sa02 98 RR 18. Pt denies chest pain, lightheadedness, blurry vision, or  palpitation.
pt is hypertensive (174/76) and febrile (101.7). other vitals are stable.
Pt A&Ox4. Pt denies pain, chest pain, SOB, dizziness, headache
Pt AOx4. Complaining of bad cramping abdominal pain. Had 3 small BM's last night. 2 Large BMs in AM. Prep H given for hemorrhoids. Ice packs given.
pt c/o dizziness when standing  denies HA  see flowsheet for vitals
pt felling nauseous. /67. other VSS.
C/o headache. BP -184/75 HR 68
Pt is alert and oriented x 4, no complaints of Pain. BP - 171/70, HR 75, O2 98% Pt asymptomatic.
Pt A&Ox4, VSS. Pt denies pain, chest pain, SOB
Pt AOx4. Follows all commands. Complaining of hemorrhoid pain.
Pt alert and oriented x 4, no complaints of pain or discomfort. Hemoglobin level was 8.5 on 2/19 now is 7.2, Hematocrit previously 27.9 now 23.9
Pt complaining of abdominal pain, pending NP order for abdominal X ray. Pt had BMx2 today. AOx4, following commands.
Patient Having rectal bleeding, Bp elevated, and having a nose bleed.

## 2022-03-01 NOTE — PROVIDER CONTACT NOTE (OTHER) - RECOMMENDATIONS
Medication for hemorrhoid pain.
zofran for nausea.
Notified NP
Order pt home medication: Amlodipine 10mg, Hydralazine 10mg BID.
Hold blood transfusion r/t 101.7 F temperature.
Assess patient at bedside. Hydralyzine for blood pressure. F/u with CBC
Assess pt. Pain medication
Notified NP
notify provider

## 2022-03-01 NOTE — PROGRESS NOTE ADULT - ASSESSMENT
77yo female pt with PMHx of CKD (Last K-5.3, Bun/Cr.-29/1.93 on 7/18/2020), Anemia, Gout, HTN, HLD, was transferred, on cervical collar, from [facility] c/o head/neck pain, C2 fracture s/p fall this morning with a syncopal episode.

## 2022-03-01 NOTE — PROGRESS NOTE ADULT - ASSESSMENT
75yo female pt with PMHx of CKD (Last K-5.3, Bun/Cr.-29/1.93 on 7/18/2020), Anemia, Gout, HTN, HLD, was transferred, on cervical collar, from [facility] c/o head/neck pain, C2 fracture s/p fall this morning with a syncopal episode.

## 2022-03-01 NOTE — PROGRESS NOTE ADULT - ASSESSMENT
76 year-old woman with history of multiple medical issues including hypertension, hyperlipidemia, and chronic kidney disease. She was transferred to [facility] yesterday from the [facility] ER after being noted on imaging to have a C2 fracture, s/p syncopal episode. Following for CKD    FLORIDALMA on CKD 4 -   CKD likely 2/2 Hypertensive Nephropathy  Serum Cr stable  renal us shows cortical calcifications  cont monitor Serum Creatinine level  fluctuations expected in cr  low Na/K in diet  daily BMP  on sevelamer for high phos     s/p Hyperkalemia  resolved  stopped rtc Lokelma 5mg PO QD  c/w Low K diet    Metabolic acidosis due to renal failure likely - improving  Nabicarb 650mg po tid  trend bicarb  goal bicarb 22   HTN, controlled. -> c/w current meds    Cervical fracture  pain management per team  Neurosx recs       For any question, call:  Cell # [phone]  Pager # [phone]  Callback # [phone]

## 2022-03-02 VITALS
SYSTOLIC BLOOD PRESSURE: 164 MMHG | TEMPERATURE: 99 F | HEART RATE: 78 BPM | DIASTOLIC BLOOD PRESSURE: 68 MMHG | OXYGEN SATURATION: 98 % | RESPIRATION RATE: 18 BRPM

## 2022-03-02 LAB — SARS-COV-2 RNA SPEC QL NAA+PROBE: SIGNIFICANT CHANGE UP

## 2022-03-02 PROCEDURE — 86850 RBC ANTIBODY SCREEN: CPT

## 2022-03-02 PROCEDURE — 36430 TRANSFUSION BLD/BLD COMPNT: CPT

## 2022-03-02 PROCEDURE — 87186 SC STD MICRODIL/AGAR DIL: CPT

## 2022-03-02 PROCEDURE — 83735 ASSAY OF MAGNESIUM: CPT

## 2022-03-02 PROCEDURE — 86803 HEPATITIS C AB TEST: CPT

## 2022-03-02 PROCEDURE — P9016: CPT

## 2022-03-02 PROCEDURE — U0003: CPT

## 2022-03-02 PROCEDURE — 86900 BLOOD TYPING SEROLOGIC ABO: CPT

## 2022-03-02 PROCEDURE — 93306 TTE W/DOPPLER COMPLETE: CPT

## 2022-03-02 PROCEDURE — 76775 US EXAM ABDO BACK WALL LIM: CPT

## 2022-03-02 PROCEDURE — 84443 ASSAY THYROID STIM HORMONE: CPT

## 2022-03-02 PROCEDURE — 71045 X-RAY EXAM CHEST 1 VIEW: CPT

## 2022-03-02 PROCEDURE — 85730 THROMBOPLASTIN TIME PARTIAL: CPT

## 2022-03-02 PROCEDURE — 86923 COMPATIBILITY TEST ELECTRIC: CPT

## 2022-03-02 PROCEDURE — 86901 BLOOD TYPING SEROLOGIC RH(D): CPT

## 2022-03-02 PROCEDURE — 80048 BASIC METABOLIC PNL TOTAL CA: CPT

## 2022-03-02 PROCEDURE — 97530 THERAPEUTIC ACTIVITIES: CPT

## 2022-03-02 PROCEDURE — 85025 COMPLETE CBC W/AUTO DIFF WBC: CPT

## 2022-03-02 PROCEDURE — 84550 ASSAY OF BLOOD/URIC ACID: CPT

## 2022-03-02 PROCEDURE — 94640 AIRWAY INHALATION TREATMENT: CPT

## 2022-03-02 PROCEDURE — 83880 ASSAY OF NATRIURETIC PEPTIDE: CPT

## 2022-03-02 PROCEDURE — 85652 RBC SED RATE AUTOMATED: CPT

## 2022-03-02 PROCEDURE — 87086 URINE CULTURE/COLONY COUNT: CPT

## 2022-03-02 PROCEDURE — 82272 OCCULT BLD FECES 1-3 TESTS: CPT

## 2022-03-02 PROCEDURE — 87040 BLOOD CULTURE FOR BACTERIA: CPT

## 2022-03-02 PROCEDURE — 36415 COLL VENOUS BLD VENIPUNCTURE: CPT

## 2022-03-02 PROCEDURE — 81001 URINALYSIS AUTO W/SCOPE: CPT

## 2022-03-02 PROCEDURE — 82962 GLUCOSE BLOOD TEST: CPT

## 2022-03-02 PROCEDURE — 73610 X-RAY EXAM OF ANKLE: CPT

## 2022-03-02 PROCEDURE — 97116 GAIT TRAINING THERAPY: CPT

## 2022-03-02 PROCEDURE — 85027 COMPLETE CBC AUTOMATED: CPT

## 2022-03-02 PROCEDURE — 97161 PT EVAL LOW COMPLEX 20 MIN: CPT

## 2022-03-02 PROCEDURE — 85610 PROTHROMBIN TIME: CPT

## 2022-03-02 PROCEDURE — 80053 COMPREHEN METABOLIC PANEL: CPT

## 2022-03-02 PROCEDURE — 73630 X-RAY EXAM OF FOOT: CPT

## 2022-03-02 PROCEDURE — 93005 ELECTROCARDIOGRAM TRACING: CPT

## 2022-03-02 PROCEDURE — 84100 ASSAY OF PHOSPHORUS: CPT

## 2022-03-02 PROCEDURE — 99285 EMERGENCY DEPT VISIT HI MDM: CPT

## 2022-03-02 PROCEDURE — 87077 CULTURE AEROBIC IDENTIFY: CPT

## 2022-03-02 PROCEDURE — 83036 HEMOGLOBIN GLYCOSYLATED A1C: CPT

## 2022-03-02 PROCEDURE — 84484 ASSAY OF TROPONIN QUANT: CPT

## 2022-03-02 PROCEDURE — U0005: CPT

## 2022-03-02 RX ORDER — CARVEDILOL PHOSPHATE 80 MG/1
1 CAPSULE, EXTENDED RELEASE ORAL
Qty: 60 | Refills: 0
Start: 2022-03-02 | End: 2022-03-31

## 2022-03-02 RX ORDER — CARVEDILOL PHOSPHATE 80 MG/1
6.25 CAPSULE, EXTENDED RELEASE ORAL EVERY 12 HOURS
Refills: 0 | Status: DISCONTINUED | OUTPATIENT
Start: 2022-03-02 | End: 2022-03-02

## 2022-03-02 RX ADMIN — Medication 25 MILLIGRAM(S): at 05:57

## 2022-03-02 RX ADMIN — GABAPENTIN 50 MILLIGRAM(S): 400 CAPSULE ORAL at 05:55

## 2022-03-02 RX ADMIN — Medication 100 MILLIGRAM(S): at 11:56

## 2022-03-02 RX ADMIN — Medication 50 MILLIGRAM(S): at 14:07

## 2022-03-02 RX ADMIN — PANTOPRAZOLE SODIUM 40 MILLIGRAM(S): 20 TABLET, DELAYED RELEASE ORAL at 08:27

## 2022-03-02 RX ADMIN — Medication 50 MILLIGRAM(S): at 05:57

## 2022-03-02 RX ADMIN — Medication 25 MICROGRAM(S): at 05:56

## 2022-03-02 RX ADMIN — Medication 1 DROP(S): at 11:56

## 2022-03-02 RX ADMIN — SEVELAMER CARBONATE 800 MILLIGRAM(S): 2400 POWDER, FOR SUSPENSION ORAL at 11:56

## 2022-03-02 RX ADMIN — Medication 1 DROP(S): at 06:28

## 2022-03-02 RX ADMIN — Medication 650 MILLIGRAM(S): at 14:07

## 2022-03-02 RX ADMIN — Medication 1 TABLET(S): at 14:08

## 2022-03-02 RX ADMIN — Medication 650 MILLIGRAM(S): at 05:59

## 2022-03-02 RX ADMIN — SEVELAMER CARBONATE 800 MILLIGRAM(S): 2400 POWDER, FOR SUSPENSION ORAL at 08:40

## 2022-03-02 NOTE — PROGRESS NOTE ADULT - PROBLEM SELECTOR PROBLEM 5
Acute gout

## 2022-03-02 NOTE — PROGRESS NOTE ADULT - PROBLEM SELECTOR PROBLEM 2
Syncope
Syncope
HTN (hypertension)
Syncope
Syncope
HTN (hypertension)
Syncope
HTN (hypertension)
Syncope
HTN (hypertension)
Syncope
Syncope
HTN (hypertension)
HTN (hypertension)
Syncope
Syncope
HTN (hypertension)
Syncope
HTN (hypertension)
Syncope
Syncope
HTN (hypertension)
Syncope
HTN (hypertension)
HTN (hypertension)
Syncope

## 2022-03-02 NOTE — PROGRESS NOTE ADULT - PROBLEM SELECTOR PROBLEM 6
Hemorrhoids

## 2022-03-02 NOTE — PROGRESS NOTE ADULT - PROBLEM SELECTOR PLAN 2
orthostatics resolved     - d/c'd  midodrine   c/w hydralazine 50mg PO TID  d/c Tropol XL, start Coreg 6.25mg PO BIG  continue to monitor

## 2022-03-02 NOTE — PROGRESS NOTE ADULT - PROBLEM SELECTOR PLAN 7
resolved  had williamson associated UTI, completed Abx
resolved  had williamson associated UTI, completed Abx
resolved  has williamson associated UTI, was on on CTX, dysuria was persisting, on 2/19 switched to ZOSYn, now    dysuria resolved, urine is clearer, rpt wbc pending, will cont ZOsyn, urine cx growing GNR, ID pending,   wbc was rising to 12K, rpt wbc pending
resolved  had williamson associated UTI, completed Abx
resolved  had williamson associated UTI, completed Abx
resolved  has williamson associated UTI, was on on CTX, dysuria was persisting, on 2/19 switched to ZOSYn, now    dysuria resolved, urine is clearer, rpt wbc pending, will cont ZOsyn, urine cx growing GNR, ID pending,   wbc was rising to 12K, rpt wbc pending
resolved  had williamson associated UTI, completed Abx
no
resolved  has williamson associated UTI, on CTX, still w dysuria, wbc rising to 12K, will switch to Zosyn, still awaiting UCx, 2 blood cx neg
williamson in place, not also w williamson associated UTI,, though cannot be sure that UTI didn't cause the retention. not clear. feeling better post CTX, will cont
resolved  has williamson associated UTI, was on on CTX, dysuria was persisting, on 2/19 switched to ZOSYn, now    dysuria resolved, urine is clearer, rpt wbc pending, will cont ZOsyn, urine cx growing GNR, ID pending,   wbc was rising to 12K, rpt wbc pending
resolved  has williamson associated UTI, was on on CTX, dysuria was persisting, on 2/19 switched to ZOSYn, now    dysuria resolved, urine is clearer, rpt wbc pending, will cont ZOsyn, urine cx growing GNR, ID pending,   wbc was rising to 12K, rpt wbc pending

## 2022-03-02 NOTE — PROGRESS NOTE ADULT - ASSESSMENT
77yo female pt with PMHx of CKD (Last K-5.3, Bun/Cr.-29/1.93 on 7/18/2020), Anemia, Gout, HTN, HLD, was transferred, on cervical collar, from [facility] c/o head/neck pain, C2 fracture s/p fall this morning with a syncopal episode.

## 2022-03-02 NOTE — PROGRESS NOTE ADULT - PROBLEM SELECTOR PLAN 3
renal following  on renal diet, resume [facility]  portillo improving post IVF  - urinary retention now w DORMAN
renal following  on renal diet, resume [facility]  portillo improving post IVF  - urinary retention now w DORMAN
+LOC  TTE reviewed  continue to check orthostatics  monitor on tele to rule out gennaro-arrythmia
+LOC  check thyroid panel  TTE reviewed  continue to check orthostatics  monitor on tele to rule out gennaro-arrythmia
renal following  place on renal diet, treat w lokelma  oprtillo improving post IVF
+LOC  TTE reviewed  continue to check orthostatics  monitor on tele to rule out gennaro-arrythmia
renal following  place on renal diet, treat w lokelma  portillo improving post IVF
+LOC  TTE reviewed  monitor on tele to rule out gennaro-arrythmia
+LOC  check thyroid panel  TTE reviewed  continue to check orthostatics  monitor on tele to rule out gennaro-arrythmia
renal following  place on renal diet, treat w lokelma  portillo improving post IVF
renal following  place on renal diet, treat w lokelma  portillo improving post IVF
renal following  on renal diet, resume [facility]  portillo improving post IVF  - urinary retention now w DORMAN
renal following  place on renal diet, treat w lokelma
+LOC  TTE reviewed  continue to check orthostatics  monitor on tele to rule out gennaro-arrythmia
+LOC  TTE reviewed  continue to check orthostatics  monitor on tele to rule out gennaro-arrythmia
renal following  on renal diet, resume [facility]  portillo improving post IVF  - urinary retention now w DORMAN
renal following  on renal diet, resume [facility]  portillo improving post IVF  - urinary retention now w DORMAN
renal following  on renal diet, resume [facility]  portillo improving post IVF  - urinary retention now w DORMAN
renal following  on renal diet, resume lokelma  portillo improving post IVF
+LOC  TTE reviewed  continue to check orthostatics  monitor on tele to rule out gennaro-arrythmia
renal following  on renal diet, resume [facility]  portillo improving post IVF  - urinary retention now w DORMAN
+LOC  TTE reviewed  continue to check orthostatics  monitor on tele to rule out gennaro-arrythmia
+LOC  TTE reviewed  monitor on tele to rule out gennaro-arrythmia
renal following  place on renal diet, treat w lokelma  portillo improving post IVF
+LOC  TTE reviewed  continue to check orthostatics  monitor on tele to rule out gennaro-arrythmia
+LOC  check thyroid panel  TTE reviewed  continue to check orthostatics  monitor on tele to rule out gennaro-arrythmia
renal following  place on renal diet, treat w lokelma  portillo improving post IVF
+LOC  TTE reviewed  continue to check orthostatics  monitor on tele to rule out gennaro-arrythmia
+LOC  TTE reviewed  continue to check orthostatics  monitor on tele to rule out gennaro-arrythmia
renal following  on renal diet, resume [facility]  portillo improving post IVF  - urinary retention now w DORMAN
renal following  on renal diet, resume [facility]  portillo improving post IVF  - urinary retention now w DORMAN
renal following  on renal diet, resume [facility]  portillo improving post IVF  - urinary retention now w DORMAN
renal following  place on renal diet, treat w lokelma
+LOC  TTE reviewed  monitor on tele to rule out gennaro-arrythmia
+LOC  TTE reviewed  continue to check orthostatics  monitor on tele to rule out gennaro-arrythmia
renal following  on renal diet, resume lokelma  portillo improving post IVF
renal following  on renal diet, resume [facility]  portillo improving post IVF  - urinary retention now w DORMAN
renal following  on renal diet, resume [facility]  portillo improving post IVF  - urinary retention now w DORMAN
renal following  on renal diet, resume [facility]  portillo improving post IVF  - urinary retention now w DORMAN
renal following  on renal diet, resume [facility]  portillo improving post IVF  - urinary retention now w DORMAN

## 2022-03-02 NOTE — PROGRESS NOTE ADULT - ASSESSMENT
77yo female pt with PMHx of CKD (Last K-5.3, Bun/Cr.-29/1.93 on 7/18/2020), Anemia, Gout, HTN, HLD, was transferred, on cervical collar, from [facility] c/o head/neck pain, C2 fracture s/p fall this morning with a syncopal episode.

## 2022-03-02 NOTE — PROGRESS NOTE ADULT - PROBLEM SELECTOR PLAN 4
in aspen collar,  collar on for dense fx, on neurontin for pain, neuro following will need to cont collar for 6 weeks, will need outptn NSx follow up.
likely 2/2 microvascular disease  Cr elevated  c/w lokelma 5mg PO qd  c/w renvela 800mg PO daily   Nephrology following
likely 2/2 microvascular disease  Cr elevated  c/w lokelma 5mg PO qd  c/w renvela 800mg PO daily   Nephrology following
likely 2/2 microvascular disease  Cr elevated  hyperkalemia resolved - d/c'd lokelma 5mg PO daily   c/w renvela 800mg PO daily   Nephrology following
likely 2/2 microvascular disease  Cr elevated  hyperkalemia today - lokelma 5mg PO qd  c/w renvela 800mg PO daily   Nephrology following
likely 2/2 microvascular disease  Cr stable  c/w lokelma 5mg PO qd  c/w renvela 800mg PO daily   Nephrology following
in aspen collar,  collar on for dense fx, on neurontin for pain, neuro following will need to cont collar for 6 weeks, will need outptn NSx follow up.
in aspen collar,  collar on for dense fx, on neurontin for pain, neuro following will need to cont collar for 6 weeks, will need outptn NSx follow up.
likely 2/2 microvascular disease  Cr elevated  c/w lokelma 5mg PO daily   c/w renvela 800mg PO daily   Nephrology following
likely 2/2 microvascular disease  Cr elevated  c/w lokelma 5mg PO qd  c/w renvela 800mg PO daily   Nephrology following
likely 2/2 microvascular disease  Cr improving  c/w lokelma 5mg PO daily   c/w renvela 800mg PO daily   orders per Nephrology
likely 2/2 microvascular disease  Cr stable  c/w renvela 800mg PO daily   Nephrology following
in aspen collar,  collar on for dense fx, on neurontin for pain, neuro following will need to cont collar for 6 weeks, will need outptn NSx follow up.
likely 2/2 microvascular disease  Cr elevated  c/w lokelma 5mg PO qd  c/w renvela 800mg PO daily   Nephrology following
likely 2/2 microvascular disease  Cr elevated  c/w lokelma 5mg PO qd  c/w renvela 800mg PO daily   Nephrology following
likely 2/2 microvascular disease  Cr stable  c/w renvela 800mg PO daily   Nephrology following
likely 2/2 microvascular disease  Cr elevated  c/w lokelma 5mg PO daily   c/w renvela 800mg PO daily   Nephrology following
likely 2/2 microvascular disease  Cr elevated  hyperkalemia resolved - d/c'd lokelma 5mg PO daily   c/w renvela 800mg PO daily   Nephrology following
in aspen collar,  collar on for dense fx, on neurontin for pain, neuro following will need to cont collar for 6 weeks, will need outptn NSx follow up.
likely 2/2 microvascular disease  Cr stable  c/w renvela 800mg PO daily   Nephrology following
in aspen collar,  collar on for dense fx, on neurontin for pain, neuro following will need to cont collar for 6 weeks, will need outptn NSx follow up.
likely 2/2 microvascular disease  Cr stable  c/w lokelma 5mg PO qd  c/w renvela 800mg PO daily   Nephrology following
in aspen collar,  collar on for dense fx, on neurontin for pain, neuro following will need to cont collar for 6 weeks, will need outptn NSx follow up.
in aspen collar, place on neurontin for pain  pt eval
likely 2/2 microvascular disease  Cr elevated  hyperkalemia resolved - d/c'd lokelma 5mg PO daily   c/w renvela 800mg PO daily   Nephrology following
likely 2/2 microvascular disease  hyperkalemia - c/w lokelma 5mg PO daily   hyperphosphatemia - c/w renvela 800mg PO daily   orders per Nephrology
in aspen collar,  collar on for dense fx, on neurontin for pain, neuro following will need to cont collar for 6 weeks, will need outptn NSx follow up.
likely 2/2 microvascular disease  Cr elevated  c/w lokelma 5mg PO qd  c/w renvela 800mg PO daily   Nephrology following
likely 2/2 microvascular disease  Cr elevated  c/w lokelma 5mg PO qd  c/w renvela 800mg PO daily   Nephrology following
in aspen collar,  collar on for dense fx, on neurontin for pain, neuro following will need to cont collar for 6 weeks, will need outptn NSx follow up.
in aspen collar,  collar on for dense fx, on neurontin for pain, neuro following will need to cont collar for 6 weeks, will need outptn NSx follow up.
likely 2/2 microvascular disease  Cr stable  c/w lokelma 5mg PO qd  c/w renvela 800mg PO daily   Nephrology following
likely 2/2 microvascular disease  Cr stable  c/w lokelma 5mg PO qd  c/w renvela 800mg PO daily   Nephrology following
likely 2/2 microvascular disease  Cr elevated  hyperkalemia today - lokelma 5mg PO x1  c/w renvela 800mg PO daily   Nephrology following
in aspen collar,  collar on for dense fx, on neurontin for pain, neuro following will need to cont collar for 6 weeks, will need outptn NSx follow up.

## 2022-03-02 NOTE — PROGRESS NOTE ADULT - REASON FOR ADMISSION
syncope , cervical spine fracture

## 2022-03-02 NOTE — PROGRESS NOTE ADULT - PROBLEM SELECTOR PLAN 5
on 2/10 ptn c/o severe right foot pain, no fever, no N/V, no chills, exam c/w acute gout  - uric acid is in range, pain controlled, completed po prednisone for total of 3 days
on 2/10 ptn c/o severe right foot pain, no fever, no N/V, no chills, exam c/w acute gout  on 2/22 with recurrent gout flare, seen by rheum, recs appreciated, will also need outptn F/u  -
on 2/10 ptn c/o severe right foot pain, no fever, no N/V, no chills, exam c/w acute gout  on 2/22 with recurrent gout flare, seen by rheum, recs appreciated, will also need outptn F/u  -
on 2/10 ptn c/o severe right foot pain, no fever, no N/V, no chills, exam c/w acute gout  - uric acid is in range, pain controlled, completed po prednisone for total of 3 days
ptn c/o severe right foot pain, no fever, no N/V, no chills, exam c/w acute gout  - check uric acid, place on sm dose dilaudid, iv medrol 40 mg q12H, podiatry called
on 2/10 ptn c/o severe right foot pain, no fever, no N/V, no chills, exam c/w acute gout  on 2/22 with recurrent gout flare, seen by rheum, recs appreciated, will also need outptn F/u  -
on 2/10 ptn c/o severe right foot pain, no fever, no N/V, no chills, exam c/w acute gout  - uric acid is in range, pain controlled, will switch to po prednisone for 3 days
on 2/10 ptn c/o severe right foot pain, no fever, no N/V, no chills, exam c/w acute gout  - uric acid is in range, pain controlled, completed po prednisone for total of 3 days
on 2/10 ptn c/o severe right foot pain, no fever, no N/V, no chills, exam c/w acute gout  - uric acid is in range, pain controlled, completed po prednisone for total of 3 days
on 2/10 ptn c/o severe right foot pain, no fever, no N/V, no chills, exam c/w acute gout  - uric acid is in range, pain controlled, on po prednisone for total of 3 days
on 2/10 ptn c/o severe right foot pain, no fever, no N/V, no chills, exam c/w acute gout  on 2/22 with recurrent gout flare, seen by rheum, recs appreciated, will also need outptn F/u  -
on 2/10 ptn c/o severe right foot pain, no fever, no N/V, no chills, exam c/w acute gout  - uric acid is in range, pain controlled, completed po prednisone for total of 3 days
on 2/10 ptn c/o severe right foot pain, no fever, no N/V, no chills, exam c/w acute gout  - uric acid is in range, pain controlled, on po prednisone for total of 3 days
on 2/10 ptn c/o severe right foot pain, no fever, no N/V, no chills, exam c/w acute gout  - uric acid is in range, pain controlled, completed po prednisone for total of 3 days
on 2/10 ptn c/o severe right foot pain, no fever, no N/V, no chills, exam c/w acute gout  - uric acid is in range, pain controlled, on po prednisone for total of 3 days
on 2/10 ptn c/o severe right foot pain, no fever, no N/V, no chills, exam c/w acute gout  on 2/22 with recurrent gout flare, seen by rheum, recs appreciated, will also need outptn F/u  -
on 2/10 ptn c/o severe right foot pain, no fever, no N/V, no chills, exam c/w acute gout  - uric acid is in range, pain controlled, completed po prednisone for total of 3 days
on 2/10 ptn c/o severe right foot pain, no fever, no N/V, no chills, exam c/w acute gout  on 2/22 with recurrent gout flare, seen by rheum, recs appreciated, will also need outptn F/u  -
on 2/10 ptn c/o severe right foot pain, no fever, no N/V, no chills, exam c/w acute gout  - uric acid is in range, pain controlled, completed po prednisone for total of 3 days
on 2/10 ptn c/o severe right foot pain, no fever, no N/V, no chills, exam c/w acute gout  on 2/22 with recurrent gout flare, seen by rheum, recs appreciated, will also need outptn F/u  -
on 2/10 ptn c/o severe right foot pain, no fever, no N/V, no chills, exam c/w acute gout  on 2/22 with recurrent gout flare, seen by rheum, recs appreciated, will also need outptn F/u  -

## 2022-03-02 NOTE — PROGRESS NOTE ADULT - PROBLEM SELECTOR PROBLEM 1
Fall
HTN (hypertension)
HTN (hypertension)
Fall
Fall
HTN (hypertension)
HTN (hypertension)
Fall
Fall
HTN (hypertension)
Fall
Fall
HTN (hypertension)
Fall
HTN (hypertension)
HTN (hypertension)
Fall
HTN (hypertension)
HTN (hypertension)
Fall
HTN (hypertension)
HTN (hypertension)
Fall
Fall
HTN (hypertension)
HTN (hypertension)
Fall
HTN (hypertension)
Fall
Fall
HTN (hypertension)
Fall
HTN (hypertension)

## 2022-03-02 NOTE — PROGRESS NOTE ADULT - PROBLEM SELECTOR PLAN 6
on suppositories, has constipation, GI called  BMs cont being painful but much improved, had 2 large BMs yesterday and 2 large BMs today.   GI following
on suppositories, has constipation, GI called
on suppositories, has constipation, GI called  BMs cont being painful but much improved, had 2 large BMs yesterday and 2 large BMs today.   GI following

## 2022-03-02 NOTE — PROGRESS NOTE ADULT - SUBJECTIVE AND OBJECTIVE BOX
Patient seen and examined  no complaints      No Known Allergies    Hospital Medications:   MEDICATIONS  (STANDING):  allopurinol 100 milliGRAM(s) Oral daily  artificial  tears Solution 1 Drop(s) Both EYES four times a day  bisacodyl 5 milliGRAM(s) Oral at bedtime  carvedilol 6.25 milliGRAM(s) Oral every 12 hours  colchicine 0.6 milliGRAM(s) Oral <User Schedule>  gabapentin Solution 50 milliGRAM(s) Oral two times a day  heparin   Injectable 5000 Unit(s) SubCutaneous every 8 hours  hydrALAZINE 50 milliGRAM(s) Oral three times a day  hydrocortisone hemorrhoidal Suppository 1 Suppository(s) Rectal daily  latanoprost 0.005% Ophthalmic Solution 1 Drop(s) Both EYES at bedtime  levothyroxine 25 MICROGram(s) Oral daily  mesalamine Suppository 1000 milliGRAM(s) Rectal at bedtime  Nephro-darcy 1 Tablet(s) Oral daily  pantoprazole    Tablet 40 milliGRAM(s) Oral before breakfast  polyethylene glycol 3350 17 Gram(s) Oral two times a day  predniSONE   Tablet 10 milliGRAM(s) Oral daily  sevelamer carbonate 800 milliGRAM(s) Oral three times a day with meals  simvastatin 40 milliGRAM(s) Oral at bedtime  sodium bicarbonate 650 milliGRAM(s) Oral three times a day        VITALS:  T(F): 99.1 (03-02-22 @ 11:26), Max: 99.1 (03-02-22 @ 11:26)  HR: 78 (03-02-22 @ 11:26)  BP: 164/68 (03-02-22 @ 11:26)  RR: 18 (03-02-22 @ 11:26)  SpO2: 98% (03-02-22 @ 11:26)  Wt(kg): --    03-01 @ 07:01  -  03-02 @ 07:00  --------------------------------------------------------  IN: 840 mL / OUT: 201 mL / NET: 639 mL        Physical Exam :-  Constitutional: NAD  Neck: in brace  Respiratory: Bilateral equal breath sounds,  Cardiovascular: S1, S2 normal,  Gastrointestinal: Bowel Sounds present, soft, non tender.  Extremities: No edema  Neurological: Alert and Oriented x 3, no focal deficits    LABS:  03-01    144  |  108  |  39<H>  ----------------------------<  80  4.2   |  23  |  1.61<H>    Ca    9.0      01 Mar 2022 05:44  Mg     2.2     03-01      Creatinine Trend: 1.61 <--, 1.51 <--, 1.47 <--, 1.56 <--, 1.66 <--, 1.84 <--    Urine Studies:      RADIOLOGY & ADDITIONAL STUDIES:

## 2022-03-02 NOTE — PROGRESS NOTE ADULT - PROBLEM SELECTOR PLAN 1
CTH neg   CT Cervical Spine: Acute C2 fracture of the body of the dens extending to the left lateral mass.  Right C2 facet fracture.  Mild acute compression fracture of the superior endplate C3.  no acute surgery: C-Collar on x6 weeks  pain management  orders per NSx
CTH neg   CT Cervical Spine: Acute C2 fracture of the body of the dens extending to the left lateral mass.  Right C2 facet fracture.  Mild acute compression fracture of the superior endplate C3.  no acute surgery: C-Collar on  pain management  orders per NSx
CTH neg   CT Cervical Spine: Acute C2 fracture of the body of the dens extending to the left lateral mass.  Right C2 facet fracture.  Mild acute compression fracture of the superior endplate C3.  no acute surgery: C-Collar on x6 weeks  pain management  orders per NSx
off all anti HTN, on Midodrine for orthostatic hypotension, resolved now
CTH neg   CT Cervical Spine: Acute C2 fracture of the body of the dens extending to the left lateral mass.  Right C2 facet fracture.  Mild acute compression fracture of the superior endplate C3.  no acute surgery: C-Collar on x6 weeks  pain management  orders per NSx
off all anti HTN, on Midodrine for orthostatic hypotension, resolved now
BP is high, orthostasis resolved, off Midodrine , on hydralazine to 50 tid, toprol DCed, started on Coreg
CTH neg   CT Cervical Spine: Acute C2 fracture of the body of the dens extending to the left lateral mass.  Right C2 facet fracture.  Mild acute compression fracture of the superior endplate C3.  no acute surgery: C-Collar on x6 weeks  pain management  orders per NSx
off all anti HTN, on Midodrine for orthostatic hypotension, resolved now
CTH neg   CT Cervical Spine: Acute C2 fracture of the body of the dens extending to the left lateral mass.  Right C2 facet fracture.  Mild acute compression fracture of the superior endplate C3.  no acute surgery: C-Collar on x6 weeks  pain management  orders per NSx
CTH neg   CT Cervical Spine: Acute C2 fracture of the body of the dens extending to the left lateral mass.  Right C2 facet fracture.  Mild acute compression fracture of the superior endplate C3.  no acute surgery: C-Collar on x6 weeks  pain management  orders per NSx
off all anti HTN, on Midodrine for orthostatic hypotension, resolved now
CTH neg   CT Cervical Spine: Acute C2 fracture of the body of the dens extending to the left lateral mass.  Right C2 facet fracture.  Mild acute compression fracture of the superior endplate C3.  no acute surgery: C-Collar on  pain management  orders per NSx
CTH neg   CT Cervical Spine: Acute C2 fracture of the body of the dens extending to the left lateral mass.  Right C2 facet fracture.  Mild acute compression fracture of the superior endplate C3.  no acute surgery: C-Collar on x6 weeks  pain management  orders per NSx
CTH neg   CT Cervical Spine: Acute C2 fracture of the body of the dens extending to the left lateral mass.  Right C2 facet fracture.  Mild acute compression fracture of the superior endplate C3.  no acute surgery: C-Collar on  pain management  orders per NSx
off all anti HTN, on Midodrine for orthostatic hypotension, resolved now
CTH neg   CT Cervical Spine: Acute C2 fracture of the body of the dens extending to the left lateral mass.  Right C2 facet fracture.  Mild acute compression fracture of the superior endplate C3.  no acute surgery: C-Collar on  pain management  orders per NSx
CTH neg   CT Cervical Spine: Acute C2 fracture of the body of the dens extending to the left lateral mass.  Right C2 facet fracture.  Mild acute compression fracture of the superior endplate C3.  no acute surgery: C-Collar on x6 weeks  pain management  orders per NSx
off all anti HTN, on Midodrine for orthostatic hypotension, resolved now
on lopressor 12.5 mg bid, hydralazine 25 mg tid, norvasc 10 mg daily
on lopressor 12.5 mg bid, hydralazine 25 mg tid, norvasc 10 mg daily
CTH neg   CT Cervical Spine: Acute C2 fracture of the body of the dens extending to the left lateral mass.  Right C2 facet fracture.  Mild acute compression fracture of the superior endplate C3.  no acute surgery: C-Collar on x6 weeks  pain management  orders per NSx
CTH neg   CT Cervical Spine: Acute C2 fracture of the body of the dens extending to the left lateral mass.  Right C2 facet fracture.  Mild acute compression fracture of the superior endplate C3.  no acute surgery: C-Collar on x6 weeks  pain management  orders per NSx
off all anti HTN, on Midodrine for orthostatic hypotension, resolved now
CTH neg   CT Cervical Spine: Acute C2 fracture of the body of the dens extending to the left lateral mass.  Right C2 facet fracture.  Mild acute compression fracture of the superior endplate C3.  no acute surgery: C-Collar on x6 weeks  pain management  orders per NSx
CTH neg   CT Cervical Spine: Acute C2 fracture of the body of the dens extending to the left lateral mass.  Right C2 facet fracture.  Mild acute compression fracture of the superior endplate C3.  no acute surgery: C-Collar on x6 weeks  pain management  orders per NSx
off all anti HTN, on Midodrine for orthostatic hypotension, resolved now
off all anti HTN, on Midodrine for orthostatic hypotension, resolved now
BP is high, orthostasis resolved, off Midodrine , raise hydralazine to 50 tid, cont Toprol xl 25 mg daily
off all anti HTN, on Midodrine for orthostatic hypotension, resolved now
BP is high, orthostasis resolved, ptn has been refusing Midodrine for 5 days, hydralazine started
off all anti HTN, on Midodrine for orthostatic hypotension, resolved now
BP is high, orthostasis resolved, ptn has been refusing Midodrine for 5 days, hydralazine started

## 2022-03-02 NOTE — PROGRESS NOTE ADULT - ASSESSMENT
76 year-old woman with history of multiple medical issues including hypertension, hyperlipidemia, and chronic kidney disease. She was transferred to [facility] yesterday from the [facility] ER after being noted on imaging to have a C2 fracture, s/p syncopal episode. Following for CKD    FLORIDALMA on CKD 4 -   CKD likely 2/2 Hypertensive Nephropathy  Serum Cr stable  renal us shows cortical calcifications  cont monitor Serum Creatinine level  fluctuations expected in cr  low Na/K in diet  daily BMP  on sevelamer for high phos     s/p Hyperkalemia  resolved  stopped rtc Lokelma 5mg PO QD  c/w Low K diet    Metabolic acidosis due to renal failure likely - improving  Nabicarb 650mg po tid  trend bicarb  goal bicarb 22   HTN, controlled. -> c/w current meds    Cervical fracture  pain management per team  Neurosx recs       For any question, call:  Cell # [phone]

## 2022-03-02 NOTE — PROGRESS NOTE ADULT - PROBLEM SELECTOR PROBLEM 3
CKD (chronic kidney disease)
Syncope
CKD (chronic kidney disease)
CKD (chronic kidney disease)
Syncope
Syncope
CKD (chronic kidney disease)
CKD (chronic kidney disease)
Syncope
Syncope
CKD (chronic kidney disease)
Syncope
CKD (chronic kidney disease)
Syncope
CKD (chronic kidney disease)
Syncope
CKD (chronic kidney disease)
CKD (chronic kidney disease)
Syncope
CKD (chronic kidney disease)
CKD (chronic kidney disease)
Syncope
CKD (chronic kidney disease)
CKD (chronic kidney disease)
Syncope
CKD (chronic kidney disease)
Toileting/Standing/Walking

## 2022-03-02 NOTE — PROGRESS NOTE ADULT - SUBJECTIVE AND OBJECTIVE BOX
Patient is a 76y old  Female who presents with a chief complaint of syncope , cervical spine fracture (02 Mar 2022 12:56)      SUBJECTIVE / OVERNIGHT EVENTS: no new c/o ,awaiting DC to [facility] today, meds for HTN being adjusted    MEDICATIONS  (STANDING):  allopurinol 100 milliGRAM(s) Oral daily  artificial  tears Solution 1 Drop(s) Both EYES four times a day  bisacodyl 5 milliGRAM(s) Oral at bedtime  carvedilol 6.25 milliGRAM(s) Oral every 12 hours  colchicine 0.6 milliGRAM(s) Oral <User Schedule>  gabapentin Solution 50 milliGRAM(s) Oral two times a day  heparin   Injectable 5000 Unit(s) SubCutaneous every 8 hours  hydrALAZINE 50 milliGRAM(s) Oral three times a day  hydrocortisone hemorrhoidal Suppository 1 Suppository(s) Rectal daily  latanoprost 0.005% Ophthalmic Solution 1 Drop(s) Both EYES at bedtime  levothyroxine 25 MICROGram(s) Oral daily  mesalamine Suppository 1000 milliGRAM(s) Rectal at bedtime  Nephro-darcy 1 Tablet(s) Oral daily  pantoprazole    Tablet 40 milliGRAM(s) Oral before breakfast  polyethylene glycol 3350 17 Gram(s) Oral two times a day  predniSONE   Tablet 10 milliGRAM(s) Oral daily  sevelamer carbonate 800 milliGRAM(s) Oral three times a day with meals  simvastatin 40 milliGRAM(s) Oral at bedtime  sodium bicarbonate 650 milliGRAM(s) Oral three times a day    MEDICATIONS  (PRN):  acetaminophen     Tablet .. 650 milliGRAM(s) Oral every 6 hours PRN Mild Pain (1 - 3)  hydrALAZINE Injectable 5 milliGRAM(s) IV Push every 6 hours PRN SBP > 180  melatonin 3 milliGRAM(s) Oral at bedtime PRN Insomnia      Vital Signs Last 24 Hrs  T(F): 99.1 (03-02-22 @ 11:26), Max: 99.1 (03-02-22 @ 11:26)  HR: 78 (03-02-22 @ 11:26) (68 - 78)  BP: 164/68 (03-02-22 @ 11:26) (161/69 - 185/71)  RR: 18 (03-02-22 @ 11:26) (18 - 18)  SpO2: 98% (03-02-22 @ 11:26) (97% - 98%)  Telemetry:   CAPILLARY BLOOD GLUCOSE        I&O's Summary    01 Mar 2022 07:01  -  02 Mar 2022 07:00  --------------------------------------------------------  IN: 840 mL / OUT: 201 mL / NET: 639 mL    02 Mar 2022 07:01  -  02 Mar 2022 18:19  --------------------------------------------------------  IN: 840 mL / OUT: 1 mL / NET: 839 mL        PHYSICAL EXAM:  GENERAL: NAD, well-developed  HEAD:  Atraumatic, Normocephalic  EYES: EOMI, PERRLA, conjunctiva and sclera clear  NECK: Supple, No JVD  CHEST/LUNG: Clear to auscultation bilaterally; No wheeze  HEART: Regular rate and rhythm; No murmurs, rubs, or gallops  ABDOMEN: Soft, Nontender, Nondistended; Bowel sounds present  EXTREMITIES:  2+ Peripheral Pulses, No clubbing, cyanosis, or edema  PSYCH: AAOx3  NEUROLOGY: non-focal  SKIN: No rashes or lesions    LABS:    03-01    144  |  108  |  39<H>  ----------------------------<  80  4.2   |  23  |  1.61<H>    Ca    9.0      01 Mar 2022 05:44  Mg     2.2     03-01                RADIOLOGY & ADDITIONAL TESTS:    Imaging Personally Reviewed:    Consultant(s) Notes Reviewed:      Care Discussed with Consultants/Other Providers:

## 2022-03-02 NOTE — PROGRESS NOTE ADULT - PROVIDER SPECIALTY LIST ADULT
Gastroenterology
Infectious Disease
Internal Medicine
Nephrology
Nephrology
Neurology
Neurology
Cardiology
Infectious Disease
Internal Medicine
Nephrology
Neurology
Neurology
Orthopedics
Cardiology
Gastroenterology
Internal Medicine
Nephrology
Neurology
Neurology
Podiatry
Cardiology
Gastroenterology
Infectious Disease
Internal Medicine
Nephrology
Cardiology
Internal Medicine
Cardiology
Internal Medicine
Internal Medicine
Cardiology
Cardiology
Internal Medicine
Cardiology
Cardiology
Internal Medicine
Cardiology
Cardiology
Internal Medicine
Cardiology
Internal Medicine
Internal Medicine
Cardiology
Internal Medicine
Cardiology
Internal Medicine

## 2022-03-02 NOTE — PROGRESS NOTE ADULT - SUBJECTIVE AND OBJECTIVE BOX
Subjective: Patient seen and examined. No new events except as noted.   Hypertensive.     REVIEW OF SYSTEMS:    CONSTITUTIONAL: + weakness, fevers or chills  EYES/ENT: No visual changes;  No vertigo or throat pain   NECK: No pain or stiffness  RESPIRATORY: No cough, wheezing, hemoptysis; No shortness of breath  CARDIOVASCULAR: No chest pain or palpitations  GASTROINTESTINAL: No abdominal or epigastric pain. No nausea, vomiting, or hematemesis; No diarrhea or constipation. No melena or hematochezia.  GENITOURINARY: No dysuria, frequency or hematuria  NEUROLOGICAL: No numbness or weakness  SKIN: No itching, burning, rashes, or lesions   All other review of systems is negative unless indicated above.    MEDICATIONS:  MEDICATIONS  (STANDING):  allopurinol 100 milliGRAM(s) Oral daily  artificial  tears Solution 1 Drop(s) Both EYES four times a day  bisacodyl 5 milliGRAM(s) Oral at bedtime  colchicine 0.6 milliGRAM(s) Oral <User Schedule>  gabapentin Solution 50 milliGRAM(s) Oral two times a day  heparin   Injectable 5000 Unit(s) SubCutaneous every 8 hours  hydrALAZINE 50 milliGRAM(s) Oral three times a day  hydrocortisone hemorrhoidal Suppository 1 Suppository(s) Rectal daily  latanoprost 0.005% Ophthalmic Solution 1 Drop(s) Both EYES at bedtime  levothyroxine 25 MICROGram(s) Oral daily  mesalamine Suppository 1000 milliGRAM(s) Rectal at bedtime  metoprolol succinate ER 25 milliGRAM(s) Oral daily  Nephro-darcy 1 Tablet(s) Oral daily  pantoprazole    Tablet 40 milliGRAM(s) Oral before breakfast  polyethylene glycol 3350 17 Gram(s) Oral two times a day  predniSONE   Tablet 10 milliGRAM(s) Oral daily  sevelamer carbonate 800 milliGRAM(s) Oral three times a day with meals  simvastatin 40 milliGRAM(s) Oral at bedtime  sodium bicarbonate 650 milliGRAM(s) Oral three times a day      PHYSICAL EXAM:  T(C): 36.8 (03-02-22 @ 04:25), Max: 37.2 (03-01-22 @ 12:41)  HR: 68 (03-02-22 @ 04:25) (68 - 80)  BP: 171/68 (03-02-22 @ 04:25) (161/69 - 185/71)  RR: 18 (03-02-22 @ 04:25) (18 - 18)  SpO2: 97% (03-02-22 @ 04:25) (97% - 99%)  Wt(kg): --  I&O's Summary    01 Mar 2022 07:01  -  02 Mar 2022 07:00  --------------------------------------------------------  IN: 840 mL / OUT: 201 mL / NET: 639 mL      Appearance: NAD, c-collar on   HEENT: Normal oral mucosa, PERRL, EOMI	  Lymphatic: No lymphadenopathy , no edema  Cardiovascular: Normal S1 S2, No JVD, No murmurs , Peripheral pulses palpable 2+ bilaterally  Respiratory: Lungs clear to auscultation, normal effort 	  Gastrointestinal:  Soft, Non-tender, + BS	  Skin: No rashes, No ecchymoses, No cyanosis, warm to touch  Neuro: alert oriented x 3 attention comprehension are fair.  Able to name, repeat.   EOmi fundi not visualized   no nystagmus VFF to confrontation  Tongue is midline  Palate elevates symmetrically   Moving all 4 ext spontaneously no drift appreciated  Gait not assessed.   Ext: No edema      LABS:    CARDIAC MARKERS:    03-01    144  |  108  |  39<H>  ----------------------------<  80  4.2   |  23  |  1.61<H>    Ca    9.0      01 Mar 2022 05:44  Mg     2.2     03-01      proBNP:   Lipid Profile:   HgA1c:   TSH:     TELEMETRY: NSR	    ECG:  	  RADIOLOGY:   DIAGNOSTIC TESTING:  [ ] Echocardiogram:  [ ]  Catheterization:  [ ] Stress Test:    OTHER:

## 2022-03-02 NOTE — PROGRESS NOTE ADULT - NUTRITIONAL ASSESSMENT
This patient has been assessed with a concern for Malnutrition and has been determined to have a diagnosis/diagnoses of Underweight (BMI < 19).    This patient is being managed with:   Diet Regular-  No Concentrated Potassium  No Concentrated Phosphorus  Low Sodium  Entered: Feb 8 2022 11:11AM    
This patient has been assessed with a concern for Malnutrition and has been determined to have a diagnosis/diagnoses of Moderate protein-calorie malnutrition and Underweight (BMI < 19).    This patient is being managed with:   Diet Regular-  No Concentrated Potassium  No Concentrated Phosphorus  Low Sodium  Supplement Feeding Modality:  Oral  Suplena Cans or Servings Per Day:  1       Frequency:  Daily  Entered: Feb 22 2022  9:02PM    
This patient has been assessed with a concern for Malnutrition and has been determined to have a diagnosis/diagnoses of Underweight (BMI < 19).    This patient is being managed with:   Diet Regular-  No Concentrated Potassium  No Concentrated Phosphorus  Low Sodium  Entered: Feb 8 2022 11:11AM    
This patient has been assessed with a concern for Malnutrition and has been determined to have a diagnosis/diagnoses of Moderate protein-calorie malnutrition and Underweight (BMI < 19).    This patient is being managed with:   Diet Regular-  No Concentrated Potassium  No Concentrated Phosphorus  Low Sodium  Supplement Feeding Modality:  Oral  Suplena Cans or Servings Per Day:  1       Frequency:  Daily  Entered: Feb 22 2022  9:02PM    
This patient has been assessed with a concern for Malnutrition and has been determined to have a diagnosis/diagnoses of Underweight (BMI < 19).    This patient is being managed with:   Diet Regular-  No Concentrated Potassium  No Concentrated Phosphorus  Low Sodium  Entered: Feb 8 2022 11:11AM    
This patient has been assessed with a concern for Malnutrition and has been determined to have a diagnosis/diagnoses of Moderate protein-calorie malnutrition and Underweight (BMI < 19).    This patient is being managed with:   Diet Regular-  No Concentrated Potassium  No Concentrated Phosphorus  Low Sodium  Supplement Feeding Modality:  Oral  Suplena Cans or Servings Per Day:  1       Frequency:  Daily  Entered: Feb 22 2022  9:02PM    
This patient has been assessed with a concern for Malnutrition and has been determined to have a diagnosis/diagnoses of Underweight (BMI < 19).    This patient is being managed with:   Diet Regular-  No Concentrated Potassium  No Concentrated Phosphorus  Low Sodium  Entered: Feb 8 2022 11:11AM    
This patient has been assessed with a concern for Malnutrition and has been determined to have a diagnosis/diagnoses of Moderate protein-calorie malnutrition and Underweight (BMI < 19).    This patient is being managed with:   Diet Regular-  No Concentrated Potassium  No Concentrated Phosphorus  Low Sodium  Supplement Feeding Modality:  Oral  Suplena Cans or Servings Per Day:  1       Frequency:  Daily  Entered: Feb 22 2022  9:02PM    
This patient has been assessed with a concern for Malnutrition and has been determined to have a diagnosis/diagnoses of Underweight (BMI < 19).    This patient is being managed with:   Diet Regular-  No Concentrated Potassium  No Concentrated Phosphorus  Low Sodium  Entered: Feb 8 2022 11:11AM    
This patient has been assessed with a concern for Malnutrition and has been determined to have a diagnosis/diagnoses of Moderate protein-calorie malnutrition and Underweight (BMI < 19).    This patient is being managed with:   Diet Regular-  No Concentrated Potassium  No Concentrated Phosphorus  Low Sodium  Entered: Feb 8 2022 11:11AM    
This patient has been assessed with a concern for Malnutrition and has been determined to have a diagnosis/diagnoses of Moderate protein-calorie malnutrition and Underweight (BMI < 19).    This patient is being managed with:   Diet Regular-  No Concentrated Potassium  No Concentrated Phosphorus  Low Sodium  Supplement Feeding Modality:  Oral  Suplena Cans or Servings Per Day:  1       Frequency:  Daily  Entered: Feb 22 2022  9:02PM    
This patient has been assessed with a concern for Malnutrition and has been determined to have a diagnosis/diagnoses of Moderate protein-calorie malnutrition and Underweight (BMI < 19).    This patient is being managed with:   Diet Regular-  No Concentrated Potassium  No Concentrated Phosphorus  Low Sodium  Supplement Feeding Modality:  Oral  Suplena Cans or Servings Per Day:  1       Frequency:  Daily  Entered: Feb 22 2022  9:02PM      This patient has been assessed with a concern for Malnutrition and has been determined to have a diagnosis/diagnoses of Moderate protein-calorie malnutrition and Underweight (BMI < 19).    This patient is being managed with:   Diet Regular-  No Concentrated Potassium  No Concentrated Phosphorus  Low Sodium  Supplement Feeding Modality:  Oral  Suplena Cans or Servings Per Day:  1       Frequency:  Daily  Entered: Feb 22 2022  9:02PM    
This patient has been assessed with a concern for Malnutrition and has been determined to have a diagnosis/diagnoses of Underweight (BMI < 19).    This patient is being managed with:   Diet Regular-  No Concentrated Potassium  No Concentrated Phosphorus  Low Sodium  Entered: Feb 8 2022 11:11AM    
This patient has been assessed with a concern for Malnutrition and has been determined to have a diagnosis/diagnoses of Moderate protein-calorie malnutrition and Underweight (BMI < 19).    This patient is being managed with:   Diet Regular-  No Concentrated Potassium  No Concentrated Phosphorus  Low Sodium  Supplement Feeding Modality:  Oral  Suplena Cans or Servings Per Day:  1       Frequency:  Daily  Entered: Feb 22 2022  9:02PM    
This patient has been assessed with a concern for Malnutrition and has been determined to have a diagnosis/diagnoses of Underweight (BMI < 19).    This patient is being managed with:   Diet Regular-  No Concentrated Potassium  No Concentrated Phosphorus  Low Sodium  Entered: Feb 8 2022 11:11AM    
This patient has been assessed with a concern for Malnutrition and has been determined to have a diagnosis/diagnoses of Underweight (BMI < 19).    This patient is being managed with:   Diet Regular-  No Concentrated Potassium  No Concentrated Phosphorus  Low Sodium  Entered: Feb 8 2022 11:11AM    
This patient has been assessed with a concern for Malnutrition and has been determined to have a diagnosis/diagnoses of Moderate protein-calorie malnutrition and Underweight (BMI < 19).    This patient is being managed with:   Diet Regular-  No Concentrated Potassium  No Concentrated Phosphorus  Low Sodium  Supplement Feeding Modality:  Oral  Suplena Cans or Servings Per Day:  1       Frequency:  Daily  Entered: Feb 22 2022  9:02PM    
This patient has been assessed with a concern for Malnutrition and has been determined to have a diagnosis/diagnoses of Underweight (BMI < 19).    This patient is being managed with:   Diet Regular-  No Concentrated Potassium  No Concentrated Phosphorus  Low Sodium  Entered: Feb 8 2022 11:11AM

## 2022-03-02 NOTE — PROGRESS NOTE ADULT - PROBLEM SELECTOR PROBLEM 4
CKD (chronic kidney disease)
Cervical spine fracture
CKD (chronic kidney disease)
Cervical spine fracture
Cervical spine fracture
CKD (chronic kidney disease)
CKD (chronic kidney disease)
Cervical spine fracture
CKD (chronic kidney disease)
Cervical spine fracture
Cervical spine fracture
CKD (chronic kidney disease)
Cervical spine fracture
Cervical spine fracture
CKD (chronic kidney disease)
Cervical spine fracture
CKD (chronic kidney disease)
Cervical spine fracture
Cervical spine fracture
CKD (chronic kidney disease)
Cervical spine fracture
CKD (chronic kidney disease)
CKD (chronic kidney disease)
Cervical spine fracture
CKD (chronic kidney disease)
CKD (chronic kidney disease)
Cervical spine fracture

## 2022-04-05 PROBLEM — I10 ESSENTIAL (PRIMARY) HYPERTENSION: Chronic | Status: ACTIVE | Noted: 2022-02-06

## 2022-04-05 PROBLEM — Z87.828 PERSONAL HISTORY OF OTHER (HEALED) PHYSICAL INJURY AND TRAUMA: Chronic | Status: ACTIVE | Noted: 2022-02-06

## 2022-04-05 PROBLEM — D64.9 ANEMIA, UNSPECIFIED: Chronic | Status: ACTIVE | Noted: 2022-02-06

## 2022-04-05 PROBLEM — H26.9 UNSPECIFIED CATARACT: Chronic | Status: ACTIVE | Noted: 2022-02-06

## 2022-04-05 PROBLEM — E78.5 HYPERLIPIDEMIA, UNSPECIFIED: Chronic | Status: ACTIVE | Noted: 2022-02-06

## 2022-04-09 ENCOUNTER — INPATIENT (INPATIENT)
Facility: HOSPITAL | Age: 77
LOS: 1 days | Discharge: HOME | End: 2022-04-11
Attending: HOSPITALIST | Admitting: HOSPITALIST
Payer: MEDICARE

## 2022-04-09 VITALS
HEART RATE: 93 BPM | OXYGEN SATURATION: 100 % | TEMPERATURE: 98 F | DIASTOLIC BLOOD PRESSURE: 80 MMHG | RESPIRATION RATE: 20 BRPM | HEIGHT: 60 IN | SYSTOLIC BLOOD PRESSURE: 151 MMHG | WEIGHT: 98.99 LBS

## 2022-04-09 LAB
ALBUMIN SERPL ELPH-MCNC: 3.8 G/DL — SIGNIFICANT CHANGE UP (ref 3.3–5)
ALP SERPL-CCNC: 62 U/L — SIGNIFICANT CHANGE UP (ref 40–120)
ALT FLD-CCNC: 33 U/L — SIGNIFICANT CHANGE UP (ref 12–78)
ANION GAP SERPL CALC-SCNC: 9 MMOL/L — SIGNIFICANT CHANGE UP (ref 5–17)
APTT BLD: 32.4 SEC — SIGNIFICANT CHANGE UP (ref 27.5–35.5)
AST SERPL-CCNC: 27 U/L — SIGNIFICANT CHANGE UP (ref 15–37)
BASOPHILS # BLD AUTO: 0.01 K/UL — SIGNIFICANT CHANGE UP (ref 0–0.2)
BASOPHILS NFR BLD AUTO: 0.2 % — SIGNIFICANT CHANGE UP (ref 0–2)
BILIRUB SERPL-MCNC: 0.3 MG/DL — SIGNIFICANT CHANGE UP (ref 0.2–1.2)
BLD GP AB SCN SERPL QL: SIGNIFICANT CHANGE UP
BUN SERPL-MCNC: 38 MG/DL — HIGH (ref 7–23)
CALCIUM SERPL-MCNC: 9.4 MG/DL — SIGNIFICANT CHANGE UP (ref 8.5–10.1)
CHLORIDE SERPL-SCNC: 107 MMOL/L — SIGNIFICANT CHANGE UP (ref 96–108)
CO2 SERPL-SCNC: 23 MMOL/L — SIGNIFICANT CHANGE UP (ref 22–31)
CREAT SERPL-MCNC: 3.02 MG/DL — HIGH (ref 0.5–1.3)
D DIMER BLD IA.RAPID-MCNC: 211 NG/ML DDU — SIGNIFICANT CHANGE UP
EGFR: 16 ML/MIN/1.73M2 — LOW
EOSINOPHIL # BLD AUTO: 0.06 K/UL — SIGNIFICANT CHANGE UP (ref 0–0.5)
EOSINOPHIL NFR BLD AUTO: 1.1 % — SIGNIFICANT CHANGE UP (ref 0–6)
FLUAV AG NPH QL: SIGNIFICANT CHANGE UP
FLUBV AG NPH QL: SIGNIFICANT CHANGE UP
GLUCOSE BLDC GLUCOMTR-MCNC: 91 MG/DL — SIGNIFICANT CHANGE UP (ref 70–99)
GLUCOSE SERPL-MCNC: 98 MG/DL — SIGNIFICANT CHANGE UP (ref 70–99)
HCT VFR BLD CALC: 29.7 % — LOW (ref 34.5–45)
HGB BLD-MCNC: 9.4 G/DL — LOW (ref 11.5–15.5)
IMM GRANULOCYTES NFR BLD AUTO: 0.7 % — SIGNIFICANT CHANGE UP (ref 0–1.5)
INR BLD: 0.87 RATIO — LOW (ref 0.88–1.16)
LYMPHOCYTES # BLD AUTO: 1.21 K/UL — SIGNIFICANT CHANGE UP (ref 1–3.3)
LYMPHOCYTES # BLD AUTO: 21.3 % — SIGNIFICANT CHANGE UP (ref 13–44)
MCHC RBC-ENTMCNC: 26.6 PG — LOW (ref 27–34)
MCHC RBC-ENTMCNC: 31.6 G/DL — LOW (ref 32–36)
MCV RBC AUTO: 83.9 FL — SIGNIFICANT CHANGE UP (ref 80–100)
MONOCYTES # BLD AUTO: 0.61 K/UL — SIGNIFICANT CHANGE UP (ref 0–0.9)
MONOCYTES NFR BLD AUTO: 10.7 % — SIGNIFICANT CHANGE UP (ref 2–14)
NEUTROPHILS # BLD AUTO: 3.76 K/UL — SIGNIFICANT CHANGE UP (ref 1.8–7.4)
NEUTROPHILS NFR BLD AUTO: 66 % — SIGNIFICANT CHANGE UP (ref 43–77)
NRBC # BLD: 0 /100 WBCS — SIGNIFICANT CHANGE UP (ref 0–0)
PLATELET # BLD AUTO: 220 K/UL — SIGNIFICANT CHANGE UP (ref 150–400)
POTASSIUM SERPL-MCNC: 5 MMOL/L — SIGNIFICANT CHANGE UP (ref 3.5–5.3)
POTASSIUM SERPL-SCNC: 5 MMOL/L — SIGNIFICANT CHANGE UP (ref 3.5–5.3)
PROT SERPL-MCNC: 8.1 GM/DL — SIGNIFICANT CHANGE UP (ref 6–8.3)
PROTHROM AB SERPL-ACNC: 10.4 SEC — LOW (ref 10.5–13.4)
RBC # BLD: 3.54 M/UL — LOW (ref 3.8–5.2)
RBC # FLD: 15 % — HIGH (ref 10.3–14.5)
SARS-COV-2 RNA SPEC QL NAA+PROBE: SIGNIFICANT CHANGE UP
SODIUM SERPL-SCNC: 139 MMOL/L — SIGNIFICANT CHANGE UP (ref 135–145)
TROPONIN I, HIGH SENSITIVITY RESULT: 8.7 NG/L — SIGNIFICANT CHANGE UP
WBC # BLD: 5.69 K/UL — SIGNIFICANT CHANGE UP (ref 3.8–10.5)
WBC # FLD AUTO: 5.69 K/UL — SIGNIFICANT CHANGE UP (ref 3.8–10.5)

## 2022-04-09 PROCEDURE — 99223 1ST HOSP IP/OBS HIGH 75: CPT

## 2022-04-09 PROCEDURE — 71046 X-RAY EXAM CHEST 2 VIEWS: CPT | Mod: 26

## 2022-04-09 PROCEDURE — 93010 ELECTROCARDIOGRAM REPORT: CPT

## 2022-04-09 PROCEDURE — 99284 EMERGENCY DEPT VISIT MOD MDM: CPT

## 2022-04-09 RX ORDER — COLCHICINE 0.6 MG
0.6 TABLET ORAL DAILY
Refills: 0 | Status: DISCONTINUED | OUTPATIENT
Start: 2022-04-09 | End: 2022-04-09

## 2022-04-09 RX ORDER — SEVELAMER CARBONATE 2400 MG/1
800 POWDER, FOR SUSPENSION ORAL THREE TIMES A DAY
Refills: 0 | Status: DISCONTINUED | OUTPATIENT
Start: 2022-04-09 | End: 2022-04-11

## 2022-04-09 RX ORDER — PANTOPRAZOLE SODIUM 20 MG/1
40 TABLET, DELAYED RELEASE ORAL
Refills: 0 | Status: DISCONTINUED | OUTPATIENT
Start: 2022-04-09 | End: 2022-04-11

## 2022-04-09 RX ORDER — LEVOTHYROXINE SODIUM 125 MCG
25 TABLET ORAL DAILY
Refills: 0 | Status: DISCONTINUED | OUTPATIENT
Start: 2022-04-09 | End: 2022-04-11

## 2022-04-09 RX ORDER — ALLOPURINOL 300 MG
100 TABLET ORAL DAILY
Refills: 0 | Status: DISCONTINUED | OUTPATIENT
Start: 2022-04-09 | End: 2022-04-11

## 2022-04-09 RX ORDER — SODIUM CHLORIDE 9 MG/ML
1000 INJECTION, SOLUTION INTRAVENOUS
Refills: 0 | Status: DISCONTINUED | OUTPATIENT
Start: 2022-04-09 | End: 2022-04-11

## 2022-04-09 RX ORDER — SODIUM CHLORIDE 9 MG/ML
1000 INJECTION INTRAMUSCULAR; INTRAVENOUS; SUBCUTANEOUS
Refills: 0 | Status: DISCONTINUED | OUTPATIENT
Start: 2022-04-09 | End: 2022-04-09

## 2022-04-09 RX ORDER — AMLODIPINE BESYLATE 2.5 MG/1
10 TABLET ORAL DAILY
Refills: 0 | Status: DISCONTINUED | OUTPATIENT
Start: 2022-04-09 | End: 2022-04-11

## 2022-04-09 RX ORDER — SIMVASTATIN 20 MG/1
40 TABLET, FILM COATED ORAL AT BEDTIME
Refills: 0 | Status: DISCONTINUED | OUTPATIENT
Start: 2022-04-09 | End: 2022-04-11

## 2022-04-09 RX ORDER — CARVEDILOL PHOSPHATE 80 MG/1
6.25 CAPSULE, EXTENDED RELEASE ORAL EVERY 12 HOURS
Refills: 0 | Status: DISCONTINUED | OUTPATIENT
Start: 2022-04-09 | End: 2022-04-11

## 2022-04-09 RX ORDER — HYDRALAZINE HCL 50 MG
50 TABLET ORAL THREE TIMES A DAY
Refills: 0 | Status: DISCONTINUED | OUTPATIENT
Start: 2022-04-09 | End: 2022-04-11

## 2022-04-09 RX ADMIN — SIMVASTATIN 40 MILLIGRAM(S): 20 TABLET, FILM COATED ORAL at 21:24

## 2022-04-09 RX ADMIN — SODIUM CHLORIDE 125 MILLILITER(S): 9 INJECTION INTRAMUSCULAR; INTRAVENOUS; SUBCUTANEOUS at 15:26

## 2022-04-09 RX ADMIN — Medication 50 MILLIGRAM(S): at 21:25

## 2022-04-09 RX ADMIN — CARVEDILOL PHOSPHATE 6.25 MILLIGRAM(S): 80 CAPSULE, EXTENDED RELEASE ORAL at 18:08

## 2022-04-09 RX ADMIN — SODIUM CHLORIDE 100 MILLILITER(S): 9 INJECTION, SOLUTION INTRAVENOUS at 16:29

## 2022-04-09 NOTE — ED ADULT NURSE NOTE - NSIMPLEMENTINTERV_GEN_ALL_ED
Implemented All Fall Risk Interventions:  [place] to call system. Call bell, personal items and telephone within reach. Instruct patient to call for assistance. Room bathroom lighting operational. Non-slip footwear when patient is off stretcher. Physically safe environment: no spills, clutter or unnecessary equipment. Stretcher in lowest position, wheels locked, appropriate side rails in place. Provide visual cue, wrist band, yellow gown, etc. Monitor gait and stability. Monitor for mental status changes and reorient to person, place, and time. Review medications for side effects contributing to fall risk. Reinforce activity limits and safety measures with patient and family.

## 2022-04-09 NOTE — ED PROVIDER NOTE - NS ED ATTENDING STATEMENT MOD
This was a shared visit with the GISELLA. I reviewed and verified the documentation and independently performed the documented:

## 2022-04-09 NOTE — ED PROVIDER NOTE - OBJECTIVE STATEMENT
This is a 76 y,o female with a PMhx of CKD, HLD, Anemia s/p 2 transfusions, recently discharged from rehab facility for a cervical spine fracture, patient came in complaining of having a shortness of breath and trouble breathing over the past few week, patient has been having generalized weakness and fatigue. she states that the shortness of breath has been persistent, unrelated to ambulating or rest, no associated Chest pain, patient denies having any recent travel or leg swelling, denies having any swelling int he legs, syncope, blood in stool or vaginal bleeding. Patient had a transfusion a few months prior for being anemic, she states that she had similar symptoms at that time.

## 2022-04-09 NOTE — ED PROVIDER NOTE - ATTENDING CONTRIBUTION TO CARE
I have personally seen and examined this pt I have fully participated in the care of this pt. I have made amendments to the documentation where appropriate and otherwise agree the hx, exam, and plans as documented by the ACP.

## 2022-04-09 NOTE — CONSULT NOTE ADULT - SUBJECTIVE AND OBJECTIVE BOX
NEPHROLOGY CONSULTATION    CHIEF COMPLAINT:  FLORIDALMA/CKD      HPI:  She has been c/o dyspnea x 2 days, no associated symptoms of CP, cough or fever but she does get dizzy at times and fell once.  Renal function is worse than baseline.  She has chronic HTN on hydralazine, amlodipine and carvedilol.  BP at least on one occasion was low as 90's.       ROS:  she denies nausea, vomiting, diarrhea but appetite has been weak      PAST MEDICAL & SURGICAL HISTORY:  Anemia    HTN (hypertension)    HLD (hyperlipidemia)    H/O kidney injury    Cataract        SOCIAL HISTORY:  non smoker    FAMILY HISTORY:  no CKD      MEDICATIONS  (STANDING):  sodium chloride 0.9%. 1000 milliLiter(s) (125 mL/Hr) IV Continuous <Continuous>      PHYSICAL EXAMINATION:  T(F): 98.5 (04-09-22 @ 11:38)  HR: 93 (04-09-22 @ 11:38)  BP: 151/80 (04-09-22 @ 11:38)  RR: 20 (04-09-22 @ 11:38)  SpO2: 100% (04-09-22 @ 11:38)  Conversant, no apparent distress  PERRLA, pink conjunctivae, no ptosis  Good dentition, no pharyngeal erythema  Neck non tender, no mass, no thyromegaly or nodules  Normal respiratory effort, lungs clear to auscultation  Heart with RRR, no murmurs or rubs, no peripheral edema  Abdomen soft, no masses, no organomegaly  Skin no rashes, ulcers or lesions, normal turgor and temperature  Appropriate affect, AO x 3    LABS:                        9.4    5.69  )-----------( 220      ( 09 Apr 2022 13:36 )             29.7     04-09    139  |  107  |  38<H>  ----------------------------<  98  5.0   |  23  |  3.02<H>    Ca    9.4      09 Apr 2022 13:36    TPro  8.1  /  Alb  3.8  /  TBili  0.3  /  DBili  x   /  AST  27  /  ALT  33  /  AlkPhos  62  04-09        RADIOLOGY:  Chest X-Ray personally reviewed and shows NAPD        ASSESSMENT:  1.  FLORIDALMA on CKD(3) - rule out prerenal azotemia  2.  Dyspnea - no obvious etiology    PLAN:  Empiric normal saline 75 mL/hour  Check U/A, urine lytes, renal sonogram

## 2022-04-09 NOTE — H&P ADULT - NSHPPHYSICALEXAM_GEN_ALL_CORE
PHYSICAL EXAMINATION:  Vital Signs Last 24 Hrs  T(C): 36.7 (09 Apr 2022 15:29), Max: 36.9 (09 Apr 2022 11:38)  T(F): 98 (09 Apr 2022 15:29), Max: 98.5 (09 Apr 2022 11:38)  HR: 78 (09 Apr 2022 15:29) (78 - 93)  BP: 134/60 (09 Apr 2022 15:29) (134/60 - 151/80)  BP(mean): --  RR: 20 (09 Apr 2022 15:29) (20 - 20)  SpO2: 98% (09 Apr 2022 15:29) (98% - 100%)  CAPILLARY BLOOD GLUCOSE      POCT Blood Glucose.: 91 mg/dL (09 Apr 2022 11:41)      GENERAL: NAD, well-groomed, well-developed  HEAD:  atraumatic, normocephalic  EYES: sclera anicteric  ENMT: mucous membranes moist  NECK: supple, No JVD  CHEST/LUNG: clear to auscultation bilaterally; no rales, rhonchi, or wheezing b/l  HEART: normal S1, S2  ABDOMEN: BS+, soft, ND, NT   EXTREMITIES:  pulses palpable; no clubbing, cyanosis, or edema b/l LEs  NEURO: awake, alert, interactive; moves all extremities  SKIN: no rashes or lesions PHYSICAL EXAMINATION:  Vital Signs Last 24 Hrs  T(C): 36.7 (09 Apr 2022 15:29), Max: 36.9 (09 Apr 2022 11:38)  T(F): 98 (09 Apr 2022 15:29), Max: 98.5 (09 Apr 2022 11:38)  HR: 78 (09 Apr 2022 15:29) (78 - 93)  BP: 134/60 (09 Apr 2022 15:29) (134/60 - 151/80)  BP(mean): --  RR: 20 (09 Apr 2022 15:29) (20 - 20)  SpO2: 98% (09 Apr 2022 15:29) (98% - 100%)  CAPILLARY BLOOD GLUCOSE      POCT Blood Glucose.: 91 mg/dL (09 Apr 2022 11:41)      GENERAL: NAD, seen on 1-E, comfortable in bed, daughter at bedside, answers all questions well.   ENMT: mucous membranes moist  NECK: supple, No JVD  CHEST/LUNG: clear to auscultation bilaterally; no rales, rhonchi, or wheezing b/l  HEART: normal S1, S2  ABDOMEN: BS+, soft, ND, NT   EXTREMITIES:  pulses palpable; no clubbing, cyanosis, or edema b/l LEs

## 2022-04-09 NOTE — ED ADULT NURSE NOTE - OBJECTIVE STATEMENT
pts daughter is at bedside translating. pt came in complaining of shortness of breath. as per pts daughter, pt has been complaining of SOB for 2 days now, pt is experiencing it on and off. pt states she was in bed not doing anything when she began to feel short of breath.   pt states she has been experiencing dizziness as well, this is also happening on and off. pts daughter states the pt has been experiencing weakness since she was last in the hospital.  pt is complaining of an intermittent headache since thursday, pt rates it a 8/10, pt is unable to describe how it feels.     pt denies any CP, fever, cough, N/V/D, chills  AOx3, ambulatory with walker

## 2022-04-09 NOTE — ED ADULT NURSE REASSESSMENT NOTE - NS ED NURSE REASSESS COMMENT FT1
pt is not using any accessory/abdominal muscles to breathe. pt is sating 99% on room air. will continue to monitor

## 2022-04-09 NOTE — H&P ADULT - HISTORY OF PRESENT ILLNESS
75 yo  female PMH off CKD, HLD, Anemia s/p 2 transfusions, recently discharged from rehab facility for a cervical spine fracture, patient came in complaining of having a shortness of breath and trouble breathing over the past week. Patient has been having generalized weakness and fatigue. She states that the shortness of breath has been persistent, unrelated to ambulating or rest, no associated Chest pain, patient denies having any recent travel or leg swelling. Denies blood in stool or vaginal bleeding. Patient had a transfusion a few months prior for being anemic, she states that she had similar symptoms at that time.  77 yo  female PMH off CKD, HLD, Anemia of CKD, s/p 2 transfusions in the past, recently discharged from rehab facility for a cervical spine fracture, patient came in complaining of having a shortness of breath and trouble breathing over the past week. Patient has been having generalized weakness and fatigue. She states that the shortness of breath has been persistent, unrelated to ambulating or rest, no associated chest pain, patient denies having any recent travel or leg swelling. Denies blood in stool or vaginal bleeding. Patient had a transfusion a few months prior for being anemic, she states that she had similar symptoms at that time.

## 2022-04-09 NOTE — ED ADULT NURSE NOTE - CHIEF COMPLAINT QUOTE
BIBA reports sob and weakness, chills as per daughter,  discharged from rehab 3/27/22 s/p cervical fracture , received two blood transfusions in [place] prior to arrival to rehab. AAOX4 in triage, f/s 91. 200ml NS given via 20 gauge R AC by EMS.

## 2022-04-09 NOTE — ED ADULT NURSE NOTE - ED STAT RN HANDOFF DETAILS
Handoff report given to SENTHIL Tim. RN made aware of pts current condition/abnormal lab values. pt is AOx3, IV intact

## 2022-04-09 NOTE — PATIENT PROFILE ADULT - FALL HARM RISK - HARM RISK INTERVENTIONS

## 2022-04-09 NOTE — ED ADULT TRIAGE NOTE - CHIEF COMPLAINT QUOTE
BIBA reports sob and weakness, chills as per daughter,  discharged from rehab 3/27/22 s/p cervical fracture , received two blood transfusions in [place] prior to arrival to rehab. AAOX4 in triage, f/s 91. 200ml NS given via 20 gauge R AC by EMS.

## 2022-04-09 NOTE — H&P ADULT - ASSESSMENT
75 yo  female PMH off CKD, HLD, Anemia of CKD, s/p 2 transfusions in the past, recently discharged from rehab facility for a cervical spine fracture, patient came in complaining of having a shortness of breath and trouble breathing over the past week. Patient has been having generalized weakness and fatigue. She states that the shortness of breath has been persistent, unrelated to ambulating or rest, no associated chest pain, patient denies having any recent travel or leg swelling. Denies blood in stool or vaginal bleeding. Patient had a transfusion a few months prior for being anemic, she states that she had similar symptoms at that time.     Plan: Admit to medicine for FLORIDALMA on CKD. Last admission creatinine was 1.61, now 3.02. CXR is clear, no active infections. Started IVF, renal   was consulted, is follow patient. Renal sonogram ordered to confirm MRD r/o hydro. All meds were confirmed with daughter at bedside.      Continue all home meds:  Allopurinol, Coreg, Hydralazine, Synthyroid, Protonix, Zocur, Renagel, Norvasc.   75 yo  female PMH off CKD, HLD, Anemia of CKD, s/p 2 transfusions in the past, recently discharged from rehab facility for a cervical spine fracture, patient came in complaining of having a shortness of breath and trouble breathing over the past week. Patient has been having generalized weakness and fatigue. She states that the shortness of breath has been persistent, unrelated to ambulating or rest, no associated chest pain, patient denies having any recent travel or leg swelling. Denies blood in stool or vaginal bleeding. Patient had a transfusion a few months prior for being anemic, she states that she had similar symptoms at that time.     Plan: Admit to medicine for FLORIDALMA on CKD. Last admission creatinine was 1.61, now 3.02. CXR is clear, no active infections. Started IVF, renal   was consulted, is follow patient. Renal sonogram ordered to confirm MRD r/o hydro. All meds were confirmed with daughter at bedside. HGB 9.4,   prior was 9.7, likely anemia of chronic disease.      Continue all home meds:  Allopurinol, Coreg, Hydralazine, Synthyroid, Protonix, Zocur, Renagel, Norvasc.

## 2022-04-09 NOTE — H&P ADULT - NSHPLABSRESULTS_GEN_ALL_CORE
LABS:                        9.4    5.69  )-----------( 220      ( 09 Apr 2022 13:36 )             29.7     04-09    139  |  107  |  38<H>  ----------------------------<  98  5.0   |  23  |  3.02<H>    Ca    9.4      09 Apr 2022 13:36    TPro  8.1  /  Alb  3.8  /  TBili  0.3  /  DBili  x   /  AST  27  /  ALT  33  /  AlkPhos  62  04-09    PT/INR - ( 09 Apr 2022 13:36 )   PT: 10.4 sec;   INR: 0.87 ratio         PTT - ( 09 Apr 2022 13:36 )  PTT:32.4 sec        RADIOLOGY & ADDITIONAL TESTS:

## 2022-04-10 LAB
ANION GAP SERPL CALC-SCNC: 7 MMOL/L — SIGNIFICANT CHANGE UP (ref 5–17)
APPEARANCE UR: CLEAR — SIGNIFICANT CHANGE UP
BILIRUB UR-MCNC: NEGATIVE — SIGNIFICANT CHANGE UP
BUN SERPL-MCNC: 28 MG/DL — HIGH (ref 7–23)
CALCIUM SERPL-MCNC: 8.3 MG/DL — LOW (ref 8.5–10.1)
CHLORIDE SERPL-SCNC: 110 MMOL/L — HIGH (ref 96–108)
CHLORIDE UR-SCNC: 55 MMOL/L — SIGNIFICANT CHANGE UP
CO2 SERPL-SCNC: 23 MMOL/L — SIGNIFICANT CHANGE UP (ref 22–31)
COLOR SPEC: YELLOW — SIGNIFICANT CHANGE UP
CREAT ?TM UR-MCNC: 26 MG/DL — SIGNIFICANT CHANGE UP
CREAT SERPL-MCNC: 2.4 MG/DL — HIGH (ref 0.5–1.3)
DIFF PNL FLD: NEGATIVE — SIGNIFICANT CHANGE UP
EGFR: 21 ML/MIN/1.73M2 — LOW
EPI CELLS # UR: SIGNIFICANT CHANGE UP
GLUCOSE SERPL-MCNC: 97 MG/DL — SIGNIFICANT CHANGE UP (ref 70–99)
GLUCOSE UR QL: NEGATIVE MG/DL — SIGNIFICANT CHANGE UP
KETONES UR-MCNC: NEGATIVE — SIGNIFICANT CHANGE UP
LEUKOCYTE ESTERASE UR-ACNC: NEGATIVE — SIGNIFICANT CHANGE UP
NITRITE UR-MCNC: NEGATIVE — SIGNIFICANT CHANGE UP
PH UR: 7 — SIGNIFICANT CHANGE UP (ref 5–8)
POTASSIUM SERPL-MCNC: 5.2 MMOL/L — SIGNIFICANT CHANGE UP (ref 3.5–5.3)
POTASSIUM SERPL-SCNC: 5.2 MMOL/L — SIGNIFICANT CHANGE UP (ref 3.5–5.3)
PROT UR-MCNC: 100 MG/DL
RBC CASTS # UR COMP ASSIST: SIGNIFICANT CHANGE UP /HPF (ref 0–4)
SODIUM SERPL-SCNC: 140 MMOL/L — SIGNIFICANT CHANGE UP (ref 135–145)
SODIUM UR-SCNC: 80 MMOL/L — SIGNIFICANT CHANGE UP
SP GR SPEC: 1 — LOW (ref 1.01–1.02)
UROBILINOGEN FLD QL: NEGATIVE MG/DL — SIGNIFICANT CHANGE UP

## 2022-04-10 PROCEDURE — 76775 US EXAM ABDO BACK WALL LIM: CPT | Mod: 26

## 2022-04-10 PROCEDURE — 99232 SBSQ HOSP IP/OBS MODERATE 35: CPT

## 2022-04-10 RX ADMIN — CARVEDILOL PHOSPHATE 6.25 MILLIGRAM(S): 80 CAPSULE, EXTENDED RELEASE ORAL at 05:24

## 2022-04-10 RX ADMIN — Medication 50 MILLIGRAM(S): at 22:05

## 2022-04-10 RX ADMIN — PANTOPRAZOLE SODIUM 40 MILLIGRAM(S): 20 TABLET, DELAYED RELEASE ORAL at 07:56

## 2022-04-10 RX ADMIN — Medication 50 MILLIGRAM(S): at 05:24

## 2022-04-10 RX ADMIN — SODIUM CHLORIDE 100 MILLILITER(S): 9 INJECTION, SOLUTION INTRAVENOUS at 23:21

## 2022-04-10 RX ADMIN — SIMVASTATIN 40 MILLIGRAM(S): 20 TABLET, FILM COATED ORAL at 22:04

## 2022-04-10 RX ADMIN — SODIUM CHLORIDE 100 MILLILITER(S): 9 INJECTION, SOLUTION INTRAVENOUS at 13:19

## 2022-04-10 RX ADMIN — Medication 100 MILLIGRAM(S): at 12:03

## 2022-04-10 RX ADMIN — Medication 25 MICROGRAM(S): at 05:24

## 2022-04-10 RX ADMIN — SODIUM CHLORIDE 100 MILLILITER(S): 9 INJECTION, SOLUTION INTRAVENOUS at 05:26

## 2022-04-10 RX ADMIN — CARVEDILOL PHOSPHATE 6.25 MILLIGRAM(S): 80 CAPSULE, EXTENDED RELEASE ORAL at 17:17

## 2022-04-10 RX ADMIN — AMLODIPINE BESYLATE 10 MILLIGRAM(S): 2.5 TABLET ORAL at 05:24

## 2022-04-11 ENCOUNTER — TRANSCRIPTION ENCOUNTER (OUTPATIENT)
Age: 77
End: 2022-04-11

## 2022-04-11 ENCOUNTER — APPOINTMENT (OUTPATIENT)
Dept: RHEUMATOLOGY | Facility: CLINIC | Age: 77
End: 2022-04-11
Payer: MEDICARE

## 2022-04-11 VITALS
SYSTOLIC BLOOD PRESSURE: 111 MMHG | TEMPERATURE: 98 F | OXYGEN SATURATION: 99 % | HEART RATE: 70 BPM | RESPIRATION RATE: 17 BRPM | DIASTOLIC BLOOD PRESSURE: 52 MMHG

## 2022-04-11 LAB
ANION GAP SERPL CALC-SCNC: 6 MMOL/L — SIGNIFICANT CHANGE UP (ref 5–17)
BUN SERPL-MCNC: 24 MG/DL — HIGH (ref 7–23)
CALCIUM SERPL-MCNC: 8.5 MG/DL — SIGNIFICANT CHANGE UP (ref 8.5–10.1)
CHLORIDE SERPL-SCNC: 113 MMOL/L — HIGH (ref 96–108)
CO2 SERPL-SCNC: 21 MMOL/L — LOW (ref 22–31)
CREAT SERPL-MCNC: 2.05 MG/DL — HIGH (ref 0.5–1.3)
EGFR: 25 ML/MIN/1.73M2 — LOW
GLUCOSE SERPL-MCNC: 89 MG/DL — SIGNIFICANT CHANGE UP (ref 70–99)
PHOSPHATE SERPL-MCNC: 5 MG/DL — HIGH (ref 2.5–4.5)
POTASSIUM SERPL-MCNC: 4.7 MMOL/L — SIGNIFICANT CHANGE UP (ref 3.5–5.3)
POTASSIUM SERPL-SCNC: 4.7 MMOL/L — SIGNIFICANT CHANGE UP (ref 3.5–5.3)
SODIUM SERPL-SCNC: 140 MMOL/L — SIGNIFICANT CHANGE UP (ref 135–145)

## 2022-04-11 PROCEDURE — 99446 NTRPROF PH1/NTRNET/EHR 5-10: CPT

## 2022-04-11 PROCEDURE — 99239 HOSP IP/OBS DSCHRG MGMT >30: CPT

## 2022-04-11 RX ORDER — MECLIZINE HCL 12.5 MG
1 TABLET ORAL
Qty: 30 | Refills: 0
Start: 2022-04-11 | End: 2022-04-20

## 2022-04-11 RX ORDER — AMLODIPINE BESYLATE 2.5 MG/1
1 TABLET ORAL
Qty: 0 | Refills: 0 | DISCHARGE
Start: 2022-04-11

## 2022-04-11 RX ORDER — CARVEDILOL PHOSPHATE 80 MG/1
1 CAPSULE, EXTENDED RELEASE ORAL
Qty: 60 | Refills: 0
Start: 2022-04-11 | End: 2022-05-10

## 2022-04-11 RX ORDER — MECLIZINE HCL 12.5 MG
25 TABLET ORAL EVERY 8 HOURS
Refills: 0 | Status: DISCONTINUED | OUTPATIENT
Start: 2022-04-11 | End: 2022-04-11

## 2022-04-11 RX ORDER — HYDRALAZINE HCL 50 MG
1 TABLET ORAL
Qty: 0 | Refills: 0 | DISCHARGE

## 2022-04-11 RX ADMIN — SODIUM CHLORIDE 100 MILLILITER(S): 9 INJECTION, SOLUTION INTRAVENOUS at 13:22

## 2022-04-11 RX ADMIN — CARVEDILOL PHOSPHATE 6.25 MILLIGRAM(S): 80 CAPSULE, EXTENDED RELEASE ORAL at 05:55

## 2022-04-11 RX ADMIN — Medication 50 MILLIGRAM(S): at 05:55

## 2022-04-11 RX ADMIN — AMLODIPINE BESYLATE 10 MILLIGRAM(S): 2.5 TABLET ORAL at 05:55

## 2022-04-11 RX ADMIN — Medication 25 MICROGRAM(S): at 05:55

## 2022-04-11 RX ADMIN — Medication 25 MILLIGRAM(S): at 13:23

## 2022-04-11 RX ADMIN — PANTOPRAZOLE SODIUM 40 MILLIGRAM(S): 20 TABLET, DELAYED RELEASE ORAL at 07:57

## 2022-04-11 RX ADMIN — Medication 100 MILLIGRAM(S): at 13:23

## 2022-04-11 NOTE — PROGRESS NOTE ADULT - SUBJECTIVE AND OBJECTIVE BOX
Patient is a 76y old  Female who presents with a chief complaint of SOB and worsening renal function. (09 Apr 2022 15:39)      INTERVAL HPI/OVERNIGHT EVENTS: dizziness with standing     MEDICATIONS  (STANDING):  allopurinol 100 milliGRAM(s) Oral daily  amLODIPine   Tablet 10 milliGRAM(s) Oral daily  carvedilol 6.25 milliGRAM(s) Oral every 12 hours  hydrALAZINE 50 milliGRAM(s) Oral three times a day  levothyroxine 25 MICROGram(s) Oral daily  pantoprazole    Tablet 40 milliGRAM(s) Oral before breakfast  sevelamer carbonate 800 milliGRAM(s) Oral three times a day  simvastatin 40 milliGRAM(s) Oral at bedtime  sodium chloride 0.45%. 1000 milliLiter(s) (100 mL/Hr) IV Continuous <Continuous>    MEDICATIONS  (PRN):      Allergies    No Known Allergies    Intolerances        REVIEW OF SYSTEMS:  CONSTITUTIONAL: No fever, weight loss  EYES: No eye pain, visual disturbances, or discharge  ENMT:  No difficulty hearing, tinnitus, vertigo; No sinus or throat pain  RESPIRATORY: No cough, wheezing, chills or hemoptysis; No shortness of breath  CARDIOVASCULAR: No chest pain, palpitations,, or leg swelling  GASTROINTESTINAL: No abdominal or epigastric pain. No nausea, vomiting, or hematemesis; No diarrhea or constipation. No melena or hematochezia.  GENITOURINARY: No dysuria, frequency, hematuria, or incontinence  NEUROLOGICAL: No headaches, memory loss, loss of strength, numbness, or tremors  SKIN: No itching, burning, rashes, or lesions   MUSCULOSKELETAL: No joint pain or swelling; No muscle, back, or extremity pain  PSYCHIATRIC: No depression, anxiety, mood swings, or difficulty sleeping  HEME/LYMPH: No easy bruising, or bleeding gums      Vital Signs Last 24 Hrs  T(C): 36.9 (10 Apr 2022 04:55), Max: 37.2 (09 Apr 2022 16:07)  T(F): 98.4 (10 Apr 2022 04:55), Max: 99 (09 Apr 2022 16:07)  HR: 93 (10 Apr 2022 04:55) (78 - 93)  BP: 152/95 (10 Apr 2022 04:55) (125/72 - 152/95)  BP(mean): --  RR: 18 (10 Apr 2022 04:55) (18 - 20)  SpO2: 98% (10 Apr 2022 04:55) (98% - 100%)    PHYSICAL EXAM:  GENERAL: NAD  HEAD:  Atraumatic, Normocephalic, thin   EYES: EOMI, PERRLA, conjunctiva and sclera clear  ENMT: No tonsillar erythema, exudates, or enlargement;   NECK: Supple, Normal thyroid  NERVOUS SYSTEM:  Alert & Oriented X3, Good concentration; Motor Strength 5/5 B/L upper and lower extremities; DTRs 2+ intact and symmetric  CHEST/LUNG: CTABL; No rales, rhonchi, wheezing, or rubs  HEART: Regular rate and rhythm; No murmurs, rubs, or gallops  ABDOMEN: Soft, Nontender, Nondistended; Bowel sounds present  EXTREMITIES:  2+ Peripheral Pulses, No clubbing, cyanosis, or edema  LYMPH: No lymphadenopathy noted  SKIN: No rashes or lesions    LABS:                        9.4    5.69  )-----------( 220      ( 09 Apr 2022 13:36 )             29.7     04-10    140  |  110<H>  |  28<H>  ----------------------------<  97  5.2   |  23  |  2.40<H>    Ca    8.3<L>      10 Apr 2022 07:31    TPro  8.1  /  Alb  3.8  /  TBili  0.3  /  DBili  x   /  AST  27  /  ALT  33  /  AlkPhos  62  04-09    PT/INR - ( 09 Apr 2022 13:36 )   PT: 10.4 sec;   INR: 0.87 ratio         PTT - ( 09 Apr 2022 13:36 )  PTT:32.4 sec    CAPILLARY BLOOD GLUCOSE      POCT Blood Glucose.: 91 mg/dL (09 Apr 2022 11:41)      RADIOLOGY & ADDITIONAL TESTS:    Imaging Personally Reviewed:  [ ] YES  [ ] NO    Consultant(s) Notes Reviewed:  [ x] YES  [ ] NO    Care Discussed with Consultants/Other Providers [ ] YES  [ ] NO
NEPHROLOGY PROGRESS NOTE    CHIEF COMPLAINT:  FLOIRDALMA/CKD    HPI:  Renal function improved on IVF but still c/o positional dizziness and SIM    ROS:  no cough    EXAM:  T(F): 97.9 (04-10-22 @ 11:14)  HR: 72 (04-10-22 @ 11:14)  BP: 152/95 (04-10-22 @ 04:55)  RR: 16 (04-10-22 @ 11:14)  SpO2: 97% (04-10-22 @ 11:14)    Conversant, in no apparent distress  Normal respiratory effort, lungs clear bilaterally  Heart RRR with no murmur, no peripheral edema         LABS                             9.4    5.69  )-----------( 220      ( 09 Apr 2022 13:36 )             29.7          04-10    140  |  110<H>  |  28<H>  ----------------------------<  97  5.2   |  23  |  2.40<H>    Ca    8.3<L>      10 Apr 2022 07:31    TPro  8.1  /  Alb  3.8  /  TBili  0.3  /  DBili  x   /  AST  27  /  ALT  33  /  AlkPhos  62  04-09    Urine Na 80 meq/L    Renal sonogram - echogenic kidneys w/o hydronephrosis      ASSESSMENT:  1.  FLORIDALMA on CKD(3) - outpatient ATN? improving  2.  Dyspnea - no obvious etiology    PLAN:  Continue IVF  Check orthostatic vitals  If orthostatic then reduce dose of coreg or hydralazine  Check U/A        
Patient is a 76y old  Female who presents with a chief complaint of SOB and worsening renal function. (2022 15:39)      INTERVAL HPI/OVERNIGHT EVENTS: dizziness with standing     MEDICATIONS  (STANDING):  allopurinol 100 milliGRAM(s) Oral daily  amLODIPine   Tablet 10 milliGRAM(s) Oral daily  carvedilol 6.25 milliGRAM(s) Oral every 12 hours  levothyroxine 25 MICROGram(s) Oral daily  meclizine 25 milliGRAM(s) Oral every 8 hours  pantoprazole    Tablet 40 milliGRAM(s) Oral before breakfast  sevelamer carbonate 800 milliGRAM(s) Oral three times a day  simvastatin 40 milliGRAM(s) Oral at bedtime  sodium chloride 0.45%. 1000 milliLiter(s) (100 mL/Hr) IV Continuous <Continuous>    MEDICATIONS  (PRN):      Allergies    No Known Allergies    Intolerances        REVIEW OF SYSTEMS:  CONSTITUTIONAL: No fever, weight loss  EYES: No eye pain, visual disturbances, or discharge  ENMT:  No difficulty hearing, tinnitus, vertigo; No sinus or throat pain  RESPIRATORY: No cough, wheezing, chills or hemoptysis; No shortness of breath  CARDIOVASCULAR: No chest pain, palpitations,, or leg swelling  GASTROINTESTINAL: No abdominal or epigastric pain. No nausea, vomiting, or hematemesis; No diarrhea or constipation. No melena or hematochezia.  GENITOURINARY: No dysuria, frequency, hematuria, or incontinence  NEUROLOGICAL: No headaches, memory loss, loss of strength, numbness, or tremors  SKIN: No itching, burning, rashes, or lesions   MUSCULOSKELETAL: No joint pain or swelling; No muscle, back, or extremity pain  PSYCHIATRIC: No depression, anxiety, mood swings, or difficulty sleeping  HEME/LYMPH: No easy bruising, or bleeding gums    Vital Signs Last 24 Hrs  T(C): 36.7 (2022 11:57), Max: 36.8 (10 Apr 2022 23:11)  T(F): 98.1 (2022 11:57), Max: 98.3 (2022 05:10)  HR: 70 (2022 11:57) (70 - 80)  BP: 111/52 (2022 11:57) (111/52 - 157/53)  BP(mean): --  RR: 17 (2022 11:57) (17 - 18)  SpO2: 99% (2022 11:57) (96% - 99%)    PHYSICAL EXAM:  GENERAL: NAD  HEAD:  Atraumatic, Normocephalic, thin   EYES: EOMI, PERRLA, conjunctiva and sclera clear  ENMT: No tonsillar erythema, exudates, or enlargement;   NECK: Supple, Normal thyroid  NERVOUS SYSTEM:  Alert & Oriented X3, Good concentration; Motor Strength 5/5 B/L upper and lower extremities; DTRs 2+ intact and symmetric  CHEST/LUNG: CTABL; No rales, rhonchi, wheezing, or rubs  HEART: Regular rate and rhythm; No murmurs, rubs, or gallops  ABDOMEN: Soft, Nontender, Nondistended; Bowel sounds present  EXTREMITIES:  2+ Peripheral Pulses, No clubbing, cyanosis, or edema  LYMPH: No lymphadenopathy noted  SKIN: No rashes or lesions    LABS:        140  |  113<H>  |  24<H>  ----------------------------<  89  4.7   |  21<L>  |  2.05<H>    Ca    8.5      2022 06:45  Phos  5.0     04-11        Urinalysis Basic - ( 10 Apr 2022 16:04 )    Color: Yellow / Appearance: Clear / S.005 / pH: x  Gluc: x / Ketone: Negative  / Bili: Negative / Urobili: Negative mg/dL   Blood: x / Protein: 100 mg/dL / Nitrite: Negative   Leuk Esterase: Negative / RBC: 0-2 /HPF / WBC x   Sq Epi: x / Non Sq Epi: Few / Bacteria: x          CAPILLARY BLOOD GLUCOSE      POCT Blood Glucose.: 91 mg/dL (2022 11:41)      RADIOLOGY & ADDITIONAL TESTS:    Imaging Personally Reviewed:  [ ] YES  [ ] NO    Consultant(s) Notes Reviewed:  [ x] YES  [ ] NO    Care Discussed with Consultants/Other Providers [ ] YES  [ ] NO

## 2022-04-11 NOTE — DISCHARGE NOTE PROVIDER - NSDCMRMEDTOKEN_GEN_ALL_CORE_FT
allopurinol 100 mg oral tablet: 1 tab(s) orally once a day  amLODIPine 10 mg oral tablet: 1 tab(s) orally once a day  Aranesp 150 mcg/0.3 mL injectable solution: 0.3 milliliter(s) injectable every 2 weeks  Artificial Tears ophthalmic solution:   bisacodyl 5 mg oral delayed release tablet: 1 tab(s) orally once a day (at bedtime)  calcium:   calcium acetate 667 mg oral capsule: 3 cap(s) orally 3 times a day (with meals)  carvedilol 6.25 mg oral tablet: 1 tab(s) orally every 12 hours  colchicine 0.6 mg oral tablet: 1 tab(s) orally once a day  gabapentin 250 mg/5 mL oral solution: 1 milliliter(s) orally 2 times a day  hydrocortisone 25 mg rectal suppository: 1 suppository(ies) rectal once a day  levothyroxine 25 mcg (0.025 mg) oral tablet: 1 tab(s) orally once a day  Lokelma 5 g oral powder for reconstitution: 5 gram(s) orally once a day   Lumigan 0.01% ophthalmic solution: 1 drop(s) in each eye 2 times a day  meclizine 25 mg oral tablet: 1 tab(s) orally every 8 hours -for dizziness   melatonin 3 mg oral tablet: 1 tab(s) orally once a day (at bedtime), As needed, Insomnia  mesalamine 1000 mg rectal suppository: 1 suppository(ies) rectal once a day (at bedtime)  multivitamin: 1 tab(s) orally once a day  pantoprazole 40 mg oral delayed release tablet: 1 tab(s) orally once a day (before a meal)  polyethylene glycol 3350 oral powder for reconstitution: 17 gram(s) orally 2 times a day  simvastatin 40 mg oral tablet: 1 tab(s) orally once a day (at bedtime)  sodium bicarbonate 650 mg oral tablet: 1 tab(s) orally 3 times a day   Zinc:

## 2022-04-11 NOTE — PROGRESS NOTE ADULT - REASON FOR ADMISSION
SOB and worsening renal function.

## 2022-04-11 NOTE — DISCHARGE NOTE PROVIDER - NSDCCPCAREPLAN_GEN_ALL_CORE_FT
PRINCIPAL DISCHARGE DIAGNOSIS  Diagnosis: FLORIDALMA (acute kidney injury)  Assessment and Plan of Treatment: 75 yo  female PMH off CKD, HLD, Anemia of CKD, s/p 2 transfusions in the past, recently discharged from rehab facility for a cervical spine fracture, patient came in complaining of having a shortness of breath and trouble breathing over the past week. Patient has been having generalized weakness and fatigue. She states that the shortness of breath has been persistent, unrelated to ambulating or rest, no associated chest pain, patient denies having any recent travel or leg swelling. Denies blood in stool or vaginal bleeding. Patient had a transfusion a few months prior for being anemic, she states that she had similar symptoms at that time.   Plan: Admit to medicine for   FLORIDALMA on CKD. Last admission creatinine was 1.61  - , now 3.02>>2.4>2 s/p IV fluids  . f/u renal US shows chronic  medical disease and no hydronephrosis    floridalma likely related dehydration  . CXR is clear, no active infections.   anemia  - hgb 9s   dizziness   - reports with standing from a sitting position. suspect a component of dehydration, orthostasis or  hydralazine as dizziness is a common side effect. Vertigo also on the differential. Will give a trial of meclizine   normal orthostatics here but is on fluids.   will stop hydralazine   HTN   - c/w amlodipine and coreg   - could benefit from ARB/ACE but will defer to nephrology after repeat labs   - needs outpatient follow up   cleared for discharge with close follow up with nephrologist   s/p cervical fracture   - no focal deficits  - is out of aspen collar but needs close follow up with surgeon

## 2022-04-11 NOTE — DISCHARGE NOTE PROVIDER - HOSPITAL COURSE
75 yo  female PMH off CKD, HLD, Anemia of CKD, s/p 2 transfusions in the past, recently discharged from rehab facility for a cervical spine fracture, patient came in complaining of having a shortness of breath and trouble breathing over the past week. Patient has been having generalized weakness and fatigue. She states that the shortness of breath has been persistent, unrelated to ambulating or rest, no associated chest pain, patient denies having any recent travel or leg swelling. Denies blood in stool or vaginal bleeding. Patient had a transfusion a few months prior for being anemic, she states that she had similar symptoms at that time.     Plan: Admit to medicine for   FLORIDALMA on CKD. Last admission creatinine was 1.61  - , now 3.02>>2.4>2 s/p IV fluids  . f/u renal US shows chronic  medical disease and no hydronephrosis    floridalma likely related dehydration  . CXR is clear, no active infections.     anemia  - hgb 9s     dizziness   - reports with standing from a sitting position. suspect a component of dehydration, orthostasis or  hydralazine as dizziness is a common side effect. Vertigo also on the differential. Will give a trial of meclizine   normal orthostatics here but is on fluids.   will stop hydralazine     HTN   - c/w amlodipine and coreg   - could benefit from ARB/ACE but will defer to nephrology after repeat labs   - needs outpatient follow up     cleared for discharge with close follow up with nephrologist     s/p cervical fracture   - no focal deficits  - is out of aspen collar but needs close follow up with surgeon

## 2022-04-11 NOTE — DISCHARGE NOTE NURSING/CASE MANAGEMENT/SOCIAL WORK - PATIENT PORTAL LINK FT
You can access the FollowMyHealth Patient Portal offered by [facility] by registering at the following website: [email or web address]. By joining [facility]’s FollowMyHealth portal, you will also be able to view your health information using other applications (apps) compatible with our system.

## 2022-04-11 NOTE — PROGRESS NOTE ADULT - ASSESSMENT
75 yo  female PMH off CKD, HLD, Anemia of CKD, s/p 2 transfusions in the past, recently discharged from rehab facility for a cervical spine fracture, patient came in complaining of having a shortness of breath and trouble breathing over the past week. Patient has been having generalized weakness and fatigue. She states that the shortness of breath has been persistent, unrelated to ambulating or rest, no associated chest pain, patient denies having any recent travel or leg swelling. Denies blood in stool or vaginal bleeding. Patient had a transfusion a few months prior for being anemic, she states that she had similar symptoms at that time.     Plan: Admit to medicine for   FLORIDALMA on CKD. Last admission creatinine was 1.61  - , now 3.02>>2.4 s/p IV fluids. f/u renal US and lytes.   . CXR is clear, no active infections.     anemia    dizziness   - reports with standing from a sitting position. suspect a component of dehydration as she reports she not drinking much. check orthostatics          Continue all home meds:  Allopurinol, Coreg, Hydralazine, Synthyroid, Protonix, Zocur, Renagel, Norvasc.  
77 yo  female PMH off CKD, HLD, Anemia of CKD, s/p 2 transfusions in the past, recently discharged from rehab facility for a cervical spine fracture, patient came in complaining of having a shortness of breath and trouble breathing over the past week. Patient has been having generalized weakness and fatigue. She states that the shortness of breath has been persistent, unrelated to ambulating or rest, no associated chest pain, patient denies having any recent travel or leg swelling. Denies blood in stool or vaginal bleeding. Patient had a transfusion a few months prior for being anemic, she states that she had similar symptoms at that time.     Plan: Admit to medicine for   FLORIDALMA on CKD. Last admission creatinine was 1.61  - , now 3.02>>2.4>2 s/p IV fluids  . f/u renal US shows chronic  medical disease and no hydronephrosis    floridalma likely related dehydration  . CXR is clear, no active infections.     anemia  - hgb 9s     dizziness   - reports with standing from a sitting position. suspect a component of dehydration, orthostasis or  hydralazine as dizziness is a common side effect. Vertigo also on the differential. Will give a trial of meclizine   normal orthostatics here but is on fluids.   will stop hydralazine     HTN   - c/w amlodipine and coreg   - could benefit from ARB/ACE but will defer to nephrology after repeat labs   - needs outpatient follow up     cleared for discharge with close follow up with nephrologist     s/p cervical fracture   - no focal deficits  - is out of aspen collar but needs close follow up with surgeon

## 2022-04-11 NOTE — DISCHARGE NOTE NURSING/CASE MANAGEMENT/SOCIAL WORK - NSDCPEFALRISK_GEN_ALL_CORE
For information on Fall & Injury Prevention, visit: [email or web address] OR  [email or web address] OR  https://www.cdc.gov/steadi/patient.html

## 2022-04-15 DIAGNOSIS — E86.0 DEHYDRATION: ICD-10-CM

## 2022-04-15 DIAGNOSIS — N17.9 ACUTE KIDNEY FAILURE, UNSPECIFIED: ICD-10-CM

## 2022-04-15 DIAGNOSIS — N18.30 CHRONIC KIDNEY DISEASE, STAGE 3 UNSPECIFIED: ICD-10-CM

## 2022-04-15 DIAGNOSIS — R42 DIZZINESS AND GIDDINESS: ICD-10-CM

## 2022-04-15 DIAGNOSIS — I12.9 HYPERTENSIVE CHRONIC KIDNEY DISEASE WITH STAGE 1 THROUGH STAGE 4 CHRONIC KIDNEY DISEASE, OR UNSPECIFIED CHRONIC KIDNEY DISEASE: ICD-10-CM

## 2022-04-15 DIAGNOSIS — R06.00 DYSPNEA, UNSPECIFIED: ICD-10-CM

## 2022-04-15 DIAGNOSIS — Z79.52 LONG TERM (CURRENT) USE OF SYSTEMIC STEROIDS: ICD-10-CM

## 2022-04-15 DIAGNOSIS — E78.5 HYPERLIPIDEMIA, UNSPECIFIED: ICD-10-CM

## 2022-04-15 DIAGNOSIS — D63.1 ANEMIA IN CHRONIC KIDNEY DISEASE: ICD-10-CM

## 2022-04-25 ENCOUNTER — APPOINTMENT (OUTPATIENT)
Dept: NEUROSURGERY | Facility: CLINIC | Age: 77
End: 2022-04-25
Payer: MEDICARE

## 2022-04-25 VITALS
SYSTOLIC BLOOD PRESSURE: 146 MMHG | WEIGHT: 100 LBS | HEART RATE: 89 BPM | RESPIRATION RATE: 18 BRPM | DIASTOLIC BLOOD PRESSURE: 80 MMHG | HEIGHT: 62 IN | BODY MASS INDEX: 18.4 KG/M2

## 2022-04-25 DIAGNOSIS — S12.190A: ICD-10-CM

## 2022-04-25 DIAGNOSIS — M54.2 CERVICALGIA: ICD-10-CM

## 2022-04-25 PROCEDURE — 99203 OFFICE O/P NEW LOW 30 MIN: CPT

## 2022-04-26 NOTE — PHYSICAL EXAM
[General Appearance - Alert] : alert [General Appearance - In No Acute Distress] : in no acute distress [Oriented To Time, Place, And Person] : oriented to person, place, and time [Impaired Insight] : insight and judgment were intact [No Visual Abnormalities] : no visible abnormalities [Outer Ear] : the ears and nose were normal in appearance [Neck Appearance] : the appearance of the neck was normal [Skin Color & Pigmentation] : normal skin color and pigmentation [] : no respiratory distress [Heart Rate And Rhythm] : heart rate was normal and rhythm regular [FreeTextEntry1] : Ambulates with one person assist

## 2022-04-26 NOTE — HISTORY OF PRESENT ILLNESS
[de-identified] : Ms. Roxy Avery is a 76 year old woman presenting with PMHx HTN, Hyperlipidemia, CKD, Hypothyroidism, anemia who mis presenting with cervical fracture. She is here with her daughter. She reports that she initially fell at home on 2/6/22 after standing yo

## 2022-04-26 NOTE — ASSESSMENT
[FreeTextEntry1] : Ms. Roxy Avery is a 76 year old woman S/P Fall Closed Cervical Fracture\par CT Cervical to assess for healed fracture. \par Follow up 2 weeks. Wear Collar when weightbearing and active.\par She will follow up with new CT Cervical Spine CT.\par \par Please see Dr. Barton's dictation for details.\par I, Dr. Gerson Barton evaluated the patient with the nurse practitioner Samuel Dey and established the plan of care. I personally discuss this patient with the nurse practitioner at the time of the visit. I agree with the assessment and plan as written, unless noted below.\par \par

## 2022-05-06 ENCOUNTER — APPOINTMENT (OUTPATIENT)
Dept: CT IMAGING | Facility: IMAGING CENTER | Age: 77
End: 2022-05-06

## 2022-05-10 ENCOUNTER — APPOINTMENT (OUTPATIENT)
Dept: RHEUMATOLOGY | Facility: CLINIC | Age: 77
End: 2022-05-10

## 2022-05-11 ENCOUNTER — OUTPATIENT (OUTPATIENT)
Dept: OUTPATIENT SERVICES | Facility: HOSPITAL | Age: 77
LOS: 1 days | End: 2022-05-11
Payer: MEDICARE

## 2022-05-11 ENCOUNTER — APPOINTMENT (OUTPATIENT)
Dept: CT IMAGING | Facility: CLINIC | Age: 77
End: 2022-05-11
Payer: MEDICARE

## 2022-05-11 DIAGNOSIS — S12.100A UNSPECIFIED DISPLACED FRACTURE OF SECOND CERVICAL VERTEBRA, INITIAL ENCOUNTER FOR CLOSED FRACTURE: ICD-10-CM

## 2022-05-11 DIAGNOSIS — M54.2 CERVICALGIA: ICD-10-CM

## 2022-05-11 PROCEDURE — 72125 CT NECK SPINE W/O DYE: CPT

## 2022-05-11 PROCEDURE — 72125 CT NECK SPINE W/O DYE: CPT | Mod: 26

## 2022-05-23 ENCOUNTER — APPOINTMENT (OUTPATIENT)
Dept: NEUROSURGERY | Facility: CLINIC | Age: 77
End: 2022-05-23
Payer: MEDICARE

## 2022-05-23 VITALS
WEIGHT: 100 LBS | DIASTOLIC BLOOD PRESSURE: 87 MMHG | RESPIRATION RATE: 18 BRPM | HEIGHT: 62 IN | HEART RATE: 87 BPM | SYSTOLIC BLOOD PRESSURE: 152 MMHG | BODY MASS INDEX: 18.4 KG/M2

## 2022-05-23 DIAGNOSIS — S12.100A UNSPECIFIED DISPLACED FRACTURE OF SECOND CERVICAL VERTEBRA, INITIAL ENCOUNTER FOR CLOSED FRACTURE: ICD-10-CM

## 2022-05-23 PROCEDURE — 99212 OFFICE O/P EST SF 10 MIN: CPT

## 2022-05-23 RX ORDER — LEVOTHYROXINE SODIUM 0.05 MG/1
50 TABLET ORAL
Qty: 90 | Refills: 0 | Status: ACTIVE | COMMUNITY
Start: 2022-04-04

## 2022-05-23 RX ORDER — PANTOPRAZOLE 40 MG/1
40 TABLET, DELAYED RELEASE ORAL
Qty: 11 | Refills: 0 | Status: DISCONTINUED | COMMUNITY
Start: 2022-03-21 | End: 2022-05-23

## 2022-05-23 RX ORDER — HYDRALAZINE HYDROCHLORIDE 100 MG/1
100 TABLET ORAL
Qty: 270 | Refills: 0 | Status: DISCONTINUED | COMMUNITY
Start: 2022-04-04

## 2022-05-23 RX ORDER — DARBEPOETIN ALFA 150 UG/.3ML
150 INJECTION, SOLUTION INTRAVENOUS; SUBCUTANEOUS
Qty: 1 | Refills: 0 | Status: ACTIVE | COMMUNITY
Start: 2022-04-28

## 2022-05-23 RX ORDER — COLCHICINE 0.6 MG/1
0.6 TABLET ORAL
Qty: 11 | Refills: 0 | Status: ACTIVE | COMMUNITY
Start: 2022-03-21

## 2022-05-23 RX ORDER — MECLIZINE HYDROCHLORIDE 12.5 MG/1
12.5 TABLET ORAL
Qty: 30 | Refills: 0 | Status: DISCONTINUED | COMMUNITY
Start: 2022-01-26

## 2022-05-23 RX ORDER — AMLODIPINE BESYLATE 10 MG/1
10 TABLET ORAL
Qty: 90 | Refills: 0 | Status: ACTIVE | COMMUNITY
Start: 2022-03-31

## 2022-05-23 RX ORDER — HYDRALAZINE HYDROCHLORIDE 10 MG/1
10 TABLET ORAL
Qty: 180 | Refills: 0 | Status: DISCONTINUED | COMMUNITY
Start: 2022-03-29

## 2022-05-23 RX ORDER — CALCIUM ACETATE 667 MG/1
667 CAPSULE ORAL
Qty: 270 | Refills: 0 | Status: ACTIVE | COMMUNITY
Start: 2021-12-29

## 2022-05-23 RX ORDER — BIMATOPROST 0.1 MG/ML
0.01 SOLUTION/ DROPS OPHTHALMIC
Qty: 8 | Refills: 0 | Status: ACTIVE | COMMUNITY
Start: 2021-12-28

## 2022-05-23 RX ORDER — CARVEDILOL 6.25 MG/1
6.25 TABLET, FILM COATED ORAL
Qty: 22 | Refills: 0 | Status: ACTIVE | COMMUNITY
Start: 2022-03-21

## 2022-05-25 NOTE — PHYSICAL EXAM
[Motor Tone] : muscle tone was normal in all four extremities [Motor Strength] : muscle strength was normal in all four extremities [No Visual Abnormalities] : no visible abnormalities [] : no respiratory distress [Heart Rate And Rhythm] : heart rate was normal and rhythm regular [Abnormal Walk] : normal gait

## 2022-05-25 NOTE — REASON FOR VISIT
[Follow-Up: _____] : a [unfilled] follow-up visit [FreeTextEntry1] : Today she return with New CT Cervical Spine\par She reports that her symptoms are stable. She does not use the cervical collar consistently

## 2022-05-25 NOTE — ASSESSMENT
[FreeTextEntry1] : Ms. Roxy Avery is a 76 year old woman S/P Fall Closed Cervical Fracture.\par CT Cervical Shows good neck alignement and healing fracture.\par Discontinue brace. Follow up as needed.\par \par Please see Dr. Barton's dictation for details.\par I, Dr. Gerson Barton evaluated the patient with the nurse practitioner Samuel Dey and established the plan of care. I personally discuss this patient with the nurse practitioner at the time of the visit. I agree with the assessment and plan as written, unless noted below.\par \par

## 2022-05-26 ENCOUNTER — APPOINTMENT (OUTPATIENT)
Dept: RHEUMATOLOGY | Facility: CLINIC | Age: 77
End: 2022-05-26
Payer: MEDICARE

## 2022-05-26 VITALS
TEMPERATURE: 97.6 F | BODY MASS INDEX: 16.38 KG/M2 | HEIGHT: 62 IN | SYSTOLIC BLOOD PRESSURE: 164 MMHG | HEART RATE: 65 BPM | WEIGHT: 89 LBS | DIASTOLIC BLOOD PRESSURE: 69 MMHG | OXYGEN SATURATION: 96 %

## 2022-05-26 DIAGNOSIS — Z00.00 ENCOUNTER FOR GENERAL ADULT MEDICAL EXAMINATION W/OUT ABNORMAL FINDINGS: ICD-10-CM

## 2022-05-26 DIAGNOSIS — M10.9 GOUT, UNSPECIFIED: ICD-10-CM

## 2022-05-26 PROCEDURE — 99213 OFFICE O/P EST LOW 20 MIN: CPT

## 2022-05-31 LAB
ALBUMIN SERPL ELPH-MCNC: 4.8 G/DL
ALP BLD-CCNC: 54 U/L
ALT SERPL-CCNC: 43 U/L
ANION GAP SERPL CALC-SCNC: 13 MMOL/L
AST SERPL-CCNC: 36 U/L
BASOPHILS # BLD AUTO: 0.02 K/UL
BASOPHILS NFR BLD AUTO: 0.4 %
BILIRUB SERPL-MCNC: 0.2 MG/DL
BUN SERPL-MCNC: 47 MG/DL
CALCIUM SERPL-MCNC: 9.7 MG/DL
CHLORIDE SERPL-SCNC: 108 MMOL/L
CO2 SERPL-SCNC: 20 MMOL/L
CREAT SERPL-MCNC: 2.25 MG/DL
EGFR: 22 ML/MIN/1.73M2
EOSINOPHIL # BLD AUTO: 0.2 K/UL
EOSINOPHIL NFR BLD AUTO: 3.7 %
GLUCOSE SERPL-MCNC: 95 MG/DL
HCT VFR BLD CALC: 30.7 %
HGB BLD-MCNC: 9.2 G/DL
IMM GRANULOCYTES NFR BLD AUTO: 0.5 %
LYMPHOCYTES # BLD AUTO: 1.33 K/UL
LYMPHOCYTES NFR BLD AUTO: 24.4 %
MAN DIFF?: NORMAL
MCHC RBC-ENTMCNC: 26 PG
MCHC RBC-ENTMCNC: 30 GM/DL
MCV RBC AUTO: 86.7 FL
MONOCYTES # BLD AUTO: 0.42 K/UL
MONOCYTES NFR BLD AUTO: 7.7 %
NEUTROPHILS # BLD AUTO: 3.46 K/UL
NEUTROPHILS NFR BLD AUTO: 63.3 %
PLATELET # BLD AUTO: 238 K/UL
POTASSIUM SERPL-SCNC: 5.6 MMOL/L
PROT SERPL-MCNC: 8 G/DL
RBC # BLD: 3.54 M/UL
RBC # FLD: 16.6 %
SODIUM SERPL-SCNC: 141 MMOL/L
URATE SERPL-MCNC: 6 MG/DL
WBC # FLD AUTO: 5.46 K/UL

## 2022-06-02 ENCOUNTER — NON-APPOINTMENT (OUTPATIENT)
Age: 77
End: 2022-06-02

## 2022-06-08 ENCOUNTER — NON-APPOINTMENT (OUTPATIENT)
Age: 77
End: 2022-06-08

## 2022-06-08 LAB
HLA METHODOLOGY: NORMAL
HLA-B: NORMAL
HLA-B: NORMAL

## 2022-06-16 ENCOUNTER — APPOINTMENT (OUTPATIENT)
Dept: RADIOLOGY | Facility: CLINIC | Age: 77
End: 2022-06-16
Payer: MEDICARE

## 2022-06-16 DIAGNOSIS — M81.0 AGE-RELATED OSTEOPOROSIS W/OUT CURRENT PATHOLOGICAL FRACTURE: ICD-10-CM

## 2022-06-16 PROCEDURE — 77080 DXA BONE DENSITY AXIAL: CPT

## 2022-11-28 NOTE — PROGRESS NOTE ADULT - SUBJECTIVE AND OBJECTIVE BOX
Bed: RT4  Expected date:   Expected time:   Means of arrival:   Comments:  charge   Subjective: Patient seen and examined. No new events except as noted.     REVIEW OF SYSTEMS:    CONSTITUTIONAL: + weakness, fevers or chills  EYES/ENT: No visual changes;  No vertigo or throat pain   NECK: No pain or stiffness  RESPIRATORY: No cough, wheezing, hemoptysis; No shortness of breath  CARDIOVASCULAR: No chest pain or palpitations  GASTROINTESTINAL: No abdominal or epigastric pain. No nausea, vomiting, or hematemesis; No diarrhea or constipation. No melena or hematochezia.  GENITOURINARY: No dysuria, frequency or hematuria  NEUROLOGICAL: No numbness or weakness  SKIN: No itching, burning, rashes, or lesions   All other review of systems is negative unless indicated above.    MEDICATIONS:  MEDICATIONS  (STANDING):  allopurinol 100 milliGRAM(s) Oral daily  artificial  tears Solution 1 Drop(s) Both EYES four times a day  gabapentin Solution 50 milliGRAM(s) Oral two times a day  heparin   Injectable 5000 Unit(s) SubCutaneous every 8 hours  latanoprost 0.005% Ophthalmic Solution 1 Drop(s) Both EYES at bedtime  levothyroxine 25 MICROGram(s) Oral daily  midodrine. 5 milliGRAM(s) Oral <User Schedule>  sevelamer carbonate 800 milliGRAM(s) Oral three times a day with meals  simvastatin 40 milliGRAM(s) Oral at bedtime  sodium bicarbonate 650 milliGRAM(s) Oral two times a day  sodium chloride 0.9% Bolus 1000 milliLiter(s) IV Bolus once  sodium zirconium cyclosilicate 5 Gram(s) Oral daily    PHYSICAL EXAM:  T(C): 36.7 (02-09-22 @ 11:30), Max: 37.7 (02-09-22 @ 04:25)  HR: 71 (02-09-22 @ 11:30) (71 - 83)  BP: 117/54 (02-09-22 @ 11:30) (117/54 - 159/63)  RR: 18 (02-09-22 @ 11:30) (18 - 18)  SpO2: 98% (02-09-22 @ 11:30) (98% - 99%)  Wt(kg): --  I&O's Summary    08 Feb 2022 07:01  -  09 Feb 2022 07:00  --------------------------------------------------------  IN: 1110 mL / OUT: 1200 mL / NET: -90 mL    09 Feb 2022 07:01  -  09 Feb 2022 16:40  --------------------------------------------------------  IN: 630 mL / OUT: 0 mL / NET: 630 mL      Appearance: NAD	  HEENT: Normal oral mucosa, PERRL, EOMI	  Lymphatic: No lymphadenopathy , no edema  Cardiovascular: Normal S1 S2, No JVD, No murmurs , Peripheral pulses palpable 2+ bilaterally  Respiratory: Lungs clear to auscultation, normal effort 	  Gastrointestinal:  Soft, Non-tender, + BS	  Skin: No rashes, No ecchymoses, No cyanosis, warm to touch  Neuro: alert oriented x 3 attention comprehension are fair.  Able to name, repeat.   EOmi fundi not visualized   no nystagmus VFF to confrontation  Tongue is midline  Palate elevates symmetrically   Moving all 4 ext spontaneously no drift appreciated  Gait not assessed.   Ext: No edema      LABS:    CARDIAC MARKERS:                   9.2    8.51  )-----------( 227      ( 07 Feb 2022 17:20 )             29.9     02-09    142  |  110<H>  |  30<H>  ----------------------------<  97  4.1   |  17<L>  |  1.78<H>    Ca    8.4      09 Feb 2022 12:30  Phos  5.2     02-08  Mg     2.3     02-08      proBNP:   Lipid Profile:   HgA1c:   TSH:     TELEMETRY: 	    ECG:  	  RADIOLOGY:   DIAGNOSTIC TESTING:  [ ] Echocardiogram:  [ ]  Catheterization:  [ ] Stress Test:    OTHER:

## 2023-03-10 ENCOUNTER — APPOINTMENT (OUTPATIENT)
Dept: VASCULAR SURGERY | Facility: CLINIC | Age: 78
End: 2023-03-10
Payer: MEDICARE

## 2023-03-10 VITALS
DIASTOLIC BLOOD PRESSURE: 76 MMHG | SYSTOLIC BLOOD PRESSURE: 132 MMHG | HEART RATE: 73 BPM | BODY MASS INDEX: 17.47 KG/M2 | WEIGHT: 89 LBS | HEIGHT: 60 IN

## 2023-03-10 DIAGNOSIS — N18.6 END STAGE RENAL DISEASE: ICD-10-CM

## 2023-03-10 DIAGNOSIS — Z99.2 END STAGE RENAL DISEASE: ICD-10-CM

## 2023-03-10 PROCEDURE — 99204 OFFICE O/P NEW MOD 45 MIN: CPT

## 2023-03-10 PROCEDURE — 93986 DUP-SCAN HEMO COMPL UNI STD: CPT

## 2023-03-10 NOTE — PHYSICAL EXAM
[Normal Breath Sounds] : Normal breath sounds [Normal Heart Sounds] : normal heart sounds [2+] : left 2+ [JVD] : no jugular venous distention  [Ankle Swelling (On Exam)] : not present [Abdomen Masses] : No abdominal masses

## 2023-03-10 NOTE — HISTORY OF PRESENT ILLNESS
[FreeTextEntry1] : Patient is an 78-year-old female with past medical history significant for hypertension and renal insufficiency presenting to us for evaluation for permanent hemodialysis access.\par \par No history of diabetes or coronary artery disease.\par \par Patient renal function is about 15% at this time.\par \par No history of smoking, CVA, rest pain or claudication symptoms.\par \par Patient is right-handed.

## 2023-03-10 NOTE — CONSULT LETTER
[Dear  ___] : Dear  [unfilled], [Consult Letter:] : I had the pleasure of evaluating your patient, [unfilled]. [Please see my note below.] : Please see my note below. [Consult Closing:] : Thank you very much for allowing me to participate in the care of this patient.  If you have any questions, please do not hesitate to contact me. [Sincerely,] : Sincerely, [FreeTextEntry3] : Bradley Razo M.D., F.JOSES., R.P.DIYA.I.\par  of Vascular Surgery\par Assistant Professor of Radiology\par Director of Endovascular Program/ [facility]\par [facility]

## 2023-03-10 NOTE — ASSESSMENT
[FreeTextEntry1] : Patient with renal failure.  Plan for left radiocephalic AV fistula.  Patient needs medical clearance.  We will schedule the patient.

## 2023-03-13 NOTE — HISTORY OF PRESENT ILLNESS
History of Present Illness:  Pt recently underwent an appendectomy (2/17) and now has fever of 101 at home. also having N/V/D. Pt took tylenol at home at 6:30pm.      Past Medical History:   Diagnosis Date    Asthma     exercised induced       Past Surgical History:   Procedure Laterality Date    HX APPENDECTOMY      HX HEENT      oral surgery         History reviewed. No pertinent family history. Social History     Socioeconomic History    Marital status: SINGLE     Spouse name: Not on file    Number of children: Not on file    Years of education: Not on file    Highest education level: Not on file   Occupational History    Not on file   Tobacco Use    Smoking status: Never     Passive exposure: Never    Smokeless tobacco: Not on file   Vaping Use    Vaping Use: Never used   Substance and Sexual Activity    Alcohol use: Never    Drug use: Never    Sexual activity: Not on file   Other Topics Concern    Not on file   Social History Narrative    Not on file     Social Determinants of Health     Financial Resource Strain: Not on file   Food Insecurity: Not on file   Transportation Needs: Not on file   Physical Activity: Not on file   Stress: Not on file   Social Connections: Not on file   Intimate Partner Violence: Not on file   Housing Stability: Not on file         ALLERGIES: Patient has no known allergies. Review of Systems   Constitutional:  Positive for fever. Gastrointestinal:  Positive for diarrhea, nausea and vomiting. All other systems reviewed and are negative. Vitals:    03/07/23 1939   BP: 119/74   Pulse: (!) 101   Resp: 16   Temp: 98.4 °F (36.9 °C)   SpO2: 99%   Weight: 74.4 kg (164 lb)            Physical Exam  Vitals and nursing note reviewed. Constitutional:       General: She is not in acute distress. Appearance: She is normal weight. She is not ill-appearing, toxic-appearing or diaphoretic. HENT:      Head: Normocephalic and atraumatic.       Right Ear: External ear normal. Left Ear: External ear normal.      Nose: Nose normal. No congestion or rhinorrhea. Mouth/Throat:      Mouth: Mucous membranes are moist.      Pharynx: Oropharynx is clear. No oropharyngeal exudate or posterior oropharyngeal erythema. Eyes:      General: No scleral icterus. Extraocular Movements: Extraocular movements intact. Conjunctiva/sclera: Conjunctivae normal.   Cardiovascular:      Rate and Rhythm: Regular rhythm. Tachycardia present. Pulses: Normal pulses. Heart sounds: Normal heart sounds. No murmur heard. No gallop. Pulmonary:      Effort: Pulmonary effort is normal. No respiratory distress. Breath sounds: Normal breath sounds. Abdominal:      General: Abdomen is flat. Bowel sounds are normal. There is no distension. Palpations: Abdomen is soft. Tenderness: There is no abdominal tenderness. There is no guarding or rebound. Comments: Postop wounds are clean dry and intact and have no abnormal tenderness or erythema. Musculoskeletal:         General: No swelling, tenderness or deformity. Normal range of motion. Cervical back: Normal range of motion and neck supple. No rigidity or tenderness. Right lower leg: No edema. Left lower leg: No edema. Lymphadenopathy:      Cervical: No cervical adenopathy. Skin:     General: Skin is warm. Capillary Refill: Capillary refill takes less than 2 seconds. Coloration: Skin is not jaundiced. Findings: No bruising or erythema. Neurological:      General: No focal deficit present. Mental Status: She is alert and oriented to person, place, and time. Cranial Nerves: No cranial nerve deficit. Motor: No weakness. Psychiatric:         Mood and Affect: Mood normal.         Thought Content:  Thought content normal.       Latest Reference Range & Units 3/7/23 20:13   Color -   YELLOW   Appearance CLEAR   CLEAR   Specific gravity 1.003 - 1.030   1.015   pH (UA) 5.0 - 8.0   8.5 [FreeTextEntry1] : 75 yo female with history of htn, ckd, pre-dm, gout, thalasemia, anemia presents for evaluation of right upper extremity mass.  pt states that it has been present for about 3 years and has been gradually increasing in size.  pt states that she continues to get blood drawn from that area.  pt denies any history of bleeding from the area.  she states that occasionally the area is tender.   (H)   Protein NEG mg/dL TRACE ! Glucose NEG mg/dL Negative   Ketone NEG mg/dL Negative   Blood NEG   Negative   Bilirubin NEG   Negative   Urobilinogen 0.2 - 1.0 EU/dL 0.2   Nitrites NEG   Negative   Leukocyte Esterase NEG   Negative   Epithelial cells FEW /lpf FEW   Mucus NEG /lpf TRACE ! WBC 0 - 4 /hpf 0-4   RBC 0 - 5 /hpf 0-5   Bacteria NEG /hpf Negative   Influenza A Antigen NEG   Negative   Influenza B Antigen NEG   Negative   COVID-19 RAPID TEST  neg   INFLUENZA A+B VIRAL AGS  neg   (H): Data is abnormally high  !: Data is abnormal  Rpt: View report in Results Review for more information  Labs Reviewed   URINALYSIS W/MICROSCOPIC - Abnormal; Notable for the following components:       Result Value    pH (UA) 8.5 (*)     Protein TRACE (*)     Mucus TRACE (*)     All other components within normal limits   COVID-19 RAPID TEST   INFLUENZA A+B VIRAL AGS         No orders to display       Medical Decision Making  Consider the possibility of COVID, flu, nonspecific viral gastroenteritis, food poisoning, postoperative intra-abdominal infections though those seem unlikely, UTI, dehydration, electrolyte abnormalities and other causes. Problems Addressed:  Fever, unspecified fever cause: complicated acute illness or injury with systemic symptoms  Viral illness: complicated acute illness or injury    Amount and/or Complexity of Data Reviewed  Independent Historian: friend  External Data Reviewed: notes. Details: Prior visit for appendicitis  Labs: ordered. Details: Urinalysis unremarkable, COVID and flu negative. Risk  OTC drugs. Prescription drug management. Risk Details: The patient ultiumately did not warrant hospitalization nor any acute surgical intervention, I considered the need for both.   Also considered the need to obtain additional imaging and/or labs beyond what may have been ordered but no additional testing was indicated based on the patient's condition and results of any other testing that may have been performed. Any medications given here and/or prescribed for discharge are documented within the other portions of the note. After any medications or treatments that may have been provided here the patient was improved and symptoms had resolved or become tolerable or no medications or treatments were indicated aced on the patient's condition. The patient was deemed stable safe and appropriate for discharge to home and is instructed to follow-up with his primary care doctor and return for any new or worsening symptoms. Procedures    Medications   acetaminophen (TYLENOL) tablet 325 mg (325 mg Oral Given 3/7/23 2011)   naproxen (NAPROSYN) tablet 500 mg (500 mg Oral Given 3/7/23 2011)          My Medications        START taking these medications        Instructions Each Dose to Equal Morning Noon Evening Bedtime   acetaminophen 325 mg tablet  Commonly known as: TYLENOL    Your last dose was: Your next dose is: Take 2 Tablets by mouth every four (4) hours as needed for Pain. 650 mg                 naproxen 500 mg tablet  Commonly known as: Naprosyn    Your last dose was: Your next dose is: Take 1 Tablet by mouth two (2) times daily (with meals) for 10 days. 500 mg                 ondansetron hcl 4 mg tablet  Commonly known as: Zofran    Your last dose was: Your next dose is: Take 1 Tablet by mouth every eight (8) hours as needed for Nausea or Vomiting. 4 mg                        ASK your doctor about these medications        Instructions Each Dose to Equal Morning Noon Evening Bedtime   docusate sodium 100 mg capsule  Commonly known as: COLACE    Your last dose was: Your next dose is: Take 1 Capsule by mouth two (2) times daily as needed for Constipation.    100 mg                           Where to Get Your Medications        These medications were sent to [facility] [id number] [address] [place].  [address], [facility] [address]      Phone: [phone]   acetaminophen 325 mg tablet  naproxen 500 mg tablet  ondansetron hcl 4 mg tablet         Encounter Diagnoses     ICD-10-CM ICD-9-CM   1. Fever, unspecified fever cause  R50.9 780.60   2.  Viral illness  B34.9 079.99       Follow-up Information       Follow up With Specialties Details Why Contact Info    B[facility], Not On File, PA-C Family Medicine  As needed     [facility] Emergency [facility]  As needed, If symptoms worsen Vanesa Castillo [phone]:  Discharged

## 2023-03-20 ENCOUNTER — APPOINTMENT (OUTPATIENT)
Dept: VASCULAR SURGERY | Facility: CLINIC | Age: 78
End: 2023-03-20

## 2023-03-23 ENCOUNTER — OUTPATIENT (OUTPATIENT)
Dept: OUTPATIENT SERVICES | Facility: HOSPITAL | Age: 78
LOS: 1 days | End: 2023-03-23
Payer: MEDICARE

## 2023-03-23 VITALS
TEMPERATURE: 98 F | HEIGHT: 60 IN | DIASTOLIC BLOOD PRESSURE: 68 MMHG | RESPIRATION RATE: 16 BRPM | WEIGHT: 93.92 LBS | HEART RATE: 86 BPM | SYSTOLIC BLOOD PRESSURE: 163 MMHG | OXYGEN SATURATION: 100 %

## 2023-03-23 DIAGNOSIS — I10 ESSENTIAL (PRIMARY) HYPERTENSION: ICD-10-CM

## 2023-03-23 DIAGNOSIS — N18.6 END STAGE RENAL DISEASE: ICD-10-CM

## 2023-03-23 DIAGNOSIS — M10.9 GOUT, UNSPECIFIED: ICD-10-CM

## 2023-03-23 DIAGNOSIS — Z01.818 ENCOUNTER FOR OTHER PREPROCEDURAL EXAMINATION: ICD-10-CM

## 2023-03-23 DIAGNOSIS — Z98.49 CATARACT EXTRACTION STATUS, UNSPECIFIED EYE: Chronic | ICD-10-CM

## 2023-03-23 PROCEDURE — 87641 MR-STAPH DNA AMP PROBE: CPT

## 2023-03-23 PROCEDURE — G0463: CPT

## 2023-03-23 PROCEDURE — 87640 STAPH A DNA AMP PROBE: CPT

## 2023-03-23 RX ORDER — ZINC SULFATE TAB 220 MG (50 MG ZINC EQUIVALENT) 220 (50 ZN) MG
0 TAB ORAL
Qty: 0 | Refills: 0 | DISCHARGE

## 2023-03-23 RX ORDER — BIMATOPROST 0.3 MG/ML
1 SOLUTION/ DROPS OPHTHALMIC
Qty: 0 | Refills: 0 | DISCHARGE

## 2023-03-23 RX ORDER — CALCIUM CARBONATE 500(1250)
0 TABLET ORAL
Qty: 0 | Refills: 0 | DISCHARGE

## 2023-03-23 RX ORDER — CARVEDILOL PHOSPHATE 80 MG/1
1 CAPSULE, EXTENDED RELEASE ORAL
Qty: 0 | Refills: 0 | DISCHARGE

## 2023-03-23 NOTE — H&P PST ADULT - ASSESSMENT
77 y.o female with CKD with Renal Failure (not on Dialysis) now schedule for   Left Radiocephalic Arteriovenous Fistula Creation on 3/30/23 with Dr. Danni SCHNEIDER 2

## 2023-03-23 NOTE — H&P PST ADULT - PRO INTERPRETER NEED 2
First Lung Cancer Screening Recommendation Reminder Letter mailed to patient. Most recent smoking history reviewed. English

## 2023-03-23 NOTE — H&P PST ADULT - NSICDXPROCEDURE_GEN_ALL_CORE_FT
PROCEDURES:  Fusion in opposition, thumb with autogenous graft 23-Mar-2023 14:58:50  Maximilian Granados

## 2023-03-23 NOTE — H&P PST ADULT - NSICDXPASTMEDICALHX_GEN_ALL_CORE_FT
PAST MEDICAL HISTORY:  Anemia     Cataract     Chronic kidney disease (CKD)     Gout     H/O kidney injury     HLD (hyperlipidemia)     HTN (hypertension)

## 2023-03-23 NOTE — H&P PST ADULT - PROBLEM SELECTOR PLAN 1
Pt schedule for Left Radiocephalic Arteriovenous Fistula Creation on 3/30/23 with Dr. Razo     Labs, EKG done by PCP 3/20/23- will f/u with result   Pt was seen by PCP for Medical clearance 3/22/23- will f/u with report  Pt Instructed on use of Mupirocin if nasal swab positive    Pt was  instructed to stop aspirin/ecotrin and all over the counter medication including vitamins and herbal supplements one week prior to surgery   Instructions given on the use of 4% chlorhexidine wash and Pt verbalized understanding of same   Pt Instructed to have nothing by mouth starting midnight day before surgery  Patient is to expect a phone call day before surgery between the hours of 430- 630pm giving arrival time for surgery   Written and verbal preoperative instructions given to patient with understanding verbalized.     Patient today with STOP bang score 0  Low  risk for JOHN

## 2023-03-23 NOTE — H&P PST ADULT - NSWEIGHTCALCTOOLDRUG_GEN_A_CORE
Speech Therapy      Visit Type: Treatment  -  Swallow    Precautions     - Medical precautions:  PPE donned: N95, face shield, gown, gloves  Aerosol generating events or equipment? YES, coughing  AGP room closure room closure required after session. Sign posted on door and staff made aware.      - Oxygen: room air.      - Lines in chart and on patient reviewed; cautions maintained through out session    SUBJECTIVE  \"I gotta get home and tell my brother what he needs to do\"   Patient/family goals: maximize function     OBJECTIVE       Swallowing   No temperature spike noted.  Patient positioning: upright in chair  Pretrial vocal quality: dysphonic  Volitional swallow: present    Consistencies:  Nectar Thick Liquid (teaspoon):    - Amount given:4 oz   - Type: snack   - Oral:  Impaired, suspect premature spillage   - Pharyngeal: impaired, delayed cough and vocal quality change    Esophageal symptoms: not present. No pain associated with swallow.            ASSESSMENT                                                                        • Impairments: swallowing  • Functional Limitations: eating/drinking  • Skilled therapy is required to address these limitations in attempt to maximize the patient's independence.  • The patient was seen on 11S with FREDY Quintana's approval. Patient awake, seated upright in chair. Oral cares provided. Per chart review, patient was accepted to [facility] pending insurance authorization.     Trialed with 4 ounces nectar per tsp with head tilt left. While patient consistently performing postural strategy, he continued to demonstrate SS aspiration as evidenced by wet cough/throat clear. Would recommend continuing NPO with planned repeat video by end of week (in hopes that swelling has reduced). Patient eager to return to po diet.    On this date, the patient educated on plan/recs.    The response to education: Needs reinforcement    Requires SLP Follow Up: Yes    Discharge Recommendations           SLP 
Identified Barriers to Discharge: alternative nutrition   Recommendations for Discharge: SLP: EDDIE       • Rehab Potential: good  • Progress: Progressing toward goals    Patient at end of session:    - location: in chair    - safety measures: alarm system in place/re-engaged and call light within reach    - hand off to: nurse    PLAN  Accepted to [facility]. Video completed 4/2.  Initiate preswallow therapy with small amounts of nectar thick liquid with head tilt left. Would allow ice chips with staff sparingly to combat disuse atrophy. ? Pharyngeal exercises vs allow swelling to reduce to allow more efficient swallow (assess approp exs @ next video). Repeat video end of week of 4/5  if seeing progress bedside. May need OP speech therapy. Surgery 3/30 for right throat abscess, still with penrose drain. Brief intubation for surgery. See hosp course and/or ENT notes.         Frequency: M-F preswallow (4/6)    Interventions:  Patient/family education and dysphagia therapy    Plan/Goal Agreement:  Patient agrees with goals and treatment plan    RECOMMENDATIONS     -Diet:          *nothing by mouth (cautious, sparingly)    -Medication Administration:         *via feeding tube    -Speech Reviewed Swallow:         *with patient/family, with clinical caregivers and NPO sign posted in room    GOALS  Review Date: 4/12/2021  Short Term Goals:   STG sw: Patient will comfortably consume full thin liquids with diet <10% SS of aspiration and stable medical/ respiratory status.   Goal Progress: HOLD    STG sw: Patient will comfortably consume puree/thin diet <10% SS of aspiration and stable medical/ respiratory status.   Goal Progress: HOLD    STG sw: Patient will tolerate single ice chips or 1/2 tsp amounts of nectar thick liquid with <10% SS aspiration and stable temps/lungs  Goal Progress:     STG sw: Participate in pharyngeal conditioning exercises as appropriate after 2nd video with min cues  Goal Progress    Long Term Goals:   LTG 
sw: Patient will comfortably consume general diet/ thin liquids with <10% SS of aspiration and stable medical/ respiratory status.  Documented in the chart in the following areas: Pain. Assessment.      Therapy procedure time and total treatment time can be found documented on the Time Entry flowsheet  
No
 used

## 2023-03-23 NOTE — H&P PST ADULT - HISTORY OF PRESENT ILLNESS
77 y.o female with PMHx for , Anemia, HDL Gout, HTN, CKD with Renal Failure (not on Dialysis) now schedule for   Left Radiocephalic Arteriovenous Fistula Creation on 3/30/23 with Dr. Razo

## 2023-03-24 LAB
MRSA PCR RESULT.: SIGNIFICANT CHANGE UP
S AUREUS DNA NOSE QL NAA+PROBE: DETECTED

## 2023-03-24 RX ORDER — MUPIROCIN 20 MG/G
1 OINTMENT TOPICAL
Qty: 15 | Refills: 0
Start: 2023-03-24 | End: 2023-03-28

## 2023-03-29 ENCOUNTER — TRANSCRIPTION ENCOUNTER (OUTPATIENT)
Age: 78
End: 2023-03-29

## 2023-03-29 RX ORDER — POVIDONE-IODINE 5 %
1 AEROSOL (ML) TOPICAL ONCE
Refills: 0 | Status: COMPLETED | OUTPATIENT
Start: 2023-03-30 | End: 2023-03-30

## 2023-03-30 ENCOUNTER — OUTPATIENT (OUTPATIENT)
Dept: OUTPATIENT SERVICES | Facility: HOSPITAL | Age: 78
LOS: 1 days | End: 2023-03-30
Payer: MEDICARE

## 2023-03-30 ENCOUNTER — TRANSCRIPTION ENCOUNTER (OUTPATIENT)
Age: 78
End: 2023-03-30

## 2023-03-30 ENCOUNTER — APPOINTMENT (OUTPATIENT)
Dept: VASCULAR SURGERY | Facility: HOSPITAL | Age: 78
End: 2023-03-30
Payer: MEDICARE

## 2023-03-30 VITALS
HEIGHT: 60 IN | SYSTOLIC BLOOD PRESSURE: 164 MMHG | HEART RATE: 65 BPM | TEMPERATURE: 99 F | WEIGHT: 93.92 LBS | OXYGEN SATURATION: 100 % | RESPIRATION RATE: 16 BRPM | DIASTOLIC BLOOD PRESSURE: 69 MMHG

## 2023-03-30 VITALS
RESPIRATION RATE: 18 BRPM | SYSTOLIC BLOOD PRESSURE: 172 MMHG | OXYGEN SATURATION: 100 % | HEART RATE: 72 BPM | DIASTOLIC BLOOD PRESSURE: 63 MMHG | TEMPERATURE: 98 F

## 2023-03-30 DIAGNOSIS — N18.6 END STAGE RENAL DISEASE: ICD-10-CM

## 2023-03-30 DIAGNOSIS — Z98.49 CATARACT EXTRACTION STATUS, UNSPECIFIED EYE: Chronic | ICD-10-CM

## 2023-03-30 LAB
POTASSIUM SERPL-MCNC: 5.6 MMOL/L — HIGH (ref 3.5–5.3)
POTASSIUM SERPL-SCNC: 5.6 MMOL/L — HIGH (ref 3.5–5.3)

## 2023-03-30 PROCEDURE — 36820 AV FUSION/FOREARM VEIN: CPT | Mod: LT

## 2023-03-30 PROCEDURE — 84132 ASSAY OF SERUM POTASSIUM: CPT

## 2023-03-30 PROCEDURE — 36415 COLL VENOUS BLD VENIPUNCTURE: CPT

## 2023-03-30 PROCEDURE — 36818 AV FUSE UPPR ARM CEPHALIC: CPT | Mod: LT

## 2023-03-30 PROCEDURE — C1889: CPT

## 2023-03-30 DEVICE — CLIP APPLIER COVIDIEN SURGICLIP III 9" SM: Type: IMPLANTABLE DEVICE | Status: FUNCTIONAL

## 2023-03-30 DEVICE — CLIP APPLIER COVIDIEN SURGICLIP II 9.75" MEDIUM: Type: IMPLANTABLE DEVICE | Status: FUNCTIONAL

## 2023-03-30 RX ORDER — TRAMADOL HYDROCHLORIDE 50 MG/1
1 TABLET ORAL
Qty: 12 | Refills: 0
Start: 2023-03-30 | End: 2023-04-01

## 2023-03-30 RX ORDER — SODIUM CHLORIDE 9 MG/ML
1000 INJECTION, SOLUTION INTRAVENOUS
Refills: 0 | Status: DISCONTINUED | OUTPATIENT
Start: 2023-03-30 | End: 2023-03-30

## 2023-03-30 RX ORDER — SODIUM CHLORIDE 9 MG/ML
3 INJECTION INTRAMUSCULAR; INTRAVENOUS; SUBCUTANEOUS EVERY 8 HOURS
Refills: 0 | Status: DISCONTINUED | OUTPATIENT
Start: 2023-03-30 | End: 2023-03-30

## 2023-03-30 RX ORDER — CHLORHEXIDINE GLUCONATE 213 G/1000ML
1 SOLUTION TOPICAL
Refills: 0 | Status: DISCONTINUED | OUTPATIENT
Start: 2023-03-30 | End: 2023-03-30

## 2023-03-30 RX ORDER — ONDANSETRON 8 MG/1
4 TABLET, FILM COATED ORAL ONCE
Refills: 0 | Status: DISCONTINUED | OUTPATIENT
Start: 2023-03-30 | End: 2023-03-30

## 2023-03-30 RX ORDER — ACETAMINOPHEN 500 MG
650 TABLET ORAL ONCE
Refills: 0 | Status: DISCONTINUED | OUTPATIENT
Start: 2023-03-30 | End: 2023-03-30

## 2023-03-30 RX ORDER — ONDANSETRON 8 MG/1
4 TABLET, FILM COATED ORAL ONCE
Refills: 0 | Status: DISCONTINUED | OUTPATIENT
Start: 2023-03-30 | End: 2023-04-13

## 2023-03-30 RX ORDER — ACETAMINOPHEN 500 MG
650 TABLET ORAL ONCE
Refills: 0 | Status: DISCONTINUED | OUTPATIENT
Start: 2023-03-30 | End: 2023-04-13

## 2023-03-30 RX ORDER — FENTANYL CITRATE 50 UG/ML
25 INJECTION INTRAVENOUS
Refills: 0 | Status: DISCONTINUED | OUTPATIENT
Start: 2023-03-30 | End: 2023-03-30

## 2023-03-30 RX ORDER — FENTANYL CITRATE 50 UG/ML
50 INJECTION INTRAVENOUS
Refills: 0 | Status: DISCONTINUED | OUTPATIENT
Start: 2023-03-30 | End: 2023-03-30

## 2023-03-30 RX ADMIN — Medication 1 APPLICATION(S): at 08:37

## 2023-03-30 RX ADMIN — SODIUM CHLORIDE 3 MILLILITER(S): 9 INJECTION INTRAMUSCULAR; INTRAVENOUS; SUBCUTANEOUS at 08:37

## 2023-03-30 RX ADMIN — CHLORHEXIDINE GLUCONATE 1 APPLICATION(S): 213 SOLUTION TOPICAL at 08:38

## 2023-03-30 NOTE — ASU DISCHARGE PLAN (ADULT/PEDIATRIC) - CARE PROVIDER_API CALL
Bradley Razo)  Vascular Surgery  [address], [address]  [place] [address]  Phone: [phone]  Fax: [phone]  Follow Up Time: 2 weeks

## 2023-03-30 NOTE — ASU PATIENT PROFILE, ADULT - FALL HARM RISK - UNIVERSAL INTERVENTIONS
Bed in lowest position, wheels locked, appropriate side rails in place/Call bell, personal items and telephone in reach/Instruct patient to call for assistance before getting out of bed or chair/Non-slip footwear when patient is out of bed/Flushing to call system/Purposeful Proactive Rounding/Room/bathroom lighting operational, light cord in reach

## 2023-03-30 NOTE — ASU PATIENT PROFILE, ADULT - MEDICATION HERBAL REMEDIES, PROFILE
Patient here for IV Rocephin series. Patient educated on IV therapy area and brochure given. Patient instructed on needing to set up infusion times through February 2nd and appointment times should be roughly 24 hrs apart. Patient verbalizes understanding.    no

## 2023-03-30 NOTE — ASU PATIENT PROFILE, ADULT - PREOP PAIN SCORE
Plan for skin graft tomorrow  Discuss with Dr Carnes -planning skin graft tomorrow  cont IV abx  24 hr post switch to po levaquin for a week or so 0

## 2023-03-30 NOTE — ASU DISCHARGE PLAN (ADULT/PEDIATRIC) - FOLLOW UP APPOINTMENTS
911 or nearest emergency room/[facility], Same Day Surgery 911 or nearest emergency room/911

## 2023-03-30 NOTE — ASU DISCHARGE PLAN (ADULT/PEDIATRIC) - ASU DC SPECIAL INSTRUCTIONSFT
Please take over the counter Tylenol as needed for pain.  Please take Tylenol (acetaminophen) 650mg every 6 hours as needed for pain. Do not exceed more than 4000mg Tylenol (acetaminophen) in 24 hours.  For pain not well controlled with Tylenol, please take Tramadol 50mf every 6 hours as needed for more severe pain.     Resume all home medications    Do not use the new AV Fistula for HD yet until cleared to do so by Vascular Surgery team    Please follow up with Dr. Razo in clinic within 2 weeks.     Keep dressing in place for 2 days, then may remove clear outer dressing.

## 2023-03-30 NOTE — ASU DISCHARGE PLAN (ADULT/PEDIATRIC) - C. MAKE IMPORTANT PERSONAL OR BUSINESS DECISIONS
History of Present Illness





- General


Chief complaint: Dental


Stated complaint: INFECTED TOOTH/ CAN'T OPEN JAW


Time Seen by Provider: 02/02/19 20:10


Source: Patient, Family


Mode of Arrival: Ambulatory


Limitations: No limitations





- History of Present Illness


Initial comments: 





32 yo male presents with left upper dental pain with jaw pain.  It hurts to 

open his mouth.  He is able to get about two finger breadth open.  No swelling.

  No ear pain or hearing changes.  He has known dental decay in the tooth of 

concern.  No neck pain or swelling.  He has a dental appointment tomorrow.  No 

voice changes.  


MD complaint: Tooth pain


-: Days(s) (1)


Location: Tooth # (16)


Severity: Moderate


Quality: Aching


Consistency: Constant


Improves with: None


Worsens with: Eating


Context- Dental: History of dental caries


Context- Ear: Recent illness (Flu)


Associated Symptoms: Fever (early in the week with cough, resolving)





- Related Data


 Previous Rx's











 Medication  Instructions  Recorded


 


Amoxicillin 500Mg Capsule [Amoxil] 500 mg PO TID #30 tab 02/02/19


 


Naproxen [Naprosyn] 500 mg PO Q12H #20 tab. 02/02/19











 Allergies











Allergy/AdvReac Type Severity Reaction Status Date / Time


 


No Known Drug Allergies Allergy   Verified 02/02/19 20:41














Review of Systems


Constitutional: Reports: Fever.  Denies: Chills, Malaise, Weakness


Eyes: Denies: Eye discharge


ENT: Reports: Dental pain.  Denies: Congestion, Ear pain, Epistaxis, Throat pain


Respiratory: Denies: Cough


Cardiovascular: Denies: Chest pain, Syncope


Endocrine: Denies: Fatigue


Gastrointestinal: Denies: Abdominal pain, Diarrhea, Nausea, Vomiting


Genitourinary: Denies: Dysuria, Frequency, Hematuria


Musculoskeletal: Denies: Arthralgia, Back pain, Myalgia


Skin: Denies: Bruising, Change in color, Rash


Neurological: Denies: Headache


Psychiatric: Denies: Anxiety


Hematological/Lymphatic: Denies: Easy bleeding, Easy bruising





Physical Exam





- General


General Appearance: Alert, Oriented x3, Cooperative, No acute distress


Limitations: No limitations





- Head


Head exam: Atraumatic, Normal inspection





- Eye


Eye exam: Normal appearance, PERRL.  negative: Conjunctival injection, 

Periorbital swelling, Periorbital tenderness





- ENT


ENT exam: Normal exam, Mucous membranes moist, Normal orophraynx, TM's normal 

bilaterally


Ear exam: Normal external inspection


Nasal Exam: Normal inspection


Mouth exam: Normal external inspection, Tongue normal.  negative: Drooling, 

Muffled voice, Tongue elevation, Trismus


Teeth exam: Dental caries, Dental tenderness # (16), Fractured tooth #, Other (

No abscess, he is able to easily open the jaw for exmination to 2 fingerbreadths

, )


Throat exam: Normal inspection.  negative: Tonsillar erythema, Tonsillar exudate





- Neck


Neck exam: Normal inspection, Full ROM.  negative: Tenderness





- Respiratory


Respiratory exam: Normal lung sounds bilaterally.  negative: Respiratory 

distress





- Cardiovascular


Cardiovascular Exam: Regular rate, Normal rhythm, Normal heart sounds





- Neurological


Neurological exam: Alert, Oriented X3





- Psychiatric


Psychiatric exam: Normal affect, Normal mood





- Skin


Skin exam: Dry, Intact, Normal color, Warm





Course





- Reevaluation(s)


Reevaluation #1: 


No abscess, significant swelling, or trismus, clear voice, no tenderness of the 

neck or submandibular  area, no abscess


02/02/19 20:45








Disposition


Disposition: Discharge


Clinical Impression: 


 Dental infection





Disposition: Home, Self-Care


Condition: (1) Good


Instructions:  Toothache (ED)


Additional Instructions: 


Follow up with your dentist as scheduled


Return for a recheck if you have any worsening symptoms


Take the antibiotics as directed


Prescriptions: 


Amoxicillin 500Mg Capsule [Amoxil] 500 mg PO TID #30 tab


Naproxen [Naprosyn] 500 mg PO Q12H #20 tab.dr


Forms:  Patient Portal Access


Time of Disposition: 20:47





Quality





- Quality Measures


Quality Measures: N/A





- Blood Pressure Screening


Does Patient Have Any of the Following: No


Blood Pressure Classification: Normal BP Reading


Systolic Measurement: 116


Diastolic Measurement: 76


Screening for High Blood Pressure: < Normal BP, F/U Not Required > [] Statement Selected

## 2023-03-30 NOTE — ASU DISCHARGE PLAN (ADULT/PEDIATRIC) - DISCHARGE TO
Fax from [address] [place] [address] Rd w/ referral to Dr Roddy Goldberg    [id number]  -Dr Nadeem Cohen
Home

## 2023-03-30 NOTE — ASU DISCHARGE PLAN (ADULT/PEDIATRIC) - NS MD DC FALL RISK RISK
For information on Fall & Injury Prevention, visit: [email or web address] OR  [email or web address] OR  https://www.cdc.gov/steadi/patient.html

## 2023-04-17 ENCOUNTER — APPOINTMENT (OUTPATIENT)
Dept: VASCULAR SURGERY | Facility: CLINIC | Age: 78
End: 2023-04-17
Payer: MEDICARE

## 2023-04-17 PROCEDURE — 99024 POSTOP FOLLOW-UP VISIT: CPT

## 2023-04-17 NOTE — REASON FOR VISIT
[de-identified] : 3/30/2023 [de-identified] : creation of left radiocephalic fistula [de-identified] : 77 year old female with ESRD not currently on dialysis presents for post-op visit.\par Incision is clean, dry, and intact.\par Will schedule maturation fistulogram

## 2023-04-18 PROBLEM — N18.9 CHRONIC KIDNEY DISEASE, UNSPECIFIED: Chronic | Status: ACTIVE | Noted: 2023-03-23

## 2023-04-18 PROBLEM — M10.9 GOUT, UNSPECIFIED: Chronic | Status: ACTIVE | Noted: 2023-03-23

## 2023-05-03 ENCOUNTER — RESULT REVIEW (OUTPATIENT)
Age: 78
End: 2023-05-03

## 2023-05-03 ENCOUNTER — APPOINTMENT (OUTPATIENT)
Dept: ENDOVASCULAR SURGERY | Facility: CLINIC | Age: 78
End: 2023-05-03
Payer: MEDICARE

## 2023-05-03 VITALS
SYSTOLIC BLOOD PRESSURE: 167 MMHG | WEIGHT: 89 LBS | RESPIRATION RATE: 16 BRPM | HEART RATE: 67 BPM | BODY MASS INDEX: 17.47 KG/M2 | TEMPERATURE: 97.9 F | OXYGEN SATURATION: 100 % | DIASTOLIC BLOOD PRESSURE: 71 MMHG | HEIGHT: 60 IN

## 2023-05-03 DIAGNOSIS — T82.898A OTHER SPECIFIED COMPLICATION OF VASCULAR PROSTHETIC DEVICES, IMPLANTS AND GRAFTS, INITIAL ENCOUNTER: ICD-10-CM

## 2023-05-03 DIAGNOSIS — N18.6 END STAGE RENAL DISEASE: ICD-10-CM

## 2023-05-03 PROCEDURE — 76937 US GUIDE VASCULAR ACCESS: CPT

## 2023-05-03 PROCEDURE — 36902Z: CUSTOM | Mod: 58

## 2023-05-03 RX ORDER — SEVELAMER CARBONATE 800 MG/1
800 TABLET, FILM COATED ORAL
Qty: 33 | Refills: 0 | Status: DISCONTINUED | COMMUNITY
Start: 2022-03-21 | End: 2023-05-03

## 2023-05-03 RX ORDER — HYDRALAZINE HYDROCHLORIDE 50 MG/1
50 TABLET ORAL
Qty: 87 | Refills: 0 | Status: DISCONTINUED | COMMUNITY
Start: 2022-03-21 | End: 2023-05-03

## 2023-05-09 NOTE — PAST MEDICAL HISTORY
[Increasing age ( >40 years old)] : Increasing age ( >40 years old) [No therapy indicated for cases scheduled for less than one hour] : No therapy indicated for cases scheduled for less than one hour. [FreeTextEntry1] : Malignant Hyperthermia Screening Tool and Risk of Bleeding Assessment\par \par Ms. RADHA POLLACK denies family history of unexpected death following Anesthesia or Exercise.\par Denies Family history of Malignant Hyperthermia, Muscle or Neuromuscular disorder and High Temperature following exercise.\par \par Ms. RADHA POLLACK denies history of Muscle Spasm, Dark or Chocolate - Colored urine and Unanticipated fever immediately following anesthesia or serious exercise. \par Ms. POLLACK also denies bleeding tendencies/ Risks of Bleeding.\par

## 2023-05-09 NOTE — HISTORY OF PRESENT ILLNESS
[] : left radiocephalic fistula [FreeTextEntry1] : 3/30/2023 Dr. Razo [FreeTextEntry5] : yesterday 8pm [FreeTextEntry6] : Dr. Osborne

## 2023-05-17 ENCOUNTER — APPOINTMENT (OUTPATIENT)
Dept: ENDOVASCULAR SURGERY | Facility: CLINIC | Age: 78
End: 2023-05-17

## 2023-05-20 NOTE — PROGRESS NOTE ADULT - SUBJECTIVE AND OBJECTIVE BOX
Subjective: Patient seen and examined. No new events except as noted.   complaining of constipation     REVIEW OF SYSTEMS:    CONSTITUTIONAL: + weakness, fevers or chills  EYES/ENT: No visual changes;  No vertigo or throat pain   NECK: No pain or stiffness  RESPIRATORY: No cough, wheezing, hemoptysis; No shortness of breath  CARDIOVASCULAR: No chest pain or palpitations  GASTROINTESTINAL: + abdomina pain, no epigastric pain. No nausea, vomiting, or hematemesis; No diarrhea or constipation. No melena or hematochezia.  GENITOURINARY: No dysuria, frequency or hematuria  NEUROLOGICAL: No numbness or weakness  SKIN: No itching, burning, rashes, or lesions   All other review of systems is negative unless indicated above.    MEDICATIONS:  MEDICATIONS  (STANDING):  allopurinol 100 milliGRAM(s) Oral daily  artificial  tears Solution 1 Drop(s) Both EYES four times a day  bisacodyl 5 milliGRAM(s) Oral at bedtime  gabapentin Solution 50 milliGRAM(s) Oral two times a day  heparin   Injectable 5000 Unit(s) SubCutaneous every 8 hours  latanoprost 0.005% Ophthalmic Solution 1 Drop(s) Both EYES at bedtime  levothyroxine 25 MICROGram(s) Oral daily  polyethylene glycol 3350 17 Gram(s) Oral two times a day  sevelamer carbonate 800 milliGRAM(s) Oral three times a day with meals  simvastatin 40 milliGRAM(s) Oral at bedtime  sodium bicarbonate 650 milliGRAM(s) Oral two times a day  sodium chloride 0.45%. 250 milliLiter(s) (250 mL/Hr) IV Continuous <Continuous>  sodium chloride 0.9% Bolus 1000 milliLiter(s) IV Bolus once  sodium zirconium cyclosilicate 5 Gram(s) Oral once      PHYSICAL EXAM:  T(C): 36.8 (02-15-22 @ 08:18), Max: 37.1 (02-14-22 @ 13:25)  HR: 73 (02-15-22 @ 08:18) (60 - 87)  BP: 181/77 (02-15-22 @ 08:18) (154/71 - 181/77)  RR: 18 (02-15-22 @ 08:18) (18 - 18)  SpO2: 97% (02-15-22 @ 08:18) (96% - 98%)  Wt(kg): --  I&O's Summary    14 Feb 2022 07:01  -  15 Feb 2022 07:00  --------------------------------------------------------  IN: 0 mL / OUT: 950 mL / NET: -950 mL    15 Feb 2022 07:01  -  15 Feb 2022 11:49  --------------------------------------------------------  IN: 250 mL / OUT: 0 mL / NET: 250 mL      Appearance: NAD	  HEENT: Normal oral mucosa, PERRL, EOMI	  Lymphatic: No lymphadenopathy , no edema  Cardiovascular: Normal S1 S2, No JVD, No murmurs , Peripheral pulses palpable 2+ bilaterally  Respiratory: Lungs clear to auscultation, normal effort 	  Gastrointestinal:  Soft, Non-tender, + BS	  Skin: No rashes, No ecchymoses, No cyanosis, warm to touch  Neuro: alert oriented x 3 attention comprehension are fair.  Able to name, repeat.   EOmi fundi not visualized   no nystagmus VFF to confrontation  Tongue is midline  Palate elevates symmetrically   Moving all 4 ext spontaneously no drift appreciated  Gait not assessed.   Ext: No edema      LABS:    CARDIAC MARKERS:                          8.1    10.47 )-----------( 297      ( 15 Feb 2022 05:59 )             26.8     02-15    140  |  105  |  50<H>  ----------------------------<  92  5.4<H>   |  21<L>  |  2.09<H>    Ca    8.8      15 Feb 2022 05:59      proBNP:   Lipid Profile:   HgA1c:   TSH:     TELEMETRY: 	    ECG:  	  RADIOLOGY:   DIAGNOSTIC TESTING:  [ ] Echocardiogram:  [ ]  Catheterization:  [ ] Stress Test:    OTHER: 	 No cyanosis, no pallor, no jaundice, no rash

## 2023-06-22 NOTE — H&P ADULT - PROBLEM SELECTOR PLAN 3
Spoke to Ms. Carter    CONFIRMED procedure arrival time of 0515 6/26    Reiterated instructions including:  -Directions to check in desk  -NPO after midnight night prior to procedure  -High importance of HOLDING Metformin day of procedure, Metoprolol after 6/22  -Pre-procedure LABS done, no alerts  -Confirmed absence or presence of implanted device/stimulator- none present  -Confirmed no fever, cough, or shortness of breath in the past 30 days  -Confirmed no redness, rash, irritation, or yeast infection to groin area.     Patient verbalized understanding of above and appreciated the call.    
renal following  place on renal diet, bicarb

## 2023-07-10 ENCOUNTER — APPOINTMENT (OUTPATIENT)
Dept: VASCULAR SURGERY | Facility: CLINIC | Age: 78
End: 2023-07-10
Payer: MEDICARE

## 2023-07-10 PROCEDURE — 99214 OFFICE O/P EST MOD 30 MIN: CPT

## 2023-07-10 PROCEDURE — 93990 DOPPLER FLOW TESTING: CPT

## 2023-07-10 NOTE — ASSESSMENT
[FreeTextEntry1] : Patient with renal failure on dialysis.  Left radiocephalic fistula with inadequate flow on duplex.  Difficulty with cannulation.  We will schedule for fistulogram and angioplasty.

## 2023-07-11 RX ORDER — ERGOCALCIFEROL 1.25 MG/1
1.25 MG CAPSULE, LIQUID FILLED ORAL
Qty: 12 | Refills: 0 | Status: DISCONTINUED | COMMUNITY
Start: 2023-05-19

## 2023-07-11 RX ORDER — CALCIUM ACETATE 667 MG
667 TABLET ORAL
Qty: 270 | Refills: 0 | Status: DISCONTINUED | COMMUNITY
Start: 2023-06-29

## 2023-07-12 ENCOUNTER — APPOINTMENT (OUTPATIENT)
Dept: ENDOVASCULAR SURGERY | Facility: CLINIC | Age: 78
End: 2023-07-12
Payer: MEDICARE

## 2023-07-12 ENCOUNTER — RESULT REVIEW (OUTPATIENT)
Age: 78
End: 2023-07-12

## 2023-07-12 VITALS
HEART RATE: 61 BPM | TEMPERATURE: 98.6 F | SYSTOLIC BLOOD PRESSURE: 134 MMHG | WEIGHT: 90.39 LBS | BODY MASS INDEX: 17.75 KG/M2 | DIASTOLIC BLOOD PRESSURE: 71 MMHG | OXYGEN SATURATION: 97 % | HEIGHT: 60 IN | RESPIRATION RATE: 18 BRPM

## 2023-07-12 PROCEDURE — 36902Z: CUSTOM

## 2023-07-12 RX ORDER — CARVEDILOL 3.12 MG/1
3.12 TABLET, FILM COATED ORAL
Qty: 180 | Refills: 0 | Status: DISCONTINUED | COMMUNITY
Start: 2022-04-07 | End: 2023-07-12

## 2023-07-12 RX ORDER — SODIUM BICARBONATE 650 MG/1
TABLET ORAL
Refills: 0 | Status: DISCONTINUED | COMMUNITY
End: 2023-07-12

## 2023-07-12 RX ORDER — LEVOTHYROXINE SODIUM 0.03 MG/1
25 TABLET ORAL
Qty: 90 | Refills: 0 | Status: DISCONTINUED | COMMUNITY
Start: 2022-03-31 | End: 2023-07-12

## 2023-07-12 RX ORDER — ALLOPURINOL 100 MG/1
100 TABLET ORAL
Qty: 90 | Refills: 0 | Status: DISCONTINUED | COMMUNITY
Start: 2022-03-28 | End: 2023-07-12

## 2023-07-14 NOTE — HISTORY OF PRESENT ILLNESS
[FreeTextEntry1] : 3/30/2023 Dr. Razo [FreeTextEntry4] : this morning [FreeTextEntry5] : today 430am  [FreeTextEntry6] : Dr. Marcial

## 2023-07-14 NOTE — ASSESSMENT
[FreeTextEntry1] : Patient with renal failure on dialysis.  Left radiocephalic fistula with inadequate flow on duplex.  Difficulty with cannulation.  Plan for fistulogram and angioplasty.

## 2023-07-14 NOTE — PAST MEDICAL HISTORY
[FreeTextEntry1] : Malignant Hyperthermia Screening Tool and Risk of Bleeding Assessment\par \par Ms. RADHA POLLACK denies family history of unexpected death following Anesthesia or Exercise.\par Denies family history of Malignant Hyperthermia, Muscle or Neuromuscular disorder and High Temperature following exercise.\par \par Ms. RADHA POLLACK denies history of Muscle Spasm, Dark or Chocolate - Colored urine and Unanticipated fever immediately following anesthesia or serious exercise.\par Ms. POLLACK also denies bleeding tendencies / Risk of Bleeding.\par

## 2023-07-25 ENCOUNTER — INPATIENT (INPATIENT)
Facility: HOSPITAL | Age: 78
LOS: 3 days | Discharge: HOME | DRG: 377 | End: 2023-07-29
Attending: STUDENT IN AN ORGANIZED HEALTH CARE EDUCATION/TRAINING PROGRAM | Admitting: STUDENT IN AN ORGANIZED HEALTH CARE EDUCATION/TRAINING PROGRAM
Payer: MEDICARE

## 2023-07-25 VITALS
RESPIRATION RATE: 20 BRPM | OXYGEN SATURATION: 98 % | TEMPERATURE: 99 F | SYSTOLIC BLOOD PRESSURE: 144 MMHG | HEART RATE: 63 BPM | DIASTOLIC BLOOD PRESSURE: 61 MMHG | WEIGHT: 92.59 LBS

## 2023-07-25 DIAGNOSIS — Z98.49 CATARACT EXTRACTION STATUS, UNSPECIFIED EYE: Chronic | ICD-10-CM

## 2023-07-25 PROCEDURE — 99285 EMERGENCY DEPT VISIT HI MDM: CPT

## 2023-07-26 DIAGNOSIS — E03.9 HYPOTHYROIDISM, UNSPECIFIED: ICD-10-CM

## 2023-07-26 DIAGNOSIS — Z29.9 ENCOUNTER FOR PROPHYLACTIC MEASURES, UNSPECIFIED: ICD-10-CM

## 2023-07-26 DIAGNOSIS — K92.2 GASTROINTESTINAL HEMORRHAGE, UNSPECIFIED: ICD-10-CM

## 2023-07-26 DIAGNOSIS — I10 ESSENTIAL (PRIMARY) HYPERTENSION: ICD-10-CM

## 2023-07-26 DIAGNOSIS — N18.6 END STAGE RENAL DISEASE: ICD-10-CM

## 2023-07-26 DIAGNOSIS — M10.9 GOUT, UNSPECIFIED: ICD-10-CM

## 2023-07-26 DIAGNOSIS — E78.5 HYPERLIPIDEMIA, UNSPECIFIED: ICD-10-CM

## 2023-07-26 DIAGNOSIS — D64.9 ANEMIA, UNSPECIFIED: ICD-10-CM

## 2023-07-26 DIAGNOSIS — R42 DIZZINESS AND GIDDINESS: ICD-10-CM

## 2023-07-26 LAB
ALBUMIN SERPL ELPH-MCNC: 4.6 G/DL — SIGNIFICANT CHANGE UP (ref 3.3–5)
ALP SERPL-CCNC: 52 U/L — SIGNIFICANT CHANGE UP (ref 40–120)
ALT FLD-CCNC: 12 U/L — SIGNIFICANT CHANGE UP (ref 10–45)
ANION GAP SERPL CALC-SCNC: 16 MMOL/L — SIGNIFICANT CHANGE UP (ref 5–17)
APTT BLD: 29.8 SEC — SIGNIFICANT CHANGE UP (ref 24.5–35.6)
AST SERPL-CCNC: 20 U/L — SIGNIFICANT CHANGE UP (ref 10–40)
BASOPHILS # BLD AUTO: 0.02 K/UL — SIGNIFICANT CHANGE UP (ref 0–0.2)
BASOPHILS NFR BLD AUTO: 0.4 % — SIGNIFICANT CHANGE UP (ref 0–2)
BILIRUB SERPL-MCNC: 0.2 MG/DL — SIGNIFICANT CHANGE UP (ref 0.2–1.2)
BLD GP AB SCN SERPL QL: NEGATIVE — SIGNIFICANT CHANGE UP
BUN SERPL-MCNC: 31 MG/DL — HIGH (ref 7–23)
CALCIUM SERPL-MCNC: 10.1 MG/DL — SIGNIFICANT CHANGE UP (ref 8.4–10.5)
CHLORIDE SERPL-SCNC: 92 MMOL/L — LOW (ref 96–108)
CO2 SERPL-SCNC: 28 MMOL/L — SIGNIFICANT CHANGE UP (ref 22–31)
CREAT SERPL-MCNC: 5.5 MG/DL — HIGH (ref 0.5–1.3)
EGFR: 8 ML/MIN/1.73M2 — LOW
EOSINOPHIL # BLD AUTO: 0.11 K/UL — SIGNIFICANT CHANGE UP (ref 0–0.5)
EOSINOPHIL NFR BLD AUTO: 2.2 % — SIGNIFICANT CHANGE UP (ref 0–6)
GLUCOSE SERPL-MCNC: 132 MG/DL — HIGH (ref 70–99)
HCT VFR BLD CALC: 27.9 % — LOW (ref 34.5–45)
HGB BLD-MCNC: 8.1 G/DL — LOW (ref 11.5–15.5)
IMM GRANULOCYTES NFR BLD AUTO: 0.8 % — SIGNIFICANT CHANGE UP (ref 0–0.9)
INR BLD: 0.83 RATIO — LOW (ref 0.85–1.18)
LYMPHOCYTES # BLD AUTO: 1.33 K/UL — SIGNIFICANT CHANGE UP (ref 1–3.3)
LYMPHOCYTES # BLD AUTO: 26.5 % — SIGNIFICANT CHANGE UP (ref 13–44)
MCHC RBC-ENTMCNC: 27.6 PG — SIGNIFICANT CHANGE UP (ref 27–34)
MCHC RBC-ENTMCNC: 29 GM/DL — LOW (ref 32–36)
MCV RBC AUTO: 94.9 FL — SIGNIFICANT CHANGE UP (ref 80–100)
MONOCYTES # BLD AUTO: 0.72 K/UL — SIGNIFICANT CHANGE UP (ref 0–0.9)
MONOCYTES NFR BLD AUTO: 14.4 % — HIGH (ref 2–14)
NEUTROPHILS # BLD AUTO: 2.79 K/UL — SIGNIFICANT CHANGE UP (ref 1.8–7.4)
NEUTROPHILS NFR BLD AUTO: 55.7 % — SIGNIFICANT CHANGE UP (ref 43–77)
NRBC # BLD: 2 /100 WBCS — HIGH (ref 0–0)
OB PNL STL: NEGATIVE — SIGNIFICANT CHANGE UP
PLATELET # BLD AUTO: 166 K/UL — SIGNIFICANT CHANGE UP (ref 150–400)
POTASSIUM SERPL-MCNC: 3.8 MMOL/L — SIGNIFICANT CHANGE UP (ref 3.5–5.3)
POTASSIUM SERPL-SCNC: 3.8 MMOL/L — SIGNIFICANT CHANGE UP (ref 3.5–5.3)
PROT SERPL-MCNC: 7.8 G/DL — SIGNIFICANT CHANGE UP (ref 6–8.3)
PROTHROM AB SERPL-ACNC: 9.6 SEC — SIGNIFICANT CHANGE UP (ref 9.5–13)
RBC # BLD: 2.94 M/UL — LOW (ref 3.8–5.2)
RBC # FLD: 18.7 % — HIGH (ref 10.3–14.5)
RH IG SCN BLD-IMP: POSITIVE — SIGNIFICANT CHANGE UP
SODIUM SERPL-SCNC: 136 MMOL/L — SIGNIFICANT CHANGE UP (ref 135–145)
WBC # BLD: 5.01 K/UL — SIGNIFICANT CHANGE UP (ref 3.8–10.5)
WBC # FLD AUTO: 5.01 K/UL — SIGNIFICANT CHANGE UP (ref 3.8–10.5)

## 2023-07-26 PROCEDURE — 74177 CT ABD & PELVIS W/CONTRAST: CPT | Mod: 26

## 2023-07-26 PROCEDURE — 99223 1ST HOSP IP/OBS HIGH 75: CPT

## 2023-07-26 PROCEDURE — 71045 X-RAY EXAM CHEST 1 VIEW: CPT | Mod: 26

## 2023-07-26 RX ORDER — LANOLIN ALCOHOL/MO/W.PET/CERES
3 CREAM (GRAM) TOPICAL AT BEDTIME
Refills: 0 | Status: DISCONTINUED | OUTPATIENT
Start: 2023-07-26 | End: 2023-07-29

## 2023-07-26 RX ORDER — PANTOPRAZOLE SODIUM 20 MG/1
80 TABLET, DELAYED RELEASE ORAL ONCE
Refills: 0 | Status: COMPLETED | OUTPATIENT
Start: 2023-07-26 | End: 2023-07-26

## 2023-07-26 RX ORDER — ONDANSETRON 8 MG/1
4 TABLET, FILM COATED ORAL EVERY 8 HOURS
Refills: 0 | Status: DISCONTINUED | OUTPATIENT
Start: 2023-07-26 | End: 2023-07-29

## 2023-07-26 RX ORDER — CARVEDILOL PHOSPHATE 80 MG/1
6.25 CAPSULE, EXTENDED RELEASE ORAL EVERY 12 HOURS
Refills: 0 | Status: DISCONTINUED | OUTPATIENT
Start: 2023-07-26 | End: 2023-07-29

## 2023-07-26 RX ORDER — PANTOPRAZOLE SODIUM 20 MG/1
40 TABLET, DELAYED RELEASE ORAL EVERY 12 HOURS
Refills: 0 | Status: DISCONTINUED | OUTPATIENT
Start: 2023-07-26 | End: 2023-07-29

## 2023-07-26 RX ORDER — ACETAMINOPHEN 500 MG
650 TABLET ORAL EVERY 6 HOURS
Refills: 0 | Status: DISCONTINUED | OUTPATIENT
Start: 2023-07-26 | End: 2023-07-29

## 2023-07-26 RX ORDER — AMLODIPINE BESYLATE 2.5 MG/1
10 TABLET ORAL DAILY
Refills: 0 | Status: DISCONTINUED | OUTPATIENT
Start: 2023-07-26 | End: 2023-07-29

## 2023-07-26 RX ORDER — LEVOTHYROXINE SODIUM 125 MCG
25 TABLET ORAL DAILY
Refills: 0 | Status: DISCONTINUED | OUTPATIENT
Start: 2023-07-26 | End: 2023-07-29

## 2023-07-26 RX ORDER — ERYTHROPOIETIN 10000 [IU]/ML
20000 INJECTION, SOLUTION INTRAVENOUS; SUBCUTANEOUS
Refills: 0 | Status: DISCONTINUED | OUTPATIENT
Start: 2023-07-26 | End: 2023-07-29

## 2023-07-26 RX ORDER — SIMVASTATIN 20 MG/1
20 TABLET, FILM COATED ORAL AT BEDTIME
Refills: 0 | Status: DISCONTINUED | OUTPATIENT
Start: 2023-07-26 | End: 2023-07-29

## 2023-07-26 RX ORDER — LEVOTHYROXINE SODIUM 125 MCG
1 TABLET ORAL
Qty: 0 | Refills: 0 | DISCHARGE

## 2023-07-26 RX ORDER — TRAMADOL HYDROCHLORIDE 50 MG/1
50 TABLET ORAL EVERY 12 HOURS
Refills: 0 | Status: DISCONTINUED | OUTPATIENT
Start: 2023-07-26 | End: 2023-07-29

## 2023-07-26 RX ORDER — ALLOPURINOL 300 MG
100 TABLET ORAL DAILY
Refills: 0 | Status: DISCONTINUED | OUTPATIENT
Start: 2023-07-26 | End: 2023-07-29

## 2023-07-26 RX ORDER — CALCIUM ACETATE 667 MG
667 TABLET ORAL
Refills: 0 | Status: DISCONTINUED | OUTPATIENT
Start: 2023-07-26 | End: 2023-07-29

## 2023-07-26 RX ORDER — SOD SULF/SODIUM/NAHCO3/KCL/PEG
2000 SOLUTION, RECONSTITUTED, ORAL ORAL ONCE
Refills: 0 | Status: COMPLETED | OUTPATIENT
Start: 2023-07-26 | End: 2023-07-26

## 2023-07-26 RX ORDER — SIMVASTATIN 20 MG/1
1 TABLET, FILM COATED ORAL
Qty: 0 | Refills: 0 | DISCHARGE

## 2023-07-26 RX ORDER — SODIUM BICARBONATE 1 MEQ/ML
650 SYRINGE (ML) INTRAVENOUS THREE TIMES A DAY
Refills: 0 | Status: DISCONTINUED | OUTPATIENT
Start: 2023-07-26 | End: 2023-07-26

## 2023-07-26 RX ORDER — SIMVASTATIN 20 MG/1
40 TABLET, FILM COATED ORAL AT BEDTIME
Refills: 0 | Status: DISCONTINUED | OUTPATIENT
Start: 2023-07-26 | End: 2023-07-26

## 2023-07-26 RX ORDER — ALLOPURINOL 300 MG
1 TABLET ORAL
Qty: 0 | Refills: 0 | DISCHARGE

## 2023-07-26 RX ADMIN — Medication 1 TABLET(S): at 11:34

## 2023-07-26 RX ADMIN — Medication 667 MILLIGRAM(S): at 17:29

## 2023-07-26 RX ADMIN — SIMVASTATIN 20 MILLIGRAM(S): 20 TABLET, FILM COATED ORAL at 21:00

## 2023-07-26 RX ADMIN — PANTOPRAZOLE SODIUM 40 MILLIGRAM(S): 20 TABLET, DELAYED RELEASE ORAL at 17:29

## 2023-07-26 RX ADMIN — PANTOPRAZOLE SODIUM 80 MILLIGRAM(S): 20 TABLET, DELAYED RELEASE ORAL at 03:03

## 2023-07-26 RX ADMIN — Medication 667 MILLIGRAM(S): at 11:34

## 2023-07-26 NOTE — ED PROVIDER NOTE - OBJECTIVE STATEMENT
77-year-old female patient past medical history of end-stage renal disease on dialysis who presents to the emergency department for lightheadedness and dark stools since a couple days ago. Attending Kiki:  77-year-old female patient past medical history of end-stage renal disease on dialysis who presents to the emergency department for lightheadedness and dark stools since a couple days ago. Told had low blood values, no sig sob, no chest pain, no n/v, no diarrhea, has been going to dialysis.

## 2023-07-26 NOTE — CONSULT NOTE ADULT - ASSESSMENT
77 year old female with dark stools, symptomatic anemia, weight loss    1. Dark stools, weight loss. Patient overdue for colonoscopy as well.  -plan for EGD/colonoscopy tomorrow  -CT abdomen w IV contrast     2. ESRD on HD    Advanced care planning forms were discussed. Code status including forceful chest compressions, defibrillation and intubation were discussed. The risks benefits and alternatives to pertinent gastrointestinal procedures and interventions were discussed in detail and all questions were answered. Duration: 15 Minutes.    [facility]  Gilbert Hallman M.D.   [address], [place]  Office: [phone]  
77F PMH ESRD on HD, gout, HTN, HLD, hypothyroidism, presents with several weeks of dark stools and found to have anemia with syncopal episode.    1) ESRD on HD  MWF  HD center [facility]  Left Wrist AVF + thrill  consent in chart  d/c nabicarb  Plan for HD today  trend bmp    2) Anemia in ckd+ gib  f/u gi  epo 20k tiw with hd  trend hgb      Dr Brown  [phone]

## 2023-07-26 NOTE — ED PROVIDER NOTE - ATTENDING CONTRIBUTION TO CARE
MD Swanson:  patient seen and evaluated personally.   I agree with the History & Physical,  Impression & Plan other than what was detailed in my note.  MD Swanson  77-year-old female patient past medical history of end-stage renal disease on dialysis who presents to the emergency department for lightheadedness and dark stools since a couple days ago. Told had low blood values, no sig sob, no chest pain, no n/v, no diarrhea, has been going to dialysis. afebrile vitals stable  non toxic well appearing, NC/AT,  conjunctiva non conjected, sclera anicteric, moist mucous membranes, neck supple, heart sounds, normal, no mrg, lungs cta b/l no wrr, abd soft non distended w/ no tenderness, no visual deformities of extremities, axox3, , normal mood and affect, currently no resp distress, concern fo rgi bleed w/ co morbidities will likely need admission, stablize in ed, check hg, type screen, check ekg and electrolytes. RE eval.

## 2023-07-26 NOTE — ED PROVIDER NOTE - PHYSICAL EXAMINATION
GENERAL: Awake, alert, NAD  HEENT: NC/AT, pale and dry mucous membranes, EOMI  LUNGS: CTAB, no wheezes or crackles   CARDIAC: RRR, no m/r/g  ABDOMEN: Soft, non tender, non distended, no rebound, no guarding  Rectal: No gross bleeding noted on exam.  BACK: No midline spinal tenderness  EXT: No edema, no calf tenderness, no deformities.  NEURO: A&Ox3. Moving all extremities.  SKIN: Warm and dry. No rash.  PSYCH: Normal affect.

## 2023-07-26 NOTE — H&P ADULT - TIME BILLING
Physical exam and review of labs  Discussion of case with patient and patients daughter  Consultation with gastroenterology  History   Medication reconciliation  Consultation with nephrology for HD

## 2023-07-26 NOTE — H&P ADULT - HISTORY OF PRESENT ILLNESS
77F PMH ESRD on M/W/F HD, gout, HTN, HLD, hypothyroidism presents with several weeks of dark stools. Patient reports that she began experiencing dark stools several weeks ago, but denies BRBPR or diarrhea. Denies NSAID use or hematemesis. Patient reports mild weakness which has worsened over the past several days, and she had one syncopal episode over the weekend. As per the daughter at bedside, patient has looked increasingly pale. Patient previously was on iron PO, however this was discontinued for unclear reasons. Regardless, patient has never been seen by a gastroenterologist. Of note, patient also reports unintentional weight loss of 10 lbs over the past 6 months. Denies night sweats, fevers or chills. ROS otherwise stable.   In ED, patient found to have Hgb of 8.1 (baseline 9-10). Now admitted to medicine for evaluation of GI bleed.

## 2023-07-26 NOTE — PATIENT PROFILE ADULT - FALL HARM RISK - HARM RISK INTERVENTIONS
Assistance with ambulation/Assistance OOB with selected safe patient handling equipment/Communicate Risk of Fall with Harm to all staff/Discuss with provider need for PT consult/Monitor gait and stability/Provide patient with walking aids - walker, cane, crutches/Reinforce activity limits and safety measures with patient and family/Sit up slowly, dangle for a short time, stand at bedside before walking/Tailored Fall Risk Interventions/Visual Cue: Yellow wristband and red socks/Bed in lowest position, wheels locked, appropriate side rails in place/Call bell, personal items and telephone in reach/Instruct patient to call for assistance before getting out of bed or chair/Non-slip footwear when patient is out of bed/[place] to call system/Physically safe environment - no spills, clutter or unnecessary equipment/Purposeful Proactive Rounding/Room/bathroom lighting operational, light cord in reach

## 2023-07-26 NOTE — H&P ADULT - NSHPLABSRESULTS_GEN_ALL_CORE
LABS:                       8.1    5.01  )-----------( 166      ( 26 Jul 2023 01:10 )             27.9     136  |  92<L>  |  31<H>  ----------------------------<  132<H>  3.8   |  28  |  5.50<H>    Ca    10.1      26 Jul 2023 01:10    TPro  7.8  /  Alb  4.6  /  TBili  0.2  /  DBili  x   /  AST  20  /  ALT  12  /  AlkPhos  52  07-26    PT/INR - ( 26 Jul 2023 01:10 )   PT: 9.6 sec;   INR: 0.83 ratio         PTT - ( 26 Jul 2023 01:10 )  PTT:29.8 sec  Urinalysis Basic - ( 26 Jul 2023 01:10 )    Color: x / Appearance: x / SG: x / pH: x  Gluc: 132 mg/dL / Ketone: x  / Bili: x / Urobili: x   Blood: x / Protein: x / Nitrite: x   Leuk Esterase: x / RBC: x / WBC x   Sq Epi: x / Non Sq Epi: x / Bacteria: x

## 2023-07-26 NOTE — H&P ADULT - ASSESSMENT
77F PMH ESRD on HD, gout, HTN, HLD, hypothyroidism, presents with several weeks of dark stools and found to have anemia with syncopal episode. Now admitted for evaluation for suspected GI bleed.

## 2023-07-26 NOTE — H&P ADULT - PROBLEM SELECTOR PLAN 4
HD via left arm AVF, normally M/W/F schedule  - Nephrology consulted  - Continue with home calcium acetate   - Continue with home simvastatin 40 mg PO Qd

## 2023-07-26 NOTE — H&P ADULT - NSHPPHYSICALEXAM_GEN_ALL_CORE
.  VITAL SIGNS:  T(C): 36.8 (07-26-23 @ 08:10), Max: 37 (07-25-23 @ 21:20)  T(F): 98.2 (07-26-23 @ 08:10), Max: 98.6 (07-25-23 @ 21:20)  HR: 62 (07-26-23 @ 08:10) (60 - 70)  BP: 132/55 (07-26-23 @ 08:10) (132/55 - 144/61)  BP(mean): 77 (07-26-23 @ 07:26) (77 - 80)  RR: 18 (07-26-23 @ 08:10) (16 - 20)  SpO2: 95% (07-26-23 @ 08:10) (95% - 99%)  Wt(kg): --    PHYSICAL EXAM:    Constitutional: WDWN resting comfortably in bed; NAD  Head: NC/AT  Eyes: PERRL, EOMI, anicteric sclera  ENT: no nasal discharge; uvula midline, no oropharyngeal erythema or exudates; MMM  Neck: supple; no JVD or thyromegaly  Respiratory: CTA B/L; no W/R/R, no retractions  Cardiac: +S1/S2; RRR; no M/R/G; PMI non-displaced  Gastrointestinal: abdomen soft, NT/ND; no rebound or guarding; +BSx4  Back: spine midline, no bony tenderness or step-offs; no CVAT B/L  Extremities: WWP, no clubbing or cyanosis; no peripheral edema  Musculoskeletal: NROM x4; no joint swelling, tenderness or erythema  Vascular: 2+ radial, femoral, DP/PT pulses B/L  Dermatologic: skin warm, dry and intact; no rashes, wounds, or scars  Lymphatic: no submandibular or cervical LAD  Neurologic: AAOx3; CNII-XII grossly intact; no focal deficits  Psychiatric: affect and characteristics of appearance, verbalizations, behaviors are appropriate English

## 2023-07-26 NOTE — ED PROVIDER NOTE - PROGRESS NOTE DETAILS
Patient's hemoglobin 8 at this time.  Normal is around 9-10.  On exam no signs of active GI bleed.  However patient symptomatic with lightheadedness and dyspnea on exertion therefore we will keep in the hospital for GI bleed work-up.

## 2023-07-26 NOTE — CONSULT NOTE ADULT - SUBJECTIVE AND OBJECTIVE BOX
[facility] Consult Note Nephrology - CONSULTATION NOTE      The patient is a 77 year old female with gout, ESRD on HD presenting with anemia on outsied labs. She has also had intermittent black stool and weight loss since June. States she was discontinued off oral iron therapy 6 months ago.   Last colonoscopy ~2014.    The patient denies dysphagia, nausea and vomiting, abdominal pain, diarrhea,  change in bowel habits or NSAID use.  She had an episode of syncope    Renal consult for esrd on hd  last hd monday via left wrist avf  due for hd today  denies nay f/c/n/v/d/c    PAST MEDICAL & SURGICAL HISTORY:  Anemia      HTN (hypertension)      HLD (hyperlipidemia)      H/O kidney injury      Cataract      Gout      Chronic kidney disease (CKD)      H/O cataract extraction        No Known Allergies    Home Medications Reviewed  Hospital Medications:   MEDICATIONS  (STANDING):  allopurinol 100 milliGRAM(s) Oral daily  amLODIPine   Tablet 10 milliGRAM(s) Oral daily  artificial  tears Solution 1 Drop(s) Both EYES daily  calcium acetate 667 milliGRAM(s) Oral three times a day with meals  carvedilol 6.25 milliGRAM(s) Oral every 12 hours  levothyroxine 25 MICROGram(s) Oral daily  multivitamin 1 Tablet(s) Oral daily  pantoprazole  Injectable 40 milliGRAM(s) IV Push every 12 hours  polyethylene glycol/electrolyte Solution 2000 milliLiter(s) Oral once  simvastatin 20 milliGRAM(s) Oral at bedtime  sodium bicarbonate 650 milliGRAM(s) Oral three times a day    SOCIAL HISTORY:  Denies ETOh,Smoking,   FAMILY HISTORY:  FH: HTN (hypertension) (Mother)      REVIEW OF SYSTEMS:  CONSTITUTIONAL: No weakness, fevers or chills  EYES/ENT: No visual changes;  No vertigo or throat pain   NECK: No pain or stiffness  RESPIRATORY: No cough, wheezing, hemoptysis; No shortness of breath  CARDIOVASCULAR: No chest pain or palpitations.  GASTROINTESTINAL: No abdominal or epigastric pain. No nausea, vomiting, or hematemesis; No diarrhea or constipation. No melena or hematochezia.  GENITOURINARY: No dysuria, frequency, foamy urine, urinary urgency, incontinence or hematuria  NEUROLOGICAL: No numbness or weakness  SKIN: No itching, burning, rashes, or lesions   VASCULAR: No bilateral lower extremity edema.   All other review of systems is negative unless indicated above.    VITALS:  T(F): 98.3 (07-26-23 @ 13:22), Max: 98.6 (07-25-23 @ 21:20)  HR: 61 (07-26-23 @ 13:22)  BP: 129/60 (07-26-23 @ 13:22)  RR: 18 (07-26-23 @ 13:22)  SpO2: 95% (07-26-23 @ 13:22)  Wt(kg): --      Weight (kg): 42 (07-25 @ 21:20)  PHYSICAL EXAM:  Constitutional: NAD  HEENT: anicteric sclera, oropharynx clear, MMM  Neck: No JVD  Respiratory: CTAB, no wheezes, rales or rhonchi  Cardiovascular: S1, S2, RRR  Gastrointestinal: BS+, soft, NT/ND  Extremities: No cyanosis or clubbing. No peripheral edema  Neurological: A/O x 3, no focal deficits  Psychiatric: Normal mood, normal affect  : No CVA tenderness. No williamson.   Skin: No rashes  Vascular Access: left wrist avf    LABS:  07-26    136  |  92<L>  |  31<H>  ----------------------------<  132<H>  3.8   |  28  |  5.50<H>    Ca    10.1      26 Jul 2023 01:10    TPro  7.8  /  Alb  4.6  /  TBili  0.2  /  DBili      /  AST  20  /  ALT  12  /  AlkPhos  52  07-26    Creatinine Trend: 5.50 <--                        8.1    5.01  )-----------( 166      ( 26 Jul 2023 01:10 )             27.9     Urine Studies:  Urinalysis Basic - ( 26 Jul 2023 01:10 )    Color:  / Appearance:  / SG:  / pH:   Gluc: 132 mg/dL / Ketone:   / Bili:  / Urobili:    Blood:  / Protein:  / Nitrite:    Leuk Esterase:  / RBC:  / WBC    Sq Epi:  / Non Sq Epi:  / Bacteria:         RADIOLOGY & ADDITIONAL STUDIES:                
Chief Complaint:  Patient is a 77y old  Female who presents with a chief complaint of Anemia (26 Jul 2023 09:52)      Date of service: 07-26-23 @ 13:30    HPI:    The patient is a 77 year old female with gout, ESRD on HD presenting with anemia on outsied labs. She has also had intermittent black stool and weight loss since June. States she was discontinued off oral iron therapy 6 months ago.   Last colonoscopy ~2014.    The patient denies dysphagia, nausea and vomiting, abdominal pain, diarrhea,  change in bowel habits or NSAID use.  She had an episode of syncope      Allergies:  No Known Allergies      Home Medications:    Hospital Medications:  acetaminophen     Tablet .. 650 milliGRAM(s) Oral every 6 hours PRN  allopurinol 100 milliGRAM(s) Oral daily  aluminum hydroxide/magnesium hydroxide/simethicone Suspension 30 milliLiter(s) Oral every 4 hours PRN  amLODIPine   Tablet 10 milliGRAM(s) Oral daily  artificial  tears Solution 1 Drop(s) Both EYES daily  calcium acetate 667 milliGRAM(s) Oral three times a day with meals  carvedilol 6.25 milliGRAM(s) Oral every 12 hours  levothyroxine 25 MICROGram(s) Oral daily  melatonin 3 milliGRAM(s) Oral at bedtime PRN  multivitamin 1 Tablet(s) Oral daily  ondansetron Injectable 4 milliGRAM(s) IV Push every 8 hours PRN  pantoprazole  Injectable 40 milliGRAM(s) IV Push every 12 hours  polyethylene glycol/electrolyte Solution 2000 milliLiter(s) Oral once  simvastatin 20 milliGRAM(s) Oral at bedtime  sodium bicarbonate 650 milliGRAM(s) Oral three times a day  traMADol 50 milliGRAM(s) Oral every 12 hours PRN      PMHX/PSHX:  Anemia    HTN (hypertension)    HLD (hyperlipidemia)    H/O kidney injury    Cataract    Gout    Chronic kidney disease (CKD)    H/O cataract extraction        Family history:  FH: HTN (hypertension) (Mother)        Social History:   Denies ethanol use.  Denies illicit drug use.    ROS:     General:  No wt loss, fevers, chills, night sweats, fatigue,   Eyes:  Good vision, no reported pain  ENT:  No sore throat, pain, runny nose, dysphagia  CV:  No pain, palpitations, hypo/hypertension  Resp:  No dyspnea, cough, tachypnea, wheezing  GI:  See HPI  :  No pain, bleeding, incontinence, nocturia  Muscle:  No pain, weakness  Neuro:  No weakness, tingling, memory problems  Psych:  No fatigue, insomnia, mood problems, depression  Endocrine:  No polyuria, polydipsia, cold/heat intolerance  Heme:  No petechiae, ecchymosis, easy bruisability  Integumentary:  No rash, edema      PHYSICAL EXAM:     GENERAL:  Appears stated age, well-groomed, well-nourished, no distress  HEENT:  NC/AT,  conjunctivae anicteric, clear and pink,   NECK: supple, trachea midline  CHEST:  Full & symmetric excursion, no increased effort, breath sounds clear  HEART:  Regular rhythm, no JVD  ABDOMEN:  Soft, non-tender, non-distended, normoactive bowel sounds,  no masses , no hepatosplenomegaly  EXTREMITIES:  no cyanosis,clubbing or edema  SKIN:  No rash, erythema, or, ecchymoses, no jaundice  NEURO:  Alert, non-focal, no asterixis  PSYCH: Appropriate affect, oriented to place and time  RECTAL: yellow stool      Vital Signs:  Vital Signs Last 24 Hrs  T(C): 36.8 (26 Jul 2023 13:22), Max: 37 (25 Jul 2023 21:20)  T(F): 98.3 (26 Jul 2023 13:22), Max: 98.6 (25 Jul 2023 21:20)  HR: 61 (26 Jul 2023 13:22) (60 - 70)  BP: 129/60 (26 Jul 2023 13:22) (129/60 - 144/61)  BP(mean): 77 (26 Jul 2023 07:26) (77 - 80)  RR: 18 (26 Jul 2023 13:22) (16 - 20)  SpO2: 95% (26 Jul 2023 13:22) (95% - 99%)    Parameters below as of 26 Jul 2023 13:22  Patient On (Oxygen Delivery Method): room air      Daily     Daily     LABS: Labs personally reviewed by me:                        8.1    5.01  )-----------( 166      ( 26 Jul 2023 01:10 )             27.9     07-26    136  |  92<L>  |  31<H>  ----------------------------<  132<H>  3.8   |  28  |  5.50<H>    Ca    10.1      26 Jul 2023 01:10    TPro  7.8  /  Alb  4.6  /  TBili  0.2  /  DBili  x   /  AST  20  /  ALT  12  /  AlkPhos  52  07-26    LIVER FUNCTIONS - ( 26 Jul 2023 01:10 )  Alb: 4.6 g/dL / Pro: 7.8 g/dL / ALK PHOS: 52 U/L / ALT: 12 U/L / AST: 20 U/L / GGT: x           PT/INR - ( 26 Jul 2023 01:10 )   PT: 9.6 sec;   INR: 0.83 ratio         PTT - ( 26 Jul 2023 01:10 )  PTT:29.8 sec  Urinalysis Basic - ( 26 Jul 2023 01:10 )    Color: x / Appearance: x / SG: x / pH: x  Gluc: 132 mg/dL / Ketone: x  / Bili: x / Urobili: x   Blood: x / Protein: x / Nitrite: x   Leuk Esterase: x / RBC: x / WBC x   Sq Epi: x / Non Sq Epi: x / Bacteria: x          Imaging personally reviewed by me:

## 2023-07-26 NOTE — ED PROVIDER NOTE - CLINICAL SUMMARY MEDICAL DECISION MAKING FREE TEXT BOX
77-year-old female patient with history of end-stage renal disease on dialysis who presents to the emergency department for lightheadedness and dyspnea exertion in the setting of dark stools.  Patient was endorsed to us to the emergency department for concerns of GI bleed and anemia.  On exam, abdomen soft nontender, rectal exam with no gross bleeding, and clear breath sounds.  Will evaluate with blood work, EKG and chest x-ray.  Will most likely admit for GI bleed work-up.

## 2023-07-26 NOTE — ED ADULT NURSE NOTE - OBJECTIVE STATEMENT
76 yo female A&OX4 from home daughter bedside c/o 1 weeks dark stools, SOB, and lightheadedness. Patient receives hemodialysis MWF, had blood drawn which revealed anemia. Patient denies syncope, chest pain, abd pain, n/v/d. Denies fevers/chills. Denies palpitations.

## 2023-07-27 LAB
ALBUMIN SERPL ELPH-MCNC: 4.8 G/DL — SIGNIFICANT CHANGE UP (ref 3.3–5)
ALP SERPL-CCNC: 51 U/L — SIGNIFICANT CHANGE UP (ref 40–120)
ALT FLD-CCNC: 14 U/L — SIGNIFICANT CHANGE UP (ref 10–45)
ANION GAP SERPL CALC-SCNC: 19 MMOL/L — HIGH (ref 5–17)
APTT BLD: 29.6 SEC — SIGNIFICANT CHANGE UP (ref 24.5–35.6)
AST SERPL-CCNC: 25 U/L — SIGNIFICANT CHANGE UP (ref 10–40)
BILIRUB SERPL-MCNC: 0.3 MG/DL — SIGNIFICANT CHANGE UP (ref 0.2–1.2)
BUN SERPL-MCNC: 16 MG/DL — SIGNIFICANT CHANGE UP (ref 7–23)
CALCIUM SERPL-MCNC: 9.7 MG/DL — SIGNIFICANT CHANGE UP (ref 8.4–10.5)
CHLORIDE SERPL-SCNC: 102 MMOL/L — SIGNIFICANT CHANGE UP (ref 96–108)
CO2 SERPL-SCNC: 23 MMOL/L — SIGNIFICANT CHANGE UP (ref 22–31)
CREAT SERPL-MCNC: 3.73 MG/DL — HIGH (ref 0.5–1.3)
EGFR: 12 ML/MIN/1.73M2 — LOW
FERRITIN SERPL-MCNC: 3611 NG/ML — HIGH (ref 13–330)
GLUCOSE SERPL-MCNC: 90 MG/DL — SIGNIFICANT CHANGE UP (ref 70–99)
HCT VFR BLD CALC: 30.2 % — LOW (ref 34.5–45)
HGB BLD-MCNC: 8.8 G/DL — LOW (ref 11.5–15.5)
INR BLD: 0.92 RATIO — SIGNIFICANT CHANGE UP (ref 0.85–1.18)
IRON SATN MFR SERPL: 117 UG/DL — SIGNIFICANT CHANGE UP (ref 30–160)
IRON SATN MFR SERPL: 55 % — HIGH (ref 14–50)
MCHC RBC-ENTMCNC: 28.3 PG — SIGNIFICANT CHANGE UP (ref 27–34)
MCHC RBC-ENTMCNC: 29.1 GM/DL — LOW (ref 32–36)
MCV RBC AUTO: 97.1 FL — SIGNIFICANT CHANGE UP (ref 80–100)
NRBC # BLD: 3 /100 WBCS — HIGH (ref 0–0)
PLATELET # BLD AUTO: 191 K/UL — SIGNIFICANT CHANGE UP (ref 150–400)
POTASSIUM SERPL-MCNC: 4.2 MMOL/L — SIGNIFICANT CHANGE UP (ref 3.5–5.3)
POTASSIUM SERPL-SCNC: 4.2 MMOL/L — SIGNIFICANT CHANGE UP (ref 3.5–5.3)
PROT SERPL-MCNC: 8.4 G/DL — HIGH (ref 6–8.3)
PROTHROM AB SERPL-ACNC: 10.1 SEC — SIGNIFICANT CHANGE UP (ref 9.5–13)
RBC # BLD: 3.11 M/UL — LOW (ref 3.8–5.2)
RBC # FLD: 19.5 % — HIGH (ref 10.3–14.5)
SODIUM SERPL-SCNC: 144 MMOL/L — SIGNIFICANT CHANGE UP (ref 135–145)
TIBC SERPL-MCNC: 215 UG/DL — LOW (ref 220–430)
UIBC SERPL-MCNC: 98 UG/DL — LOW (ref 110–370)
WBC # BLD: 6.53 K/UL — SIGNIFICANT CHANGE UP (ref 3.8–10.5)
WBC # FLD AUTO: 6.53 K/UL — SIGNIFICANT CHANGE UP (ref 3.8–10.5)

## 2023-07-27 PROCEDURE — 99232 SBSQ HOSP IP/OBS MODERATE 35: CPT

## 2023-07-27 DEVICE — NET RETRV ROT ROTH 2.5MMX230CM: Type: IMPLANTABLE DEVICE | Status: FUNCTIONAL

## 2023-07-27 RX ORDER — LIDOCAINE HCL 20 MG/ML
4 VIAL (ML) INJECTION ONCE
Refills: 0 | Status: DISCONTINUED | OUTPATIENT
Start: 2023-07-27 | End: 2023-07-29

## 2023-07-27 RX ORDER — CHLORHEXIDINE GLUCONATE 213 G/1000ML
1 SOLUTION TOPICAL
Refills: 0 | Status: DISCONTINUED | OUTPATIENT
Start: 2023-07-27 | End: 2023-07-29

## 2023-07-27 RX ADMIN — SIMVASTATIN 20 MILLIGRAM(S): 20 TABLET, FILM COATED ORAL at 21:29

## 2023-07-27 RX ADMIN — PANTOPRAZOLE SODIUM 40 MILLIGRAM(S): 20 TABLET, DELAYED RELEASE ORAL at 17:40

## 2023-07-27 RX ADMIN — Medication 667 MILLIGRAM(S): at 17:40

## 2023-07-27 RX ADMIN — PANTOPRAZOLE SODIUM 40 MILLIGRAM(S): 20 TABLET, DELAYED RELEASE ORAL at 05:03

## 2023-07-27 RX ADMIN — Medication 2000 MILLILITER(S): at 01:11

## 2023-07-27 RX ADMIN — CARVEDILOL PHOSPHATE 6.25 MILLIGRAM(S): 80 CAPSULE, EXTENDED RELEASE ORAL at 17:39

## 2023-07-27 NOTE — PRE PROCEDURE NOTE - PRE PROCEDURE EVALUATION
Attending Physician:    Dr Saldivar                      Procedure: EGD/colonoscopy     Indication for Procedure: anemia   ________________________________________________________  PAST MEDICAL & SURGICAL HISTORY:  Anemia      HTN (hypertension)      HLD (hyperlipidemia)      H/O kidney injury      Cataract      Gout      Chronic kidney disease (CKD)      H/O cataract extraction        ALLERGIES:  No Known Allergies    HOME MEDICATIONS:  allopurinol 100 mg oral tablet: 1 tab(s) orally once a day  amLODIPine 10 mg oral tablet: 1 tab(s) orally once a day  Aranesp 150 mcg/0.3 mL injectable solution: 0.3 milliliter(s) injectable every 2 weeks  Artificial Tears ophthalmic solution:   calcium acetate 667 mg oral capsule: 3 cap(s) orally 3 times a day (with meals)  colchicine 0.6 mg oral tablet: 1 tab(s) orally once a day  levothyroxine 25 mcg (0.025 mg) oral tablet: 1 tab(s) orally once a day  multivitamin: 1 tab(s) orally once a day  simvastatin 40 mg oral tablet: 1 tab(s) orally once a day (at bedtime)    AICD/PPM: [ ] yes   [ x] no    PERTINENT LAB DATA:                        8.8    6.53  )-----------( 191      ( 27 Jul 2023 07:23 )             30.2     07-27    144  |  102  |  16  ----------------------------<  90  4.2   |  23  |  3.73<H>    Ca    9.7      27 Jul 2023 07:23    TPro  8.4<H>  /  Alb  4.8  /  TBili  0.3  /  DBili  x   /  AST  25  /  ALT  14  /  AlkPhos  51  07-27    PT/INR - ( 27 Jul 2023 07:23 )   PT: 10.1 sec;   INR: 0.92 ratio         PTT - ( 27 Jul 2023 07:23 )  PTT:29.6 sec            PHYSICAL EXAMINATION:    T(C): 36.7  HR: 61  BP: 148/65  RR: 18  SpO2: 99%    Constitutional: NAD    Neck:  No JVD  Respiratory: no respiratory distress   Cardiovascular: RRR  Extremities: No peripheral edema  Neurological: A/O x 3, no focal deficits        COMMENTS:    The patient is a suitable candidate for the planned procedure unless box checked [ ]  No, explain:

## 2023-07-28 ENCOUNTER — TRANSCRIPTION ENCOUNTER (OUTPATIENT)
Age: 78
End: 2023-07-28

## 2023-07-28 LAB
ANION GAP SERPL CALC-SCNC: 14 MMOL/L — SIGNIFICANT CHANGE UP (ref 5–17)
ANION GAP SERPL CALC-SCNC: 16 MMOL/L — SIGNIFICANT CHANGE UP (ref 5–17)
BASOPHILS # BLD AUTO: 0.02 K/UL — SIGNIFICANT CHANGE UP (ref 0–0.2)
BASOPHILS NFR BLD AUTO: 0.4 % — SIGNIFICANT CHANGE UP (ref 0–2)
BUN SERPL-MCNC: 10 MG/DL — SIGNIFICANT CHANGE UP (ref 7–23)
BUN SERPL-MCNC: 30 MG/DL — HIGH (ref 7–23)
CALCIUM SERPL-MCNC: 8.6 MG/DL — SIGNIFICANT CHANGE UP (ref 8.4–10.5)
CALCIUM SERPL-MCNC: 8.7 MG/DL — SIGNIFICANT CHANGE UP (ref 8.4–10.5)
CHLORIDE SERPL-SCNC: 101 MMOL/L — SIGNIFICANT CHANGE UP (ref 96–108)
CHLORIDE SERPL-SCNC: 99 MMOL/L — SIGNIFICANT CHANGE UP (ref 96–108)
CO2 SERPL-SCNC: 23 MMOL/L — SIGNIFICANT CHANGE UP (ref 22–31)
CO2 SERPL-SCNC: 25 MMOL/L — SIGNIFICANT CHANGE UP (ref 22–31)
CREAT SERPL-MCNC: 2.97 MG/DL — HIGH (ref 0.5–1.3)
CREAT SERPL-MCNC: 6 MG/DL — HIGH (ref 0.5–1.3)
EGFR: 16 ML/MIN/1.73M2 — LOW
EGFR: 7 ML/MIN/1.73M2 — LOW
EOSINOPHIL # BLD AUTO: 0.12 K/UL — SIGNIFICANT CHANGE UP (ref 0–0.5)
EOSINOPHIL NFR BLD AUTO: 2.3 % — SIGNIFICANT CHANGE UP (ref 0–6)
FOLATE SERPL-MCNC: 17.1 NG/ML — SIGNIFICANT CHANGE UP
GLUCOSE SERPL-MCNC: 157 MG/DL — HIGH (ref 70–99)
GLUCOSE SERPL-MCNC: 85 MG/DL — SIGNIFICANT CHANGE UP (ref 70–99)
HCT VFR BLD CALC: 27.3 % — LOW (ref 34.5–45)
HCT VFR BLD CALC: 29.3 % — LOW (ref 34.5–45)
HGB BLD-MCNC: 7.9 G/DL — LOW (ref 11.5–15.5)
HGB BLD-MCNC: 8.5 G/DL — LOW (ref 11.5–15.5)
IMM GRANULOCYTES NFR BLD AUTO: 1.2 % — HIGH (ref 0–0.9)
LYMPHOCYTES # BLD AUTO: 1.22 K/UL — SIGNIFICANT CHANGE UP (ref 1–3.3)
LYMPHOCYTES # BLD AUTO: 23.6 % — SIGNIFICANT CHANGE UP (ref 13–44)
MAGNESIUM SERPL-MCNC: 2.3 MG/DL — SIGNIFICANT CHANGE UP (ref 1.6–2.6)
MCHC RBC-ENTMCNC: 27.2 PG — SIGNIFICANT CHANGE UP (ref 27–34)
MCHC RBC-ENTMCNC: 27.4 PG — SIGNIFICANT CHANGE UP (ref 27–34)
MCHC RBC-ENTMCNC: 28.9 GM/DL — LOW (ref 32–36)
MCHC RBC-ENTMCNC: 29 GM/DL — LOW (ref 32–36)
MCV RBC AUTO: 93.6 FL — SIGNIFICANT CHANGE UP (ref 80–100)
MCV RBC AUTO: 94.8 FL — SIGNIFICANT CHANGE UP (ref 80–100)
MONOCYTES # BLD AUTO: 0.61 K/UL — SIGNIFICANT CHANGE UP (ref 0–0.9)
MONOCYTES NFR BLD AUTO: 11.8 % — SIGNIFICANT CHANGE UP (ref 2–14)
NEUTROPHILS # BLD AUTO: 3.14 K/UL — SIGNIFICANT CHANGE UP (ref 1.8–7.4)
NEUTROPHILS NFR BLD AUTO: 60.7 % — SIGNIFICANT CHANGE UP (ref 43–77)
NRBC # BLD: 11 /100 WBCS — HIGH (ref 0–0)
NRBC # BLD: 4 /100 WBCS — HIGH (ref 0–0)
PHOSPHATE SERPL-MCNC: 2.9 MG/DL — SIGNIFICANT CHANGE UP (ref 2.5–4.5)
PLATELET # BLD AUTO: 156 K/UL — SIGNIFICANT CHANGE UP (ref 150–400)
PLATELET # BLD AUTO: 164 K/UL — SIGNIFICANT CHANGE UP (ref 150–400)
POTASSIUM SERPL-MCNC: 3.6 MMOL/L — SIGNIFICANT CHANGE UP (ref 3.5–5.3)
POTASSIUM SERPL-MCNC: 4.4 MMOL/L — SIGNIFICANT CHANGE UP (ref 3.5–5.3)
POTASSIUM SERPL-SCNC: 3.6 MMOL/L — SIGNIFICANT CHANGE UP (ref 3.5–5.3)
POTASSIUM SERPL-SCNC: 4.4 MMOL/L — SIGNIFICANT CHANGE UP (ref 3.5–5.3)
RBC # BLD: 2.88 M/UL — LOW (ref 3.8–5.2)
RBC # BLD: 2.88 M/UL — LOW (ref 3.8–5.2)
RBC # BLD: 3.13 M/UL — LOW (ref 3.8–5.2)
RBC # FLD: 19.3 % — HIGH (ref 10.3–14.5)
RBC # FLD: 19.5 % — HIGH (ref 10.3–14.5)
RETICS #: 107.4 K/UL — SIGNIFICANT CHANGE UP (ref 25–125)
RETICS/RBC NFR: 3.8 % — HIGH (ref 0.5–2.5)
SODIUM SERPL-SCNC: 138 MMOL/L — SIGNIFICANT CHANGE UP (ref 135–145)
SODIUM SERPL-SCNC: 140 MMOL/L — SIGNIFICANT CHANGE UP (ref 135–145)
VIT B12 SERPL-MCNC: 953 PG/ML — SIGNIFICANT CHANGE UP (ref 232–1245)
WBC # BLD: 5.17 K/UL — SIGNIFICANT CHANGE UP (ref 3.8–10.5)
WBC # BLD: 5.78 K/UL — SIGNIFICANT CHANGE UP (ref 3.8–10.5)
WBC # FLD AUTO: 5.17 K/UL — SIGNIFICANT CHANGE UP (ref 3.8–10.5)
WBC # FLD AUTO: 5.78 K/UL — SIGNIFICANT CHANGE UP (ref 3.8–10.5)

## 2023-07-28 PROCEDURE — 99233 SBSQ HOSP IP/OBS HIGH 50: CPT

## 2023-07-28 RX ORDER — CALCIUM ACETATE 667 MG
1 TABLET ORAL
Qty: 0 | Refills: 0 | DISCHARGE
Start: 2023-07-28

## 2023-07-28 RX ORDER — ACETAMINOPHEN 500 MG
2 TABLET ORAL
Qty: 0 | Refills: 0 | DISCHARGE
Start: 2023-07-28

## 2023-07-28 RX ORDER — SODIUM CHLORIDE 9 MG/ML
250 INJECTION INTRAMUSCULAR; INTRAVENOUS; SUBCUTANEOUS ONCE
Refills: 0 | Status: COMPLETED | OUTPATIENT
Start: 2023-07-28 | End: 2023-07-28

## 2023-07-28 RX ORDER — CALCIUM ACETATE 667 MG
3 TABLET ORAL
Qty: 0 | Refills: 0 | DISCHARGE

## 2023-07-28 RX ADMIN — CARVEDILOL PHOSPHATE 6.25 MILLIGRAM(S): 80 CAPSULE, EXTENDED RELEASE ORAL at 13:00

## 2023-07-28 RX ADMIN — Medication 667 MILLIGRAM(S): at 17:09

## 2023-07-28 RX ADMIN — PANTOPRAZOLE SODIUM 40 MILLIGRAM(S): 20 TABLET, DELAYED RELEASE ORAL at 05:29

## 2023-07-28 RX ADMIN — SODIUM CHLORIDE 500 MILLILITER(S): 9 INJECTION INTRAMUSCULAR; INTRAVENOUS; SUBCUTANEOUS at 14:03

## 2023-07-28 RX ADMIN — Medication 25 MICROGRAM(S): at 05:21

## 2023-07-28 RX ADMIN — Medication 1 TABLET(S): at 13:00

## 2023-07-28 RX ADMIN — ERYTHROPOIETIN 20000 UNIT(S): 10000 INJECTION, SOLUTION INTRAVENOUS; SUBCUTANEOUS at 11:08

## 2023-07-28 RX ADMIN — PANTOPRAZOLE SODIUM 40 MILLIGRAM(S): 20 TABLET, DELAYED RELEASE ORAL at 17:08

## 2023-07-28 RX ADMIN — Medication 667 MILLIGRAM(S): at 13:00

## 2023-07-28 RX ADMIN — CHLORHEXIDINE GLUCONATE 1 APPLICATION(S): 213 SOLUTION TOPICAL at 05:29

## 2023-07-28 RX ADMIN — SIMVASTATIN 20 MILLIGRAM(S): 20 TABLET, FILM COATED ORAL at 21:01

## 2023-07-28 NOTE — DISCHARGE NOTE PROVIDER - PROVIDER TOKENS
PROVIDER:[TOKEN:[[id number]],FOLLOWUP:[1 week]],PROVIDER:[TOKEN:[[id number]],FOLLOWUP:[1 week]]

## 2023-07-28 NOTE — DISCHARGE NOTE PROVIDER - NSDCFUADDAPPT_GEN_ALL_CORE_FT
APPTS ARE READY TO BE MADE: [x] YES    Best Family or Patient Contact (if needed):    Additional Information about above appointments (if needed):    1:   2:   3:     Other comments or requests:    You must follow up with your primary medical doctor within 3-4 days of discharge - please call to make an appointment.  You will need a CBC drawn at that time to monitor.    You must follow up with a hematologist within one week of discharge - please call to make an appointment.    You must follow up with your nephrologist at dialysis on Monday, July 31, 2023 - at this time, you must discuss epogen dosing/frequency intra-dialysis for your anemia.                APPTS ARE READY TO BE MADE: [x] YES    Best Family or Patient Contact (if needed):    Additional Information about above appointments (if needed):    1: Primary medical doctor  2: Hematologist  3: Nephrologist    Other comments or requests:    You must follow up with your primary medical doctor within 3-4 days of discharge - please call to make an appointment.  You will need a CBC drawn at that time to monitor.    You must follow up with a hematologist within one week of discharge - you can call [phone] to make an appointment.    You must follow up with your nephrologist at dialysis on Monday, July 31, 2023 - at this time, you must discuss epogen dosing/frequency intra-dialysis for your anemia.    You can follow up with your gastroenterologist within 2-3 weeks of discharge.            APPTS ARE READY TO BE MADE: [x] YES    Best Family or Patient Contact (if needed):    Additional Information about above appointments (if needed):    1: Primary medical doctor  2: Hematologist  3: Nephrologist    Other comments or requests:    You must follow up with your primary medical doctor within 3-4 days of discharge - please call to make an appointment.  You will need a CBC drawn at that time to monitor.    You must follow up with a hematologist within one week of discharge - you can call [phone] to make an appointment.    You must follow up with your nephrologist at dialysis on Monday, July 31, 2023 - at this time, you must discuss epogen dosing/frequency intra-dialysis for your anemia.    You can follow up with your gastroenterologist within 2-3 weeks of discharge.            APPTS ARE READY TO BE MADE: [x] YES    Best Family or Patient Contact (if needed):    Additional Information about above appointments (if needed):    1: Primary medical doctor  2: Hematologist  3: Nephrologist    Other comments or requests:     Patient/Caregiver was provided with follow up request details and prefers to call the providers office on their own to schedule.

## 2023-07-28 NOTE — DISCHARGE NOTE PROVIDER - ATTENDING DISCHARGE PHYSICAL EXAMINATION:
CONSTITUTIONAL: NAD, well-developed, well-groomed  EYES: PERRLA; conjunctiva and sclera clear  ENMT: Moist oral mucosa, no pharyngeal injection or exudates;   NECK: Supple, no palpable masses;   RESPIRATORY: Normal respiratory effort; lungs are clear to auscultation bilaterally  CARDIOVASCULAR: Regular rate and rhythm, normal S1 and S2, no murmur/rub/gallop; No lower extremity edema; Peripheral pulses are 2+ bilaterally  ABDOMEN: Nontender to palpation, normoactive bowel sounds, no rebound/guarding;   MUSCULOSKELETAL:  Normal gait; no clubbing or cyanosis of digits; no joint swelling or tenderness to palpation  PSYCH: A+O to person, place, and time; affect appropriate  NEUROLOGY: CN 2-12 are intact and symmetric; no gross sensory deficits   SKIN: No rashes; no palpable lesions

## 2023-07-28 NOTE — DISCHARGE NOTE PROVIDER - NSDCCPCAREPLAN_GEN_ALL_CORE_FT
PRINCIPAL DISCHARGE DIAGNOSIS  Diagnosis: Lightheadedness  Assessment and Plan of Treatment: Resolved  Follow up with your primary medical doctor within 3-4 days of discharge - please call to make an appointment.      SECONDARY DISCHARGE DIAGNOSES  Diagnosis: Symptomatic anemia  Assessment and Plan of Treatment: Epogen intra-dialysis, as per nephrology recommendations  Follow up with a hematologist upon discharge - please call to make an appointment.    Diagnosis: Dark stools  Assessment and Plan of Treatment: Resolved  EGD and Colonoscopy without signs of bleeding  Continue with oral protonix daily  Follow up with Gastroenterology upon discharge.    Diagnosis: HTN (hypertension)  Assessment and Plan of Treatment: Low salt diet  Activity as tolerated.  Take all medication as prescribed.  Follow up with your medical doctor for routine blood pressure monitoring at your next visit.  Notify your doctor if you have any of the following symptoms:   Dizziness, Lightheadedness, Blurry vision, Headache, Chest pain, Shortness of breath      Diagnosis: ESRD on hemodialysis  Assessment and Plan of Treatment: Continue with current dialysis regimen.  Follow up with nephrology on discharge     PRINCIPAL DISCHARGE DIAGNOSIS  Diagnosis: Lightheadedness  Assessment and Plan of Treatment: Resolved  Follow up with your primary medical doctor within 3-4 days of discharge - please call to make an appointment.      SECONDARY DISCHARGE DIAGNOSES  Diagnosis: Symptomatic anemia  Assessment and Plan of Treatment: Epogen intra-dialysis, to be determined by nephrology  Follow up with a hematologist upon discharge - please call to make an appointment.    Diagnosis: Dark stools  Assessment and Plan of Treatment: Resolved  EGD and Colonoscopy without signs of bleeding  Continue with oral protonix daily  Follow up with Gastroenterology upon discharge.    Diagnosis: HTN (hypertension)  Assessment and Plan of Treatment: Low salt diet  Activity as tolerated.  Take all medication as prescribed.  Follow up with your medical doctor for routine blood pressure monitoring at your next visit.  Notify your doctor if you have any of the following symptoms:   Dizziness, Lightheadedness, Blurry vision, Headache, Chest pain, Shortness of breath      Diagnosis: ESRD on hemodialysis  Assessment and Plan of Treatment: Continue with current dialysis regimen.  Follow up with nephrology on discharge

## 2023-07-28 NOTE — PROGRESS NOTE ADULT - PROBLEM SELECTOR PLAN 4
HD via left arm AVF, normally M/W/F schedule  - Nephrology consulted  - Continue with home calcium acetate   - Continue with home simvastatin 40 mg PO Qd
HD via left arm AVF, normally M/W/F schedule  - Nephrology consulted  - Continue with home calcium acetate   - Continue with home simvastatin 40 mg PO Qd  - felt dizzy after HD, will give some small amount of IVF

## 2023-07-28 NOTE — DISCHARGE NOTE PROVIDER - NSDCMRMEDTOKEN_GEN_ALL_CORE_FT
allopurinol 100 mg oral tablet: 1 tab(s) orally once a day  amLODIPine 10 mg oral tablet: 1 tab(s) orally once a day  Aranesp 150 mcg/0.3 mL injectable solution: 0.3 milliliter(s) injectable every 2 weeks  Artificial Tears ophthalmic solution:   calcium acetate 667 mg oral capsule: 3 cap(s) orally 3 times a day (with meals)  carvedilol 6.25 mg oral tablet: 1 tab(s) orally every 12 hours  colchicine 0.6 mg oral tablet: 1 tab(s) orally once a day  levothyroxine 25 mcg (0.025 mg) oral tablet: 1 tab(s) orally once a day  Lokelma 5 g oral powder for reconstitution: 5 gram(s) orally once a day   multivitamin: 1 tab(s) orally once a day  mupirocin 2% topical ointment: Apply topically to affected area 2 times a day MDD:Apply twice daily in each nostril  simvastatin 40 mg oral tablet: 1 tab(s) orally once a day (at bedtime)  sodium bicarbonate 650 mg oral tablet: 1 tab(s) orally 3 times a day   traMADol 50 mg oral tablet: 1 tab(s) orally 4 times a day MDD: 4   acetaminophen 325 mg oral tablet: 2 tab(s) orally every 6 hours As needed Temp greater or equal to 38C (100.4F), Mild Pain (1 - 3)  allopurinol 100 mg oral tablet: 1 tab(s) orally once a day  amLODIPine 10 mg oral tablet: 1 tab(s) orally once a day  Aranesp 150 mcg/0.3 mL injectable solution: 0.3 milliliter(s) injectable every 2 weeks  Artificial Tears ophthalmic solution:   calcium acetate 667 mg oral tablet: 1 cap(s) orally 3 times a day (with meals)  carvedilol 6.25 mg oral tablet: 1 tab(s) orally every 12 hours  levothyroxine 25 mcg (0.025 mg) oral tablet: 1 tab(s) orally once a day  multivitamin: 1 tab(s) orally once a day  Protonix 40 mg oral delayed release tablet: 1 tab(s) orally once a day  simvastatin 40 mg oral tablet: 1 tab(s) orally once a day (at bedtime)   acetaminophen 325 mg oral tablet: 2 tab(s) orally every 6 hours As needed Temp greater or equal to 38C (100.4F), Mild Pain (1 - 3)  allopurinol 100 mg oral tablet: 1 tab(s) orally once a day  amLODIPine 10 mg oral tablet: 1 tab(s) orally once a day  Aranesp 150 mcg/0.3 mL injectable solution: 0.3 milliliter(s) injectable every 2 weeks  Artificial Tears ophthalmic solution: 1 drop(s) in each eye once a day  calcium acetate 667 mg oral tablet: 1 cap(s) orally 3 times a day (with meals)  carvedilol 6.25 mg oral tablet: 1 tab(s) orally every 12 hours  levothyroxine 25 mcg (0.025 mg) oral tablet: 1 tab(s) orally once a day  multivitamin: 1 tab(s) orally once a day  Protonix 40 mg oral delayed release tablet: 1 tab(s) orally once a day  simvastatin 40 mg oral tablet: 1 tab(s) orally once a day (at bedtime)   acetaminophen 325 mg oral tablet: 2 tab(s) orally every 6 hours As needed Temp greater or equal to 38C (100.4F), Mild Pain (1 - 3)  allopurinol 100 mg oral tablet: 1 tab(s) orally once a day  amLODIPine 10 mg oral tablet: 1 tab(s) orally once a day  Artificial Tears ophthalmic solution: 1 drop(s) in each eye once a day  calcium acetate 667 mg oral tablet: 1 cap(s) orally 3 times a day (with meals)  carvedilol 6.25 mg oral tablet: 1 tab(s) orally every 12 hours  epoetin cheryl: Monday, Wednesday, and Friday to be given intra-dialysis - Nephrology to decide dose and frequency at dialysis  levothyroxine 25 mcg (0.025 mg) oral tablet: 1 tab(s) orally once a day  multivitamin: 1 tab(s) orally once a day  Protonix 40 mg oral delayed release tablet: 1 tab(s) orally once a day  simvastatin 40 mg oral tablet: 1 tab(s) orally once a day (at bedtime)

## 2023-07-28 NOTE — DISCHARGE NOTE PROVIDER - NSFOLLOWUPCLINICS_GEN_ALL_ED_FT
[facility]  Hematology/Oncology  [address] [id number]  Phone: [phone]  Fax:   Established Patient

## 2023-07-28 NOTE — PROGRESS NOTE ADULT - PROBLEM SELECTOR PLAN 3
- Continue with amlodipine 10 mg PO Qd  - Continue with home carvedilol 6.25 mg PO BID
- Continue with amlodipine 10 mg PO Qd  - Continue with home carvedilol 6.25 mg PO BID

## 2023-07-28 NOTE — DISCHARGE NOTE PROVIDER - NSDCFUSCHEDAPPT_GEN_ALL_CORE_FT
[facility]  VASCULAR [address]  Scheduled Appointment: 10/16/2023    Bradley Razo  [facility]  VASCULAR [address]  Scheduled Appointment: 10/16/2023

## 2023-07-28 NOTE — PHYSICAL THERAPY INITIAL EVALUATION ADULT - ASSISTIVE DEVICE FOR TRANSFER: SIT/STAND, REHAB EVAL
[address]       Accredited by the [facility] ([facility])    PATIENT'S NAME:        Carmen Yates  ATTENDING PHYSICIAN:   Amanda Flowers M.D. REFERRING PHYSICIAN:   Amanda Flowers M.D.   PATIENT was 4 minutes, for an overall sleep efficiency of 73 minutes. During the baseline portion of the study, sleep stage breakdown was as follows:  Stage 1, 3%; stage 2, 76%; stage 3, 18%; stage REM 3%.      During CPAP titration, total recording time was 191 m desaturations. RECOMMENDATIONS:    1. Patient should be initiated on auto PAP at 8 to 14 cm of water pressure with A-Flex level 1, humidity level 3. During this study, patient used a ResMed AirFit F20 size medium mask.    2.   The patient should avoid rolling walker

## 2023-07-28 NOTE — DISCHARGE NOTE PROVIDER - CARE PROVIDER_API CALL
Bowen Proctor  Internal Medicine  [address], [address]  [place] [address]  Phone: [phone]  Fax: [phone]  Follow Up Time: 1 week    Dalila Moyer  Internal Medicine  [address], [address]  [place] [address]  Phone: [phone]  Fax: [phone]  Follow Up Time: 1 week

## 2023-07-28 NOTE — PHYSICAL THERAPY INITIAL EVALUATION ADULT - GENERAL OBSERVATIONS, REHAB EVAL
received semisupine in bed, A&OX,4 following commands, willing to participate, family at bedside, a/w anemia. H/o obtained, ROM/MMT assessed, vitals assessed. BP 88/49. received semisupine in bed, A&OX,4 following commands, willing to participate, family at bedside, a/w anemia. H/o obtained, ROM/MMT assessed, v

## 2023-07-28 NOTE — PROGRESS NOTE ADULT - PROBLEM SELECTOR PLAN 1
Patient with worsening anemia with dark stools for several weeks. Of note, patient also endorses unintentional weight loss of 10 lbs over the past 6 months. Denies previous examination with gastroenterology.   - EGD 7/27 showing normal colonoscopy, endoscopy normal, mild antral gastritis, normal stomach, normal duodenum,  - f/u final gi recs  - 2 large bore IVs, maintain active T/S  - Transfuse to hgb >7.0 or if symptoms of anemia recur   - Pantoprazole 40 mg IV BID for now
Patient with worsening anemia with dark stools for several weeks. Of note, patient also endorses unintentional weight loss of 10 lbs over the past 6 months. Denies previous examination with gastroenterology.   - EGD 7/27 showing normal colonoscopy, endoscopy normal, mild antral gastritis, normal stomach, normal duodenum,  - 2 large bore IVs, maintain active T/S  - Transfuse to hgb >7.0 or if symptoms of anemia recur   - Pantoprazole 40 mg IV BID for now

## 2023-07-28 NOTE — PROGRESS NOTE ADULT - PROBLEM SELECTOR PLAN 2
- Mgmt as above  - Iron studies ordered given concurrent ESRD
- Mgmt as above  - Iron studies ordered given concurrent ESRD  - outpatient hematology follow up

## 2023-07-28 NOTE — DISCHARGE NOTE PROVIDER - HOSPITAL COURSE
77F PMH ESRD on HD, gout, HTN, HLD, hypothyroidism, presents with several weeks of dark stools and found to have anemia with syncopal episode. Now admitted for evaluation for suspected GI bleed.       Problem/Plan - 1:  ·  Problem: GIB (gastrointestinal bleeding).   ·  Plan: Patient with worsening anemia with dark stools for several weeks. Of note, patient also endorses unintentional weight loss of 10 lbs over the past 6 months. Denies previous examination with gastroenterology.   - GI consulted  - EGD 7/27 showing normal colonoscopy, endoscopy normal, mild antral gastritis, normal stomach, normal duodenum,  - Transfuse to hgb >7.0 or if symptoms of anemia recur   - Pantoprazole 40 mg IV BID for now.     Problem/Plan - 2:  ·  Problem: Symptomatic anemia.   ·  Plan: - Mgmt as above  - Iron studies given concurrent ESRD.     Problem/Plan - 3:  ·  Problem: HTN (hypertension).   ·  Plan: - Continue with amlodipine 10 mg PO Qd  - Continue with home carvedilol 6.25 mg PO BID.     Problem/Plan - 4:  ·  Problem: ESRD on hemodialysis.   ·  Plan: HD via left arm AVF, normally M/W/F schedule  - Nephrology consulted  - Continue with home calcium acetate   - Continue with home simvastatin 40 mg PO Qd.     Problem/Plan - 5:  ·  Problem: HLD (hyperlipidemia).   ·  Plan: - Continue with home simvastatin 40 mg PO Qd.     Problem/Plan - 6:  ·  Problem: Gout.   ·  Plan: - Continue with allopurinol 100 mg PO Qd.     Problem/Plan - 7:  ·  Problem: Hypothyroidism.   ·  Plan: - Continue with home levothyroxine 25 mcg PO Qd.     Problem/Plan - 8:  ·  Problem: Need for prophylactic measure.   ·  Plan: DVT ppx - Hold in setting of suspected GIB.    Patient medically cleared for discharge, by Dr. Napier, with PCP and Hematology follow up. 77F PMH ESRD on HD, gout, HTN, HLD, hypothyroidism, presents with several weeks of dark stools and found to have anemia with syncopal episode. Now admitted for evaluation for suspected GI bleed.       Problem/Plan - 1:  ·  Problem: GIB (gastrointestinal bleeding).   ·  Plan: Patient with worsening anemia with dark stools for several weeks. Of note, patient also endorses unintentional weight loss of 10 lbs over the past 6 months. Denies previous examination with gastroenterology.   - GI consulted  - EGD 7/27 showing normal colonoscopy, endoscopy normal, mild antral gastritis, normal stomach, normal duodenum,  - Transfuse to hgb >7.0 or if symptoms of anemia recur   - Pantoprazole 40 mg IV BID for now - change to protonix 40mg oral daily on discharge.     Problem/Plan - 2:  ·  Problem: Symptomatic anemia.   ·  Plan: - Mgmt as above  - Iron studies given concurrent ESRD.     Problem/Plan - 3:  ·  Problem: HTN (hypertension).   ·  Plan: - Continue with amlodipine 10 mg PO Qd  - Continue with home carvedilol 6.25 mg PO BID.     Problem/Plan - 4:  ·  Problem: ESRD on hemodialysis.   ·  Plan: HD via left arm AVF, normally M/W/F schedule  - Nephrology consulted  - Continue with home calcium acetate   - Continue with home simvastatin 40 mg PO Qd.     Problem/Plan - 5:  ·  Problem: HLD (hyperlipidemia).   ·  Plan: - Continue with home simvastatin 40 mg PO Qd.     Problem/Plan - 6:  ·  Problem: Gout.   ·  Plan: - Continue with allopurinol 100 mg PO Qd.     Problem/Plan - 7:  ·  Problem: Hypothyroidism.   ·  Plan: - Continue with home levothyroxine 25 mcg PO Qd.     Problem/Plan - 8:  ·  Problem: Need for prophylactic measure.   ·  Plan: DVT ppx - Hold in setting of suspected GIB.    Patient medically cleared for discharge, by Dr. Napier, with PCP and Hematology follow up. 77F PMH ESRD on HD, gout, HTN, HLD, hypothyroidism, presents with several weeks of dark stools and found to have anemia with syncopal episode. Now admitted for evaluation for suspected GI bleed.       Problem/Plan - 1:  ·  Problem: GIB (gastrointestinal bleeding).   ·  Plan: Patient with worsening anemia with dark stools for several weeks. Of note, patient also endorses unintentional weight loss of 10 lbs over the past 6 months. Denies previous examination with gastroenterology.   - EGD 7/27 showing normal colonoscopy, endoscopy normal, mild antral gastritis, normal stomach, normal duodenum  - Transfuse to hgb >7.0 or if symptoms of anemia recur   - Pantoprazole 40 mg IV BID for now - change to protonix 40mg oral daily on discharge.     Problem/Plan - 2:  ·  Problem: Symptomatic anemia.   - hematology referral given upon discharge     Problem/Plan - 3:  ·  Problem: HTN (hypertension).   ·  Plan: - Continue with amlodipine 10 mg PO Qd  - Continue with home carvedilol 6.25 mg PO BID.     Problem/Plan - 4:  ·  Problem: ESRD on hemodialysis.   ·  Plan: HD via left arm AVF, normally M/W/F schedule  - Nephrology consulted  - Continue with home calcium acetate   - Continue with home simvastatin 40 mg PO Qd.     Problem/Plan - 5:  ·  Problem: HLD (hyperlipidemia).   ·  Plan: - Continue with home simvastatin 40 mg PO Qd.     Problem/Plan - 6:  ·  Problem: Gout.   ·  Plan: - Continue with allopurinol 100 mg PO Qd.     Problem/Plan - 7:  ·  Problem: Hypothyroidism.   ·  Plan: - Continue with home levothyroxine 25 mcg PO Qd.     Problem/Plan - 8:  ·  Problem: Need for prophylactic measure.   ·  Plan: DVT ppx - Hold in setting of suspected GIB.    Patient medically cleared for discharge, by Dr. Napier, with PCP and Hematology follow up. 77F PMH ESRD on HD, gout, HTN, HLD, hypothyroidism, presents with several weeks of dark stools and found to have anemia with syncopal episode. Now admitted for evaluation for suspected GI bleed.       Problem/Plan - 1:  ·  Problem: GIB (gastrointestinal bleeding).   ·  Plan: Patient with worsening anemia with dark stools for several weeks. Of note, patient also endorses unintentional weight loss of 10 lbs over the past 6 months. Denies previous examination with gastroenterology.   - EGD 7/27 showing normal colonoscopy, endoscopy normal, mild antral gastritis, normal stomach, normal duodenum  - Transfuse to hgb >7.0 or if symptoms of anemia recur   - Pantoprazole 40 mg IV BID for now - change to protonix 40mg oral daily on discharge.     Problem/Plan - 2:  ·  Problem: Symptomatic anemia.   - hematology referral given upon discharge     Problem/Plan - 3:  ·  Problem: HTN (hypertension).   ·  Plan: - Continue with amlodipine 10 mg PO Qd  - Continue with home carvedilol 6.25 mg PO BID.     Problem/Plan - 4:  ·  Problem: ESRD on hemodialysis.   ·  Plan: HD via left arm AVF, normally M/W/F schedule  - Nephrology consulted  - Continue with home calcium acetate   - Continue with home simvastatin 40 mg PO Qd.     Problem/Plan - 5:  ·  Problem: HLD (hyperlipidemia).   ·  Plan: - Continue with home simvastatin 40 mg PO Qd.     Problem/Plan - 6:  ·  Problem: Gout.   ·  Plan: - Continue with allopurinol 100 mg PO Qd.     Problem/Plan - 7:  ·  Problem: Hypothyroidism.   ·  Plan: - Continue with home levothyroxine 25 mcg PO Qd.     Problem/Plan - 8:  ·  Problem: Need for prophylactic measure.   ·  Plan: DVT ppx - Hold in setting of suspected GIB.    Patient medically cleared for discharge, by Dr. Napier, with PCP, Nephrology, and Hematology follow up. 77F PMH ESRD on HD, gout, HTN, HLD, hypothyroidism, presents with several weeks of dark stools and found to have anemia with syncopal episode. Now admitted for evaluation for suspected GI bleed.       Problem/Plan - 1:  ·  Problem: GIB (gastrointestinal bleeding).   ·  Plan: Patient with worsening anemia with dark stools for several weeks. Of note, patient also endorses unintentional weight loss of 10 lbs over the past 6 months. Denies previous examination with gastroenterology.   - EGD 7/27 showing normal colonoscopy, endoscopy normal, mild antral gastritis, normal stomach, normal duodenum  - Transfuse to hgb >7.0 or if symptoms of anemia recur   - Pantoprazole 40 mg IV BID for now - change to protonix 40mg oral daily on discharge.     Problem/Plan - 2:  ·  Problem: Symptomatic anemia.   - hematology referral given upon discharge  - Epogen to be given, as determined by nephrology     Problem/Plan - 3:  ·  Problem: HTN (hypertension).   ·  Plan: - Continue with amlodipine 10 mg PO Qd  - Continue with home carvedilol 6.25 mg PO BID.     Problem/Plan - 4:  ·  Problem: ESRD on hemodialysis.   ·  Plan: HD via left arm AVF, normally M/W/F schedule  - Nephrology consulted  - Continue with calcium acetate   - Continue with home simvastatin 40 mg PO Qd.     Problem/Plan - 5:  ·  Problem: HLD (hyperlipidemia).   ·  Plan: - Continue with home simvastatin 40 mg PO Qd.     Problem/Plan - 6:  ·  Problem: Gout.   ·  Plan: - Continue with allopurinol 100 mg PO Qd.     Problem/Plan - 7:  ·  Problem: Hypothyroidism.   ·  Plan: - Continue with home levothyroxine 25 mcg PO Qd.      Patient medically cleared for discharge, by Dr. Napier, with PCP, Nephrology, and Hematology follow up.

## 2023-07-28 NOTE — PHYSICAL THERAPY INITIAL EVALUATION ADULT - PERTINENT HX OF CURRENT PROBLEM, REHAB EVAL
Pt is 77F admitted 7/26/23 PMHx ESRD on M/W/F HD, gout, HTN, HLD, hypothyroidism presents with several weeks of dark stools. Patient reports that she began experiencing dark stools several weeks ago, but denies BRBPR or diarrhea. Denies NSAID use or hematemesis. Patient reports mild weakness which has worsened over the past several days, and she had one syncopal episode over the weekend. As per the daughter at bedside, patient has looked increasingly pale. Patient previously was on iron PO, however this was discontinued for unclear reasons. Regardless, patient has never been seen by a gastroenterologist. Of note, patient also reports unintentional weight loss of 10 lbs over the past 6 months. In ED, patient found to have Hgb of 8.1 (baseline 9-10). Now admitted to medicine for evaluation of GI bleed.     CT ABDOMEN/PELVIS: No acute abnormality in the abdomen and pelvis.Endometrial cavity is slightly distended in this postmenopausal patient. Further evaluation with a pelvic ultrasound is advised and can be   performed on outpatient basis.

## 2023-07-29 ENCOUNTER — TRANSCRIPTION ENCOUNTER (OUTPATIENT)
Age: 78
End: 2023-07-29

## 2023-07-29 VITALS — HEART RATE: 68 BPM | SYSTOLIC BLOOD PRESSURE: 99 MMHG | OXYGEN SATURATION: 96 % | DIASTOLIC BLOOD PRESSURE: 54 MMHG

## 2023-07-29 LAB
ANION GAP SERPL CALC-SCNC: 16 MMOL/L — SIGNIFICANT CHANGE UP (ref 5–17)
BUN SERPL-MCNC: 20 MG/DL — SIGNIFICANT CHANGE UP (ref 7–23)
CALCIUM SERPL-MCNC: 8.5 MG/DL — SIGNIFICANT CHANGE UP (ref 8.4–10.5)
CHLORIDE SERPL-SCNC: 98 MMOL/L — SIGNIFICANT CHANGE UP (ref 96–108)
CO2 SERPL-SCNC: 24 MMOL/L — SIGNIFICANT CHANGE UP (ref 22–31)
CREAT SERPL-MCNC: 5.11 MG/DL — HIGH (ref 0.5–1.3)
EGFR: 8 ML/MIN/1.73M2 — LOW
GLUCOSE SERPL-MCNC: 79 MG/DL — SIGNIFICANT CHANGE UP (ref 70–99)
HCT VFR BLD CALC: 28.4 % — LOW (ref 34.5–45)
HGB BLD-MCNC: 8.2 G/DL — LOW (ref 11.5–15.5)
MCHC RBC-ENTMCNC: 27.6 PG — SIGNIFICANT CHANGE UP (ref 27–34)
MCHC RBC-ENTMCNC: 28.9 GM/DL — LOW (ref 32–36)
MCV RBC AUTO: 95.6 FL — SIGNIFICANT CHANGE UP (ref 80–100)
NRBC # BLD: 4 /100 WBCS — HIGH (ref 0–0)
PLATELET # BLD AUTO: 169 K/UL — SIGNIFICANT CHANGE UP (ref 150–400)
POTASSIUM SERPL-MCNC: 4 MMOL/L — SIGNIFICANT CHANGE UP (ref 3.5–5.3)
POTASSIUM SERPL-SCNC: 4 MMOL/L — SIGNIFICANT CHANGE UP (ref 3.5–5.3)
RBC # BLD: 2.97 M/UL — LOW (ref 3.8–5.2)
RBC # FLD: 19.5 % — HIGH (ref 10.3–14.5)
SODIUM SERPL-SCNC: 138 MMOL/L — SIGNIFICANT CHANGE UP (ref 135–145)
WBC # BLD: 4.96 K/UL — SIGNIFICANT CHANGE UP (ref 3.8–10.5)
WBC # FLD AUTO: 4.96 K/UL — SIGNIFICANT CHANGE UP (ref 3.8–10.5)

## 2023-07-29 PROCEDURE — 96374 THER/PROPH/DIAG INJ IV PUSH: CPT

## 2023-07-29 PROCEDURE — 80053 COMPREHEN METABOLIC PANEL: CPT

## 2023-07-29 PROCEDURE — 85610 PROTHROMBIN TIME: CPT

## 2023-07-29 PROCEDURE — 99285 EMERGENCY DEPT VISIT HI MDM: CPT

## 2023-07-29 PROCEDURE — 97116 GAIT TRAINING THERAPY: CPT

## 2023-07-29 PROCEDURE — 84100 ASSAY OF PHOSPHORUS: CPT

## 2023-07-29 PROCEDURE — 83550 IRON BINDING TEST: CPT

## 2023-07-29 PROCEDURE — 86900 BLOOD TYPING SEROLOGIC ABO: CPT

## 2023-07-29 PROCEDURE — 80048 BASIC METABOLIC PNL TOTAL CA: CPT

## 2023-07-29 PROCEDURE — 82607 VITAMIN B-12: CPT

## 2023-07-29 PROCEDURE — 99261: CPT

## 2023-07-29 PROCEDURE — 83540 ASSAY OF IRON: CPT

## 2023-07-29 PROCEDURE — 85045 AUTOMATED RETICULOCYTE COUNT: CPT

## 2023-07-29 PROCEDURE — 82746 ASSAY OF FOLIC ACID SERUM: CPT

## 2023-07-29 PROCEDURE — 86850 RBC ANTIBODY SCREEN: CPT

## 2023-07-29 PROCEDURE — 36415 COLL VENOUS BLD VENIPUNCTURE: CPT

## 2023-07-29 PROCEDURE — 97161 PT EVAL LOW COMPLEX 20 MIN: CPT

## 2023-07-29 PROCEDURE — 71045 X-RAY EXAM CHEST 1 VIEW: CPT

## 2023-07-29 PROCEDURE — 97530 THERAPEUTIC ACTIVITIES: CPT

## 2023-07-29 PROCEDURE — 83735 ASSAY OF MAGNESIUM: CPT

## 2023-07-29 PROCEDURE — 85027 COMPLETE CBC AUTOMATED: CPT

## 2023-07-29 PROCEDURE — 85025 COMPLETE CBC W/AUTO DIFF WBC: CPT

## 2023-07-29 PROCEDURE — 86901 BLOOD TYPING SEROLOGIC RH(D): CPT

## 2023-07-29 PROCEDURE — 85730 THROMBOPLASTIN TIME PARTIAL: CPT

## 2023-07-29 PROCEDURE — 74177 CT ABD & PELVIS W/CONTRAST: CPT

## 2023-07-29 PROCEDURE — 82728 ASSAY OF FERRITIN: CPT

## 2023-07-29 PROCEDURE — 99239 HOSP IP/OBS DSCHRG MGMT >30: CPT

## 2023-07-29 PROCEDURE — 82272 OCCULT BLD FECES 1-3 TESTS: CPT

## 2023-07-29 RX ORDER — ERYTHROPOIETIN 10000 [IU]/ML
0 INJECTION, SOLUTION INTRAVENOUS; SUBCUTANEOUS
Qty: 0 | Refills: 0 | DISCHARGE
Start: 2023-07-29

## 2023-07-29 RX ORDER — DARBEPOETIN ALFA IN POLYSORBAT 200MCG/0.4
0.3 PEN INJECTOR (ML) SUBCUTANEOUS
Qty: 0 | Refills: 0 | DISCHARGE

## 2023-07-29 RX ORDER — PANTOPRAZOLE SODIUM 20 MG/1
1 TABLET, DELAYED RELEASE ORAL
Qty: 30 | Refills: 0
Start: 2023-07-29 | End: 2023-08-27

## 2023-07-29 RX ADMIN — CHLORHEXIDINE GLUCONATE 1 APPLICATION(S): 213 SOLUTION TOPICAL at 06:38

## 2023-07-29 RX ADMIN — PANTOPRAZOLE SODIUM 40 MILLIGRAM(S): 20 TABLET, DELAYED RELEASE ORAL at 06:34

## 2023-07-29 RX ADMIN — Medication 25 MICROGRAM(S): at 06:33

## 2023-07-29 RX ADMIN — CARVEDILOL PHOSPHATE 6.25 MILLIGRAM(S): 80 CAPSULE, EXTENDED RELEASE ORAL at 08:53

## 2023-07-29 RX ADMIN — Medication 1 DROP(S): at 11:37

## 2023-07-29 RX ADMIN — Medication 667 MILLIGRAM(S): at 08:53

## 2023-07-29 RX ADMIN — Medication 1 TABLET(S): at 11:37

## 2023-07-29 RX ADMIN — Medication 667 MILLIGRAM(S): at 11:37

## 2023-07-29 NOTE — DISCHARGE NOTE NURSING/CASE MANAGEMENT/SOCIAL WORK - PATIENT PORTAL LINK FT
You can access the FollowMyHealth Patient Portal offered by [facility] by registering at the following website: [email or web address]. By joining [facility]’s FollowMyHealth portal, you will also be able to view your health information using other applications (apps) compatible with our system.

## 2023-07-29 NOTE — PROGRESS NOTE ADULT - PROVIDER SPECIALTY LIST ADULT
Gastroenterology
Nephrology
Hospitalist
Hospitalist

## 2023-07-29 NOTE — PROGRESS NOTE ADULT - SUBJECTIVE AND OBJECTIVE BOX
Chief Complaint:  Patient is a 77y old  Female who presents with a chief complaint of Anemia (2023 14:31)      Date of service 23 @ 10:26      Interval Events:   no acute events     Hospital Medications:  acetaminophen     Tablet .. 650 milliGRAM(s) Oral every 6 hours PRN  allopurinol 100 milliGRAM(s) Oral daily  aluminum hydroxide/magnesium hydroxide/simethicone Suspension 30 milliLiter(s) Oral every 4 hours PRN  amLODIPine   Tablet 10 milliGRAM(s) Oral daily  artificial  tears Solution 1 Drop(s) Both EYES daily  calcium acetate 667 milliGRAM(s) Oral three times a day with meals  carvedilol 6.25 milliGRAM(s) Oral every 12 hours  epoetin cheryl (EPOGEN) Injectable 14997 Unit(s) IV Push <User Schedule>  levothyroxine 25 MICROGram(s) Oral daily  melatonin 3 milliGRAM(s) Oral at bedtime PRN  multivitamin 1 Tablet(s) Oral daily  ondansetron Injectable 4 milliGRAM(s) IV Push every 8 hours PRN  pantoprazole  Injectable 40 milliGRAM(s) IV Push every 12 hours  simvastatin 20 milliGRAM(s) Oral at bedtime  traMADol 50 milliGRAM(s) Oral every 12 hours PRN        Review of Systems:  General:  No wt loss, fevers, chills, night sweats, fatigue,   Eyes:  Good vision, no reported pain  ENT:  No sore throat, pain, runny nose, dysphagia  CV:  No pain, palpitations, hypo/hypertension  Resp:  No dyspnea, cough, tachypnea, wheezing  GI:  See HPI  :  No pain, bleeding, incontinence, nocturia  Muscle:  No pain, weakness  Neuro:  No weakness, tingling, memory problems  Psych:  No fatigue, insomnia, mood problems, depression  Endocrine:  No polyuria, polydipsia, cold/heat intolerance  Heme:  No petechiae, ecchymosis, easy bruisability  Integumentary:  No rash, edema    PHYSICAL EXAM:   Vital Signs:  Vital Signs Last 24 Hrs  T(C): 36.8 (2023 04:52), Max: 36.8 (2023 13:22)  T(F): 98.2 (2023 04:52), Max: 98.3 (2023 13:22)  HR: 61 (2023 04:52) (58 - 72)  BP: 106/63 (2023 04:52) (106/63 - 135/60)  BP(mean): --  RR: 18 (2023 04:52) (18 - 18)  SpO2: 98% (2023 04:52) (95% - 100%)    Parameters below as of 2023 04:52  Patient On (Oxygen Delivery Method): room air      Daily     Daily Weight in k.4 (2023 00:10)      PHYSICAL EXAM:     GENERAL:  Appears stated age, well-groomed, well-nourished, no distress  HEENT:  NC/AT,  conjunctivae anicteric, clear and pink,   NECK: supple, trachea midline  CHEST:  Full & symmetric excursion, no increased effort, breath sounds clear  HEART:  Regular rhythm, no JVD  ABDOMEN:  Soft, non-tender, non-distended, normoactive bowel sounds,  no masses , no hepatosplenomegaly  EXTREMITIES:  no cyanosis,clubbing or edema  SKIN:  No rash, erythema, or, ecchymoses, no jaundice  NEURO:  Alert, non-focal, no asterixis  PSYCH: Appropriate affect, oriented to place and time  RECTAL: Deferred      LABS Personally reviewed by me:                        8.8    6.53  )-----------( 191      ( 2023 07:23 )             30.2     Mean Cell Volume: 97.1 fl (23 @ 07:23)        144  |  102  |  16  ----------------------------<  90  4.2   |  23  |  3.73<H>    Ca    9.7      2023 07:23    TPro  8.4<H>  /  Alb  4.8  /  TBili  0.3  /  DBili  x   /  AST  25  /  ALT  14  /  AlkPhos  51      LIVER FUNCTIONS - ( 2023 07:23 )  Alb: 4.8 g/dL / Pro: 8.4 g/dL / ALK PHOS: 51 U/L / ALT: 14 U/L / AST: 25 U/L / GGT: x           PT/INR - ( 2023 07:23 )   PT: 10.1 sec;   INR: 0.92 ratio         PTT - ( 2023 07:23 )  PTT:29.6 sec  Urinalysis Basic - ( 2023 07:23 )    Color: x / Appearance: x / SG: x / pH: x  Gluc: 90 mg/dL / Ketone: x  / Bili: x / Urobili: x   Blood: x / Protein: x / Nitrite: x   Leuk Esterase: x / RBC: x / WBC x   Sq Epi: x / Non Sq Epi: x / Bacteria: x                              8.8    6.53  )-----------( 191      ( 2023 07:23 )             30.2                         8.1    5.01  )-----------( 166      ( 2023 01:10 )             27.9       Imaging personally reviewed by me:          
  Chief Complaint:  Patient is a 77y old  Female who presents with a chief complaint of Anemia (27 Jul 2023 16:16)      Date of service 07-28-23 @ 09:15      Interval Events:   no acute events     Hospital Medications:  acetaminophen     Tablet .. 650 milliGRAM(s) Oral every 6 hours PRN  allopurinol 100 milliGRAM(s) Oral daily  aluminum hydroxide/magnesium hydroxide/simethicone Suspension 30 milliLiter(s) Oral every 4 hours PRN  amLODIPine   Tablet 10 milliGRAM(s) Oral daily  artificial  tears Solution 1 Drop(s) Both EYES daily  calcium acetate 667 milliGRAM(s) Oral three times a day with meals  carvedilol 6.25 milliGRAM(s) Oral every 12 hours  chlorhexidine 2% Cloths 1 Application(s) Topical <User Schedule>  epoetin cheryl (EPOGEN) Injectable 06895 Unit(s) IV Push <User Schedule>  levothyroxine 25 MICROGram(s) Oral daily  lidocaine 4% Injection for Nebulization 4 milliLiter(s) Nebulizer once  melatonin 3 milliGRAM(s) Oral at bedtime PRN  multivitamin 1 Tablet(s) Oral daily  ondansetron Injectable 4 milliGRAM(s) IV Push every 8 hours PRN  pantoprazole  Injectable 40 milliGRAM(s) IV Push every 12 hours  simvastatin 20 milliGRAM(s) Oral at bedtime  traMADol 50 milliGRAM(s) Oral every 12 hours PRN        Review of Systems:  General:  No wt loss, fevers, chills, night sweats, fatigue,   Eyes:  Good vision, no reported pain  ENT:  No sore throat, pain, runny nose, dysphagia  CV:  No pain, palpitations, hypo/hypertension  Resp:  No dyspnea, cough, tachypnea, wheezing  GI:  See HPI  :  No pain, bleeding, incontinence, nocturia  Muscle:  No pain, weakness  Neuro:  No weakness, tingling, memory problems  Psych:  No fatigue, insomnia, mood problems, depression  Endocrine:  No polyuria, polydipsia, cold/heat intolerance  Heme:  No petechiae, ecchymosis, easy bruisability  Integumentary:  No rash, edema    PHYSICAL EXAM:   Vital Signs:  Vital Signs Last 24 Hrs  T(C): 36.4 (28 Jul 2023 04:39), Max: 36.7 (27 Jul 2023 11:07)  T(F): 97.5 (28 Jul 2023 04:39), Max: 98 (27 Jul 2023 11:07)  HR: 60 (28 Jul 2023 04:39) (56 - 70)  BP: 119/62 (28 Jul 2023 04:39) (113/54 - 155/69)  BP(mean): 77 (27 Jul 2023 12:48) (77 - 77)  RR: 18 (28 Jul 2023 04:39) (18 - 21)  SpO2: 100% (28 Jul 2023 04:39) (99% - 100%)    Parameters below as of 28 Jul 2023 04:39  Patient On (Oxygen Delivery Method): room air      Daily     Daily       PHYSICAL EXAM:     GENERAL:  Appears stated age, well-groomed, well-nourished, no distress  HEENT:  NC/AT,  conjunctivae anicteric, clear and pink,   NECK: supple, trachea midline  CHEST:  Full & symmetric excursion, no increased effort, breath sounds clear  HEART:  Regular rhythm, no JVD  ABDOMEN:  Soft, non-tender, non-distended, normoactive bowel sounds,  no masses , no hepatosplenomegaly  EXTREMITIES:  no cyanosis,clubbing or edema  SKIN:  No rash, erythema, or, ecchymoses, no jaundice  NEURO:  Alert, non-focal, no asterixis  PSYCH: Appropriate affect, oriented to place and time  RECTAL: Deferred      LABS Personally reviewed by me:                        7.9    5.17  )-----------( 164      ( 28 Jul 2023 06:50 )             27.3     Mean Cell Volume: 94.8 fl (07-28-23 @ 06:50)    07-28    140  |  101  |  30<H>  ----------------------------<  85  4.4   |  23  |  6.00<H>    Ca    8.7      28 Jul 2023 06:49  Phos  2.9     07-28  Mg     2.3     07-28    TPro  8.4<H>  /  Alb  4.8  /  TBili  0.3  /  DBili  x   /  AST  25  /  ALT  14  /  AlkPhos  51  07-27    LIVER FUNCTIONS - ( 27 Jul 2023 07:23 )  Alb: 4.8 g/dL / Pro: 8.4 g/dL / ALK PHOS: 51 U/L / ALT: 14 U/L / AST: 25 U/L / GGT: x           PT/INR - ( 27 Jul 2023 07:23 )   PT: 10.1 sec;   INR: 0.92 ratio         PTT - ( 27 Jul 2023 07:23 )  PTT:29.6 sec  Urinalysis Basic - ( 28 Jul 2023 06:49 )    Color: x / Appearance: x / SG: x / pH: x  Gluc: 85 mg/dL / Ketone: x  / Bili: x / Urobili: x   Blood: x / Protein: x / Nitrite: x   Leuk Esterase: x / RBC: x / WBC x   Sq Epi: x / Non Sq Epi: x / Bacteria: x                              7.9    5.17  )-----------( 164      ( 28 Jul 2023 06:50 )             27.3                         8.8    6.53  )-----------( 191      ( 27 Jul 2023 07:23 )             30.2                         8.1    5.01  )-----------( 166      ( 26 Jul 2023 01:10 )             27.9       Imaging personally reviewed by me:          
  Patient  seen and examined  no complaints      No Known Allergies    Hospital Medications:   MEDICATIONS  (STANDING):  allopurinol 100 milliGRAM(s) Oral daily  amLODIPine   Tablet 10 milliGRAM(s) Oral daily  artificial  tears Solution 1 Drop(s) Both EYES daily  calcium acetate 667 milliGRAM(s) Oral three times a day with meals  carvedilol 6.25 milliGRAM(s) Oral every 12 hours  chlorhexidine 2% Cloths 1 Application(s) Topical <User Schedule>  epoetin cheryl (EPOGEN) Injectable 89798 Unit(s) IV Push <User Schedule>  levothyroxine 25 MICROGram(s) Oral daily  lidocaine 4% Injection for Nebulization 4 milliLiter(s) Nebulizer once  multivitamin 1 Tablet(s) Oral daily  pantoprazole  Injectable 40 milliGRAM(s) IV Push every 12 hours  simvastatin 20 milliGRAM(s) Oral at bedtime        VITALS:  T(F): 97.2 (07-28-23 @ 08:53), Max: 98 (07-27-23 @ 14:11)  HR: 71 (07-28-23 @ 08:53)  BP: 121/64 (07-28-23 @ 08:53)  RR: 16 (07-28-23 @ 08:53)  SpO2: 100% (07-28-23 @ 08:53)  Wt(kg): --      Weight (kg): 42 (07-27 @ 12:48)    PHYSICAL EXAM:  Constitutional: NAD  HEENT: anicteric sclera, oropharynx clear, MMM  Neck: No JVD  Respiratory: CTAB, no wheezes, rales or rhonchi  Cardiovascular: S1, S2, RRR  Gastrointestinal: BS+, soft, NT/ND  Extremities: No cyanosis or clubbing. No peripheral edema  Neurological: A/O x 3, no focal deficits  Psychiatric: Normal mood, normal affect  : No CVA tenderness. No williamson.   Skin: No rashes  Vascular Access: left wrist avf    LABS:  07-28    140  |  101  |  30<H>  ----------------------------<  85  4.4   |  23  |  6.00<H>    Ca    8.7      28 Jul 2023 06:49  Phos  2.9     07-28  Mg     2.3     07-28    TPro  8.4<H>  /  Alb  4.8  /  TBili  0.3  /  DBili      /  AST  25  /  ALT  14  /  AlkPhos  51  07-27    Creatinine Trend: 6.00 <--, 3.73 <--, 5.50 <--                        7.9    5.17  )-----------( 164      ( 28 Jul 2023 06:50 )             27.3     Urine Studies:  Urinalysis Basic - ( 28 Jul 2023 06:49 )    Color:  / Appearance:  / SG:  / pH:   Gluc: 85 mg/dL / Ketone:   / Bili:  / Urobili:    Blood:  / Protein:  / Nitrite:    Leuk Esterase:  / RBC:  / WBC    Sq Epi:  / Non Sq Epi:  / Bacteria:         RADIOLOGY & ADDITIONAL STUDIES:  
[facility] Division of Hospital Medicine  Taylor Napier MD  Available via MS Teams  Pager: [phone]    SUBJECTIVE / OVERNIGHT EVENTS:  - no events overnight, states she feels okay, wanted to go home but felt significantly dizzy after HD. otehrwise denies any black stools, brbpr, vomiting, hematemesis, and other sxs.    ADDITIONAL REVIEW OF SYSTEMS:    MEDICATIONS  (STANDING):  allopurinol 100 milliGRAM(s) Oral daily  amLODIPine   Tablet 10 milliGRAM(s) Oral daily  artificial  tears Solution 1 Drop(s) Both EYES daily  calcium acetate 667 milliGRAM(s) Oral three times a day with meals  carvedilol 6.25 milliGRAM(s) Oral every 12 hours  chlorhexidine 2% Cloths 1 Application(s) Topical <User Schedule>  epoetin cheryl (EPOGEN) Injectable 26403 Unit(s) IV Push <User Schedule>  levothyroxine 25 MICROGram(s) Oral daily  lidocaine 4% Injection for Nebulization 4 milliLiter(s) Nebulizer once  multivitamin 1 Tablet(s) Oral daily  pantoprazole  Injectable 40 milliGRAM(s) IV Push every 12 hours  simvastatin 20 milliGRAM(s) Oral at bedtime    MEDICATIONS  (PRN):  acetaminophen     Tablet .. 650 milliGRAM(s) Oral every 6 hours PRN Temp greater or equal to 38C (100.4F), Mild Pain (1 - 3)  aluminum hydroxide/magnesium hydroxide/simethicone Suspension 30 milliLiter(s) Oral every 4 hours PRN Dyspepsia  melatonin 3 milliGRAM(s) Oral at bedtime PRN Insomnia  ondansetron Injectable 4 milliGRAM(s) IV Push every 8 hours PRN Nausea and/or Vomiting  traMADol 50 milliGRAM(s) Oral every 12 hours PRN Moderate Pain (4 - 6)      I&O's Summary    28 Jul 2023 07:01  -  28 Jul 2023 15:27  --------------------------------------------------------  IN: 0 mL / OUT: 500 mL / NET: -500 mL        PHYSICAL EXAM:  Vital Signs Last 24 Hrs  T(C): 36.4 (28 Jul 2023 13:50), Max: 36.4 (28 Jul 2023 04:39)  T(F): 97.6 (28 Jul 2023 13:50), Max: 97.6 (28 Jul 2023 13:50)  HR: 65 (28 Jul 2023 15:09) (60 - 73)  BP: 88/49 (28 Jul 2023 15:09) (80/50 - 124/62)  BP(mean): --  RR: 20 (28 Jul 2023 13:50) (16 - 20)  SpO2: 98% (28 Jul 2023 15:09) (98% - 100%)    Parameters below as of 28 Jul 2023 15:09  Patient On (Oxygen Delivery Method): room air      CONSTITUTIONAL: NAD, well-developed, well-groomed  EYES: PERRLA; conjunctiva and sclera clear  ENMT: Moist oral mucosa, no pharyngeal injection or exudates;   NECK: Supple, no palpable masses;   RESPIRATORY: Normal respiratory effort; lungs are clear to auscultation bilaterally  CARDIOVASCULAR: Regular rate and rhythm, normal S1 and S2, no murmur/rub/gallop; No lower extremity edema; Peripheral pulses are 2+ bilaterally  ABDOMEN: Nontender to palpation, normoactive bowel sounds, no rebound/guarding;   MUSCULOSKELETAL:  Normal gait; no clubbing or cyanosis of digits; no joint swelling or tenderness to palpation  PSYCH: A+O to person, place, and time; affect appropriate  NEUROLOGY: CN 2-12 are intact and symmetric; no gross sensory deficits   SKIN: No rashes; no palpable lesions      LABS:                        7.9    5.17  )-----------( 164      ( 28 Jul 2023 06:50 )             27.3     07-28    140  |  101  |  30<H>  ----------------------------<  85  4.4   |  23  |  6.00<H>    Ca    8.7      28 Jul 2023 06:49  Phos  2.9     07-28  Mg     2.3     07-28    TPro  8.4<H>  /  Alb  4.8  /  TBili  0.3  /  DBili  x   /  AST  25  /  ALT  14  /  AlkPhos  51  07-27    PT/INR - ( 27 Jul 2023 07:23 )   PT: 10.1 sec;   INR: 0.92 ratio         PTT - ( 27 Jul 2023 07:23 )  PTT:29.6 sec      Urinalysis Basic - ( 28 Jul 2023 06:49 )    Color: x / Appearance: x / SG: x / pH: x  Gluc: 85 mg/dL / Ketone: x  / Bili: x / Urobili: x   Blood: x / Protein: x / Nitrite: x   Leuk Esterase: x / RBC: x / WBC x   Sq Epi: x / Non Sq Epi: x / Bacteria: x            RADIOLOGY & ADDITIONAL TESTS:  New Results Reviewed Today: cbc cmp   New Imaging Personally Reviewed Today: n/a  New Electrocardiogram Personally Reviewed Today: n/a  Prior or Outpatient Records Reviewed Today: n/a    COMMUNICATION:  Care Discussed with Consultants/Other Providers and Details of Discussion: n/a  Discussions with Patient/Family: n/a  PCP Communication: n/a     
[facility] : Ans Serv [phone], Office [phone]  Dr Crouch/Dr Howe  /Dr Trever ambrosio /Dr JOSIANE Brown/Dr Emeterio Car/Dr Michael Hartmann /Dr AKIRA Alonzo  _______________________________________________________________________________________________    seen and examined today for renal failure  Interval : s/p hemodialysis yesterday   VITALS:  T(F): 98.4 (07-29 @ 06:22), Max: 98.4 (07-29 @ 06:22)  HR: 67 (07-29 @ 08:15)  BP: 135/69 (07-29 @ 08:15)  ABP: --  RR: 18 (07-29 @ 08:15)  SpO2: 96% (07-29 @ 08:15)    07-28 @ 07:01  -  07-29 @ 07:00  --------------------------------------------------------  IN: 360 mL / OUT: 500 mL / NET: -140 mL      Physical Exam :-  Constitutional: NAD  Respiratory: Bilateral equal breath sounds, no Crackles present.  Cardiovascular: S1, S2 normal, positive Murmur  Gastrointestinal: Bowel Sounds present, soft  Extremities: no Edema Feet  Neurological: Alert and Oriented x 3  Psychiatric: Normal mood, normal affect    Data:-  Allergies :   No Known Allergies    Hospital Medications:   MEDICATIONS  (STANDING):  allopurinol 100 milliGRAM(s) Oral daily  amLODIPine   Tablet 10 milliGRAM(s) Oral daily  artificial  tears Solution 1 Drop(s) Both EYES daily  calcium acetate 667 milliGRAM(s) Oral three times a day with meals  carvedilol 6.25 milliGRAM(s) Oral every 12 hours  chlorhexidine 2% Cloths 1 Application(s) Topical <User Schedule>  epoetin cheryl (EPOGEN) Injectable 88934 Unit(s) IV Push <User Schedule>  levothyroxine 25 MICROGram(s) Oral daily  lidocaine 4% Injection for Nebulization 4 milliLiter(s) Nebulizer once  multivitamin 1 Tablet(s) Oral daily  pantoprazole  Injectable 40 milliGRAM(s) IV Push every 12 hours  simvastatin 20 milliGRAM(s) Oral at bedtime    07-29    138  |  98  |  20  ----------------------------<  79  4.0   |  24  |  5.11<H>    Ca    8.5      29 Jul 2023 08:11  Phos  2.9     07-28  Mg     2.3     07-28      Creatinine Trend: 5.11 <--, 2.97 <--, 6.00 <--, 3.73 <--, 5.50 <--  egfr trend : 8 <--, 16 <--, 7 <--, 12 <--, 8 <--                        8.2    4.96  )-----------( 169      ( 29 Jul 2023 08:11 )             28.4   
  Chief Complaint:  Patient is a 77y old  Female who presents with a chief complaint of Anemia (2023 10:27)      Date of service 23 @ 11:29      Interval Events:   feels well    Hospital Medications:  acetaminophen     Tablet .. 650 milliGRAM(s) Oral every 6 hours PRN  allopurinol 100 milliGRAM(s) Oral daily  aluminum hydroxide/magnesium hydroxide/simethicone Suspension 30 milliLiter(s) Oral every 4 hours PRN  amLODIPine   Tablet 10 milliGRAM(s) Oral daily  artificial  tears Solution 1 Drop(s) Both EYES daily  calcium acetate 667 milliGRAM(s) Oral three times a day with meals  carvedilol 6.25 milliGRAM(s) Oral every 12 hours  chlorhexidine 2% Cloths 1 Application(s) Topical <User Schedule>  epoetin cheryl (EPOGEN) Injectable 45907 Unit(s) IV Push <User Schedule>  levothyroxine 25 MICROGram(s) Oral daily  lidocaine 4% Injection for Nebulization 4 milliLiter(s) Nebulizer once  melatonin 3 milliGRAM(s) Oral at bedtime PRN  multivitamin 1 Tablet(s) Oral daily  ondansetron Injectable 4 milliGRAM(s) IV Push every 8 hours PRN  pantoprazole  Injectable 40 milliGRAM(s) IV Push every 12 hours  simvastatin 20 milliGRAM(s) Oral at bedtime  traMADol 50 milliGRAM(s) Oral every 12 hours PRN        Review of Systems:  General:  No wt loss, fevers, chills, night sweats, fatigue,   Eyes:  Good vision, no reported pain  ENT:  No sore throat, pain, runny nose, dysphagia  CV:  No pain, palpitations, hypo/hypertension  Resp:  No dyspnea, cough, tachypnea, wheezing  GI:  See HPI  :  No pain, bleeding, incontinence, nocturia  Muscle:  No pain, weakness  Neuro:  No weakness, tingling, memory problems  Psych:  No fatigue, insomnia, mood problems, depression  Endocrine:  No polyuria, polydipsia, cold/heat intolerance  Heme:  No petechiae, ecchymosis, easy bruisability  Integumentary:  No rash, edema    PHYSICAL EXAM:   Vital Signs:  Vital Signs Last 24 Hrs  T(C): 36.9 (2023 06:22), Max: 36.9 (2023 06:22)  T(F): 98.4 (2023 06:22), Max: 98.4 (2023 06:22)  HR: 68 (2023 11:17) (60 - 73)  BP: 99/54 (2023 11:17) (80/50 - 135/69)  BP(mean): --  RR: 18 (2023 08:15) (16 - 20)  SpO2: 96% (2023 11:17) (96% - 100%)    Parameters below as of 2023 11:17  Patient On (Oxygen Delivery Method): room air      Daily     Daily Weight in k.5 (2023 11:53)      PHYSICAL EXAM:     GENERAL:  Appears stated age, well-groomed, well-nourished, no distress  HEENT:  NC/AT,  conjunctivae anicteric, clear and pink,   NECK: supple, trachea midline  CHEST:  Full & symmetric excursion, no increased effort, breath sounds clear  HEART:  Regular rhythm, no JVD  ABDOMEN:  Soft, non-tender, non-distended, normoactive bowel sounds,  no masses , no hepatosplenomegaly  EXTREMITIES:  no cyanosis,clubbing or edema  SKIN:  No rash, erythema, or, ecchymoses, no jaundice  NEURO:  Alert, non-focal, no asterixis  PSYCH: Appropriate affect, oriented to place and time  RECTAL: Deferred      LABS Personally reviewed by me:                        8.2    4.96  )-----------( 169      ( 2023 08:11 )             28.4     Mean Cell Volume: 95.6 fl (23 @ 08:11)        138  |  98  |  20  ----------------------------<  79  4.0   |  24  |  5.11<H>    Ca    8.5      2023 08:11  Phos  2.9       Mg     2.3               Urinalysis Basic - ( 2023 08:11 )    Color: x / Appearance: x / SG: x / pH: x  Gluc: 79 mg/dL / Ketone: x  / Bili: x / Urobili: x   Blood: x / Protein: x / Nitrite: x   Leuk Esterase: x / RBC: x / WBC x   Sq Epi: x / Non Sq Epi: x / Bacteria: x                              8.2    4.96  )-----------( 169      ( 2023 08:11 )             28.4                         8.5    5.78  )-----------( 156      ( 2023 15:51 )             29.3                         7.9    5.17  )-----------( 164      ( 2023 06:50 )             27.3                         8.8    6.53  )-----------( 191      ( 2023 07:23 )             30.2       Imaging personally reviewed by me:          
  Patient  seen and examined  no complaints    No Known Allergies    Hospital Medications:   MEDICATIONS  (STANDING):  allopurinol 100 milliGRAM(s) Oral daily  amLODIPine   Tablet 10 milliGRAM(s) Oral daily  artificial  tears Solution 1 Drop(s) Both EYES daily  calcium acetate 667 milliGRAM(s) Oral three times a day with meals  carvedilol 6.25 milliGRAM(s) Oral every 12 hours  chlorhexidine 2% Cloths 1 Application(s) Topical <User Schedule>  epoetin cheryl (EPOGEN) Injectable 50664 Unit(s) IV Push <User Schedule>  levothyroxine 25 MICROGram(s) Oral daily  lidocaine 4% Injection for Nebulization 4 milliLiter(s) Nebulizer once  multivitamin 1 Tablet(s) Oral daily  pantoprazole  Injectable 40 milliGRAM(s) IV Push every 12 hours  simvastatin 20 milliGRAM(s) Oral at bedtime      VITALS:  T(F): 98 (07-27-23 @ 14:11), Max: 98.3 (07-26-23 @ 19:23)  HR: 70 (07-27-23 @ 14:11)  BP: 122/64 (07-27-23 @ 14:11)  RR: 18 (07-27-23 @ 14:11)  SpO2: 100% (07-27-23 @ 14:11)  Wt(kg): --    07-26 @ 07:01  -  07-27 @ 07:00  --------------------------------------------------------  IN: 240 mL / OUT: 600 mL / NET: -360 mL        Weight (kg): 42 (07-27 @ 12:48)  PHYSICAL EXAM:  Constitutional: NAD  HEENT: anicteric sclera, oropharynx clear, MMM  Neck: No JVD  Respiratory: CTAB, no wheezes, rales or rhonchi  Cardiovascular: S1, S2, RRR  Gastrointestinal: BS+, soft, NT/ND  Extremities: No cyanosis or clubbing. No peripheral edema  Neurological: A/O x 3, no focal deficits  Psychiatric: Normal mood, normal affect  : No CVA tenderness. No williamson.   Skin: No rashes  Vascular Access: left wrist avf    LABS:  07-27    144  |  102  |  16  ----------------------------<  90  4.2   |  23  |  3.73<H>    Ca    9.7      27 Jul 2023 07:23    TPro  8.4<H>  /  Alb  4.8  /  TBili  0.3  /  DBili      /  AST  25  /  ALT  14  /  AlkPhos  51  07-27    Creatinine Trend: 3.73 <--, 5.50 <--                        8.8    6.53  )-----------( 191      ( 27 Jul 2023 07:23 )             30.2     Urine Studies:  Urinalysis Basic - ( 27 Jul 2023 07:23 )    Color:  / Appearance:  / SG:  / pH:   Gluc: 90 mg/dL / Ketone:   / Bili:  / Urobili:    Blood:  / Protein:  / Nitrite:    Leuk Esterase:  / RBC:  / WBC    Sq Epi:  / Non Sq Epi:  / Bacteria:         RADIOLOGY & ADDITIONAL STUDIES:  
[facility] Division of Hospital Medicine  Taylor Napier MD  Available via MS Teams  Pager: [phone]    SUBJECTIVE / OVERNIGHT EVENTS:  - no events overnight, denies any nausea, vomiting, headaches, shortness of breath, cough, chst pain. hungry and eagerly awaiting egd/c-scope this morning.     MEDICATIONS  (STANDING):  allopurinol 100 milliGRAM(s) Oral daily  amLODIPine   Tablet 10 milliGRAM(s) Oral daily  artificial  tears Solution 1 Drop(s) Both EYES daily  calcium acetate 667 milliGRAM(s) Oral three times a day with meals  carvedilol 6.25 milliGRAM(s) Oral every 12 hours  chlorhexidine 2% Cloths 1 Application(s) Topical <User Schedule>  epoetin cheryl (EPOGEN) Injectable 84984 Unit(s) IV Push <User Schedule>  levothyroxine 25 MICROGram(s) Oral daily  lidocaine 4% Injection for Nebulization 4 milliLiter(s) Nebulizer once  multivitamin 1 Tablet(s) Oral daily  pantoprazole  Injectable 40 milliGRAM(s) IV Push every 12 hours  simvastatin 20 milliGRAM(s) Oral at bedtime    MEDICATIONS  (PRN):  acetaminophen     Tablet .. 650 milliGRAM(s) Oral every 6 hours PRN Temp greater or equal to 38C (100.4F), Mild Pain (1 - 3)  aluminum hydroxide/magnesium hydroxide/simethicone Suspension 30 milliLiter(s) Oral every 4 hours PRN Dyspepsia  melatonin 3 milliGRAM(s) Oral at bedtime PRN Insomnia  ondansetron Injectable 4 milliGRAM(s) IV Push every 8 hours PRN Nausea and/or Vomiting  traMADol 50 milliGRAM(s) Oral every 12 hours PRN Moderate Pain (4 - 6)      I&O's Summary    26 Jul 2023 07:01  -  27 Jul 2023 07:00  --------------------------------------------------------  IN: 240 mL / OUT: 600 mL / NET: -360 mL        PHYSICAL EXAM:  Vital Signs Last 24 Hrs  T(C): 36.7 (27 Jul 2023 14:11), Max: 36.8 (26 Jul 2023 19:23)  T(F): 98 (27 Jul 2023 14:11), Max: 98.3 (26 Jul 2023 19:23)  HR: 70 (27 Jul 2023 14:11) (56 - 72)  BP: 122/64 (27 Jul 2023 14:11) (106/63 - 155/69)  BP(mean): 77 (27 Jul 2023 12:48) (77 - 77)  RR: 18 (27 Jul 2023 14:11) (18 - 21)  SpO2: 100% (27 Jul 2023 14:11) (96% - 100%)    Parameters below as of 27 Jul 2023 14:11  Patient On (Oxygen Delivery Method): room air      CONSTITUTIONAL: NAD, well-developed, well-groomed  EYES: PERRLA; conjunctiva and sclera clear  ENMT: Moist oral mucosa, no pharyngeal injection or exudates;   NECK: Supple, no palpable masses;   RESPIRATORY: Normal respiratory effort; lungs are clear to auscultation bilaterally  CARDIOVASCULAR: Regular rate and rhythm, normal S1 and S2, no murmur/rub/gallop; No lower extremity edema; Peripheral pulses are 2+ bilaterally  ABDOMEN: Nontender to palpation, normoactive bowel sounds, no rebound/guarding;   MUSCULOSKELETAL:  Normal gait; no clubbing or cyanosis of digits; no joint swelling or tenderness to palpation  PSYCH: A+O to person, place, and time; affect appropriate  NEUROLOGY: CN 2-12 are intact and symmetric; no gross sensory deficits   SKIN: No rashes; no palpable lesions    LABS:                        8.8    6.53  )-----------( 191      ( 27 Jul 2023 07:23 )             30.2     07-27    144  |  102  |  16  ----------------------------<  90  4.2   |  23  |  3.73<H>    Ca    9.7      27 Jul 2023 07:23    TPro  8.4<H>  /  Alb  4.8  /  TBili  0.3  /  DBili  x   /  AST  25  /  ALT  14  /  AlkPhos  51  07-27    PT/INR - ( 27 Jul 2023 07:23 )   PT: 10.1 sec;   INR: 0.92 ratio         PTT - ( 27 Jul 2023 07:23 )  PTT:29.6 sec      Urinalysis Basic - ( 27 Jul 2023 07:23 )    Color: x / Appearance: x / SG: x / pH: x  Gluc: 90 mg/dL / Ketone: x  / Bili: x / Urobili: x   Blood: x / Protein: x / Nitrite: x   Leuk Esterase: x / RBC: x / WBC x   Sq Epi: x / Non Sq Epi: x / Bacteria: x            RADIOLOGY & ADDITIONAL TESTS:  New Results Reviewed Today: cbc cmp  New Imaging Personally Reviewed Today: n/a  New Electrocardiogram Personally Reviewed Today: n/a  Prior or Outpatient Records Reviewed Today: n/a    COMMUNICATION:  Care Discussed with Consultants/Other Providers and Details of Discussion: n/a  Discussions with Patient/Family: n/a  PCP Communication: n/a

## 2023-07-29 NOTE — DISCHARGE NOTE NURSING/CASE MANAGEMENT/SOCIAL WORK - NSDCFUADDAPPT_GEN_ALL_CORE_FT
You must follow up with your primary medical doctor within 3-4 days of discharge - please call to make an appointment.  You will need a CBC drawn at that time to monitor.    You must follow up with a hematologist within one week of discharge - you can call [phone] to make an appointment.    You must follow up with your nephrologist at dialysis on Monday, July 31, 2023 - at this time, you must discuss epogen dosing/frequency intra-dialysis for your anemia.    You can follow up with your gastroenterologist within 2-3 weeks of discharge.            APPTS ARE READY TO BE MADE: [x] YES    Best Family or Patient Contact (if needed):    Additional Information about above appointments (if needed):    1: Primary medical doctor  2: Hematologist  3: Nephrologist    Other comments or requests:

## 2023-07-29 NOTE — PROGRESS NOTE ADULT - NSPROGADDITIONALINFOA_GEN_ALL_CORE
Dr Siva Crouch  Nephrology Attending  [facility]  office [phone]  ans serv- [phone]  ms Team-Etienne
d/w acp    Taylor Napier MD  [facility] Division of Hospital Medicine  Available via MS Teams  Pager: [phone]
Taylor aNpier MD  [facility] Division of Hospital Medicine  Available via MS Teams  Pager: [phone]

## 2023-07-29 NOTE — PROGRESS NOTE ADULT - ASSESSMENT
77 year old female with symptomatic anemia and weight loss    1. anemia  CT A/P unrevealing  EGD/Colon 7/27 unremarkable  cont epo   no GI objection to d/c     2. ESRD on HD  per nephrology       [facility]  Lori Hallman M.D.   [address], [place]  Office: [phone]  
77 year old female with dark stools, symptomatic anemia, weight loss    1. anemia, dark stools, weight loss.   FOBT neg  CT A/P unrevealing  Not iron deficient.   for EGD/Colon today   epo per renal     2. ESRD on HD  per nephrology       [facility]  Lori Hallman M.D.   [address] [place], [place]  Office: [phone]  
77 year old female with symptomatic anemia and weight loss    1. anemia, weight loss  CT A/P unrevealing  EGD/Colon 7/27 unremarkable  consider hematology and nutrition consults    2. ESRD on HD  per nephrology       [facility]  Lori Hallman M.D.   [address] [place], [place]  Office: [phone]  
77F PMH ESRD on HD, gout, HTN, HLD, hypothyroidism, presents with several weeks of dark stools and found to have anemia with syncopal episode.    1) ESRD on HD  MWF  HD center [facility]  Left Wrist AVF + thrill  consent in chart  s/p Hd yesterday- tolerated well  Plan for next HD tomm  trend bmp    2) Anemia in ckd+ gib  f/u gi  epo 20k tiw with hd  trend hgb      Dr Brown  [phone]
77F PMH ESRD on HD, gout, HTN, HLD, hypothyroidism, presents with several weeks of dark stools and found to have anemia with syncopal episode.    1) ESRD on HD  MWF  HD center [facility]  Left Wrist AVF + thrill  consent in chart  s/p HD today- flowsheet reviewed- tolerated well- > uf was 1 kg   trend bmp    2) Anemia in ckd+ gib  f/u gi  epo 20k tiw with hd  trend hgb      Dr Brown  [phone]
77F PMH ESRD on HD, gout, HTN, HLD, hypothyroidism, presents with several weeks of dark stools and found to have anemia with syncopal episode.    1) ESRD on HD  MWF  HD center [facility]  Left Wrist AVF + thrill  consent in chart    Plan  next hemodialysis on monday  2 k bath, Ultrafiltration on Dialysis as tolerated with blood pressure     2) Anemia in ckd+ gib    Anemia Labs   Hemoglobin : 8.2 <--, 8.5 <--, 7.9 <--, 8.8 <--, 8.1 <--  Platelets : 169 <--, 156 <--, 164 <--  Ferritin : 3611 <--  Iron Saturation % : 55 <--  Folate : 17.1 <--  Vitamin b12 : 953 <--    Medications :  Recent JAMAR class Medication Administration as per chart:  epoetin cheryl (EPOGEN) Injectable: 73660 Unit(s) IV Push (07-28-23 @ 11:08)    Current orders :  epoetin cheryl (EPOGEN) Injectable 49752 Unit(s) IV Push <User Schedule>    - plan to titrate medication as per response of hemoglobin  - if Hb less than 7gm/dl consider blood transfusion  
77F PMH ESRD on HD, gout, HTN, HLD, hypothyroidism, presents with several weeks of dark stools and found to have anemia with syncopal episode. Now admitted for evaluation for suspected GI bleed.
77F PMH ESRD on HD, gout, HTN, HLD, hypothyroidism, presents with several weeks of dark stools and found to have anemia with syncopal episode. Now admitted for evaluation for suspected GI bleed.

## 2023-07-29 NOTE — DISCHARGE NOTE NURSING/CASE MANAGEMENT/SOCIAL WORK - NSDCPEFALRISK_GEN_ALL_CORE
For information on Fall & Injury Prevention, visit: [email or web address] OR  [email or web address] OR  https://www.cdc.gov/steadi/patient.html

## 2023-07-31 NOTE — CHART NOTE - NSCHARTNOTEFT_GEN_A_CORE
Brief GI note  --  Patient s/p EGD/Colon - no bleeding source identified. Suspect AOCD from ESRD.   No GI objection to d/c.
Left (1) message(s) for patient in regards to follow up care with callback information.
Request from Dr. Napier to facilitate patient discharge.  Medication reconciliation reviewed, revised, and resolved with Dr. Napier, who has medically cleared patient for discharge with follow up as advised.  Please refer to discharge note for detailed hospital course.

## 2023-10-16 ENCOUNTER — APPOINTMENT (OUTPATIENT)
Dept: VASCULAR SURGERY | Facility: CLINIC | Age: 78
End: 2023-10-16
Payer: MEDICARE

## 2023-10-16 PROCEDURE — 93990 DOPPLER FLOW TESTING: CPT

## 2023-10-16 PROCEDURE — 99213 OFFICE O/P EST LOW 20 MIN: CPT

## 2023-11-11 NOTE — ED PROVIDER NOTE - SECONDARY DIAGNOSIS.
#ARF resolved / pulmo edema - bumex on hold  < from: TTE Echo Complete w/o Contrast w/ Doppler (10.31.23 @ 09:39) >  Summary:   1. Patient with significant PVCs during study.   2. Normal global left ventricular systolic function.   3. LV Ejection Fraction by Adkins's Method with a biplane EF of 61 %.   4. The left ventricular diastolic function could not be assessed in this   study.   5. Normal right ventricular size and function.   6. Normal left atrial size.   7. Normal right atrial size.   8. Mild aortic valve stenosis.   9. Moderate to severe mitral annular calcification.  10. Moderate thickening and calcification of the anterior and posterior   mitral valve leaflets.  11. Mild mitral stenosis.  12. No evidence of mitral valve regurgitation.  13. Estimated pulmonary artery systolic pressure is 67.1 mmHg assuming a   right atrial pressure of 15 mmHg, which is consistent with severe   pulmonary hypertension.  14. There is no evidence of pericardial effusion.    < end of copied text >    #DKA - on insulin gtt  CAPILLARY BLOOD GLUCOSE      POCT Blood Glucose.: 297 mg/dL (11 Nov 2023 09:17)  POCT Blood Glucose.: 270 mg/dL (11 Nov 2023 08:09)  POCT Blood Glucose.: 235 mg/dL (11 Nov 2023 06:54)  POCT Blood Glucose.: 287 mg/dL (11 Nov 2023 05:32)  POCT Blood Glucose.: 304 mg/dL (11 Nov 2023 04:02)  POCT Blood Glucose.: 307 mg/dL (11 Nov 2023 02:11)  POCT Blood Glucose.: 316 mg/dL (11 Nov 2023 00:31)  POCT Blood Glucose.: 288 mg/dL (10 Nov 2023 23:17)  POCT Blood Glucose.: 276 mg/dL (10 Nov 2023 22:19)  POCT Blood Glucose.: 324 mg/dL (10 Nov 2023 20:56)  POCT Blood Glucose.: 331 mg/dL (10 Nov 2023 19:37)  POCT Blood Glucose.: 331 mg/dL (10 Nov 2023 18:38)  POCT Blood Glucose.: 268 mg/dL (10 Nov 2023 17:29)  POCT Blood Glucose.: 213 mg/dL (10 Nov 2023 14:11)  POCT Blood Glucose.: 217 mg/dL (10 Nov 2023 13:00)  POCT Blood Glucose.: 192 mg/dL (10 Nov 2023 12:18)  POCT Blood Glucose.: 166 mg/dL (10 Nov 2023 11:11)    #PARKER - new HD - unclear if RRT will be prolonged - follow up renal     DNR/I  follow up pulm cc recs Syncope Hyperkalemia Chronic kidney disease (CKD)

## 2024-01-29 ENCOUNTER — APPOINTMENT (OUTPATIENT)
Dept: VASCULAR SURGERY | Facility: CLINIC | Age: 79
End: 2024-01-29
Payer: MEDICARE

## 2024-01-29 PROCEDURE — 93990 DOPPLER FLOW TESTING: CPT

## 2024-01-29 PROCEDURE — 99214 OFFICE O/P EST MOD 30 MIN: CPT

## 2024-01-29 NOTE — PHYSICAL EXAM
[Normal] : normal rate, regular rhythm, normal S1/S2, no murmur [Thrill] : thrill [Hand well perfused] : hand well perfused [Warm Extremities] : warm extremities [2+] : left 2+ [Pulsatile Thrill] : no pulsatile thrill [Aneurysm] : no aneurysm [Bleeding] : no bleeding [de-identified] : Intact

## 2024-01-29 NOTE — DISCUSSION/SUMMARY
[FreeTextEntry1] : 78-year-old female with ESRD currently on hemodialysis via left radiocephalic AV fistula.  In the office today, patient underwent duplex which demonstrates well-functioning AV fistula with no evidence of stenosis.  Given the patient is without any present issue, we will continue to monitor.  Patient to follow-up in 3 to 4 months with repeat duplex.

## 2024-01-29 NOTE — HISTORY OF PRESENT ILLNESS
[] : left radiocephalic fistula [FreeTextEntry1] : 78-year-old female with ESRD currently on hemodialysis via left upper extremity AV fistula who presents to the office today for routine surveillance.  Patient denies any present issues with cannulation or prolonged bleeding.

## 2024-05-20 ENCOUNTER — APPOINTMENT (OUTPATIENT)
Dept: VASCULAR SURGERY | Facility: CLINIC | Age: 79
End: 2024-05-20
Payer: MEDICARE

## 2024-05-20 PROCEDURE — 93990 DOPPLER FLOW TESTING: CPT

## 2024-06-11 ENCOUNTER — APPOINTMENT (OUTPATIENT)
Age: 79
End: 2024-06-11

## 2024-08-20 NOTE — DISCHARGE NOTE PROVIDER - TIME SPENT: (MINUTES SPENT ON THE DISCHARGE SERVICE)
Pt presents to the ED with N/V for the past few days. Pt is 9 weeks pregnant.She has been seen for this before and given meds but they are not working.    36

## 2024-09-30 ENCOUNTER — APPOINTMENT (OUTPATIENT)
Dept: VASCULAR SURGERY | Facility: CLINIC | Age: 79
End: 2024-09-30
Payer: MEDICARE

## 2024-09-30 PROCEDURE — G2211 COMPLEX E/M VISIT ADD ON: CPT

## 2024-09-30 PROCEDURE — 93990 DOPPLER FLOW TESTING: CPT

## 2024-09-30 PROCEDURE — 99214 OFFICE O/P EST MOD 30 MIN: CPT

## 2024-09-30 NOTE — HISTORY OF PRESENT ILLNESS
[] : left radiocephalic fistula [FreeTextEntry1] : 79-year-old female with ESRD currently on hemodialysis via left upper extremity AV fistula who presents to the office today for routine surveillance.  Patient denies any present issues with cannulation or prolonged bleeding.

## 2024-09-30 NOTE — PHYSICAL EXAM
[Normal] : normal rate, regular rhythm, normal S1/S2, no murmur [Thrill] : thrill [Hand well perfused] : hand well perfused [Warm Extremities] : warm extremities [2+] : left 2+ [Pulsatile Thrill] : no pulsatile thrill [Aneurysm] : no aneurysm [Bleeding] : no bleeding [de-identified] : Intact

## 2024-09-30 NOTE — DISCUSSION/SUMMARY
[FreeTextEntry1] : 79-year-old female with ESRD currently on hemodialysis via left radiocephalic AV fistula.  In the office today, patient underwent duplex which demonstrates well-functioning AV fistula with no evidence of stenosis.  We will continue to monitor. No intervention is required at this time.  Patient to follow-up in 3 to 4 months with repeat duplex.

## 2024-11-03 NOTE — DISCHARGE NOTE PROVIDER - PROVIDER TOKENS
PROVIDER:[TOKEN:[[id number]],FOLLOWUP:[1 week]],PROVIDER:[TOKEN:[[id number]],FOLLOWUP:[1 month]],PROVIDER:[TOKEN:[[id number]],FOLLOWUP:[2 weeks]],PROVIDER:[TOKEN:[[id number]],FOLLOWUP:[2 weeks]] SEE MDM

## 2024-12-04 NOTE — CONSULT NOTE ADULT - SUBJECTIVE AND OBJECTIVE BOX
Addended by: ZOYA JERRY on: 12/4/2024 09:02 AM     Modules accepted: Orders     [facility]  Bogdan Johnson PA-C  [facility] [place] [address]  [phone]      Chief Complaint:  Patient is a 76y old  Female who presents with a chief complaint of syncope , cervical spine fracture (14 Feb 2022 13:53)      HPI: 75yo female pt with PMHx of CKD (Last K-5.3, Bun/Cr.-29/1.93 on 7/18/2020), Anemia, Gout, HTN, HLD, was transferred, on cervical collar, from [facility] c/o head/neck pain, C2 fracture s/p fall this morning with a syncopal episode. Pt stated she felt dizzy after urination in her bathroom at 8:30am and fell. +LOC and noticed severe headache/neck pain.   She was evaluated in [facility] and sent to ED for spine consult 2/2 C2 fracture.   Denies visual changes or N/V. Denies sensory changes or weakness to extremities. Denies CP/SOB/Palpitation/abd pain. Denies lower back pain. Denies urinary problems. Denies fever, chills, cough or congestion.    GI asked to consult for constipation. Pt seen with daughter at bedside. Pt on bed pan having BM at time of interview. Per pt and daughter pt has been constipated the past few days, Prior to today last BM was Saturday 2/12. Daughter also reports some blood in stool and on toilet paper with pts last BM. Pt does have a history of hemorrhoids. Currently pt without N/V/D, abdominal pain.    Allergies:  No Known Allergies      Medications:  acetaminophen     Tablet .. 650 milliGRAM(s) Oral every 6 hours PRN  allopurinol 100 milliGRAM(s) Oral daily  artificial  tears Solution 1 Drop(s) Both EYES four times a day  bisacodyl Suppository 10 milliGRAM(s) Rectal daily PRN  gabapentin Solution 50 milliGRAM(s) Oral two times a day  heparin   Injectable 5000 Unit(s) SubCutaneous every 8 hours  latanoprost 0.005% Ophthalmic Solution 1 Drop(s) Both EYES at bedtime  levothyroxine 25 MICROGram(s) Oral daily  midodrine. 5 milliGRAM(s) Oral <User Schedule>  polyethylene glycol 3350 17 Gram(s) Oral daily  sevelamer carbonate 800 milliGRAM(s) Oral three times a day with meals  simvastatin 40 milliGRAM(s) Oral at bedtime  sodium bicarbonate 650 milliGRAM(s) Oral two times a day  sodium chloride 0.45%. 250 milliLiter(s) IV Continuous <Continuous>  sodium chloride 0.9% Bolus 1000 milliLiter(s) IV Bolus once      PMHX/PSHX:  Anemia    HTN (hypertension)    HLD (hyperlipidemia)    H/O kidney injury    Cataract        Family history:      Social History:     ROS:     General:  No wt loss, fevers, chills, night sweats, fatigue,   Eyes:  Good vision, no reported pain  ENT:  No sore throat, pain, runny nose, dysphagia  CV:  No pain, palpitations, hypo/hypertension  Resp:  No dyspnea, cough, tachypnea, wheezing  GI:  No pain, No nausea, No vomiting, No diarrhea, + constipation, No weight loss, No fever, No pruritis, No rectal bleeding, No tarry stools, No dysphagia,  :  No pain, bleeding, incontinence, nocturia  Muscle:  No pain, weakness  Neuro:  No weakness, tingling, memory problems  Psych:  No fatigue, insomnia, mood problems, depression  Endocrine:  No polyuria, polydipsia, cold/heat intolerance  Heme:  No petechiae, ecchymosis, easy bruisability  Skin:  No rash, tattoos, scars, edema      PHYSICAL EXAM:   Vital Signs:  Vital Signs Last 24 Hrs  T(C): 36.6 (14 Feb 2022 08:16), Max: 37.1 (14 Feb 2022 04:42)  T(F): 97.8 (14 Feb 2022 08:16), Max: 98.7 (14 Feb 2022 04:42)  HR: 63 (14 Feb 2022 04:42) (63 - 72)  BP: 159/68 (14 Feb 2022 04:42) (146/66 - 181/76)  BP(mean): --  RR: 18 (14 Feb 2022 08:16) (18 - 18)  SpO2: 97% (14 Feb 2022 08:16) (97% - 100%)  Daily     Daily     GENERAL:  Appears stated age,   HEENT:  NC/AT,    CHEST:  Full & symmetric excursion,   HEART:  Regular rhythm  ABDOMEN:  Soft, non-tender, non-distended,   EXTEREMITIES:  no cyanosis,clubbing or edema  SKIN:  No rash  NEURO:  Alert,    LABS:                        8.1    12.51 )-----------( 269      ( 14 Feb 2022 10:09 )             27.6     02-13    139  |  104  |  48<H>  ----------------------------<  88  4.7   |  23  |  1.78<H>    Ca    8.6      13 Feb 2022 07:16                Imaging:

## 2025-01-13 ENCOUNTER — APPOINTMENT (OUTPATIENT)
Dept: VASCULAR SURGERY | Facility: CLINIC | Age: 80
End: 2025-01-13
Payer: MEDICARE

## 2025-01-13 PROCEDURE — 99214 OFFICE O/P EST MOD 30 MIN: CPT

## 2025-01-13 PROCEDURE — 93990 DOPPLER FLOW TESTING: CPT

## 2025-01-13 PROCEDURE — G2211 COMPLEX E/M VISIT ADD ON: CPT

## 2025-05-05 ENCOUNTER — APPOINTMENT (OUTPATIENT)
Dept: VASCULAR SURGERY | Facility: CLINIC | Age: 80
End: 2025-05-05

## 2025-05-20 ENCOUNTER — APPOINTMENT (OUTPATIENT)
Dept: ENDOVASCULAR SURGERY | Facility: CLINIC | Age: 80
End: 2025-05-20
Payer: MEDICARE

## 2025-05-20 ENCOUNTER — RESULT REVIEW (OUTPATIENT)
Age: 80
End: 2025-05-20

## 2025-05-20 ENCOUNTER — NON-APPOINTMENT (OUTPATIENT)
Age: 80
End: 2025-05-20

## 2025-05-20 VITALS
DIASTOLIC BLOOD PRESSURE: 68 MMHG | BODY MASS INDEX: 17.67 KG/M2 | TEMPERATURE: 97.7 F | RESPIRATION RATE: 18 BRPM | SYSTOLIC BLOOD PRESSURE: 173 MMHG | WEIGHT: 90 LBS | OXYGEN SATURATION: 96 % | HEART RATE: 70 BPM | HEIGHT: 60 IN

## 2025-05-20 DIAGNOSIS — T82.898A OTHER SPECIFIED COMPLICATION OF VASCULAR PROSTHETIC DEVICES, IMPLANTS AND GRAFTS, INITIAL ENCOUNTER: ICD-10-CM

## 2025-05-20 DIAGNOSIS — N18.6 END STAGE RENAL DISEASE: ICD-10-CM

## 2025-05-20 PROCEDURE — 99213 OFFICE O/P EST LOW 20 MIN: CPT | Mod: 25

## 2025-05-20 PROCEDURE — 36902Z: CUSTOM

## 2025-05-20 PROCEDURE — 36215Z: CUSTOM | Mod: 59

## 2025-05-20 RX ORDER — NIFEDIPINE 90 MG
90 TABLET, EXTENDED RELEASE ORAL
Refills: 0 | Status: ACTIVE | COMMUNITY

## 2025-05-20 RX ORDER — LOSARTAN POTASSIUM 100 MG/1
TABLET, FILM COATED ORAL
Refills: 0 | Status: ACTIVE | COMMUNITY

## 2025-05-20 RX ORDER — HYDRALAZINE HYDROCHLORIDE 50 MG/1
50 TABLET ORAL
Refills: 0 | Status: ACTIVE | COMMUNITY

## 2025-05-21 ENCOUNTER — APPOINTMENT (OUTPATIENT)
Dept: VASCULAR SURGERY | Facility: CLINIC | Age: 80
End: 2025-05-21

## 2025-07-08 ENCOUNTER — RESULT REVIEW (OUTPATIENT)
Age: 80
End: 2025-07-08

## 2025-07-08 ENCOUNTER — APPOINTMENT (OUTPATIENT)
Dept: ENDOVASCULAR SURGERY | Facility: CLINIC | Age: 80
End: 2025-07-08
Payer: MEDICARE

## 2025-07-08 VITALS
HEIGHT: 60 IN | BODY MASS INDEX: 17.67 KG/M2 | TEMPERATURE: 97.3 F | DIASTOLIC BLOOD PRESSURE: 53 MMHG | HEART RATE: 64 BPM | SYSTOLIC BLOOD PRESSURE: 165 MMHG | WEIGHT: 90 LBS | OXYGEN SATURATION: 97 % | RESPIRATION RATE: 16 BRPM

## 2025-07-08 PROCEDURE — 76937 US GUIDE VASCULAR ACCESS: CPT

## 2025-07-08 PROCEDURE — 99213 OFFICE O/P EST LOW 20 MIN: CPT | Mod: 25

## 2025-07-08 PROCEDURE — 36907Z: CUSTOM | Mod: 59

## 2025-07-08 PROCEDURE — 36902Z: CUSTOM

## 2025-07-08 RX ORDER — LABETALOL HYDROCHLORIDE 100 MG/1
100 TABLET, FILM COATED ORAL
Refills: 0 | Status: ACTIVE | COMMUNITY

## 2025-07-29 ENCOUNTER — EMERGENCY (EMERGENCY)
Facility: HOSPITAL | Age: 80
LOS: 1 days | End: 2025-07-29
Attending: EMERGENCY MEDICINE
Payer: MEDICARE

## 2025-07-29 VITALS
RESPIRATION RATE: 20 BRPM | HEART RATE: 61 BPM | SYSTOLIC BLOOD PRESSURE: 144 MMHG | TEMPERATURE: 99 F | OXYGEN SATURATION: 95 % | DIASTOLIC BLOOD PRESSURE: 63 MMHG

## 2025-07-29 VITALS
TEMPERATURE: 98 F | WEIGHT: 92.59 LBS | HEART RATE: 65 BPM | OXYGEN SATURATION: 97 % | RESPIRATION RATE: 17 BRPM | SYSTOLIC BLOOD PRESSURE: 128 MMHG | DIASTOLIC BLOOD PRESSURE: 69 MMHG | HEIGHT: 60 IN

## 2025-07-29 DIAGNOSIS — Z98.49 CATARACT EXTRACTION STATUS, UNSPECIFIED EYE: Chronic | ICD-10-CM

## 2025-07-29 LAB
ALBUMIN SERPL ELPH-MCNC: 4.9 G/DL — SIGNIFICANT CHANGE UP (ref 3.3–5)
ALP SERPL-CCNC: 47 U/L — SIGNIFICANT CHANGE UP (ref 40–120)
ALT FLD-CCNC: 16 U/L — SIGNIFICANT CHANGE UP (ref 10–45)
ANION GAP SERPL CALC-SCNC: 14 MMOL/L — SIGNIFICANT CHANGE UP (ref 5–17)
ANISOCYTOSIS BLD QL: ABNORMAL
AST SERPL-CCNC: 22 U/L — SIGNIFICANT CHANGE UP (ref 10–40)
BASOPHILS # BLD AUTO: 0.02 K/UL — SIGNIFICANT CHANGE UP (ref 0–0.2)
BASOPHILS # BLD MANUAL: 0 K/UL — SIGNIFICANT CHANGE UP (ref 0–0.2)
BASOPHILS NFR BLD AUTO: 0.5 % — SIGNIFICANT CHANGE UP (ref 0–2)
BASOPHILS NFR BLD MANUAL: 0 % — SIGNIFICANT CHANGE UP (ref 0–2)
BILIRUB SERPL-MCNC: 0.3 MG/DL — SIGNIFICANT CHANGE UP (ref 0.2–1.2)
BUN SERPL-MCNC: 9 MG/DL — SIGNIFICANT CHANGE UP (ref 7–23)
CALCIUM SERPL-MCNC: 10.9 MG/DL — HIGH (ref 8.4–10.5)
CHLORIDE SERPL-SCNC: 94 MMOL/L — LOW (ref 96–108)
CO2 SERPL-SCNC: 30 MMOL/L — SIGNIFICANT CHANGE UP (ref 22–31)
CREAT SERPL-MCNC: 3.63 MG/DL — HIGH (ref 0.5–1.3)
DACRYOCYTES BLD QL SMEAR: SLIGHT — SIGNIFICANT CHANGE UP
EGFR: 12 ML/MIN/1.73M2 — LOW
EGFR: 12 ML/MIN/1.73M2 — LOW
EOSINOPHIL # BLD AUTO: 0.13 K/UL — SIGNIFICANT CHANGE UP (ref 0–0.5)
EOSINOPHIL # BLD MANUAL: 0.04 K/UL — SIGNIFICANT CHANGE UP (ref 0–0.5)
EOSINOPHIL NFR BLD AUTO: 3 % — SIGNIFICANT CHANGE UP (ref 0–6)
EOSINOPHIL NFR BLD MANUAL: 0.9 % — SIGNIFICANT CHANGE UP (ref 0–6)
FLUAV AG NPH QL: SIGNIFICANT CHANGE UP
FLUBV AG NPH QL: SIGNIFICANT CHANGE UP
GAS PNL BLDV: SIGNIFICANT CHANGE UP
GLUCOSE SERPL-MCNC: 109 MG/DL — HIGH (ref 70–99)
HCT VFR BLD CALC: 33 % — LOW (ref 34.5–45)
HGB BLD-MCNC: 9.9 G/DL — LOW (ref 11.5–15.5)
IMM GRANULOCYTES # BLD AUTO: 0.03 K/UL — SIGNIFICANT CHANGE UP (ref 0–0.07)
IMM GRANULOCYTES NFR BLD AUTO: 0.7 % — SIGNIFICANT CHANGE UP (ref 0–0.9)
LIDOCAIN IGE QN: 165 U/L — HIGH (ref 7–60)
LYMPHOCYTES # BLD AUTO: 1.05 K/UL — SIGNIFICANT CHANGE UP (ref 1–3.3)
LYMPHOCYTES # BLD MANUAL: 1.29 K/UL — SIGNIFICANT CHANGE UP (ref 1–3.3)
LYMPHOCYTES NFR BLD AUTO: 24.4 % — SIGNIFICANT CHANGE UP (ref 13–44)
LYMPHOCYTES NFR BLD MANUAL: 29.9 % — SIGNIFICANT CHANGE UP (ref 13–44)
MACROCYTES BLD QL: ABNORMAL
MAGNESIUM SERPL-MCNC: 2.2 MG/DL — SIGNIFICANT CHANGE UP (ref 1.6–2.6)
MANUAL NRBC #: 0.26 K/UL — HIGH (ref 0–0)
MCHC RBC-ENTMCNC: 26.4 PG — LOW (ref 27–34)
MCHC RBC-ENTMCNC: 30 G/DL — LOW (ref 32–36)
MCV RBC AUTO: 88 FL — SIGNIFICANT CHANGE UP (ref 80–100)
MONOCYTES # BLD AUTO: 0.69 K/UL — SIGNIFICANT CHANGE UP (ref 0–0.9)
MONOCYTES # BLD MANUAL: 0.29 K/UL — SIGNIFICANT CHANGE UP (ref 0–0.9)
MONOCYTES NFR BLD AUTO: 16 % — HIGH (ref 2–14)
MONOCYTES NFR BLD MANUAL: 6.8 % — SIGNIFICANT CHANGE UP (ref 2–14)
NEUTROPHILS # BLD AUTO: 2.39 K/UL — SIGNIFICANT CHANGE UP (ref 1.8–7.4)
NEUTROPHILS # BLD MANUAL: 2.69 K/UL — SIGNIFICANT CHANGE UP (ref 1.8–7.4)
NEUTROPHILS NFR BLD AUTO: 55.4 % — SIGNIFICANT CHANGE UP (ref 43–77)
NEUTROPHILS NFR BLD MANUAL: 62.4 % — SIGNIFICANT CHANGE UP (ref 43–77)
NRBC # BLD AUTO: 0.14 K/UL — HIGH (ref 0–0)
NRBC # BLD: 6 /100 WBCS — HIGH (ref 0–0)
NRBC # FLD: 0.14 K/UL — HIGH (ref 0–0)
NRBC BLD AUTO-RTO: 3 /100 WBCS — HIGH (ref 0–0)
NRBC BLD-RTO: 6 /100 WBCS — HIGH (ref 0–0)
OVALOCYTES BLD QL SMEAR: SLIGHT — SIGNIFICANT CHANGE UP
PLAT MORPH BLD: NORMAL — SIGNIFICANT CHANGE UP
PLATELET # BLD AUTO: 177 K/UL — SIGNIFICANT CHANGE UP (ref 150–400)
PMV BLD: 9.2 FL — SIGNIFICANT CHANGE UP (ref 7–13)
POIKILOCYTOSIS BLD QL AUTO: SLIGHT — SIGNIFICANT CHANGE UP
POLYCHROMASIA BLD QL SMEAR: SLIGHT — SIGNIFICANT CHANGE UP
POTASSIUM SERPL-MCNC: 4.1 MMOL/L — SIGNIFICANT CHANGE UP (ref 3.5–5.3)
POTASSIUM SERPL-SCNC: 4.1 MMOL/L — SIGNIFICANT CHANGE UP (ref 3.5–5.3)
PROT SERPL-MCNC: 8.8 G/DL — HIGH (ref 6–8.3)
RBC # BLD: 3.75 M/UL — LOW (ref 3.8–5.2)
RBC # FLD: 17.4 % — HIGH (ref 10.3–14.5)
RBC BLD AUTO: ABNORMAL
RSV RNA NPH QL NAA+NON-PROBE: SIGNIFICANT CHANGE UP
SARS-COV-2 RNA SPEC QL NAA+PROBE: SIGNIFICANT CHANGE UP
SODIUM SERPL-SCNC: 138 MMOL/L — SIGNIFICANT CHANGE UP (ref 135–145)
SOURCE RESPIRATORY: SIGNIFICANT CHANGE UP
TROPONIN T, HIGH SENSITIVITY RESULT: 49 NG/L — SIGNIFICANT CHANGE UP (ref 0–51)
WBC # BLD: 4.31 K/UL — SIGNIFICANT CHANGE UP (ref 3.8–10.5)
WBC # FLD AUTO: 4.31 K/UL — SIGNIFICANT CHANGE UP (ref 3.8–10.5)

## 2025-07-29 PROCEDURE — 82330 ASSAY OF CALCIUM: CPT

## 2025-07-29 PROCEDURE — 84132 ASSAY OF SERUM POTASSIUM: CPT

## 2025-07-29 PROCEDURE — 71045 X-RAY EXAM CHEST 1 VIEW: CPT | Mod: 26

## 2025-07-29 PROCEDURE — 84484 ASSAY OF TROPONIN QUANT: CPT

## 2025-07-29 PROCEDURE — 85025 COMPLETE CBC W/AUTO DIFF WBC: CPT

## 2025-07-29 PROCEDURE — 36000 PLACE NEEDLE IN VEIN: CPT

## 2025-07-29 PROCEDURE — 87637 SARSCOV2&INF A&B&RSV AMP PRB: CPT

## 2025-07-29 PROCEDURE — 99285 EMERGENCY DEPT VISIT HI MDM: CPT

## 2025-07-29 PROCEDURE — 80053 COMPREHEN METABOLIC PANEL: CPT

## 2025-07-29 PROCEDURE — 82803 BLOOD GASES ANY COMBINATION: CPT

## 2025-07-29 PROCEDURE — 85014 HEMATOCRIT: CPT

## 2025-07-29 PROCEDURE — 70450 CT HEAD/BRAIN W/O DYE: CPT

## 2025-07-29 PROCEDURE — 85018 HEMOGLOBIN: CPT

## 2025-07-29 PROCEDURE — 84295 ASSAY OF SERUM SODIUM: CPT

## 2025-07-29 PROCEDURE — 82947 ASSAY GLUCOSE BLOOD QUANT: CPT

## 2025-07-29 PROCEDURE — 70450 CT HEAD/BRAIN W/O DYE: CPT | Mod: 26

## 2025-07-29 PROCEDURE — 93005 ELECTROCARDIOGRAM TRACING: CPT

## 2025-07-29 PROCEDURE — 93010 ELECTROCARDIOGRAM REPORT: CPT

## 2025-07-29 PROCEDURE — 83735 ASSAY OF MAGNESIUM: CPT

## 2025-07-29 PROCEDURE — 83605 ASSAY OF LACTIC ACID: CPT

## 2025-07-29 PROCEDURE — 71045 X-RAY EXAM CHEST 1 VIEW: CPT

## 2025-07-29 PROCEDURE — 82435 ASSAY OF BLOOD CHLORIDE: CPT

## 2025-07-29 PROCEDURE — 96374 THER/PROPH/DIAG INJ IV PUSH: CPT

## 2025-07-29 PROCEDURE — 83690 ASSAY OF LIPASE: CPT

## 2025-07-29 PROCEDURE — 99284 EMERGENCY DEPT VISIT MOD MDM: CPT | Mod: 25

## 2025-07-29 RX ORDER — ACETAMINOPHEN 500 MG/5ML
625 LIQUID (ML) ORAL ONCE
Refills: 0 | Status: COMPLETED | OUTPATIENT
Start: 2025-07-29 | End: 2025-07-29

## 2025-07-29 RX ADMIN — Medication 250 MILLIGRAM(S): at 20:17

## 2025-07-29 NOTE — ED PROVIDER NOTE - NSFOLLOWUPINSTRUCTIONS_ED_ALL_ED_FT
Continue all medications as prescribed    Continue your dialysis as per routine    Follow-up with your primary care or your nephrologist mention to them that you were in the emergency department and you need to have a urine study done    If you have any new or worsening complaints please come back to the emergency department immediately

## 2025-07-29 NOTE — ED PROVIDER NOTE - CLINICAL SUMMARY MEDICAL DECISION MAKING FREE TEXT BOX
Patient informed/Family informed/Explanation of wait/Warm blanket/Incontinence care/Pillow
See attending statement.

## 2025-07-29 NOTE — ED PROVIDER NOTE - ATTENDING APP SHARED VISIT CONTRIBUTION OF CARE
Patient is a 79-year-old female with a history of hypertension, hyperlipidemia, ESRD on HD M/W/F, called by her  physician for lab work that showed elevated potassium of 6.2 yesterday.  Patient's daughter is at bedside.  She states her mother had dialysis after that blood was drawn.  However, as a precaution, patient had another dialysis session today.  Patient reports that she makes urine.  Patient complains that she has had heaviness in her head and has felt dizzy.  She denies any focal weakness.  No visual changes.  No fevers, chills, nausea or vomiting.  No chest pain or shortness of breath.      VS noted  Gen. no acute distress, Non toxic   HEENT:  PERRL,  right eye with small subconjunctival hemorrhage, EOMI, mmm  Lungs: CTAB/L no C/ W /R   CVS: RRR   Abd; Soft non tender, non distended   Ext: no edema  Skin: no rash  Neuro AAOx3, CN II-XII intact, strength is 5/5 in UE/LE, finger-to-nose normal, no pronator drift, clear speech  a/p:  headache–described as a heaviness.  Patient is neuro intact.  Plan for Tylenol, labs, CT head and reassess.  - Hugo SAENZ

## 2025-07-29 NOTE — ED PROVIDER NOTE - PROGRESS NOTE DETAILS
Still pending urinalysis however patient has been able to pee since being in the emergency department.  Patient denies any dysuria frequency hesitancy or any urinary complaints.  Does not wish to stay for the test neither does the daughter.  I believe that this is reasonable and patient can follow-up as an outpatient with her primary care or her nephrology team at which point they can get a urinalysis and urine culture.  Discussed return precautions and need for close follow-up with her primary care/nephrology team.  Usha

## 2025-07-29 NOTE — ED PROVIDER NOTE - PATIENT PORTAL LINK FT
You can access the FollowMyHealth Patient Portal offered by [facility] by registering at the following website: [email or web address]. By joining [employer]’s FollowMyHealth portal, you will also be able to view your health information using other applications (apps) compatible with our system.

## 2025-07-29 NOTE — ED PROVIDER NOTE - CONSTITUTIONAL, MLM
TIRED appearing, awake, alert, oriented to person, place, time/situation and in no apparent distress. normal...

## 2025-07-29 NOTE — ED ADULT NURSE NOTE - OBJECTIVE STATEMENT
79yF, A&Ox4, independent, PMH ESRD on dialysis via LUE fistula on M/W/F, presents to the ED for elevated potassium on outpatient lab work. Pt was called by her dr and was told her potassium was 6.2. Pt instructed to go for extra dialysis session this morning. Pt was brought in to check the potassium level. Pt also endorsing feeling fatigued and lightheaded x1 week. Pt denies LOC at any point. Gross neuro intact, no difficulty speaking in complete sentences, pulses x 4, moving all extremities, abdomen soft nontender nondistended, skin intact. Pt denies fevers/chills, numbness/tingling, vision changes, cp, sob, cough, abd pain, n/v/d, dysuria, hematuria, bloody stools, back pain.

## 2025-07-29 NOTE — ED ADULT TRIAGE NOTE - CHIEF COMPLAINT QUOTE
Pt/daughter states " sent in for elevated K+ 6.2 drawn yesterday. + fullness to head on and off x 1 week."

## 2025-07-29 NOTE — ED PROVIDER NOTE - OBJECTIVE STATEMENT
79-year-old female end-stage renal disease on dialysis Monday Wednesday Friday was called by her primary for elevated potassium at 6.2 yesterday.  Subsequent dialysis session was set up this morning for which she completed, unsure if the potassium was fix but states that was the nature of the appointment.  Patient is brought in by her daughter to recheck potassium and also because mother has complaining about being lightheaded and fatigue over the past week.  Also endorsing a headache no trauma no head injury no visual changes no nausea no vomiting.  No fevers no chills decreased p.o. intake secondary to not feeling well.  Does make some urine no urinary complaints no diarrhea.  No cough no upper respiratory complaints

## (undated) DEVICE — BITE BLOCK ADULT 20 X 27MM (GREEN)

## (undated) DEVICE — SOL IRR POUR H2O 1500ML

## (undated) DEVICE — LAP PAD W RING 18 X 18"

## (undated) DEVICE — PACK AV FISTULA

## (undated) DEVICE — CATH IV SAFE BC 20G X 1.16" (PINK)

## (undated) DEVICE — DRSG KLING 6"

## (undated) DEVICE — DRSG TEGADERM 4X4.75"

## (undated) DEVICE — DRSG STERISTRIPS 0.5 X 4"

## (undated) DEVICE — SUCTION YANKAUER NO CONTROL VENT

## (undated) DEVICE — SUT BIOSYN 4-0 18" P-12

## (undated) DEVICE — BRUSH COLONOSCOPY CYTOLOGY

## (undated) DEVICE — SENSOR O2 FINGER ADULT

## (undated) DEVICE — SYR LUER LOK 5CC

## (undated) DEVICE — TUBING SUCTION CONN 6FT STERILE

## (undated) DEVICE — PACK IV START WITH CHG

## (undated) DEVICE — CATH IV SAFE BC 22G X 1" (BLUE)

## (undated) DEVICE — IRRIGATOR BIO SHIELD

## (undated) DEVICE — TUBING IV SET GRAVITY 3Y 100" MACRO

## (undated) DEVICE — SUT MONOSOF 4-0 18" P-12

## (undated) DEVICE — SUT SOFSILK 2-0 18" TIES

## (undated) DEVICE — ELCTR GROUNDING PAD ADULT COVIDIEN

## (undated) DEVICE — SYR LUER LOK 50CC

## (undated) DEVICE — DRSG KERLIX ROLL 4.5"

## (undated) DEVICE — SUT SOFSILK 4-0 18" TIES

## (undated) DEVICE — SYR ALLIANCE II INFLATION 60ML

## (undated) DEVICE — VENODYNE/SCD SLEEVE CALF MEDIUM

## (undated) DEVICE — CLAMP BX HOT RAD JAW 3

## (undated) DEVICE — SOL IRR POUR NS 0.9% 1500ML

## (undated) DEVICE — POLY TRAP ETRAP

## (undated) DEVICE — BIOPSY FORCEP RADIAL JAW 4 STANDARD WITH NEEDLE

## (undated) DEVICE — SOL INJ NS 0.9% 500ML 2 PORT

## (undated) DEVICE — FOLEY HOLDER STATLOCK 2 WAY ADULT

## (undated) DEVICE — SUT SURGIPRO II 6-0 30" CV-1 DA

## (undated) DEVICE — FOR-ESU VALLEYLAB T7E14842DX: Type: DURABLE MEDICAL EQUIPMENT

## (undated) DEVICE — DRSG CURITY GAUZE SPONGE 4 X 4" 12-PLY

## (undated) DEVICE — SUT POLYSORB 3-0 30" V-20 UNDYED

## (undated) DEVICE — TUBING SUCTION 20FT

## (undated) DEVICE — SUT SOFSILK 3-0 18" TIES

## (undated) DEVICE — FORCEP RADIAL JAW 4 JUMBO 2.8MM 3.2MM 240CM ORANGE DISP

## (undated) DEVICE — BALLOON US ENDO